# Patient Record
Sex: MALE | Race: WHITE | NOT HISPANIC OR LATINO | Employment: FULL TIME | URBAN - METROPOLITAN AREA
[De-identification: names, ages, dates, MRNs, and addresses within clinical notes are randomized per-mention and may not be internally consistent; named-entity substitution may affect disease eponyms.]

---

## 2017-03-18 ENCOUNTER — HOSPITAL ENCOUNTER (INPATIENT)
Facility: HOSPITAL | Age: 62
LOS: 19 days | Discharge: NURSING, REHAB OR LONG-TERM CARE | DRG: 023 | End: 2017-04-06
Attending: EMERGENCY MEDICINE | Admitting: PSYCHIATRY & NEUROLOGY
Payer: COMMERCIAL

## 2017-03-18 ENCOUNTER — TELEMEDICINE (OUTPATIENT)
Dept: TELEMEDICINE | Facility: OTHER | Age: 62
End: 2017-03-18
Payer: COMMERCIAL

## 2017-03-18 DIAGNOSIS — I82.411 DVT OF DEEP FEMORAL VEIN, RIGHT: ICD-10-CM

## 2017-03-18 DIAGNOSIS — Q23.9 ABNORMALITY OF AORTIC VALVE: ICD-10-CM

## 2017-03-18 DIAGNOSIS — I63.512 ACUTE ISCHEMIC LEFT MCA STROKE: Primary | ICD-10-CM

## 2017-03-18 DIAGNOSIS — R53.1 RIGHT SIDED WEAKNESS: ICD-10-CM

## 2017-03-18 DIAGNOSIS — R13.10 DYSPHAGIA, UNSPECIFIED TYPE: ICD-10-CM

## 2017-03-18 DIAGNOSIS — G93.6 CYTOTOXIC CEREBRAL EDEMA: ICD-10-CM

## 2017-03-18 DIAGNOSIS — I63.412 EMBOLIC STROKE INVOLVING LEFT MIDDLE CEREBRAL ARTERY: ICD-10-CM

## 2017-03-18 DIAGNOSIS — R53.1 ACUTE RIGHT-SIDED WEAKNESS: ICD-10-CM

## 2017-03-18 DIAGNOSIS — I10 ESSENTIAL HYPERTENSION: ICD-10-CM

## 2017-03-18 DIAGNOSIS — I63.312 THROMBOTIC STROKE INVOLVING LEFT MIDDLE CEREBRAL ARTERY: ICD-10-CM

## 2017-03-18 DIAGNOSIS — R47.01 APHASIA: ICD-10-CM

## 2017-03-18 LAB
ABO + RH BLD: NORMAL
ALBUMIN SERPL BCP-MCNC: 3.4 G/DL
ALLENS TEST: ABNORMAL
ALP SERPL-CCNC: 77 U/L
ALT SERPL W/O P-5'-P-CCNC: 14 U/L
ANION GAP SERPL CALC-SCNC: 9 MMOL/L
APTT BLDCRRT: 28.5 SEC
AST SERPL-CCNC: 20 U/L
BASOPHILS # BLD AUTO: 0.05 K/UL
BASOPHILS NFR BLD: 0.4 %
BILIRUB SERPL-MCNC: 0.7 MG/DL
BLD GP AB SCN CELLS X3 SERPL QL: NORMAL
BUN SERPL-MCNC: 16 MG/DL
CALCIUM SERPL-MCNC: 8.8 MG/DL
CHLORIDE SERPL-SCNC: 108 MMOL/L
CHOLEST/HDLC SERPL: 4.9 {RATIO}
CO2 SERPL-SCNC: 22 MMOL/L
CREAT SERPL-MCNC: 1 MG/DL
DELSYS: ABNORMAL
DIFFERENTIAL METHOD: ABNORMAL
EOSINOPHIL # BLD AUTO: 0.1 K/UL
EOSINOPHIL NFR BLD: 1.1 %
ERYTHROCYTE [DISTWIDTH] IN BLOOD BY AUTOMATED COUNT: 13.5 %
EST. GFR  (AFRICAN AMERICAN): >60 ML/MIN/1.73 M^2
EST. GFR  (NON AFRICAN AMERICAN): >60 ML/MIN/1.73 M^2
GLUCOSE SERPL-MCNC: 83 MG/DL
HCO3 UR-SCNC: 19.3 MMOL/L (ref 24–28)
HCT VFR BLD AUTO: 44.7 %
HDL/CHOLESTEROL RATIO: 20.6 %
HDLC SERPL-MCNC: 175 MG/DL
HDLC SERPL-MCNC: 36 MG/DL
HGB BLD-MCNC: 15.6 G/DL
INR PPP: 1.1
LDLC SERPL CALC-MCNC: 123.8 MG/DL
LYMPHOCYTES # BLD AUTO: 2.5 K/UL
LYMPHOCYTES NFR BLD: 22.4 %
MCH RBC QN AUTO: 31.8 PG
MCHC RBC AUTO-ENTMCNC: 34.9 %
MCV RBC AUTO: 91 FL
MONOCYTES # BLD AUTO: 1.2 K/UL
MONOCYTES NFR BLD: 10.6 %
NEUTROPHILS # BLD AUTO: 7.4 K/UL
NEUTROPHILS NFR BLD: 65.3 %
NONHDLC SERPL-MCNC: 139 MG/DL
PCO2 BLDA: 32.3 MMHG (ref 35–45)
PH SMN: 7.38 [PH] (ref 7.35–7.45)
PLATELET # BLD AUTO: 234 K/UL
PMV BLD AUTO: 10.3 FL
PO2 BLDA: 72 MMHG (ref 80–100)
POC BE: -6 MMOL/L
POC SATURATED O2: 94 % (ref 95–100)
POC TCO2: 20 MMOL/L (ref 23–27)
POCT GLUCOSE: 93 MG/DL (ref 70–110)
POTASSIUM SERPL-SCNC: 4 MMOL/L
PROT SERPL-MCNC: 6.5 G/DL
PROTHROMBIN TIME: 11.5 SEC
RBC # BLD AUTO: 4.91 M/UL
SAMPLE: ABNORMAL
SITE: ABNORMAL
SODIUM SERPL-SCNC: 139 MMOL/L
T4 FREE SERPL-MCNC: 1.24 NG/DL
TRIGL SERPL-MCNC: 76 MG/DL
TSH SERPL DL<=0.005 MIU/L-ACNC: 0.17 UIU/ML
WBC # BLD AUTO: 11.34 K/UL

## 2017-03-18 PROCEDURE — 80053 COMPREHEN METABOLIC PANEL: CPT

## 2017-03-18 PROCEDURE — 81003 URINALYSIS AUTO W/O SCOPE: CPT

## 2017-03-18 PROCEDURE — 25000003 PHARM REV CODE 250: Performed by: PHYSICIAN ASSISTANT

## 2017-03-18 PROCEDURE — 93005 ELECTROCARDIOGRAM TRACING: CPT

## 2017-03-18 PROCEDURE — 25500020 PHARM REV CODE 255: Performed by: EMERGENCY MEDICINE

## 2017-03-18 PROCEDURE — 99245 OFF/OP CONSLTJ NEW/EST HI 55: CPT | Mod: GT,,, | Performed by: PSYCHIATRY & NEUROLOGY

## 2017-03-18 PROCEDURE — 96365 THER/PROPH/DIAG IV INF INIT: CPT

## 2017-03-18 PROCEDURE — 037L3DZ DILATION OF LEFT INTERNAL CAROTID ARTERY WITH INTRALUMINAL DEVICE, PERCUTANEOUS APPROACH: ICD-10-PCS | Performed by: PSYCHIATRY & NEUROLOGY

## 2017-03-18 PROCEDURE — 93010 ELECTROCARDIOGRAM REPORT: CPT | Mod: ,,, | Performed by: INTERNAL MEDICINE

## 2017-03-18 PROCEDURE — 83036 HEMOGLOBIN GLYCOSYLATED A1C: CPT

## 2017-03-18 PROCEDURE — 99291 CRITICAL CARE FIRST HOUR: CPT | Mod: ,,, | Performed by: EMERGENCY MEDICINE

## 2017-03-18 PROCEDURE — 85610 PROTHROMBIN TIME: CPT

## 2017-03-18 PROCEDURE — 20000000 HC ICU ROOM

## 2017-03-18 PROCEDURE — 85730 THROMBOPLASTIN TIME PARTIAL: CPT

## 2017-03-18 PROCEDURE — 86900 BLOOD TYPING SEROLOGIC ABO: CPT

## 2017-03-18 PROCEDURE — 80061 LIPID PANEL: CPT

## 2017-03-18 PROCEDURE — 99291 CRITICAL CARE FIRST HOUR: CPT | Mod: 25

## 2017-03-18 PROCEDURE — 36600 WITHDRAWAL OF ARTERIAL BLOOD: CPT

## 2017-03-18 PROCEDURE — 82803 BLOOD GASES ANY COMBINATION: CPT

## 2017-03-18 PROCEDURE — 99223 1ST HOSP IP/OBS HIGH 75: CPT | Mod: ,,, | Performed by: PSYCHIATRY & NEUROLOGY

## 2017-03-18 PROCEDURE — 94760 N-INVAS EAR/PLS OXIMETRY 1: CPT

## 2017-03-18 PROCEDURE — 86901 BLOOD TYPING SEROLOGIC RH(D): CPT

## 2017-03-18 PROCEDURE — 84439 ASSAY OF FREE THYROXINE: CPT

## 2017-03-18 PROCEDURE — 03CG3ZZ EXTIRPATION OF MATTER FROM INTRACRANIAL ARTERY, PERCUTANEOUS APPROACH: ICD-10-PCS | Performed by: PSYCHIATRY & NEUROLOGY

## 2017-03-18 PROCEDURE — 96375 TX/PRO/DX INJ NEW DRUG ADDON: CPT

## 2017-03-18 PROCEDURE — 99291 CRITICAL CARE FIRST HOUR: CPT | Mod: ,,, | Performed by: NURSE PRACTITIONER

## 2017-03-18 PROCEDURE — 25000003 PHARM REV CODE 250: Performed by: NURSE PRACTITIONER

## 2017-03-18 PROCEDURE — 85025 COMPLETE CBC W/AUTO DIFF WBC: CPT

## 2017-03-18 PROCEDURE — 63600175 PHARM REV CODE 636 W HCPCS: Performed by: PSYCHIATRY & NEUROLOGY

## 2017-03-18 PROCEDURE — B3141ZZ FLUOROSCOPY OF LEFT COMMON CAROTID ARTERY USING LOW OSMOLAR CONTRAST: ICD-10-PCS | Performed by: PSYCHIATRY & NEUROLOGY

## 2017-03-18 PROCEDURE — 84443 ASSAY THYROID STIM HORMONE: CPT

## 2017-03-18 RX ORDER — SODIUM CHLORIDE 9 MG/ML
INJECTION, SOLUTION INTRAVENOUS CONTINUOUS
Status: DISCONTINUED | OUTPATIENT
Start: 2017-03-18 | End: 2017-03-19

## 2017-03-18 RX ORDER — POLYETHYLENE GLYCOL 3350 17 G/17G
17 POWDER, FOR SOLUTION ORAL DAILY
Status: DISCONTINUED | OUTPATIENT
Start: 2017-03-19 | End: 2017-03-20

## 2017-03-18 RX ORDER — MIDAZOLAM HYDROCHLORIDE 1 MG/ML
INJECTION, SOLUTION INTRAMUSCULAR; INTRAVENOUS CODE/TRAUMA/SEDATION MEDICATION
Status: COMPLETED | OUTPATIENT
Start: 2017-03-18 | End: 2017-03-18

## 2017-03-18 RX ORDER — IODIXANOL 320 MG/ML
100 INJECTION, SOLUTION INTRAVASCULAR
Status: COMPLETED | OUTPATIENT
Start: 2017-03-18 | End: 2017-03-18

## 2017-03-18 RX ORDER — AMOXICILLIN 250 MG
1 CAPSULE ORAL 2 TIMES DAILY
Status: DISCONTINUED | OUTPATIENT
Start: 2017-03-18 | End: 2017-03-20

## 2017-03-18 RX ORDER — SODIUM CHLORIDE 0.9 % (FLUSH) 0.9 %
3 SYRINGE (ML) INJECTION EVERY 8 HOURS
Status: DISCONTINUED | OUTPATIENT
Start: 2017-03-18 | End: 2017-04-06 | Stop reason: HOSPADM

## 2017-03-18 RX ORDER — HYDRALAZINE HYDROCHLORIDE 20 MG/ML
10 INJECTION INTRAMUSCULAR; INTRAVENOUS EVERY 4 HOURS PRN
Status: DISCONTINUED | OUTPATIENT
Start: 2017-03-18 | End: 2017-03-27

## 2017-03-18 RX ORDER — NICARDIPINE HYDROCHLORIDE 0.2 MG/ML
2.5 INJECTION INTRAVENOUS CONTINUOUS
Status: DISCONTINUED | OUTPATIENT
Start: 2017-03-18 | End: 2017-03-20

## 2017-03-18 RX ORDER — ONDANSETRON 2 MG/ML
4 INJECTION INTRAMUSCULAR; INTRAVENOUS EVERY 12 HOURS PRN
Status: DISCONTINUED | OUTPATIENT
Start: 2017-03-18 | End: 2017-04-06 | Stop reason: HOSPADM

## 2017-03-18 RX ADMIN — IOHEXOL 100 ML: 350 INJECTION, SOLUTION INTRAVENOUS at 04:03

## 2017-03-18 RX ADMIN — SODIUM CHLORIDE: 0.9 INJECTION, SOLUTION INTRAVENOUS at 05:03

## 2017-03-18 RX ADMIN — MIDAZOLAM HYDROCHLORIDE 1 MG: 1 INJECTION, SOLUTION INTRAMUSCULAR; INTRAVENOUS at 05:03

## 2017-03-18 RX ADMIN — NICARDIPINE HYDROCHLORIDE 2.5 MG/HR: 0.2 INJECTION, SOLUTION INTRAVENOUS at 05:03

## 2017-03-18 RX ADMIN — IODIXANOL 60 ML: 320 INJECTION, SOLUTION INTRAVASCULAR at 05:03

## 2017-03-18 NOTE — PROGRESS NOTES
Assumed care of patient in  from ED RN. Pt placed on IR table and Dr. Naidu at bedside. Connected to IR monitor and oxygen per IR nurse. Will continue to monitor patient.

## 2017-03-18 NOTE — PROGRESS NOTES
IR procedures completed. Right groin dressing noted to be clean dry and intact. Pulses palpable. Per Dr. Naidu, systolic blood pressure to be controlled less than 140. Cardene drip started at 1720. Pt connected to portable ICU monitor and placed on ICU bed. Pt transported back to [facility] and brought to room 7088. Report given to ADINA Whitaker. CAROLINE Limon with [facility] notified of patient's arrival.

## 2017-03-18 NOTE — PROCEDURES
Radiology Post-Procedure Note    Pre Op Diagnosis: L ICA stenosis and MCA occlusion    Post Op Diagnosis: no stenosis of L ICA (stent) and TICI 3 L MCA    Procedure: Cerebral angiogram, carotid stent and aspiration thrombectomy    Procedure performed by: Guillaume Naidu MD    Written Informed Consent Obtained: Yes    Specimen Removed: YES clot    Estimated Blood Loss: 200     Procedure report:     A 8F sheath was placed into the right femoral artery and a 5F Ken catheter was advanced into the aortic arch.  The left common/internal arteries were subselected and angiography of the brain was performed after injection into each of these vessels.    Preliminary interpretation: L ICA stenosis < 90% and L MCA occlusion, L ICA stenosis treated to 0% stenosis w angioplasty and stent; L MCA occlusion treated to TICI3 w aspiration.  Please see Imaging report for full details.    A right femoral artery angiogram was performed, the sheath removed and hemostasis achieved using angioseal.  No hematoma was present at the time of hemostasis.    The patient tolerated the procedure well.     Guillaume Naidu MD  Vascular and Interventional Neurology Staff  Director of [facility]  [phone]

## 2017-03-18 NOTE — TELEMEDICINE CONSULT
[facility]/Vascular Neurology  Tele-Consult    Consultation started: 3/18/2017 at 12:25 PM   The chief complaint leading to stroke consultation is:  stroke  The referring MD that requested this consult is: margie  The patient location is [facility]  Spoke hospital nurse at bedside with patient assisting consultant.     Subjective:     History of Present Illness:  Nathanael Velez is a 61 y.o. male who presents with L MCA syndrome    Many unknowns- lives in [place] and receives rachel care there, now aphasic  HXo CAD with recent cardiac cath. In last several days. HTN, HLD, heavy smoker  LSN 10:00. Found in car at 1110 and to ED  CT head Hyperdense LMCA, otherwise normal  Exam with LMCA syndrome.   Last known normal:   1000 as determined over phne with family  Patient information was obtained from relative(s).    Past Medical History: hypertension, high cholesterol, CAD and heart problems    Past Surgical History: any recent surgeries (within last month) cardiac cath in last several days    Family History: unable to obtain    Social History: smoker (active)    Medications: No current outpatient prescriptions on file.    Allergies: Review of patient's allergies indicates:  Allergies not on file    Review Of Systems:     Unable to obtain due to aphasia      Objective:     Vitals: There were no vitals filed for this visit.     Physical Exam:  General: well developed, well nourished, severe distress   HENT: Head:normocephalic and atraumatic   HENT: Ears: right ear normal and left ear normal  Nose: normal nares and no discharge  Eyes:conjunctivae/corneas clear. PERRL.  Neck: normal, no obvious masses and trachea to midline  Lungs: normal respiratory effort  Cardiovascular: Heart: regular rate and rhythm   Cardiovascular: Extremities: no cyanosis, no edema and no clubbing  Abdomen: appears normal and not distended  Skin:  skin color and texture normal, no rash and no bruises  Musculoskeletal: normal  range of motion in all extremities  Psych/Behavioral: confused, inappropriate affect and inappropriate insight      NIH Stroke Scale:  Interval: baseline (upon arrival/admit)  Level of Consciousness: 1 - drowsy  LOC Questions: 2 - answers none correctly  LOC Commands: 2 - performs neither correctly  Best Gaze: 2 - forced deviation  Visual: 1 - partial hemianopia  Facial Palsy: 2 - partial  Motor Left Arm: 0 - no drift  Motor Right Arm: 4 - no movement  Motor Left Le - no drift  Motor Right Le - no movement  Limb Ataxia: 0 - absent  Sensory: 1 - mild to moderate loss  Best Language: 3 - mute  Dysarthria: 2 - near to unintelligible  Extinction and Inattention: 0 - no neglect  NIH Stroke Scale Total: 24          Laboratory Data:   No results found for: EXTCAPBLOODG  No results found for: GLUCOSE   No results found for: EXTPT  No results found for: LABPROT  No results found for: EXTPTT   No results found for: APTT  No results found for: EXTINR  No results found for: INR  No results found for: EXTCREATININ  No results found for: CREATININE  No results found for: EXTPLATELETS   No results found for: PLT     Assessment - Diagnosis - Goals:     Impression: Nathanael Velez 61 y.o. male with acute L MCA syndrome  Hyper dense L MCA on CT  LSN about 1000  Several risk factors- CAD, HTN, HLD, smoking  Several unknowns- no blood thinners with his meds. Some type of cath procedure in last several days to right groin for CAD or PAD- family not aware of specifis    Imaging Notes: Abnormal CT L MCA hyperdense. Impression performed at: 1225     Diagnosis: Carotid stenosis or occlusion with stroke (I63.9)  Other Diagnosis: I10 Hypertension  E78.5 Hyperlipidemia  Z72.0 Smoker  I25.10 CAD  M62.89 Acute right side weakness    TPA Recommendation:   tPA Recommendation   tPA Total Dose:          Bolus Dose       Intravenously over 1 minute (10% of Total Dose)   Infusion Dose       Continuous Infusion over 60 Minutes     Additional  Recommendations:   1. Neurological assessment and vital signs (except temperature) every 15 minutes during tPA infusion.  2. Frequency of BP assessments may need to be increased if systolic BP stays >= 180 mm Hg or diastolic BP stays >= 105 mm Hg. Administer antihypertensive meds as ordered  3. Continue to monitor and control blood pressure and monitor for neurological deterioration every 15 minutes for the first hour after the infusion is stopped. Then every 30 minutes for the next 6 hours. Perform hourly monitoring from the 8th post-infusion hour until 24 hours post-infusion.  4. Temperature every 4 hours or as required.  5. Follow hospital protocol for further orders re: post tPA infusion patient management.  6. No antithrombotics or anticoagulants (including but not limited to: heparin, warfarin, aspirin, clopidigrel, or dipyridamole) for 24 hours, then start antithrombotics as ordered by treating physician    Adapted from the American Heart Association/American Stroke Association (AHA/ASA) and American Association of Neuroscience Nurses (AANN) Guidelines.  Recommendation given at: 1230    Possible Interventional Revascularization Candidate? Yes    Recommendation: NPO until after pass dysphagia screen Diagnostic studies: CTA Head to assess vasculature , CTA Neck/Arch to assess vasculature, HgbA1C to assess blood glucose levels, Lipid Profile to assess cholesterol levels, MRI head without contrast to assess brain parenchyma, Trans-thoracic cardiac echo to assess cardiac function/status    Disposition Recommendation:  transfer to [facility] by  air  stat    Risk/Benefit Discussion: Risks and benefit of treatment (if indicated) were discussed with staff physician.     Additional Work up Recommended in the ED: IV TPA, BP control, IV hydration, prep for transport    Consulting clinician was informed of the encounter and consult note.    Consultation ended: 3/18/2017 at 1245  .

## 2017-03-18 NOTE — CONSULTS
[facility]  Vascular Neurology  Comprehensive Stroke Center  Consult Note      Consults  Subjective:     History of Present Illness:  61 year old male with pmh of HTN, HLD, and CAD who presents after receiving tPA at [facility] for CTA multiphase and potential IR thrombectomy. The patients tpa infusion ended at 1347 and was seen in the ED at 1528 upon arrival. The patient with symptoms of right sided weakness, left gaze preference, aphasia, numbness of the right side and facial droop - last known normal at 10 am. The patients son was contacted and advised of his arrival and verified the patient has no known drug allergies   The patients ems transport reported some spontaneous movement of the Rside while in transport. Remains aphasic.            No past medical history on file.  No past surgical history on file.  No family history on file.  Social History   Substance Use Topics    Smoking status: Not on file    Smokeless tobacco: Not on file    Alcohol use Not on file     Review of patient's allergies indicates:  Allergies not on file  Medications: I have reviewed the current medication administration record.      (Not in a hospital admission)    Review of Systems   Constitutional: Negative for chills and fever.   HENT: Positive for drooling.    Eyes: Positive for visual disturbance.   Respiratory: Negative for shortness of breath.    Cardiovascular: Negative for leg swelling.   Gastrointestinal: Negative for diarrhea and vomiting.   Genitourinary: Negative for hematuria.   Musculoskeletal: Positive for gait problem.   Neurological: Positive for facial asymmetry, speech difficulty and weakness.   Psychiatric/Behavioral: Negative for agitation.     Objective:     Vital Signs (Most Recent):  Pulse: (!) 58 (03/18/17 1530)  Resp: 16 (03/18/17 1530)  BP: (!) 156/90 (03/18/17 1530)  SpO2: 98 % (03/18/17 1530)    Vital Signs Range (Last 24H):  Pulse:  [58]   Resp:  [16]   BP: (156)/(90)   SpO2:  [98  %]     Physical Exam   Constitutional: He appears well-developed and well-nourished.   HENT:   Head: Normocephalic and atraumatic.   Eyes: Pupils are equal, round, and reactive to light.   Neck: Normal range of motion.   Cardiovascular: Normal rate.    Pulmonary/Chest: Effort normal.   Abdominal: Soft. There is no tenderness.   Musculoskeletal:   Right weakness    Neurological: He is alert. GCS eye subscore is 4 - spontaneous. GCS verbal subscore is 1 - none. GCS motor subscore is 4 - withdraws from pain.   Skin: Skin is warm.   Psychiatric: He has a normal mood and affect.       Neurological Exam:   LOC: alert and does not follow requests  Language: Global aphasia  Speech: Mute  Orientation: Mute  Memory: Mute  Visual Fields (CN II): Hemianopsia  right  EOM (CN III, IV, VI): Gaze preference left  Oculocephalics: normal  Pupils (CN III, IV, VI): PERRL  Facial Sensation (CN V): Facial sensory loss right upper and right lower  Facial Movement (CN VII): lower weakness right lower  Hearing (CN VIII): intact bilaterally  Tongue (CN XII): doesnt follow commands to stick tongue out  Motor*: Arm Left:  Normal (5/5), Leg Left:   Normal (5/5), Arm Right:   Paretic:  1/5, Leg Right:   Paretic:  1/5  Cerebellar*: Not testable due to aphasia  Sensation: siddharth-hypoesthesia right  Tone: Arm-Left: normal; Leg-Left: normal; Arm-Right: flaccid; Leg-Right: flaccid    Stroke Team Times:   Last Known Normal Date and Time : 3/18/869638:00  Symptom Onset Date and Time:3/18/596992:00  Stroke Team Called Date and Time: 3/18/2017Call team paged: seen via telemedicine     Stroke Team Arrived Date and Time: 3/18/838506:28  CT Interpretation Time: 15:58  Intervention Time: 13:47 (end time on tpa documented as 1347. went to IR thrombectomy after cta multiphase)  NIH Stroke Scale:  Interval: baseline (upon arrival/admit)  Level of Consciousness: 0 - alert  LOC Questions: 2 - answers none correctly  LOC Commands: 2 - performs neither  correctly  Best Gaze: 2 - forced deviation  Visual: 2 - complete hemianopia  Facial Palsy: 1 - minor  Motor Left Arm: 0 - no drift  Motor Right Arm: 3 - no effort against gravity  Motor Left Le - no drift  Motor Right Leg: 3 - no effort against gravity  Limb Ataxia: 0 - absent  Sensory: 1 - mild to moderate loss  Best Language: 3 - mute  Dysarthria: 0 - normal articulation  Extinction and Inattention: 0 - no neglect  NIH Stroke Scale Total: 19  Benson Coma Scale:  Best Eye Response: 4 - spontaneous  Best Motor Response: 4 - withdraws from pain  Best Verbal Response: 1 - none  Benson Coma Scale Total: 9  Modified Willcox Scale:   Timeline: Prior to symptoms onset  Modified Willcox Score: 0 - no symptoms        Laboratory:  CMP: No results for input(s): GLUCOSE, CALCIUM, ALBUMIN, PROT, NA, K, CO2, CL, BUN, CREATININE, ALKPHOS, ALT, AST, BILITOT in the last 24 hours.  CBC: No results for input(s): WBC, RBC, HGB, HCT, PLT, MCV, MCH, MCHC in the last 168 hours.  Lipid Panel: No results for input(s): CHOL, LDLCALC, HDL, TRIG in the last 168 hours.  Coagulation: No results for input(s): INR, APTT in the last 168 hours.    Invalid input(s): PT  Platelet Aggregation Study: No results for input(s): PLTAGG, PLTAGINTERP, PLTAGREGLACO, ADPPLTAGGREG in the last 168 hours.  Hgb A1C: No results for input(s): HGBA1C in the last 168 hours.  TSH: No results for input(s): TSH in the last 168 hours.    Diagnostic Results:  Brain Imaging:  L carotid stenosis on CT multiphase, going to IR angio for potential thrombectomy     Assessment/Plan:     Patient is a 61 y.o. year old male with:    * Thrombotic stroke involving left middle cerebral artery  Arrived and had STAT cta multiphase - patient with critical carotid stenosis, still with significant deficit post tPA -going for IR thrombectomy.   Admit to [facility]     Antithrombotics for secondary stroke prevention: None: Reason:  Hold all Antithrombotics x 24 hours after IV t-PA  administration  Statins for secondary stroke prevention and hyperlipidemia, if present: Atorvastatin- 40 mg oral daily  Aggressive risk factor modification: Hypertension, High Cholesterol and Obesity  Rehab Efforts: Physical Therapy, Occupational Therapy, Speech and Language Pathology and when appropriate, Diagnostics: Ordered/Pending HgbA1C to assess blood glucose levels, Lipid Profile to assess cholesterol levels, MRI head without contrast to assess brain parenchyma  VTE Prophylaxis: None: Reason for No Pharmacological VTE Prophylaxis: Mechanical prophylaxis: Place SCDs  BP Parameters: SBP <180  Nursing Orders: Neuro checks- per tpa protocol, Antiembolic stockings, Swallowing evaluation by Nursing, Seizure precautions, Avoid Rucker catheter, Pneumatic compression device, Stroke Education, Blood glucose target 100-130, Up with assistance                             Right sided weakness  Given tpa   Due to stroke above  Aggressive PT/OT/ speech when appropriate    Aphasia  Given tpa   Due to stroke above  Aggressive PT/OT/ speech when appropriate    Hypertension  Stroke RF  <180 post tpa and thrombectomy   Optimize management       Thrombolysis Candidate? Yes. given at outside facility  1. Contraindications: none    Interventional Revascularization Candidate?  Yes    Research Candidate? No    AC Philippe  Comprehensive Stroke Center  Department of Vascular Neurology   [facility]

## 2017-03-18 NOTE — PROGRESS NOTES
Patient arrived to [facility] from ED/IR at 1740    Type of Stroke ischemic Left MCA    TPA start time and end time (if applicable) tPA end time 1347 3/18/17, thrombectomy end time 3/18/17 6844    Patients current symptoms include right sided facial droop, right sided weakness left visual gaze and aphasia

## 2017-03-18 NOTE — ED PROVIDER NOTES
Encounter Date: 3/18/2017    SCRIBE #1 NOTE: IMartha, am scribing for, and in the presence of, Dr. Ma.       History     Chief Complaint   Patient presents with    Cerebrovascular Accident     Patient sent for further evaluation and treatment of MCA stroke after TPA administration.     Review of patient's allergies indicates:  Allergies not on file  HPI Comments: Time seen by provider: 3:36 PM    This is a 61 y.o. male with a history of HTN, HLD, and CAD who presented with right sided hemiplegia and aphasia to facility in [place] with evidence of left MCA injury on head CT and presents here for further evaluation and management of acute stroke symptoms.      The history is provided by medical records. The history is limited by the condition of the patient.     No past medical history on file.  No past surgical history on file.  No family history on file.  Social History   Substance Use Topics    Smoking status: Not on file    Smokeless tobacco: Not on file    Alcohol use Not on file     Review of Systems   Unable to perform ROS: Acuity of condition   Neurological: Positive for speech difficulty.        Hemiplegia        Physical Exam   Initial Vitals   BP Pulse Resp Temp SpO2   03/18/17 1530 03/18/17 1530 03/18/17 1530 -- 03/18/17 1530   156/90 58 16  98 %     Physical Exam    Nursing note and vitals reviewed.  Constitutional: He appears distressed (moderate).   61 year old  man.    HENT:   Head: Normocephalic and atraumatic.   No intraoral lesions noted   Eyes:   4 mm bilaterally and reactive   Cardiovascular: Normal rate, regular rhythm and intact distal pulses.   Pulmonary/Chest:   Diminished breath sounds at bilateral bases   Abdominal: He exhibits no distension. There is no tenderness.   Neurological:   Does not exhibit spontaneous movement of right side with right sided neglect. Aphasic. Does not follow commands.         ED Course   Critical Care  Date/Time: 3/18/2017 3:36  PM  Performed by: AKILA MARKS  Authorized by: AKILA MARKS   Direct patient critical care time: 15 minutes  Additional history critical care time: 5 minutes  Ordering / reviewing critical care time: 10 minutes  Documentation critical care time: 5 minutes  Consulting other physicians critical care time: 10 minutes  Total critical care time (exclusive of procedural time) : 45 minutes  Critical care was necessary to treat or prevent imminent or life-threatening deterioration of the following conditions: CNS failure or compromise.  Critical care was time spent personally by me on the following activities: pulse oximetry, obtaining history from patient or surrogate, re-evaluation of patient's condition, ordering and performing treatments and interventions, interpretation of cardiac output measurements, discussions with consultants, evaluation of patient's response to treatment, ordering and review of laboratory studies, review of old charts, examination of patient and ordering and review of radiographic studies.        Labs Reviewed   CBC W/ AUTO DIFFERENTIAL - Abnormal; Notable for the following:        Result Value    MCH 31.8 (*)     Mono # 1.2 (*)     All other components within normal limits   COMPREHENSIVE METABOLIC PANEL   PROTIME-INR   TSH   LIPID PANEL   HEMOGLOBIN A1C   APTT   URINALYSIS   TYPE & SCREEN   POCT GLUCOSE MONITORING CONTINUOUS        Imaging Results         CTA STROKE MULTI-PHASE (Final result) Result time:  03/18/17 16:32:34    Final result by Dayday Dietrich DO (03/18/17 16:32:34)    Impression:     CT head: No evidence for acute intracranial hemorrhage. There is ill-defined decreased attenuation of left MCA distribution specifically left basal ganglia and left insula concerning for recent area of infarction. No evidence for hemorrhagic confusion. Ventricles normal without hydrocephalus.    CTA head: Occlusion left cervical ICA with reconstitution at the cavernous segment. In  addition there is a ipsilateral occlusion of the left M1 segment of the MCA proximally. There is reconstituted M2 and M3 MCA candelabra on all 3 phases.    CTA neck: Atherosclerotic disease carotid bifurcations and proximal ICAs bilaterally with high-grade critical stenosis or short segment occlusion of the left proximal ICA and more distal left cervical occlusion or reduced flow-related to the distal occlusion.    There is high-grade critical stenosis of the right ICA at its origin with only trace flow identified.    Atherosclerotic disease of the origin the vertebral arteries the dominant left vertebral artery. There is questioned stenosis at the vertebral artery origins bilaterally limited by calcified plaque and high density venous contrast.    Please note there is advanced degenerative change in the cervical spine with moderate to severe stenosis in the central canal allowing for CT technique at C5/C6.      Please see above for additional details.      Electronically signed by: CECILE LOVE DO  Date:     03/18/17  Time:    16:32     Narrative:       Procedure: Postcontrast CTA of the head and neck    Technique:5-mm axial images the head  pre-contrast . 0.5 mm axial CTA images of the head and neck postcontrast with coronal and sagittal reformatted imaging. Please note this study was performed in multiphase technique with 3 arterial passes through the head. 100 cc of Omnipaque 350 IV contrast administered.      Comparison:None          Results:    CT headwithout contrast:  There is no evidence for acute intracranial hemorrhage. Ill-defined decreased attenuation within the left MCA distribution primarily in the left basal ganglia and insula concerning for recent area of infarction. Age-appropriate generalized cerebral volume Loss. Ventricles normal without hydrocephalus. No midline shift or significant mass effect globular focus of maxillary antra suggestive for mucous retention cyst or polyp. .    CTA  head:  Anterior circulation: There is occlusion of the left cervical ICA with reconstitution of the mid cavernous segment with atherosclerotic plaquing. There is patent left DARIEN with small caliber flow in the left proximal M1 segment of the MCA with occlusion in its proximal aspect which persists on all 3 phases. There is collateralization with reconstitution of the left MCA candelabra on all 3 phases    The right distal cervical, features cavernous and supraclinoid ICAs are patent. The right anterior and posterior arteries are patent without focal proximal stenosis or occlusion.        Posterior circulation: The distal left vertebral artery is dominant.  The distal vertebral arteries, basilar artery and posterior cerebral arteries are patent without significant focal stenosis or aneurysm.  Probable small bilateral PCOMS.    CTA Neck:  Atherosclerotic plaquing of the arch and origin of the great vessels without significant focal stenosis. The origins of the right brachycephalic,  left common carotid and left subclavian arteries from the arch are within normal limits. There is atherosclerotic plaque in origin the right vertebral artery which is partially obscured by calcification and venous contrast with question high-grade stenosis in the proximal right vertebral artery. There is flow identified in the remaining right vertebral artery without additional significant focal stenosis. The left vertebral artery origin has atherosclerotic plaquing with questionable mottled stenosis. Left vertebral artery is dominant otherwise patent throughout its course.    .      Right carotid: The right common carotid artery is patent. There is heavy atherosclerotic plaquing of the right carotid bifurcation and proximal ICA with resultant high-grade critical stenosis of the right ICA at its origin with only trace flow identified suggestive for greater than 80-90% stenosis by NASCET criteria.    Left carotid: The left common carotid  artery, carotid lactation are patent. There is heavy atherosclerotic plaque in the carotid bifurcation with high-grade critical stenosis of the left ICA origin versus short segment occlusion. There is trickle of contrast identified in the cervical ICA distally with probable occlusion in the distal cervical ICA versus reduced flow related to distal occlusion with left M1 occlusion detailed above    .  Pharynx/larynx: Please note evaluation somewhat limited by  patient motion.  Allowing for limitation the nasopharynx, oral pharynx,, hypopharynx, larynx and visualized portion of the trachea are within normal limits.    The oral cavity and buccal space are limited partial exclusion    Glands: The bilateral parotid, submandibular and thyroid glands are within normal limits.    No evidence for adenopathy throughout the neck by size criteria.    Degenerative change in the cervical spine without evidence for acute fracture subluxation. On for CT technique degenerative changes most prominent at C5/C6 with posterior disc osteophyte with probable severe central canal stenosis. Uncovertebral joint hypertrophy and facet arthropathy with at least moderate to severe neural foraminal stenosis.     No significant air space consolidation within the visualized lungs are mild scattered centrilobular emphysema..        Case was discussed with Dr. Naidu at 16:09 on 3/18/17                Medical Decision Making:   History:   Old Medical Records: I decided to obtain old medical records.  Differential Diagnosis:   My initial differential diagnoses include altered mental status, acute ischemic stroke, hemorrhagic conversion, and cerebral edema.  Independently Interpreted Test(s):   I have ordered and independently interpreted X-rays - see prior notes.  Clinical Tests:   Lab Tests: Ordered and Reviewed  Radiological Study: Ordered and Reviewed  Other:   I have discussed this case with another health care provider.       <> Summary of the  Discussion: Vascular Neurology, Neuro ICU            Scribe Attestation:   Scribe #1: I performed the above scribed service and the documentation accurately describes the services I performed. I attest to the accuracy of the note.    Attending Attestation:           Physician Attestation for Scribe:  Physician Attestation Statement for Scribe #1: I, Dr. Ma, reviewed documentation, as scribed by Martha Anthony in my presence, and it is both accurate and complete.         Attending ED Notes:   4:50 PM - Patient has been emergently evaluated by Vascular Neurology and undergone emergent CTA multiphase. Despite thrombolytic therapy, patient does not exhibit significant improvement of neurologic deficits. He will be taken for emergent intravascular procedure and will be admitted to Neuro ICU in serious condition.           ED Course     Clinical Impression:   The encounter diagnosis was Acute ischemic left MCA stroke.    Disposition:   Disposition: Admitted  Condition: Serious  Neuro ICU/ Vascular neurology       Mahamed Ma MD  03/29/17 0034

## 2017-03-18 NOTE — IP AVS SNAPSHOT
[facility]  [address]  [facility] [address]  Phone: [phone]           Patient Discharge Instructions   Our goal is to set you up for success. This packet includes information on your condition, medications, and your home care.  It will help you care for yourself to prevent having to return to the hospital.     Please ask your nurse if you have any questions.      There are many details to remember when preparing to leave the hospital. Here is what you will need to do:    1. Take your medicine. If you are prescribed medications, review your Medication List on the following pages. You may have new medications to  at the pharmacy and others that you'll need to stop taking. Review the instructions for how and when to take your medications. Talk with your doctor or nurses if you are unsure of what to do.     2. Go to your follow-up appointments. Specific follow-up information is listed in the following pages. Your may be contacted by a nurse or clinical provider about future appointments. Be sure we have all of the phone numbers to reach you. Please contact your provider's office if you are unable to make an appointment.     3. Watch for warning signs. Your doctor or nurse will give you detailed warning signs to watch for and when to call for assistance. These instructions may also include educational information about your condition. If you experience any of warning signs to your health, call your doctor.           [facility]  Unless otherwise directed by your provider, please   contact [facility], our nurse care line   that is available for 24/7 assistance.     [phone] (toll-free)     Registered nurses in the [facility]   provide: appointment scheduling, clinical advisement, health education, and other advisory services.                  ** Verify the list of medication(s) below is accurate and up to date. Carry this with you in case of  emergency. If your medications have changed, please notify your healthcare provider.             Medication List      START taking these medications        Additional Info                      amlodipine 10 MG tablet   Commonly known as:  NORVASC   Refills:  0   Dose:  10 mg    Last time this was given:  10 mg on 4/6/2017  9:46 AM   Instructions:  1 tablet (10 mg total) by Per G Tube route once daily.     Begin Date    AM    Noon    PM    Bedtime       apixaban 5 mg Tab   Refills:  0   Dose:  10 mg    Last time this was given:  10 mg on 4/6/2017  9:44 AM   Instructions:  2 tablets (10 mg total) by Per G Tube route 2 (two) times daily.     Begin Date    AM    Noon    PM    Bedtime       aspirin 81 MG Chew   Refills:  0   Dose:  81 mg    Last time this was given:  81 mg on 4/6/2017  9:46 AM   Instructions:  1 tablet (81 mg total) by Per G Tube route once daily.     Begin Date    AM    Noon    PM    Bedtime       clopidogrel 75 mg tablet   Commonly known as:  PLAVIX   Refills:  0   Dose:  75 mg    Last time this was given:  75 mg on 4/6/2017  9:45 AM   Instructions:  1 tablet (75 mg total) by Per G Tube route once daily.     Begin Date    AM    Noon    PM    Bedtime       fluoxetine 20 MG capsule   Commonly known as:  PROZAC   Refills:  0   Dose:  20 mg    Last time this was given:  20 mg on 4/6/2017  9:43 AM   Instructions:  1 capsule (20 mg total) by Per G Tube route once daily.     Begin Date    AM    Noon    PM    Bedtime       hydrALAZINE 50 MG tablet   Commonly known as:  APRESOLINE   Refills:  0   Dose:  50 mg    Last time this was given:  50 mg on 4/6/2017  4:31 PM   Instructions:  1 tablet (50 mg total) by Per G Tube route every 8 (eight) hours.     Begin Date    AM    Noon    PM    Bedtime         CHANGE how you take these medications        Additional Info                      atorvastatin 80 MG tablet   Commonly known as:  LIPITOR   Refills:  0   Dose:  80 mg   What changed:    - medication strength  - how  much to take  - how to take this    Last time this was given:  80 mg on 4/6/2017  9:43 AM   Instructions:  1 tablet (80 mg total) by Per G Tube route once daily.     Begin Date    AM    Noon    PM    Bedtime         CONTINUE taking these medications        Additional Info                      losartan 100 MG tablet   Commonly known as:  COZAAR   Refills:  0   Dose:  100 mg    Last time this was given:  100 mg on 4/6/2017  9:43 AM   Instructions:  Take 100 mg by mouth once daily.     Begin Date    AM    Noon    PM    Bedtime         STOP taking these medications     clonazePAM 1 MG tablet   Commonly known as:  KLONOPIN            Where to Get Your Medications      You can get these medications from any pharmacy     You don't need a prescription for these medications     aspirin 81 MG Chew         Information about where to get these medications is not yet available     ! Ask your nurse or doctor about these medications     amlodipine 10 MG tablet    apixaban 5 mg Tab    atorvastatin 80 MG tablet    clopidogrel 75 mg tablet    fluoxetine 20 MG capsule    hydrALAZINE 50 MG tablet                  Please bring to all follow up appointments:    1. A copy of your discharge instructions.  2. All medicines you are currently taking in their original bottles.  3. Identification and insurance card.    Please arrive 15 minutes ahead of scheduled appointment time.    Please call 24 hours in advance if you must reschedule your appointment and/or time.        Follow-up Information     Follow up with Arlet Belcher PA-C In 4 weeks.    Specialty:  Neurology    Contact information:    [address] A[address]  [facility] [id number] [phone]        Referrals     Future Orders    Ambulatory Referral to Cardiology         Discharge Instructions     Future Orders    Activity as tolerated         Discharge Instructions         Discharge Instructions: Caring for Your Gastrostomy Tube (G-Tube)  You have been discharged with a gastrostomy  tube, or G-tube. The G-tube was inserted through your belly (abdominal) wall and into your stomach. The tube will provide you with food, fluids, and medicine. Your G-tube may move in and out slightly. If the tube comes out all the way in the first few weeks after placement, dont put it back in. Call your healthcare provider right away. Dont wait until the next day. This is important because the G-tube tract through the skin may close very quickly, often within 24 hours. After the first few weeks, if the tube comes out, ask your provider how to get a spare tube. Ask your provider how to replace it at home.   General guidelines for use  · Wash your hands thoroughly with soap and warm water before starting your feeding.  · During the feeding and for 1 hour after, sit in a chair or sit up in bed.  · Before feeding begins, check to see that your stomach is empty. You will need a syringe for the following steps:   ¨ Insert the tip of an empty syringe into the end of the G-tube.  ¨ Pull back on the syringe to withdraw contents of the stomach.  ¨ Dont begin the feeding if more than 100 mL remains from the previous feeding.  · Clean the area around the tube with mild soap and water.  · Pat the area dry during bathing and as needed.  · Clean the area more often if it gets wet or is leaking some discharge (weeping).  · Keep the disk (flange) a few millimeters off the skin. This should leave just enough room for a gauze sponge. Pulling the flange too tightly can damage the skin. But leaving the flange too loose leads to leaking around the G-tube. Your healthcare team will go over these guidelines before you leave the hospital.     The name of my feeding supplement/formula is:  ___________________________________________     Amount per feeding:   ___________________________________________     Times per day:  ___________________________________________     Amount of water used to flush  tube:  ___________________________________________   Gravity feeding method  · Fill the feeding bag with the prescribed amount of formula. Run the fluid to the end of the tube to clear out any air. Clamp the tube.  · Connect the end of the feeding bag tubing to the G-tube.  · Hang the bag at least 18 inches above the level of your G-tube.  · Open the clamp and allow the formula to flow into the G-tube.  · Follow with the prescribed amount of water.  · After each feeding, rinse the bag and tubing. Every 24 hours, wash the bag and tubing with soapy water and rinse thoroughly.  Pump feeding method  · Fill the feeding bag with the prescribed amount of formula. Run the fluid to the end of the tube to clear out any air. Clamp the tube.  · Connect the end of the feeding bag tubing to the G-tube. Set the pump rate of flow to the prescribed rate per hour.  · Open the clamp on the tubing; press the start button on your pump.  · When feeding is complete, disconnect the feeding set.  · Connect the tip of an empty syringe to the feeding tube and slowly push in the prescribed amount of water.  · After each feeding, rinse the bag and tubing. Every 24 hours, wash the bag and tubing with soapy water and rinse thoroughly.  Syringe feeding method  · Remove the plunger from a syringe and connect the syringe to the G-tube.  · Hold the syringe upright and pour the formula into the syringe.  · Refill the syringe as the formula reaches the bottom of the syringe.  · Repeat the process until the prescribed amount of formula is given.  · Follow the feeding with the prescribed amount of water.  · After each feeding, rinse the bag and tubing. Every 24 hours, wash the bag and tubing with soapy water and rinse thoroughly.  When to call your provider  Call your healthcare provider right away if you have any of the following:  · The tube comes out   · The tube is blocked  · Vomiting  · Fever above 100.4°F (38.0°C)  · Diarrhea that lasts more than 2  "days  · Signs of infection (redness, swelling, or warmth at the tube site)  · Drainage from the tube site   Date Last Reviewed: 8/1/2016  © 8243-8440 [employer]. [address] [place] [address], [place] [address]. All rights reserved. This information is not intended as a substitute for professional medical care. Always follow your healthcare professional's instructions.            Primary Diagnosis     Your primary diagnosis was:  Stroke      Admission Information     Date & Time Provider Department CSN    3/18/2017  3:41 PM Ramses Benz MD [facility] [id number]      Care Providers     Provider Role Specialty Primary office phone    Ramses Benz MD Attending Provider Neurology [phone]    Louie Soto MD Team Attending  Neuro Critical Care [phone]    Chi Hall MD Team Attending  Neuro Critical Care [phone]    Guillaume Naidu MD Team Attending  Interventional Neurology [phone]    Ramses Benz MD Team Attending  Neurology [phone]    Joshua Goldberg, MD Surgeon  General Surgery [phone]      Your Vitals Were     BP Pulse Temp Resp Height Weight    147/63 74 98 °F (36.7 °C) (Oral) 16 5' 7" (1.702 m) 74.2 kg (163 lb 9.3 oz)    SpO2 BMI             98% 25.62 kg/m2         Recent Lab Values        3/18/2017                           5:52 PM           A1C 5.7           Comment for A1C at  5:52 PM on 3/18/2017:  According to ADA guidelines, hemoglobin A1C <7.0% represents  optimal control in non-pregnant diabetic patients.  Different  metrics may apply to specific populations.   Standards of Medical Care in Diabetes - 2016.  For the purpose of screening for the presence of diabetes:  <5.7%     Consistent with the absence of diabetes  5.7-6.4%  Consistent with increasing risk for diabetes   (prediabetes)  >or=6.5%  Consistent with diabetes  Currently no consensus exists for use of hemoglobin A1C  for diagnosis of diabetes for " children.        Pending Labs     Order Current Status    Blood culture In process    Sodium In process    Blood culture Preliminary result    Blood culture Preliminary result      Allergies as of 4/6/2017     No Known Allergies      Advance Directives     An advance directive is a document which, in the event you are no longer able to make decisions for yourself, tells your healthcare team what kind of treatment you do or do not want to receive, or who you would like to make those decisions for you.  If you do not currently have an advance directive, [facility] encourages you to create one.  For more information call:  [phone]), [phone] ([phone]),  or log on to [email or web address].        Smoking Cessation     If you would like to quit smoking:   You may be eligible for free services if you are a [place] resident and started smoking cigarettes before September 1, 1988.  Call the Smoking Cessation Trust ([facility]) toll free at [phone] or [phone].   Call 8-638-QUIT-NOW if you do not meet the above criteria.   Contact us via email: [email or web address]   View our website for more information: [email or web address]        Language Assistance Services     ATTENTION: Language assistance services are available, free of charge. Please call [phone].      ATENCIÓN: Si habla español, tiene a hays disposición servicios gratuitos de asistencia lingüística. Llame al [phone].     CHÚ Ý: N?u b?n nói Ti?ng Vi?t, có các d?ch v? h? tr? ngôn ng? mi?n phí dành cho b?n. G?i s? [phone].        Stroke Education              [employer] Inform[employer]           [facility] Sign-Up     Activating your [facility] account is as easy as 1-2-3!     1) Visit [email or web address], select Sign Up Now, enter this activation code and your date of birth, then select Next.  [email or web address]  Expires: 5/21/2017  5:30 PM      2) Create a username and password to use when you visit [facility] in  the future and select a security question in case you lose your password and select Next.    3) Enter your e-mail address and click Sign Up!    Additional Information  If you have questions, please e-mail [email or web address] or call [phone] to talk to our [facility] staff. Remember, [facility] is NOT to be used for urgent needs. For medical emergencies, dial 911.          [facility] complies with applicable Federal civil rights laws and does not discriminate on the basis of race, color, national origin, age, disability, or sex.

## 2017-03-18 NOTE — ED NOTES
Records reviewed from prior facility.  Documented 71 mg administered at 1230pm - documents bolus dose not given.  Dosing time for administration 12:30pm

## 2017-03-18 NOTE — ASSESSMENT & PLAN NOTE
Arrived and had STAT cta multiphase - patient with critical carotid stenosis, still with significant deficit post tPA -going for IR thrombectomy.   Admit to [facility]     Antithrombotics for secondary stroke prevention: None: Reason:  Hold all Antithrombotics x 24 hours after IV t-PA administration  Statins for secondary stroke prevention and hyperlipidemia, if present: Atorvastatin- 40 mg oral daily  Aggressive risk factor modification: Hypertension, High Cholesterol and Obesity  Rehab Efforts: Physical Therapy, Occupational Therapy, Speech and Language Pathology and when appropriate, Diagnostics: Ordered/Pending HgbA1C to assess blood glucose levels, Lipid Profile to assess cholesterol levels, MRI head without contrast to assess brain parenchyma  VTE Prophylaxis: None: Reason for No Pharmacological VTE Prophylaxis: Mechanical prophylaxis: Place SCDs  BP Parameters: SBP <180  Nursing Orders: Neuro checks- per tpa protocol, Antiembolic stockings, Swallowing evaluation by Nursing, Seizure precautions, Avoid Rucker catheter, Pneumatic compression device, Stroke Education, Blood glucose target 100-130, Up with assistance

## 2017-03-18 NOTE — PROGRESS NOTES
Pt arrive to the IR dept room 190 for Cerebral Angiogram and possible Thrombectomy. Neuro ICU nurse at the bedside.

## 2017-03-18 NOTE — SUBJECTIVE & OBJECTIVE
No past medical history on file.  No past surgical history on file.  No family history on file.  Social History   Substance Use Topics    Smoking status: Not on file    Smokeless tobacco: Not on file    Alcohol use Not on file     Review of patient's allergies indicates:  Allergies not on file  Medications: I have reviewed the current medication administration record.      (Not in a hospital admission)    Review of Systems   Constitutional: Negative for chills and fever.   HENT: Positive for drooling.    Eyes: Positive for visual disturbance.   Respiratory: Negative for shortness of breath.    Cardiovascular: Negative for leg swelling.   Gastrointestinal: Negative for diarrhea and vomiting.   Genitourinary: Negative for hematuria.   Musculoskeletal: Positive for gait problem.   Neurological: Positive for facial asymmetry, speech difficulty and weakness.   Psychiatric/Behavioral: Negative for agitation.     Objective:     Vital Signs (Most Recent):  Pulse: (!) 58 (03/18/17 1530)  Resp: 16 (03/18/17 1530)  BP: (!) 156/90 (03/18/17 1530)  SpO2: 98 % (03/18/17 1530)    Vital Signs Range (Last 24H):  Pulse:  [58]   Resp:  [16]   BP: (156)/(90)   SpO2:  [98 %]     Physical Exam   Constitutional: He appears well-developed and well-nourished.   HENT:   Head: Normocephalic and atraumatic.   Eyes: Pupils are equal, round, and reactive to light.   Neck: Normal range of motion.   Cardiovascular: Normal rate.    Pulmonary/Chest: Effort normal.   Abdominal: Soft. There is no tenderness.   Musculoskeletal:   Right weakness    Neurological: He is alert. GCS eye subscore is 4 - spontaneous. GCS verbal subscore is 1 - none. GCS motor subscore is 4 - withdraws from pain.   Skin: Skin is warm.   Psychiatric: He has a normal mood and affect.       Neurological Exam:   LOC: alert and does not follow requests  Language: Global aphasia  Speech: Mute  Orientation: Mute  Memory: Mute  Visual Fields (CN II): Hemianopsia  right  EOM  (CN III, IV, VI): Gaze preference left  Oculocephalics: normal  Pupils (CN III, IV, VI): PERRL  Facial Sensation (CN V): Facial sensory loss right upper and right lower  Facial Movement (CN VII): lower weakness right lower  Hearing (CN VIII): intact bilaterally  Tongue (CN XII): doesnt follow commands to stick tongue out  Motor*: Arm Left:  Normal (5/5), Leg Left:   Normal (5/5), Arm Right:   Paretic:  1/5, Leg Right:   Paretic:  1/5  Cerebellar*: Not testable due to aphasia  Sensation: siddharth-hypoesthesia right  Tone: Arm-Left: normal; Leg-Left: normal; Arm-Right: flaccid; Leg-Right: flaccid    Stroke Team Times:   Last Known Normal Date and Time : 3/18/960709:00  Symptom Onset Date and Time:3/18/137722:00  Stroke Team Called Date and Time: 3/18/2017Call team paged: seen via telemedicine     Stroke Team Arrived Date and Time: 3/18/211745:28  CT Interpretation Time: 15:58  Intervention Time: 13:47 (end time on tpa documented as 1347. went to IR thrombectomy after cta multiphase)  NIH Stroke Scale:  Interval: baseline (upon arrival/admit)  Level of Consciousness: 0 - alert  LOC Questions: 2 - answers none correctly  LOC Commands: 2 - performs neither correctly  Best Gaze: 2 - forced deviation  Visual: 2 - complete hemianopia  Facial Palsy: 1 - minor  Motor Left Arm: 0 - no drift  Motor Right Arm: 3 - no effort against gravity  Motor Left Le - no drift  Motor Right Leg: 3 - no effort against gravity  Limb Ataxia: 0 - absent  Sensory: 1 - mild to moderate loss  Best Language: 3 - mute  Dysarthria: 0 - normal articulation  Extinction and Inattention: 0 - no neglect  NIH Stroke Scale Total: 19  Poston Coma Scale:  Best Eye Response: 4 - spontaneous  Best Motor Response: 4 - withdraws from pain  Best Verbal Response: 1 - none  Poston Coma Scale Total: 9  Modified Dinora Scale:   Timeline: Prior to symptoms onset  Modified Cotuit Score: 0 - no symptoms        Laboratory:  CMP: No results for input(s): GLUCOSE, CALCIUM,  ALBUMIN, PROT, NA, K, CO2, CL, BUN, CREATININE, ALKPHOS, ALT, AST, BILITOT in the last 24 hours.  CBC: No results for input(s): WBC, RBC, HGB, HCT, PLT, MCV, MCH, MCHC in the last 168 hours.  Lipid Panel: No results for input(s): CHOL, LDLCALC, HDL, TRIG in the last 168 hours.  Coagulation: No results for input(s): INR, APTT in the last 168 hours.    Invalid input(s): PT  Platelet Aggregation Study: No results for input(s): PLTAGG, PLTAGINTERP, PLTAGREGLACO, ADPPLTAGGREG in the last 168 hours.  Hgb A1C: No results for input(s): HGBA1C in the last 168 hours.  TSH: No results for input(s): TSH in the last 168 hours.    Diagnostic Results:  Brain Imaging:  L carotid stenosis on CT multiphase, going to IR angio for potential thrombectomy

## 2017-03-19 LAB
ALBUMIN SERPL BCP-MCNC: 3.1 G/DL
ALP SERPL-CCNC: 59 U/L
ALT SERPL W/O P-5'-P-CCNC: 14 U/L
ANION GAP SERPL CALC-SCNC: 10 MMOL/L
AST SERPL-CCNC: 18 U/L
BASOPHILS # BLD AUTO: 0.04 K/UL
BASOPHILS NFR BLD: 0.3 %
BILIRUB SERPL-MCNC: 0.8 MG/DL
BILIRUB UR QL STRIP: NEGATIVE
BUN SERPL-MCNC: 17 MG/DL
CALCIUM SERPL-MCNC: 7.9 MG/DL
CHLORIDE SERPL-SCNC: 114 MMOL/L
CLARITY UR REFRACT.AUTO: CLEAR
CO2 SERPL-SCNC: 20 MMOL/L
COLOR UR AUTO: ABNORMAL
CREAT SERPL-MCNC: 0.8 MG/DL
DIASTOLIC DYSFUNCTION: NO
DIFFERENTIAL METHOD: ABNORMAL
EOSINOPHIL # BLD AUTO: 0.1 K/UL
EOSINOPHIL NFR BLD: 0.5 %
ERYTHROCYTE [DISTWIDTH] IN BLOOD BY AUTOMATED COUNT: 19.6 %
EST. GFR  (AFRICAN AMERICAN): >60 ML/MIN/1.73 M^2
EST. GFR  (NON AFRICAN AMERICAN): >60 ML/MIN/1.73 M^2
ESTIMATED AVG GLUCOSE: 117 MG/DL
FIBRINOGEN PPP-MCNC: 196 MG/DL
GLUCOSE SERPL-MCNC: 86 MG/DL
GLUCOSE UR QL STRIP: NEGATIVE
HBA1C MFR BLD HPLC: 5.7 %
HCT VFR BLD AUTO: 49.2 %
HGB BLD-MCNC: 16.4 G/DL
HGB UR QL STRIP: NEGATIVE
INR PPP: 1.1
KETONES UR QL STRIP: ABNORMAL
LEUKOCYTE ESTERASE UR QL STRIP: NEGATIVE
LYMPHOCYTES # BLD AUTO: 3.1 K/UL
LYMPHOCYTES NFR BLD: 21 %
MAGNESIUM SERPL-MCNC: 1.7 MG/DL
MCH RBC QN AUTO: 31.9 PG
MCHC RBC AUTO-ENTMCNC: 33.3 %
MCV RBC AUTO: 96 FL
MONOCYTES # BLD AUTO: 1.4 K/UL
MONOCYTES NFR BLD: 9.2 %
NEUTROPHILS # BLD AUTO: 10.2 K/UL
NEUTROPHILS NFR BLD: 68.7 %
NITRITE UR QL STRIP: NEGATIVE
PH UR STRIP: 6 [PH] (ref 5–8)
PHOSPHATE SERPL-MCNC: 2.9 MG/DL
PLATELET # BLD AUTO: 248 K/UL
PMV BLD AUTO: 11.6 FL
POCT GLUCOSE: 101 MG/DL (ref 70–110)
POCT GLUCOSE: 103 MG/DL (ref 70–110)
POCT GLUCOSE: 76 MG/DL (ref 70–110)
POCT GLUCOSE: 76 MG/DL (ref 70–110)
POTASSIUM SERPL-SCNC: 3.7 MMOL/L
PROT SERPL-MCNC: 6 G/DL
PROT UR QL STRIP: NEGATIVE
PROTHROMBIN TIME: 11.8 SEC
RBC # BLD AUTO: 5.14 M/UL
RETIRED EF AND QEF - SEE NOTES: 55 (ref 55–65)
SODIUM SERPL-SCNC: 141 MMOL/L
SODIUM SERPL-SCNC: 142 MMOL/L
SODIUM SERPL-SCNC: 143 MMOL/L
SODIUM SERPL-SCNC: 144 MMOL/L
SP GR UR STRIP: >1.03 (ref 1–1.03)
TRICUSPID VALVE REGURGITATION: NORMAL
URN SPEC COLLECT METH UR: ABNORMAL
UROBILINOGEN UR STRIP-ACNC: NEGATIVE EU/DL
VANCOMYCIN TROUGH SERPL-MCNC: <1.1 UG/ML
WBC # BLD AUTO: 14.81 K/UL

## 2017-03-19 PROCEDURE — 85384 FIBRINOGEN ACTIVITY: CPT

## 2017-03-19 PROCEDURE — 84100 ASSAY OF PHOSPHORUS: CPT

## 2017-03-19 PROCEDURE — G8996 SWALLOW CURRENT STATUS: HCPCS | Mod: CN

## 2017-03-19 PROCEDURE — 99233 SBSQ HOSP IP/OBS HIGH 50: CPT | Mod: ,,, | Performed by: NURSE PRACTITIONER

## 2017-03-19 PROCEDURE — 97166 OT EVAL MOD COMPLEX 45 MIN: CPT

## 2017-03-19 PROCEDURE — 99900035 HC TECH TIME PER 15 MIN (STAT)

## 2017-03-19 PROCEDURE — 85025 COMPLETE CBC W/AUTO DIFF WBC: CPT

## 2017-03-19 PROCEDURE — 97112 NEUROMUSCULAR REEDUCATION: CPT

## 2017-03-19 PROCEDURE — 25000003 PHARM REV CODE 250: Performed by: STUDENT IN AN ORGANIZED HEALTH CARE EDUCATION/TRAINING PROGRAM

## 2017-03-19 PROCEDURE — 93306 TTE W/DOPPLER COMPLETE: CPT | Mod: 26,,, | Performed by: INTERNAL MEDICINE

## 2017-03-19 PROCEDURE — 20000000 HC ICU ROOM

## 2017-03-19 PROCEDURE — 83735 ASSAY OF MAGNESIUM: CPT

## 2017-03-19 PROCEDURE — G8997 SWALLOW GOAL STATUS: HCPCS | Mod: CL

## 2017-03-19 PROCEDURE — 84295 ASSAY OF SERUM SODIUM: CPT | Mod: 91

## 2017-03-19 PROCEDURE — 93306 TTE W/DOPPLER COMPLETE: CPT

## 2017-03-19 PROCEDURE — 63600175 PHARM REV CODE 636 W HCPCS: Performed by: STUDENT IN AN ORGANIZED HEALTH CARE EDUCATION/TRAINING PROGRAM

## 2017-03-19 PROCEDURE — 99233 SBSQ HOSP IP/OBS HIGH 50: CPT | Mod: ,,, | Performed by: PSYCHIATRY & NEUROLOGY

## 2017-03-19 PROCEDURE — 85610 PROTHROMBIN TIME: CPT

## 2017-03-19 PROCEDURE — 63600175 PHARM REV CODE 636 W HCPCS: Performed by: NURSE PRACTITIONER

## 2017-03-19 PROCEDURE — 80053 COMPREHEN METABOLIC PANEL: CPT

## 2017-03-19 PROCEDURE — 25000003 PHARM REV CODE 250: Performed by: NURSE PRACTITIONER

## 2017-03-19 PROCEDURE — 92610 EVALUATE SWALLOWING FUNCTION: CPT

## 2017-03-19 PROCEDURE — 99291 CRITICAL CARE FIRST HOUR: CPT | Mod: ,,, | Performed by: PSYCHIATRY & NEUROLOGY

## 2017-03-19 PROCEDURE — 97162 PT EVAL MOD COMPLEX 30 MIN: CPT

## 2017-03-19 PROCEDURE — 80202 ASSAY OF VANCOMYCIN: CPT

## 2017-03-19 PROCEDURE — 87040 BLOOD CULTURE FOR BACTERIA: CPT | Mod: 59

## 2017-03-19 RX ORDER — ATORVASTATIN CALCIUM 20 MG/1
40 TABLET, FILM COATED ORAL DAILY
Status: DISCONTINUED | OUTPATIENT
Start: 2017-03-19 | End: 2017-03-20

## 2017-03-19 RX ORDER — ATORVASTATIN CALCIUM 10 MG/1
10 TABLET, FILM COATED ORAL DAILY
Status: ON HOLD | COMMUNITY
End: 2017-04-06 | Stop reason: HOSPADM

## 2017-03-19 RX ORDER — LOSARTAN POTASSIUM 100 MG/1
100 TABLET ORAL DAILY
COMMUNITY

## 2017-03-19 RX ORDER — CEFEPIME HYDROCHLORIDE 1 G/50ML
1 INJECTION, SOLUTION INTRAVENOUS
Status: DISCONTINUED | OUTPATIENT
Start: 2017-03-19 | End: 2017-03-24

## 2017-03-19 RX ORDER — CLONAZEPAM 1 MG/1
1 TABLET ORAL 3 TIMES DAILY
Status: ON HOLD | COMMUNITY
End: 2017-04-06 | Stop reason: HOSPADM

## 2017-03-19 RX ORDER — CEFEPIME HYDROCHLORIDE 1 G/50ML
1 INJECTION, SOLUTION INTRAVENOUS
Status: DISCONTINUED | OUTPATIENT
Start: 2017-03-19 | End: 2017-03-19

## 2017-03-19 RX ORDER — SODIUM CHLORIDE 9 MG/ML
INJECTION, SOLUTION INTRAVENOUS CONTINUOUS
Status: DISCONTINUED | OUTPATIENT
Start: 2017-03-19 | End: 2017-03-21

## 2017-03-19 RX ORDER — ACETAMINOPHEN 325 MG/1
650 TABLET ORAL EVERY 4 HOURS PRN
Status: DISCONTINUED | OUTPATIENT
Start: 2017-03-19 | End: 2017-04-06

## 2017-03-19 RX ORDER — NAPROXEN SODIUM 220 MG/1
81 TABLET, FILM COATED ORAL DAILY
Status: DISCONTINUED | OUTPATIENT
Start: 2017-03-19 | End: 2017-03-20

## 2017-03-19 RX ADMIN — CEFEPIME HYDROCHLORIDE 1 G: 1 INJECTION, POWDER, FOR SOLUTION INTRAMUSCULAR; INTRAVENOUS at 07:03

## 2017-03-19 RX ADMIN — STANDARDIZED SENNA CONCENTRATE AND DOCUSATE SODIUM 1 TABLET: 8.6; 5 TABLET, FILM COATED ORAL at 09:03

## 2017-03-19 RX ADMIN — ACETAMINOPHEN 650 MG: 325 TABLET ORAL at 11:03

## 2017-03-19 RX ADMIN — SODIUM CHLORIDE: 234 INJECTION INTRAMUSCULAR; INTRAVENOUS; SUBCUTANEOUS at 07:03

## 2017-03-19 RX ADMIN — VANCOMYCIN HYDROCHLORIDE 1000 MG: 1 INJECTION, POWDER, LYOPHILIZED, FOR SOLUTION INTRAVENOUS at 07:03

## 2017-03-19 RX ADMIN — ACETAMINOPHEN 650 MG: 325 TABLET ORAL at 06:03

## 2017-03-19 RX ADMIN — Medication 3 ML: at 10:03

## 2017-03-19 RX ADMIN — HYDRALAZINE HYDROCHLORIDE 10 MG: 20 INJECTION INTRAMUSCULAR; INTRAVENOUS at 05:03

## 2017-03-19 RX ADMIN — ATORVASTATIN CALCIUM 40 MG: 20 TABLET, FILM COATED ORAL at 02:03

## 2017-03-19 RX ADMIN — ASPIRIN 81 MG CHEWABLE TABLET 81 MG: 81 TABLET CHEWABLE at 02:03

## 2017-03-19 RX ADMIN — HYDRALAZINE HYDROCHLORIDE 10 MG: 20 INJECTION INTRAMUSCULAR; INTRAVENOUS at 10:03

## 2017-03-19 NOTE — PLAN OF CARE
Problem: Patient Care Overview  Goal: Plan of Care Review  Outcome: Ongoing (interventions implemented as appropriate)  POC reviewed with pt at 0400. Pt unable to verbalize understanding. Questions and concerns addressed. No acute events overnight. Pt taken to CT.  Pt titrated off Cardene. Pt progressing toward goals. Will continue to monitor. See flowsheets for full assessment and VS info

## 2017-03-19 NOTE — ASSESSMENT & PLAN NOTE
Given tpa , status post thrombectomy   Due to stroke above  Aggressive PT/OT/ speech when appropriate

## 2017-03-19 NOTE — PT/OT/SLP EVAL
"Occupational Therapy  Evaluation    Nathanael Velez   MRN: [id number]   Admitting Diagnosis: Thrombotic stroke involving left middle cerebral artery    OT Date of Treatment: 17   OT Start Time: 0515; 10:21  OT Stop Time: 0546: 10:31  OT Total Time (min): 41 min    Billable Minutes:  Evaluation 22  Neuromuscular Re-education 19    Diagnosis: Thrombotic stroke involving left middle cerebral artery     History reviewed. No pertinent past medical history.   History reviewed. No pertinent surgical history.    Referring physician: CAROLINE Guy  Date referred to OT: 3/18  General Precautions: Standard, aphasia, aspiration, fall, vision impaired  Orthopedic Precautions: N/A  Braces: N/A    Do you have any cultural, spiritual, Jehovah's witness conflicts, given your current situation?: Gnosticist     Patient History:  Prior level of function:   Per son:  Patient primarily resides in first floor apt in [place], [place] alone (wife passed away).  Patient resides in [place] for work.  Also has a two story home in [place], [place]  with bedroom on the first floor.  PTA patient independent with ADLs including driving.  Currently owns no DME.  Patient is right handed.  Works:   at [employer].  Patient is right handed.  Hobbies:  baseball, casino.  Role/Responsibilities:  father, grandfather, , grocery shopping, managing finances, cooking.     Subjective:  Communicated with nurse prior to session.  Patient:  Nonverbal  Son: "I haven't heard him try to talk.  I'll just hear a moan when he yawns."  Pain Ratin/10   Pain Rating Post-Intervention: 0/10    Objective:  Patient found with: oxygen, telemetry, peripheral IV, restraints, blood pressure cuff (external urinary cath)  Son present at 2nd session    Cognitive Exam:  Oriented to: Daily orientation provided  Follows Commands/attention: Unable to follow commands.  Communication: Nonverbal  Safety awareness/insight to disability: " impaired    Visual/perceptual:  Right neglect; left upward gaze    Physical Exam:  Postural examination/scapula alignment: Rounded shoulder  Skin integrity: Visible skin intact  Edema: None noted     Sensation:   Impaired light touch    Upper Extremity Range of Motion:  Right Upper Extremity: PROM WNL  Left Upper Extremity: AAROM WNL    Upper Extremity Strength:  Right Upper Extremity: 0/5  Left Upper Extremity: WFL    Fine motor coordination:   Absent right hand    Functional Mobility:  Bed Mobility:  Rolling/Turning to Left: Total assistance  Rolling/Turning Right: Total assistance  Scooting/Bridging: Total Assistance  Supine to Sit: Total Assistance  Sit to Supine: Total Assistance    Transfers:  Dependent drawsheet transfers    Activities of Daily Living:  Feeding Level of Assistance:  (NPO)  UE Dressing Level of Assistance: Total assistance  LE Dressing Level of Assistance: Total assistance  Grooming Position: Seated  Grooming Level of Assistance: Total assistance     Additional Treatment:   Patient education provided on role of OT and need for continued OT services upon discharge.  Patient education provided on hemiplegic dressing technique, right UE weight bearing / positioning, postural control, and bed mobility.  Patient unable to verbalize understanding via teach back method. Continued education, patient/ family training recommended.  Patient lethargic; unable to follow commands. Daily orientation provided.  PROM performed right UE/LE and AAROM left UE/LE one set x 10 rep in all planes of motion with stretches provided at end range; sustained stretch provided for right ankle dorsiflexion.  Assistance and facilitation provided for upward rotation of the scapula during shoulder flexion and abduction.  Patient kept eyes closed 90% of the session.  Total assist required with bed mobility and self-care skills.  Positioning provided for midline orientation with bilateral UEs elevated and heels lifted off  "mattress.  Gentle cervical rotation provided.   White board updated in patient's room.  OT asked if there were any other questions; patient/ family had no further questions.      AM-PAC 6 CLICK ADL  How much help from another person does this patient currently need?  1 = Unable, Total/Dependent Assistance  2 = A lot, Maximum/Moderate Assistance  3 = A little, Minimum/Contact Guard/Supervision  4 = None, Modified Morton/Independent    Putting on and taking off regular lower body clothing? : 1  Bathing (including washing, rinsing, drying)?: 1  Toileting, which includes using toilet, bedpan, or urinal? : 1  Putting on and taking off regular upper body clothing?: 1  Taking care of personal grooming such as brushing teeth?: 1  Eating meals?: 1  Total Score: 6    AM-PAC Raw Score CMS "G-Code Modifier Level of Impairment Assistance   6 % Total / Unable   7 - 9 CM 80 - 100% Maximal Assist   10 - 14 CL 60 - 80% Moderate Assist   15 - 19 CK 40 - 60% Moderate Assist   20 - 22 CJ 20 - 40% Minimal Assist   23 CI 1-20% SBA / CGA   24 CH 0% Independent/ Mod I       Patient left supine with all lines intact and call button in reach    Assessment:  Nathanael Velez is a 61 y.o. male with a medical diagnosis of Thrombotic stroke involving left middle cerebral artery and presents with performance deficits of physical skills including impaired balance, mobility, strength, dexterity, fine motor coordination, gross motor coordination, sensation and endurance; demonstrating performance deficits of cognitive skills including impaired attention, perception, understanding, problem solving, sequencing and memory all resulting in inability organizing occupational performance in a timely and safe manner; demonstrating performance deficits of psychosocial skills including impairments of interpersonal interactions and coping strategies which are skills necessary to successfully and appropriately participate in everyday tasks and social " situations.  These performance deficits have resulted in activity limitations including but not limited to:  bed mobility, transfers, ascending/ descending stairs, walking short and long distances, walking around obstacles, transitional movement patterns (kneeling, bending); eating, upper body dressing, lower body dressing, brushing teeth, toileting, bathing, carrying objects, typing, writing, reading, balancing checkbook, shopping, meal preparation, school, community roles, and assisting others in self care.   Patient's role as father, grandfather, , and independent caretaker for self has been affected. Patient will benefit from skilled OT services to maximize level of independence with self-care skills and functional mobility.  Will benefit from SNF.    Rehab identified problem list/impairments: Rehab identified problem list/impairments: weakness, impaired endurance, impaired self care skills, gait instability, visual deficits, impaired balance, impaired functional mobilty, impaired sensation, impaired cognition, decreased coordination, decreased lower extremity function, decreased upper extremity function, decreased safety awareness, abnormal tone, decreased ROM, impaired fine motor, impaired coordination    Rehab potential is fair.    Activity tolerance: Fair    Discharge recommendations: Discharge Facility/Level Of Care Needs: nursing facility, skilled     Barriers to discharge: Barriers to Discharge: Inaccessible home environment, Decreased caregiver support    Equipment recommendations: 3-in-1 commode, bath bench, wheelchair     GOALS:   Occupational Therapy Goals        Problem: Occupational Therapy Goal    Goal Priority Disciplines Outcome Interventions   Occupational Therapy Goal     OT, PT/OT     Description:  Goals set 3/19 to be addressed for 7 days with expiration date, 3/26:  Patient will increase functional independence with ADLs by performing:    Patient will demonstrate rolling to the  right with mod assist.  Not met   Patient will demonstrate rolling to the left with mod assist.   Not met  Patient will demonstrate supine -sit with mod assist.   Not met  Patient will demonstrate stand pivot transfers with max assist.   Not met  Patient will demonstrate grooming while standing with max assist.   Not met  Patient will demonstrate upper body dressing with max assist while seated EOB.   Not met  Patient will demonstrate lower body dressing with max assist while seated EOB.   Not met  Patient will demonstrate toileting with max assist.   Not met  Patient will demonstrate bathing while seated EOB with max assist.   Not met  Patient's family / caregiver will demonstrate independence and safety with assisting patient with self-care skills and functional mobility.     Not met  Patient's family / caregiver will demonstrate independence with providing ROM and changes in bed positioning.   Not met  Patient and/or patient's family will verbalize understanding of stroke prevention guidelines, personal risk factors and stroke warning signs via teachback method.  Not met                     PLAN:  Patient to be seen 6 x/week to address the above listed problems via self-care/home management, therapeutic activities, therapeutic exercises, sensory integration, cognitive retraining, neuromuscular re-education  Plan of Care expires: 04/16/17  Plan of Care reviewed with: patient         Solange AVIS Mtz  03/19/2017

## 2017-03-19 NOTE — SUBJECTIVE & OBJECTIVE
Neurologic Chief Complaint: L MCA with carotid stenosis     Subjective:     Interval History: Patient is seen for follow-up neurological assessment and treatment recommendations: improvement today, family at bedside. No events overnight     HPI, Past Medical, Family, and Social History remains the same as documented in the initial encounter.     Review of Systems   Constitutional: Negative for fever.   Genitourinary: Negative for hematuria.   Neurological: Positive for facial asymmetry, speech difficulty and weakness.     Scheduled Meds:   aspirin  81 mg Oral Daily    atorvastatin  40 mg Oral Daily    polyethylene glycol  17 g Oral Daily    senna-docusate 8.6-50 mg  1 tablet Oral BID    sodium chloride 0.9%  3 mL Intravenous Q8H     Continuous Infusions:   sodium chloride 0.9% 50 mL/hr at 03/19/17 1130    nicardipine Stopped (03/19/17 0000)     PRN Meds:hydrALAZINE, ondansetron    Objective:     Vital Signs (Most Recent):  Temp: 99.4 °F (37.4 °C) (03/19/17 1115)  Pulse: 65 (03/19/17 1215)  Resp: (!) 21 (03/19/17 1115)  BP: 123/62 (03/19/17 1215)  SpO2: 100 % (03/19/17 1215)  BP Location: Right arm    Vital Signs Range (Last 24H):  Temp:  [98.4 °F (36.9 °C)-100 °F (37.8 °C)]   Pulse:  [55-90]   Resp:  [10-29]   BP: (115-207)/()   SpO2:  [97 %-100 %]   BP Location: Right arm    Physical Exam   Constitutional: He appears well-developed and well-nourished.   HENT:   Head: Normocephalic and atraumatic.   Cardiovascular: Normal rate.    Pulmonary/Chest: Effort normal.   Neurological:   Drowsy but easy to arouse   Skin: Skin is warm.   Nursing note and vitals reviewed.      Neurological Exam:   LOC: does not follow requests and drowsy  Language: Expressive aphasia, Receptive aphasia  Speech: Aphasic   Orientation: Aphasic  Memory: aphasic  Facial Movement (CN VII): lower weakness right lower  Motor*: localizes with left extremities. Some proximal movement - wd to pain on right extremities  Sensation:  siddharth-hypoesthesia right  Tone: Arm-Left: normal; Leg-Left: normal; Arm-Right: normal; Leg-Right: normal    NIH Stroke Scale:    Level of Consciousness: 1 - drowsy  LOC Questions: 2 - answers none correctly  LOC Commands: 2 - performs neither correctly  Best Gaze: 1 - partial gaze palsy  Visual: 2 - complete hemianopia  Facial Palsy: 1 - minor  Motor Left Arm: 0 - no drift  Motor Right Arm: 3 - no effort against gravity  Motor Left Le - no drift  Motor Right Leg: 3 - no effort against gravity  Limb Ataxia: 0 - absent  Sensory: 0 - normal  Best Language: 3 - mute  Dysarthria: 0 - normal articulation  Extinction and Inattention: 0 - no neglect  NIH Stroke Scale Total: 18      Laboratory:  CMP:   Recent Labs  Lab 17  0524  17  1140   CALCIUM 7.9*  --   --    ALBUMIN 3.1*  --   --    PROT 6.0  --   --      < > 142   K 3.7  --   --    CO2 20*  --   --    *  --   --    BUN 17  --   --    CREATININE 0.8  --   --    ALKPHOS 59  --   --    ALT 14  --   --    AST 18  --   --    BILITOT 0.8  --   --    < > = values in this interval not displayed.  CBC:   Recent Labs  Lab 17  0220   WBC 14.81*   RBC 5.14   HGB 16.4   HCT 49.2      MCV 96   MCH 31.9*   MCHC 33.3     Lipid Panel:   Recent Labs  Lab 17  1609   CHOL 175   LDLCALC 123.8   HDL 36*   TRIG 76     Coagulation:   Recent Labs  Lab 17  1752 17  0220   INR  --  1.1   APTT 28.5  --      Platelet Aggregation Study: No results for input(s): PLTAGG, PLTAGINTERP, PLTAGREGLACO, ADPPLTAGGREG in the last 168 hours.  Hgb A1C:   Recent Labs  Lab 17  1752   HGBA1C 5.7     TSH:   Recent Labs  Lab 17  1609   TSH 0.169*       Diagnostic Results:  I have personally reviewed:  MRI pending    CT head 3/19/17  Interval worsening of hemorrhagic conversion centered within the left basal ganglia, in this patient who is status post thrombectomy for left MCA infarct.  Interval worsening of sulcal effacement and effacement of  the left lateral ventricle.  There is 0.2 cm rightward midline shift.    No new foci of hemorrhage or infarction.    CTA stroke multiphase 3/18/17   CT head: No evidence for acute intracranial hemorrhage. There is ill-defined decreased attenuation of left MCA distribution specifically left basal ganglia and left insula concerning for recent area of infarction. No evidence for hemorrhagic confusion. Ventricles normal without hydrocephalus.    CTA head: Occlusion left cervical ICA with reconstitution at the cavernous segment. In addition there is a ipsilateral occlusion of the left M1 segment of the MCA proximally. There is reconstituted M2 and M3 MCA candelabra on all 3 phases.    CTA neck: Atherosclerotic disease carotid bifurcations and proximal ICAs bilaterally with high-grade critical stenosis or short segment occlusion of the left proximal ICA and more distal left cervical occlusion or reduced flow-related to the distal occlusion.    There is high-grade critical stenosis of the right ICA at its origin with only trace flow identified.    Atherosclerotic disease of the origin the vertebral arteries the dominant left vertebral artery. There is questioned stenosis at the vertebral artery origins bilaterally limited by calcified plaque and high density venous contrast.    Please note there is advanced degenerative change in the cervical spine with moderate to severe stenosis in the central canal allowing for CT technique at C5/C6.

## 2017-03-19 NOTE — PROGRESS NOTES
ICU Progress Note  Neurocritical Care    Admit Date: 3/18/2017  LOS: 1    CC: Thrombotic stroke involving left middle cerebral artery    Code Status: Full Code     SUBJECTIVE:     Interval History/Significant Events:   61 year old male with pmh of HTN, HLD, and CAD who presents after receiving tPA at [facility] for CTA multiphase and potential IR thrombectomy. The patients tpa infusion ended at 1347l. The patient with symptoms of right sided weakness, left gaze preference, aphasia, numbness of the right side and facial droop - last known normal at 10 am. The patients son was contacted and advised of his arrival and verified the patient has no known drug allergies   The patients ems transport reported some spontaneous movement of the Rside while in transport, but  remains aphasic.     3/18 Post thrombectomy monitoring serial CT scans for hemorrhagic conversion  3/18 Repeat CT head showed increased attenuation of L basal ganglia and caudate head with mild increase in mass effect, mild effacement of anterior horn of left ventricle and sulci in L hemisphere.    3/19 CT head Shows an interval increase in size of increased attenuation within the globus pallidus, putamen, and caudate head, representing hemorrhagic conversion post thrombectomy;  There is worsening of effacement of the left lateral ventricle, and development of 0.2 cm rightward midline shift. There is increased effacement of the sulci of the left cerebral hemisphere.  There is no evidence of developing hydrocephalus.  There are no new foci of hemorrhage.  There is no evidence of intraventricular extension.    Review of Symptoms: SAMINA 2/2 global aphasia      Medications:  Continuous Infusions:   sodium chloride 0.9% 50 mL/hr at 03/19/17 1130    nicardipine Stopped (03/19/17 0000)     Scheduled Meds:   aspirin  81 mg Oral Daily    atorvastatin  40 mg Oral Daily    polyethylene glycol  17 g Oral Daily    senna-docusate 8.6-50 mg  1 tablet Oral BID     sodium chloride 0.9%  3 mL Intravenous Q8H     PRN Meds:.hydrALAZINE, ondansetron    OBJECTIVE:   Vital Signs (Most Recent):   Temp: 99.4 °F (37.4 °C) (03/19/17 1115)  Pulse: 65 (03/19/17 1115)  Resp: (!) 21 (03/19/17 1115)  BP: 132/68 (03/19/17 1115)  SpO2: 99 % (03/19/17 1115)    Vital Signs (24h Range):   Temp:  [98.4 °F (36.9 °C)-100 °F (37.8 °C)] 99.4 °F (37.4 °C)  Pulse:  [55-90] 65  Resp:  [10-29] 21  SpO2:  [97 %-100 %] 99 %  BP: (115-207)/() 132/68    ICP/CPP (Last 24h):        I & O (Last 24h):   Intake/Output Summary (Last 24 hours) at 03/19/17 1217  Last data filed at 03/19/17 0930   Gross per 24 hour   Intake          1589.17 ml   Output              820 ml   Net           769.17 ml     Physical Exam:  GA: Awake, opens eyes spontaneously,will track as one  moves around bed. nonverbal comfortable, no acute distress.   HEENT: No scleral icterus or JVD.   Pulmonary: Clear to auscultation A/P/L. No wheezing, crackles, or rhonchi.  Cardiac: RRR S1 & S2 w/o rubs/murmurs/gallops.   Abdominal: Bowel sounds present x 4. No appreciable hepatosplenomegaly.  Skin: No jaundice, rashes, or visible lesions.  Neuro:  --GCS: E4 V1 M4  --Mental Status: Awake, opens eyes spontaneously,will track as one  moves around bed. Nonverbal. Does not follow commands  --CN II-XII grossly intact.   --Pupils 4mm, PERRL.   --Corneal reflex, cough intact.  --RUE extends to noxious stimuli; RLE triple flexion with noxious stimuli. Moves LUE/LLE spontaneously and withdraws to pain    Vent Data:        Lines/Drains/Airway:              External Urinary Catheter 03/18/17 8678 Medium (Active)   Collection Container Urimeter 3/19/2017 11:15 AM   Securement Method secured to top of thigh w/ adhesive device 3/19/2017 11:15 AM   Skin no redness;no breakdown;penis/scrotum cleansed w/ soap and water;skin barrier applied 3/19/2017 11:15 AM   Tolerance no signs/symptoms of discomfort 3/19/2017 11:15 AM   Output (mL) 0 mL 3/19/2017  6:00 AM      Nutrition/Tube Feeds (if NPO state why): TF started Isosource at 40cc/h dietary consulted for tube feeding requirements    Labs:  ABG:   Recent Labs  Lab 03/18/17  1933   PH 7.384   PO2 72*   PCO2 32.3*   HCO3 19.3*   POCSATURATED 94*   BE -6     BMP:  Recent Labs  Lab 03/19/17  0524  03/19/17  1140     < > 142   K 3.7  --   --    *  --   --    CO2 20*  --   --    BUN 17  --   --    CREATININE 0.8  --   --    GLU 86  --   --    MG 1.7  --   --    PHOS 2.9  --   --    < > = values in this interval not displayed.  LFT: Lab Results   Component Value Date    AST 18 03/19/2017    ALT 14 03/19/2017    ALKPHOS 59 03/19/2017    BILITOT 0.8 03/19/2017    ALBUMIN 3.1 (L) 03/19/2017    PROT 6.0 03/19/2017     CBC:   Lab Results   Component Value Date    WBC 14.81 (H) 03/19/2017    HGB 16.4 03/19/2017    HCT 49.2 03/19/2017    MCV 96 03/19/2017     03/19/2017     Microbiology x 7d:   Microbiology Results (last 7 days)     ** No results found for the last 168 hours. **        Imaging:  3/19 CT head  Interval worsening of hemorrhagic conversion centered within the left basal ganglia, in this patient who is status post thrombectomy for left MCA infarct.  Interval worsening of sulcal effacement and effacement of the left lateral ventricle.  There is 0.2 cm rightward midline shift.  No new foci of hemorrhage or infarction.    3/19 CXR  Cardiac monitoring leads project over the bilateral hemithoraces. Cardiopulmonary status is unchanged noting mild persistent increased attenuation within the left lung base suggesting atelectasis. No pneumothorax.    I personally reviewed the above image.    ASSESSMENT/PLAN:     Active Hospital Problems    Diagnosis    *Thrombotic stroke involving left middle cerebral artery    Right sided weakness    Aphasia    Hypertension    Embolic stroke involving left middle cerebral artery      Neuro: L MCA Stroke/ S/P tPA/ S/P thrombectomy  Vasc. Neuro following  CT/ CTA stroke  Multiphase 3/18 LMCA stroke  To IR for thrombectomy - L MCA occlusion to TICI 3 with aspiration  CT Head post IR procedure Shows increase attenuation of L basal ganglia and caudate head with mild increase mass effect, mild effacement ant. Horn of L ventricle   MRI 3/19 pending  ASA 81mg daily  Atorvastatin 40mg daily  Repeat CT head 3/19 worsening of hemorrhagic conversion, and worsening effacement of sulciand L lateral ventricle with 0.2cm R MLS  Q1h Neuro checks  SBP goal < 140  NS 100cc/h  PT/OT/SLP     Pulmonary: Hx of smoking  On 2l NCO2 Sats 99%  Protecting airway at present  Requiring ocassional oral suctioning, monitor closely  Prn CXR/ABG if distress    Cardiac: HTN  SBP goal <140  Cardene gtt  PRN hydralazine    Echo pending  ECG SB with LBBB; L axis deviation    Renal:    Monitor strict I/O  BUN/Cr WNL  Follow daily CMP    ID:   T. 99.4   leukocytosis  Monitor for s/s of infection    Hem/Onc:   H/H WNL  Plt WNL  PT/INR/PTT WNL  Follow Daily CBC    Endocrine:    accuchecks Q6 with SSI  TSH 0.169  fT4 1.24  A1C 5.7    Fluids/Electrolytes/Nutrition/GI:   NG tube placed  TF isosource at 40cc/h started  Dietary consult for tube feeding requirements  IVF NS 50cc/h  Electrolyte replacement prn    Lines:  PIV x3 3/18  Art  CVC  ETT  Rucker condom cath 3/18  NG  PEG  R groin incision    Proph:  DVT:scd  Constipation: senna doc  miralax Last output: none since admissiin  PUP:  VAP:      Uninterrupted Critical Care/Counseling Time (not including procedures): 40 minutes    Ashly Guy [facility]  Neurocritical care

## 2017-03-19 NOTE — PROGRESS NOTES
0335- pt brought to CT via bed and portable monitor.    0355- pt back from CT, on bedside monitor.

## 2017-03-19 NOTE — PLAN OF CARE
Problem: Occupational Therapy Goal  Goal: Occupational Therapy Goal  Goals set 3/19 to be addressed for 7 days with expiration date, 3/26:  Patient will increase functional independence with ADLs by performing:    Patient will demonstrate rolling to the right with mod assist. Not met   Patient will demonstrate rolling to the left with mod assist. Not met  Patient will demonstrate supine -sit with mod assist. Not met  Patient will demonstrate stand pivot transfers with max assist. Not met  Patient will demonstrate grooming while standing with max assist. Not met  Patient will demonstrate upper body dressing with max assist while seated EOB. Not met  Patient will demonstrate lower body dressing with max assist while seated EOB. Not met  Patient will demonstrate toileting with max assist. Not met  Patient will demonstrate bathing while seated EOB with max assist. Not met  Patients family / caregiver will demonstrate independence and safety with assisting patient with self-care skills and functional mobility. Not met  Patients family / caregiver will demonstrate independence with providing ROM and changes in bed positioning. Not met  Patient and/or patients family will verbalize understanding of stroke prevention guidelines, personal risk factors and stroke warning signs via teachback method. Not met   OT evaluation completed.  AVIS Staton  3/19/2017

## 2017-03-19 NOTE — PT/OT/SLP EVAL
Speech Language Pathology  Evaluation    Nathanael Velez   MRN: [id number]   Admitting Diagnosis: Thrombotic stroke involving left middle cerebral artery    Diet recommendations: Solid Diet Level: NPO  Liquid Diet Level: NPO     SLP Treatment Date: 17  Speech Start Time: 831     Speech Stop Time: 839     Speech Total (min): 8 min       TREATMENT BILLABLE MINUTES:  Eval Swallow and Oral Function 8    Diagnosis: Thrombotic stroke involving left middle cerebral artery      History reviewed. No pertinent past medical history.  History reviewed. No pertinent surgical history.    Has the patient been evaluated by SLP for swallowing? : Yes  Keep patient NPO?: Yes   General Precautions: Standard,                Prior diet: no reported restrictions        Subjective:  Pt solemnly  Patient goals: none stated    Pain Ratin/10              Pain Rating Post-Intervention: 0/10    Objective:        Oral Musculature Evaluation  Oral Musculature: unable to assess due to poor participation/comprehension  Dentition: present and adequate  Voice Prior to PO Intake: no phonation during session     Bedside Swallow Eval:  Consistencies Assessed: Thin liquids 1 ice chip  Oral Phase: adequate oral acceptance provided min-mod cues; functional mastication with delayed onset of pharyngeal swallow  Pharyngeal Phase: coughing before swallow initiated; no swallow demonstrated    Additional Treatment:    Wet/gurgly breathing before, during, and after PO; evidence of poor saliva tolerance. Oral suction used; non-productive. No further PO trials attempted 2/2 high aspiration risk; RN notified. Pt followed 1 step simple commands x3 with 0% acc indep. Skilled education provided on aspiration precautions and diet recommendations. Whiteboard updated with diet recommendations/aspiration precautions. When appropriate, it is recommended complete MBS prior to initiation of PO diet. No further questions.                                    Assessment:  Nathanael Velez is a 61 y.o. male with a medical diagnosis of Thrombotic stroke involving left middle cerebral artery and presents with oropharyngeal dysphagia.    Do you have any cultural, spiritual, Episcopalian conflicts, given your current situation?: no     Discharge recommendations: Discharge Facility/Level Of Care Needs: nursing facility, skilled (pending PT OT Recs)     Goals:   SLP Goals        Problem: SLP Goal    Goal Priority Disciplines Outcome   SLP Goal     SLP    Description:  SLP Plan of Care: Speech Pathology will follow pt daily M-F and address the following goals x1 week:  3/26  1. Pt will participate in ongoing swallow assessment to determine least restrictive diet.  2. Pt will participate in speech language cognitive eval to determine need for intervention.  3. When deemed appropriate by MD/SLP pt will participate in MBS to further determine safe swallow function.                     Plan:   Patient to be seen Therapy Frequency: 5 x/week   Plan of Care expires: 04/17/17  Plan of Care reviewed with: patient  SLP Follow-up?: Yes  SLP - Next Visit Date: 03/20/17           Brenda Card M.A. CCC-SLP  Speech Language Pathologist  [phone]  3/19/2017

## 2017-03-19 NOTE — H&P
History & Physical  Neurocritical Care    Admit Date: 3/18/2017  LOS: 0    Code Status: Full Code     CC: Thrombotic stroke involving left middle cerebral artery    SUBJECTIVE:     History of Present Illness:   61 year old male with pmh of HTN, HLD, and CAD who presents after receiving tPA at [facility] for CTA multiphase and potential IR thrombectomy. The patients tpa infusion ended at 1347 and was seen in the ED at 1528 upon arrival. The patient with symptoms of right sided weakness, left gaze preference, aphasia, numbness of the right side and facial droop - last known normal at 10 am. The patients son was contacted and advised of his arrival and verified the patient has no known drug allergies   The patients ems transport reported some spontaneous movement of the Rside while in transport. Remains aphasic.        No past medical history on file.  No past surgical history on file.  No current facility-administered medications on file prior to encounter.      No current outpatient prescriptions on file prior to encounter.     Review of patient's allergies indicates:  Not on File  No family history on file.  Social History   Substance Use Topics    Smoking status: Not on file    Smokeless tobacco: Not on file    Alcohol use Not on file      Review of Symptoms: Unable to fully assess 2/2 aphasia      OBJECTIVE:   Vital Signs (Most Recent):   Temp: 98.4 °F (36.9 °C) (03/18/17 1745)  Pulse: 90 (03/18/17 1934)  Resp: (!) 23 (03/18/17 1934)  BP: (!) 146/63 (03/18/17 1934)  SpO2: 99 % (03/18/17 1934)    Vital Signs (24h Range):   Temp:  [98.4 °F (36.9 °C)] 98.4 °F (36.9 °C)  Pulse:  [55-90] 90  Resp:  [10-29] 23  SpO2:  [98 %-100 %] 99 %  BP: (137-207)/() 146/63    ICP/CPP (Last 24h):        I & O (Last 24h):    Intake/Output Summary (Last 24 hours) at 03/18/17 1951  Last data filed at 03/18/17 1900   Gross per 24 hour   Intake           108.34 ml   Output                0 ml   Net           108.34 ml      Physical Exam:  GA: Aphasic, left upward  gaze, comfortable, no acute distress.   HEENT: No scleral icterus or JVD.   Pulmonary: Clear to auscultation A/P/L. No wheezing, crackles, or rhonchi.  Cardiac: RRR S1 & S2 w/o rubs/murmurs/gallops.   Abdominal: Bowel sounds present x 4. No appreciable hepatosplenomegaly.  Skin: No jaundice, rashes, or visible lesions.  Neuro:  --GCS: E4 V1 M5  --Mental Status:  Eyes open, left upward gaze but does track to right, aphasic/nonverbal. Not following commands  --CN II-XII grossly intact.   --Pupils 4mm, PERRL.   --Corneal reflex, gag, cough intact.  --RUE/RLE weakness., withdraws to pain. LUE/LLE moves extremities spontaneously. Localizes to pain    Vent Data:        Lines/Drains/Airway:              External Urinary Catheter 03/18/17 1758 Medium (Active)   Collection Container Urimeter 3/18/2017  5:40 PM   Securement Method secured to top of thigh w/ adhesive device 3/18/2017  5:40 PM   Skin no redness;no breakdown;penis/scrotum cleansed w/ soap and water;skin barrier applied 3/18/2017  5:40 PM   Tolerance no signs/symptoms of discomfort 3/18/2017  5:40 PM   Output (mL) 0 mL 3/18/2017  5:40 PM     Nutrition/Tube Feeds:   Current Diet Order   Procedures    Diet NPO     No food intake until passes ELENA or evaluated by speech.       Labs:  ABG:   Recent Labs  Lab 03/18/17  1933   PH 7.384   PO2 72*   PCO2 32.3*   HCO3 19.3*   POCSATURATED 94*   BE -6     BMP:  Recent Labs  Lab 03/18/17  1609      K 4.0      CO2 22*   BUN 16   CREATININE 1.0   GLU 83     LFT: Lab Results   Component Value Date    AST 20 03/18/2017    ALT 14 03/18/2017    ALKPHOS 77 03/18/2017    BILITOT 0.7 03/18/2017    ALBUMIN 3.4 (L) 03/18/2017    PROT 6.5 03/18/2017     CBC:   Lab Results   Component Value Date    WBC 11.34 03/18/2017    HGB 15.6 03/18/2017    HCT 44.7 03/18/2017    MCV 91 03/18/2017     03/18/2017     Microbiology x 7d:   Microbiology Results (last 7 days)     ** No  results found for the last 168 hours. **        Imaging:  3/18 CT head post IR procedure  Increased attenuation of the left basal ganglia and caudate head, in this patient who is status post IR thrombectomy.  This finding is concerning for hemorrhage and/or contrast staining.  Mild increased mass effect from acute left basal ganglia infarct, with mild effacement of the anterior horn of the left ventricle, as well as minimal effacement of the sulci of the left cerebral hemisphere  No new foci of infarction.      3/18 CTA stroke multiphase   CT head: No evidence for acute intracranial hemorrhage. There is ill-defined decreased attenuation of left MCA distribution specifically left basal ganglia and left insula concerning for recent area of infarction. No evidence for hemorrhagic confusion. Ventricles normal without hydrocephalus.    CTA head: Occlusion left cervical ICA with reconstitution at the cavernous segment. In addition there is a ipsilateral occlusion of the left M1 segment of the MCA proximally. There is reconstituted M2 and M3 MCA candelabra on all 3 phases.    CTA neck: Atherosclerotic disease carotid bifurcations and proximal ICAs bilaterally with high-grade critical stenosis or short segment occlusion of the left proximal ICA and more distal left cervical occlusion or reduced flow-related to the distal occlusion.    There is high-grade critical stenosis of the right ICA at its origin with only trace flow identified.    Atherosclerotic disease of the origin the vertebral arteries the dominant left vertebral artery. There is questioned stenosis at the vertebral artery origins bilaterally limited by calcified plaque and high density venous contrast.    Please note there is advanced degenerative change in the cervical spine with moderate to severe stenosis in the central canal allowing for CT technique at C5/C6.      3/18 CXR  1. Mildly enlarged cardiac silhouette without evidence failure.  2. Medial left  lung base vascular crowding suggesting retrocardiac subsegmental atelectasis, with early aspiration or pneumonia not excluded in the proper clinical setting. Followup as warranted.      I personally reviewed the above image.    ASSESSMENT/PLAN:     Patient Active Problem List    Diagnosis Date Noted    Thrombotic stroke involving left middle cerebral artery 03/18/2017    Right sided weakness 03/18/2017    Aphasia 03/18/2017    Hypertension 03/18/2017    Embolic stroke involving left middle cerebral artery 03/18/2017     Neuro: L MCA Stroke/ S/P tPA/ S/P thrombectomy  Vasc. Neuro consulted  CT/ CTA stroke Multiphase 3/18 LMCA stroke  To IR for thrombectomy - L MCA occlusion to TICI 3 with aspiration  CT Head post IR procedure  Shows increase attenuation of L basal ganglia and caudate head with mild increase mass effect, mild effacement ant. Horn of L ventricle  ASA and Plavix held  Repeat CT head 3/19 0230 pending  Q1h Neuro checks  SBP goal < 140  NS 100cc/h  PT/OT/SLP    Pulmonary: Hx of smoking  On RA O2 Sats 97%  ABG 7.38/32.4/73/-6/19.3/94  Protecting airway at present  Requiring ocassional oral suctioning, monitor closely  Prn CXR/ABG if distress    Cardiac: HTN  SBP goal <140  Cardene gtt  PRN hydralazine    Echo pending  ECG SB with LBBB; L axis deviation    Renal:    Monitor strict I/O  BUN/Cr WNL  Follow daily CMP    ID:   T. 100  No leukocytosis  Monitor for s/s of infection    Hem/Onc:   H/H WNL  Plt WNL  PT/INR/PTT WNL  Follow Daily CBC    Endocrine:    accuchecks Q6 with SSI  TSH 0.169  fT4 1.24  A1C pending    Fluids/Electrolytes/Nutrition/GI:   NPO till passes swallow eval  IVF NS 100cc/h  Electrolyte replacement prn    Uninterrupted Critical Care/Counseling Time (not including procedures):  35minutes    Ashly Guy [facility]  Neurocritical Care

## 2017-03-19 NOTE — PLAN OF CARE
Problem: Stroke (Ischemic) (Adult)  Goal: Signs and Symptoms of Listed Potential Problems Will be Absent, Minimized or Managed (Stroke)  Signs and symptoms of listed potential problems will be absent, minimized or managed by discharge/transition of care (reference Stroke (Ischemic) (Adult) CPG).  Outcome: Ongoing (interventions implemented as appropriate)  Stroke education booklet provided to son and reviewed at bedside with son and patient. All questions and concerns addressed. Will continue to educate

## 2017-03-19 NOTE — PROGRESS NOTES
Ashly Guy NP with NCC at bedside. Upon initial assessment, patient has multiple large discolored moles all over back, arms and neck, some with scabs. There is also a large, round, fatty mass that is behind the patient's left neck and right neck. There is also one smaller, hard, rounded mass above the right areola.     Mr. Velez's son Brian was questioned at 1620 regarding his father's skin. He states that his father regularly has dermatology appointments and has all moles, lesions, and masses checked. Also per the son, the large fatty masses behind Mr. Velez's neck are very close to his spinal cord and the previous hospital opted out of removal.

## 2017-03-19 NOTE — PT/OT/SLP EVAL
Physical Therapy  Evaluation    Nathanael Velez   MRN: [id number]   Admitting Diagnosis: Thrombotic stroke involving left middle cerebral artery    PT Received On: 17  PT Start Time: 0950     PT Stop Time: 1008    PT Total Time (min): 18 min       Billable Minutes:  Evaluation 18  Moderate complexity eval  Total assist for bed mobility  Max assist for sitting balance  Max assist for transfers  Pt unable to ambulate  Lives alone  1 JESUS  Diagnosis: Thrombotic stroke involving left middle cerebral artery  [facility], s/p tPA. Pt with R sided weakness, L gaze preference, aphasia, and facial droop. Pt found to have thrombotic stroke involving the L MCA     History reviewed. No pertinent past medical history.   History reviewed. No pertinent surgical history.    Referring physician: Malena  Date referred to PT: 3/18/17  General Precautions: Standard, fall, aphasia, aspiration, vision impaired  Orthopedic Precautions: N/A   Braces: N/A       Do you have any cultural, spiritual, Latter-day conflicts, given your current situation?: Jehovah's witness    Patient History:  Lives With: alone  Living Arrangements: apartment  Home Accessibility: stairs to enter home  Home Layout: Able to live on 1st floor  Number of Stairs to Enter Home: 1  Stair Railings at Home: none  Living Environment Comment: Per son, pt lives in [place] alone but would come to [place] regularly to stay with sons. Pt's wife recently passed away.  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  Pt non verbal, kept his eyes closed most of the treatment    Pain Ratin/10 (pt aphasic but did not appear to be in pain with mobility)   Pain Rating Post-Intervention: 0/10    Objective:   Patient found with: oxygen,  blood pressure cuff, restraints, SCD, telemetry, pulse ox (continuous), peripheral IV (condom catheter)     Cognitive Exam:  Oriented to: unable to assess due to pt non verbal  Follows Commands/attention: unable to follow commands  Communication: expressive aphasia and receptive aphasia  Safety awareness/insight to disability: impaired    Physical Exam:  Postural examination/scapula alignment: Rounded shoulder and Head forward    Sensation:   Unable to assess due to pt not following commands    Lower Extremity Range of Motion:  Right Lower Extremity: WFL PROM  Left Lower Extremity: WFL    Lower Extremity Strength:  Right Lower Extremity: active movement into R hip/knee ext noted during standing, no other active movement noted  Left Lower Extremity: at least 3/5 throughout spontaneously, unable to test further due to pt unable to follow commands     Gross motor coordination: impaired on R UE/LE    Functional Mobility:  Bed Mobility:  Rolling/Turning Right: Total assistance  Supine to Sit: Total Assistance, With leg lift  Sit to Supine: Total Assistance, With leg lift    Transfers:  Sit <> Stand Assistance: Maximum Assistance (PT initially blocked R knee into ext; pt able to hold B hip and knee ext in standing ~ 45 sec for nurse to straighten linens)  Sit <> Stand Assistive Device: No Assistive Device    Gait:   Gait Distance: PT attempted to weight shift pt in standing to try to get pt to take a step without success    Balance:   Static Sit: 0: Needs MAXIMAL assist to maintain sitting without back support  Dynamic Sit: FAIR: Cannot move trunk without losing balance  Static Stand: 0: Needs MAXIMAL assist to maintain     Therapeutic Activities and Exercises:  Pt sat on the EOB ~ 10 min with minimal to max assist due to posterior instability and pt putting his L foot up on the bed at times causing LOB. Pt noted to be drooling throughout treatment.    AM-PAC 6 CLICK MOBILITY  How much help from another person does this  patient currently need?   1 = Unable, Total/Dependent Assistance  2 = A lot, Maximum/Moderate Assistance  3 = A little, Minimum/Contact Guard/Supervision  4 = None, Modified [place]/Independent    Turning over in bed (including adjusting bedclothes, sheets and blankets)?: 2  Sitting down on and standing up from a chair with arms (e.g., wheelchair, bedside commode, etc.): 2  Moving from lying on back to sitting on the side of the bed?: 2  Moving to and from a bed to a chair (including a wheelchair)?: 2  Need to walk in hospital room?: 1  Climbing 3-5 steps with a railing?: 1  Total Score: 10     AM-PAC Raw Score CMS G-Code Modifier Level of Impairment Assistance   6 % Total / Unable   7 - 9 CM 80 - 100% Maximal Assist   10 - 14 CL 60 - 80% Moderate Assist   15 - 19 CK 40 - 60% Moderate Assist   20 - 22 CJ 20 - 40% Minimal Assist   23 CI 1-20% SBA / CGA   24 CH 0% Independent/ Mod I     Patient left HOB elevated with all lines intact, call button in reach, nurse notified and son present.    Assessment:   Nathanael Velez is a 61 y.o. male with a medical diagnosis of Thrombotic stroke involving left middle cerebral artery and presents with difficulty with functional mobility due to weakness, instability, poor safety awareness, aphasia, impaired sitting and standing balance, and fatigue. Pt can benefit from continued therapy to work towards improved mobility.    Rehab identified problem list/impairments: Rehab identified problem list/impairments: weakness, impaired endurance, gait instability, impaired balance, decreased safety awareness, decreased lower extremity function, decreased upper extremity function, abnormal tone, decreased coordination, impaired functional mobilty    Rehab potential is fair.    Activity tolerance: Good    Discharge recommendations: Discharge Facility/Level Of Care Needs: nursing facility, skilled     Barriers to discharge: Barriers to Discharge: Inaccessible home environment,  Decreased caregiver support (1 JESUS and lives alone)    Equipment recommendations: Equipment Needed After Discharge: 3-in-1 commode, bath bench, wheelchair (will assess AD needed for gait as pt progresses)     GOALS:   Physical Therapy Goals        Problem: Physical Therapy Goal    Goal Priority Disciplines Outcome Goal Variances Interventions   Physical Therapy Goal     PT/OT, PT Ongoing (interventions implemented as appropriate)     Description:  PT goals for the next 10 visits:    1. Pt supine to sit with moderate assist-not met  2. Pt sit to supine with moderate assist-not met  3. Pt sit to stand with appropriate AD with moderate assist-not met  4. Pt to perform gait 5ft with appropriate AD with max assist.-not met  5. Pt to tolerate sitting on the EOB 20 min  with CGA.-not met  6. Pt to transfer bed to/from bedside chair with max assist.-not met  7. Pt to perform B LE exs in sitting or supine x 10 reps with AAROM on R LE.-not met            PLAN:    Patient to be seen 6 x/week to address the above listed problems via gait training, therapeutic activities, therapeutic exercises, neuromuscular re-education  Plan of Care expires: 04/18/17  Plan of Care reviewed with: patient, dewey GONZALEZ Davida, PT  03/19/2017

## 2017-03-19 NOTE — PLAN OF CARE
Problem: SLP Goal  Goal: SLP Goal  SLP Plan of Care: Speech Pathology will follow pt daily M-F and address the following goals x1 week:  3/26  1. Pt will participate in ongoing swallow assessment to determine least restrictive diet.  2. Pt will participate in speech language cognitive eval to determine need for intervention.  3. When deemed appropriate by MD/SLP pt will participate in MBS to further determine safe swallow function.          Swallow evaluation completed with initiated POC.     Brenda Card M.A. CCC-SLP  Speech Language Pathologist  [phone]  3/19/2017

## 2017-03-19 NOTE — ASSESSMENT & PLAN NOTE
Arrived and had STAT cta multiphase - patient with critical carotid stenosis, still with significant deficit post tPA -going for IR thrombectomy.   Admit to [facility]     Antithrombotics for secondary stroke prevention: Can start asa at 1400 on 3/19/17  Statins for secondary stroke prevention and hyperlipidemia, if present: Atorvastatin- 40 mg oral daily  Aggressive risk factor modification: Hypertension, High Cholesterol and Obesity  Rehab Efforts: Physical Therapy, Occupational Therapy, Speech and Language Pathology and when appropriate,   Diagnostics: echo  VTE Prophylaxis:  Can restart vte at 1400 at 3/18/17  BP Parameters: SBP <180  Nursing Orders: Neuro checks- per tpa protocol, Antiembolic stockings, Swallowing evaluation by Nursing, Seizure precautions, Avoid Rucker catheter, Pneumatic compression device, Stroke Education, Blood glucose target 100-130, Up with assistance     Should likely start plavix tomorrow due to stenting

## 2017-03-19 NOTE — NURSING
1200- Pt transferred to MRI on portable monitor with RN & PCT.  1310- Pt arrived back in room 7088 connected to monitor.

## 2017-03-19 NOTE — PLAN OF CARE
Problem: Patient Care Overview  Goal: Plan of Care Review  Outcome: Ongoing (interventions implemented as appropriate)  POC reviewed with pt and family at 1500. Pt unable to verbalize understanding. Family verbalized understanding. Questions and concerns addressed. No acute events today. Pt progressing toward goals. Will continue to monitor. See flowsheets for full assessment and VS info.

## 2017-03-19 NOTE — PROGRESS NOTES
[facility]  Vascular Neurology  Comprehensive Stroke Center  Progress Note      Neurologic Chief Complaint: L MCA with carotid stenosis     Subjective:     Interval History: Patient is seen for follow-up neurological assessment and treatment recommendations: improvement today, family at bedside. No events overnight     HPI, Past Medical, Family, and Social History remains the same as documented in the initial encounter.     Review of Systems   Constitutional: Negative for fever.   Genitourinary: Negative for hematuria.   Neurological: Positive for facial asymmetry, speech difficulty and weakness.     Scheduled Meds:   aspirin  81 mg Oral Daily    atorvastatin  40 mg Oral Daily    polyethylene glycol  17 g Oral Daily    senna-docusate 8.6-50 mg  1 tablet Oral BID    sodium chloride 0.9%  3 mL Intravenous Q8H     Continuous Infusions:   sodium chloride 0.9% 50 mL/hr at 03/19/17 1130    nicardipine Stopped (03/19/17 0000)     PRN Meds:hydrALAZINE, ondansetron    Objective:     Vital Signs (Most Recent):  Temp: 99.4 °F (37.4 °C) (03/19/17 1115)  Pulse: 65 (03/19/17 1215)  Resp: (!) 21 (03/19/17 1115)  BP: 123/62 (03/19/17 1215)  SpO2: 100 % (03/19/17 1215)  BP Location: Right arm    Vital Signs Range (Last 24H):  Temp:  [98.4 °F (36.9 °C)-100 °F (37.8 °C)]   Pulse:  [55-90]   Resp:  [10-29]   BP: (115-207)/()   SpO2:  [97 %-100 %]   BP Location: Right arm    Physical Exam   Constitutional: He appears well-developed and well-nourished.   HENT:   Head: Normocephalic and atraumatic.   Cardiovascular: Normal rate.    Pulmonary/Chest: Effort normal.   Neurological:   Drowsy but easy to arouse   Skin: Skin is warm.   Nursing note and vitals reviewed.      Neurological Exam:   LOC: does not follow requests and drowsy  Language: Expressive aphasia, Receptive aphasia  Speech: Aphasic   Orientation: Aphasic  Memory: aphasic  Facial Movement (CN VII): lower weakness right lower  Motor*: localizes  with left extremities. Some proximal movement - wd to pain on right extremities  Sensation: siddharth-hypoesthesia right  Tone: Arm-Left: normal; Leg-Left: normal; Arm-Right: normal; Leg-Right: normal    NIH Stroke Scale:    Level of Consciousness: 1 - drowsy  LOC Questions: 2 - answers none correctly  LOC Commands: 2 - performs neither correctly  Best Gaze: 1 - partial gaze palsy  Visual: 2 - complete hemianopia  Facial Palsy: 1 - minor  Motor Left Arm: 0 - no drift  Motor Right Arm: 3 - no effort against gravity  Motor Left Le - no drift  Motor Right Leg: 3 - no effort against gravity  Limb Ataxia: 0 - absent  Sensory: 0 - normal  Best Language: 3 - mute  Dysarthria: 0 - normal articulation  Extinction and Inattention: 0 - no neglect  NIH Stroke Scale Total: 18      Laboratory:  CMP:   Recent Labs  Lab 17  0524  17  1140   CALCIUM 7.9*  --   --    ALBUMIN 3.1*  --   --    PROT 6.0  --   --      < > 142   K 3.7  --   --    CO2 20*  --   --    *  --   --    BUN 17  --   --    CREATININE 0.8  --   --    ALKPHOS 59  --   --    ALT 14  --   --    AST 18  --   --    BILITOT 0.8  --   --    < > = values in this interval not displayed.  CBC:   Recent Labs  Lab 17  0220   WBC 14.81*   RBC 5.14   HGB 16.4   HCT 49.2      MCV 96   MCH 31.9*   MCHC 33.3     Lipid Panel:   Recent Labs  Lab 17  1609   CHOL 175   LDLCALC 123.8   HDL 36*   TRIG 76     Coagulation:   Recent Labs  Lab 17  1752 17  0220   INR  --  1.1   APTT 28.5  --      Platelet Aggregation Study: No results for input(s): PLTAGG, PLTAGINTERP, PLTAGREGLACO, ADPPLTAGGREG in the last 168 hours.  Hgb A1C:   Recent Labs  Lab 17  1752   HGBA1C 5.7     TSH:   Recent Labs  Lab 17  1609   TSH 0.169*       Diagnostic Results:  I have personally reviewed:  MRI pending    CT head 3/19/17  Interval worsening of hemorrhagic conversion centered within the left basal ganglia, in this patient who is status post  thrombectomy for left MCA infarct.  Interval worsening of sulcal effacement and effacement of the left lateral ventricle.  There is 0.2 cm rightward midline shift.    No new foci of hemorrhage or infarction.    CTA stroke multiphase 3/18/17   CT head: No evidence for acute intracranial hemorrhage. There is ill-defined decreased attenuation of left MCA distribution specifically left basal ganglia and left insula concerning for recent area of infarction. No evidence for hemorrhagic confusion. Ventricles normal without hydrocephalus.    CTA head: Occlusion left cervical ICA with reconstitution at the cavernous segment. In addition there is a ipsilateral occlusion of the left M1 segment of the MCA proximally. There is reconstituted M2 and M3 MCA candelabra on all 3 phases.    CTA neck: Atherosclerotic disease carotid bifurcations and proximal ICAs bilaterally with high-grade critical stenosis or short segment occlusion of the left proximal ICA and more distal left cervical occlusion or reduced flow-related to the distal occlusion.    There is high-grade critical stenosis of the right ICA at its origin with only trace flow identified.    Atherosclerotic disease of the origin the vertebral arteries the dominant left vertebral artery. There is questioned stenosis at the vertebral artery origins bilaterally limited by calcified plaque and high density venous contrast.    Please note there is advanced degenerative change in the cervical spine with moderate to severe stenosis in the central canal allowing for CT technique at C5/C6.        Assessment/Plan:     61 year old male with HTN, HLD, and CAD and L MCA stroke symptoms post tpa and transferred here for higher level of care and potential thrombectomy. CTA multiphase upon arrival.   Taken to IR for acute intervention. Stent placed on 3/18/17        * Thrombotic stroke involving left middle cerebral artery  Arrived and had STAT cta multiphase - patient with critical carotid  stenosis, still with significant deficit post tPA -going for IR thrombectomy.   Admit to [facility]     Antithrombotics for secondary stroke prevention: Can start asa at 1400 on 3/19/17  Statins for secondary stroke prevention and hyperlipidemia, if present: Atorvastatin- 40 mg oral daily  Aggressive risk factor modification: Hypertension, High Cholesterol and Obesity  Rehab Efforts: Physical Therapy, Occupational Therapy, Speech and Language Pathology and when appropriate,   Diagnostics: echo  VTE Prophylaxis:  Can restart vte at 1400 at 3/18/17  BP Parameters: SBP <180  Nursing Orders: Neuro checks- per tpa protocol, Antiembolic stockings, Swallowing evaluation by Nursing, Seizure precautions, Avoid Rucker catheter, Pneumatic compression device, Stroke Education, Blood glucose target 100-130, Up with assistance     Should likely start plavix tomorrow due to stenting                         Right sided weakness  Given tpa , status post thrombectomy   Due to stroke above  Aggressive PT/OT/ speech when appropriate    Aphasia  Given tpa , status post thrombectomy   Due to stroke above  Aggressive PT/OT/ speech when appropriate    Hypertension  Stroke RF  <180 post tpa and thrombectomy   Optimize management       AC Philippe  Comprehensive Stroke Center  Department of Vascular Neurology   [facility]

## 2017-03-19 NOTE — PLAN OF CARE
Problem: Physical Therapy Goal  Goal: Physical Therapy Goal  PT goals for the next 10 visits:    1. Pt supine to sit with moderate assist-not met  2. Pt sit to supine with moderate assist-not met  3. Pt sit to stand with appropriate AD with moderate assist-not met  4. Pt to perform gait 5ft with appropriate AD with max assist.-not met  5. Pt to tolerate sitting on the EOB 20 min with CGA.-not met  6. Pt to transfer bed to/from bedside chair with max assist.-not met  7. Pt to perform B LE exs in sitting or supine x 10 reps with AAROM on R LE.-not met  Outcome: Ongoing (interventions implemented as appropriate)  Pt's goals set and pt will benefit from skilled PT services to work towards improved functional mobility including: bed mobility, transfers, therapeutic exercise, and gait. Sharon Higuera PT 3/19/17

## 2017-03-20 PROBLEM — Q23.9 ABNORMALITY OF AORTIC VALVE: Status: ACTIVE | Noted: 2017-03-20

## 2017-03-20 LAB
ALBUMIN SERPL BCP-MCNC: 3.3 G/DL
ALP SERPL-CCNC: 65 U/L
ALT SERPL W/O P-5'-P-CCNC: 14 U/L
ANION GAP SERPL CALC-SCNC: 10 MMOL/L
AST SERPL-CCNC: 26 U/L
BASOPHILS # BLD AUTO: 0.04 K/UL
BASOPHILS NFR BLD: 0.3 %
BILIRUB SERPL-MCNC: 0.9 MG/DL
BILIRUB UR QL STRIP: NEGATIVE
BUN SERPL-MCNC: 21 MG/DL
CALCIUM SERPL-MCNC: 8.7 MG/DL
CHLORIDE SERPL-SCNC: 109 MMOL/L
CLARITY UR REFRACT.AUTO: ABNORMAL
CO2 SERPL-SCNC: 23 MMOL/L
COLOR UR AUTO: YELLOW
CREAT SERPL-MCNC: 0.9 MG/DL
CRP SERPL-MCNC: 30.72 MG/L
DIFFERENTIAL METHOD: ABNORMAL
EOSINOPHIL # BLD AUTO: 0.1 K/UL
EOSINOPHIL NFR BLD: 0.9 %
ERYTHROCYTE [DISTWIDTH] IN BLOOD BY AUTOMATED COUNT: 13.8 %
ERYTHROCYTE [SEDIMENTATION RATE] IN BLOOD BY WESTERGREN METHOD: 7 MM/HR
EST. GFR  (AFRICAN AMERICAN): >60 ML/MIN/1.73 M^2
EST. GFR  (NON AFRICAN AMERICAN): >60 ML/MIN/1.73 M^2
GLUCOSE SERPL-MCNC: 95 MG/DL
GLUCOSE UR QL STRIP: NEGATIVE
HCT VFR BLD AUTO: 43.9 %
HGB BLD-MCNC: 14.8 G/DL
HGB UR QL STRIP: NEGATIVE
INR PPP: 1.1
KETONES UR QL STRIP: ABNORMAL
LEUKOCYTE ESTERASE UR QL STRIP: NEGATIVE
LYMPHOCYTES # BLD AUTO: 2 K/UL
LYMPHOCYTES NFR BLD: 12.8 %
MAGNESIUM SERPL-MCNC: 2.1 MG/DL
MCH RBC QN AUTO: 31.3 PG
MCHC RBC AUTO-ENTMCNC: 33.7 %
MCV RBC AUTO: 93 FL
MONOCYTES # BLD AUTO: 1.6 K/UL
MONOCYTES NFR BLD: 10.3 %
NEUTROPHILS # BLD AUTO: 11.7 K/UL
NEUTROPHILS NFR BLD: 75.3 %
NITRITE UR QL STRIP: NEGATIVE
PH UR STRIP: 6 [PH] (ref 5–8)
PHOSPHATE SERPL-MCNC: 2 MG/DL
PLATELET # BLD AUTO: 226 K/UL
PLATELET BLD QL SMEAR: ABNORMAL
PMV BLD AUTO: 11 FL
POCT GLUCOSE: 107 MG/DL (ref 70–110)
POCT GLUCOSE: 123 MG/DL (ref 70–110)
POCT GLUCOSE: 123 MG/DL (ref 70–110)
POCT GLUCOSE: 129 MG/DL (ref 70–110)
POTASSIUM SERPL-SCNC: 3.6 MMOL/L
PROT SERPL-MCNC: 6.6 G/DL
PROT UR QL STRIP: NEGATIVE
PROTHROMBIN TIME: 11.4 SEC
RBC # BLD AUTO: 4.73 M/UL
SODIUM SERPL-SCNC: 142 MMOL/L
SODIUM SERPL-SCNC: 145 MMOL/L
SP GR UR STRIP: >1.03 (ref 1–1.03)
URN SPEC COLLECT METH UR: ABNORMAL
UROBILINOGEN UR STRIP-ACNC: ABNORMAL EU/DL
WBC # BLD AUTO: 15.53 K/UL

## 2017-03-20 PROCEDURE — 92523 SPEECH SOUND LANG COMPREHEN: CPT

## 2017-03-20 PROCEDURE — 97530 THERAPEUTIC ACTIVITIES: CPT

## 2017-03-20 PROCEDURE — 97802 MEDICAL NUTRITION INDIV IN: CPT

## 2017-03-20 PROCEDURE — 84295 ASSAY OF SERUM SODIUM: CPT

## 2017-03-20 PROCEDURE — 85651 RBC SED RATE NONAUTOMATED: CPT

## 2017-03-20 PROCEDURE — 99233 SBSQ HOSP IP/OBS HIGH 50: CPT | Mod: ,,, | Performed by: PSYCHIATRY & NEUROLOGY

## 2017-03-20 PROCEDURE — 87040 BLOOD CULTURE FOR BACTERIA: CPT

## 2017-03-20 PROCEDURE — 25000003 PHARM REV CODE 250: Performed by: NURSE PRACTITIONER

## 2017-03-20 PROCEDURE — 81003 URINALYSIS AUTO W/O SCOPE: CPT

## 2017-03-20 PROCEDURE — 83735 ASSAY OF MAGNESIUM: CPT

## 2017-03-20 PROCEDURE — 63600175 PHARM REV CODE 636 W HCPCS: Performed by: PHYSICIAN ASSISTANT

## 2017-03-20 PROCEDURE — 99900035 HC TECH TIME PER 15 MIN (STAT)

## 2017-03-20 PROCEDURE — 25000003 PHARM REV CODE 250: Performed by: STUDENT IN AN ORGANIZED HEALTH CARE EDUCATION/TRAINING PROGRAM

## 2017-03-20 PROCEDURE — 92526 ORAL FUNCTION THERAPY: CPT

## 2017-03-20 PROCEDURE — 85610 PROTHROMBIN TIME: CPT

## 2017-03-20 PROCEDURE — 63600175 PHARM REV CODE 636 W HCPCS: Performed by: NURSE PRACTITIONER

## 2017-03-20 PROCEDURE — 20000000 HC ICU ROOM

## 2017-03-20 PROCEDURE — 84100 ASSAY OF PHOSPHORUS: CPT

## 2017-03-20 PROCEDURE — 86141 C-REACTIVE PROTEIN HS: CPT

## 2017-03-20 PROCEDURE — 85025 COMPLETE CBC W/AUTO DIFF WBC: CPT

## 2017-03-20 PROCEDURE — 99222 1ST HOSP IP/OBS MODERATE 55: CPT | Mod: ,,, | Performed by: PHYSICIAN ASSISTANT

## 2017-03-20 PROCEDURE — 80053 COMPREHEN METABOLIC PANEL: CPT

## 2017-03-20 PROCEDURE — 97110 THERAPEUTIC EXERCISES: CPT

## 2017-03-20 PROCEDURE — 94761 N-INVAS EAR/PLS OXIMETRY MLT: CPT

## 2017-03-20 PROCEDURE — 25000003 PHARM REV CODE 250: Performed by: PHYSICIAN ASSISTANT

## 2017-03-20 RX ORDER — METOPROLOL TARTRATE 25 MG/1
12.5 TABLET ORAL 2 TIMES DAILY
Status: DISCONTINUED | OUTPATIENT
Start: 2017-03-20 | End: 2017-03-20

## 2017-03-20 RX ORDER — POLYETHYLENE GLYCOL 3350 17 G/17G
17 POWDER, FOR SOLUTION ORAL DAILY
Status: DISCONTINUED | OUTPATIENT
Start: 2017-03-21 | End: 2017-03-29

## 2017-03-20 RX ORDER — ATORVASTATIN CALCIUM 20 MG/1
80 TABLET, FILM COATED ORAL DAILY
Status: DISCONTINUED | OUTPATIENT
Start: 2017-03-21 | End: 2017-04-05

## 2017-03-20 RX ORDER — AMOXICILLIN 250 MG
1 CAPSULE ORAL 2 TIMES DAILY
Status: DISCONTINUED | OUTPATIENT
Start: 2017-03-20 | End: 2017-03-29

## 2017-03-20 RX ORDER — LABETALOL 200 MG/1
200 TABLET, FILM COATED ORAL EVERY 8 HOURS
Status: DISCONTINUED | OUTPATIENT
Start: 2017-03-20 | End: 2017-03-20

## 2017-03-20 RX ORDER — LOSARTAN POTASSIUM 50 MG/1
100 TABLET ORAL DAILY
Status: DISCONTINUED | OUTPATIENT
Start: 2017-03-20 | End: 2017-03-20

## 2017-03-20 RX ORDER — ATORVASTATIN CALCIUM 20 MG/1
80 TABLET, FILM COATED ORAL DAILY
Status: DISCONTINUED | OUTPATIENT
Start: 2017-03-21 | End: 2017-03-20

## 2017-03-20 RX ORDER — NAPROXEN SODIUM 220 MG/1
81 TABLET, FILM COATED ORAL DAILY
Status: DISCONTINUED | OUTPATIENT
Start: 2017-03-21 | End: 2017-03-20

## 2017-03-20 RX ORDER — LABETALOL 200 MG/1
200 TABLET, FILM COATED ORAL EVERY 8 HOURS
Status: DISCONTINUED | OUTPATIENT
Start: 2017-03-20 | End: 2017-03-26

## 2017-03-20 RX ORDER — LOSARTAN POTASSIUM 50 MG/1
100 TABLET ORAL DAILY
Status: DISCONTINUED | OUTPATIENT
Start: 2017-03-20 | End: 2017-04-05

## 2017-03-20 RX ADMIN — LABETALOL HYDROCHLORIDE 200 MG: 200 TABLET, FILM COATED ORAL at 09:03

## 2017-03-20 RX ADMIN — Medication 3 ML: at 09:03

## 2017-03-20 RX ADMIN — ACETAMINOPHEN 650 MG: 325 TABLET ORAL at 11:03

## 2017-03-20 RX ADMIN — ATORVASTATIN CALCIUM 40 MG: 20 TABLET, FILM COATED ORAL at 08:03

## 2017-03-20 RX ADMIN — SODIUM CHLORIDE: 234 INJECTION INTRAMUSCULAR; INTRAVENOUS; SUBCUTANEOUS at 11:03

## 2017-03-20 RX ADMIN — STANDARDIZED SENNA CONCENTRATE AND DOCUSATE SODIUM 1 TABLET: 8.6; 5 TABLET, FILM COATED ORAL at 08:03

## 2017-03-20 RX ADMIN — VANCOMYCIN HYDROCHLORIDE 1000 MG: 1 INJECTION, POWDER, LYOPHILIZED, FOR SOLUTION INTRAVENOUS at 07:03

## 2017-03-20 RX ADMIN — LOSARTAN POTASSIUM 100 MG: 50 TABLET, FILM COATED ORAL at 02:03

## 2017-03-20 RX ADMIN — ACETAMINOPHEN 650 MG: 325 TABLET ORAL at 04:03

## 2017-03-20 RX ADMIN — CEFEPIME HYDROCHLORIDE 1 G: 1 INJECTION, POWDER, FOR SOLUTION INTRAMUSCULAR; INTRAVENOUS at 07:03

## 2017-03-20 RX ADMIN — Medication 3 ML: at 06:03

## 2017-03-20 RX ADMIN — LABETALOL HYDROCHLORIDE 200 MG: 200 TABLET, FILM COATED ORAL at 03:03

## 2017-03-20 RX ADMIN — POLYETHYLENE GLYCOL 3350 17 G: 17 POWDER, FOR SOLUTION ORAL at 08:03

## 2017-03-20 RX ADMIN — ASPIRIN 81 MG CHEWABLE TABLET 81 MG: 81 TABLET CHEWABLE at 08:03

## 2017-03-20 RX ADMIN — HYDRALAZINE HYDROCHLORIDE 10 MG: 20 INJECTION INTRAMUSCULAR; INTRAVENOUS at 12:03

## 2017-03-20 RX ADMIN — HYDRALAZINE HYDROCHLORIDE 10 MG: 20 INJECTION INTRAMUSCULAR; INTRAVENOUS at 08:03

## 2017-03-20 RX ADMIN — ACETAMINOPHEN 650 MG: 325 TABLET ORAL at 02:03

## 2017-03-20 RX ADMIN — STANDARDIZED SENNA CONCENTRATE AND DOCUSATE SODIUM 1 TABLET: 8.6; 5 TABLET, FILM COATED ORAL at 09:03

## 2017-03-20 RX ADMIN — SODIUM CHLORIDE: 234 INJECTION INTRAMUSCULAR; INTRAVENOUS; SUBCUTANEOUS at 03:03

## 2017-03-20 NOTE — PT/OT/SLP EVAL
Speech Language Pathology Evaluation    Nathanael Velez   MRN: [id number]   Admitting Diagnosis: Thrombotic stroke involving left middle cerebral artery    Diet recommendations: Solid Diet Level: NPO  Liquid Diet Level: NPO   Strictly NPO; high risk for aspiration.    SLP Treatment Date: 03/20/17  Speech Start Time: 1053     Speech Stop Time: 1111     Speech Total (min): 18 min       TREATMENT BILLABLE MINUTES:  Eval 9  and Treatment Swallowing Dysfunction 9    Diagnosis: Thrombotic stroke involving left middle cerebral artery      History reviewed. No pertinent past medical history.  History reviewed. No pertinent surgical history.    Has the patient been evaluated by SLP for swallowing? : Yes  Keep patient NPO?: Yes   General Precautions: Standard,            Social Hx: Patient lives alone; independent.    Prior diet: Regular/ thin liquids.    Occupational/hobbies/homemaking: ; researcher.    Education: Master's Degree in Marketing    Subjective:  Pt was lethargic.     Patient goals: pt did not state.    Objective:      Oral Musculature Evaluation  Oral Musculature: unable to assess due to poor participation/comprehension  Dentition: present and adequate  Voice Prior to PO Intake: wet gurgly; clear after suction of PO trials    Cognitive Status:  Behavioral Observations: lethargic-    Orientation: X3 with 33% acc indep      Language:    Receptive Language: identify X2 objects with 0% acc indep; follow X1 1-step command with 100% acc indep; X1 2-step commands with 0% acc indep; X1 yes/no question with 0% acc indep .     Expressive Language: X3 confrontational naming atsk with 0% acc indep; per family rote speech greeting with 100% acc indep.    Motor Speech: mild dysarthria     Pt exhibited paraphasia throughout session.      Additional Treatment:      Wet/gurgly voice before PO trials. Oral suction provided.  Pt tolerated X2 icechips via spoon; no pharyngeal swallow demonstrated; suction provided and no  further PO trials assessed 2/2 aspiration risk. Oral phase cb slow mastication, drooling, oral weakness. Extensive skilled education provided to family on diagnosis of Aphasia, aspiration precautions and diet recommendations. Whiteboard updated with diet recommendations/aspiration precautions. No further    Assessment:  Nathanael Velez is a 61 y.o. male with a SLP diagnosis of Aphasia, oropharyngeal Dysphagia, mild Dysarthria and Cognitive-Linguistic Impairment. Continued progress towards goals.     Do you have any cultural, spiritual, Oriental orthodox conflicts, given your current situation?: no    Discharge recommendations: Discharge Facility/Level Of Care Needs: nursing facility, skilled (pending PT OT recs)     Goals:   SLP Goals        Problem: SLP Goal    Goal Priority Disciplines Outcome   SLP Goal     SLP    Description:  SLP Plan of Care: Speech Pathology will follow pt daily M-F and address the following goals x1 week:  3/26  1. Pt will participate in ongoing swallow assessment to determine least restrictive diet. ONGOING  2. Pt will participate in speech language cognitive eval to determine need for intervention. MET  3. When deemed appropriate by MD/SLP pt will participate in MBS to further determine safe swallow function. ONGOING      SLP Plan of Care: Speech Pathology will follow pt daily M-F and address the following goals x1 week:  3/27  1. Pt will participate in ongoing swallow assessment to determine least restrictive diet.  2. Pt will answer 3/4 orientation questions with mod assistance to improve cognitive function.   3. Pt will follow simple directives with 60% acc indep provided mod cues to improve receptive language.  4. Pt will complete automatic rote speech (counting) with 60% acc indep provided mod cues to improve expressive language.  5. When deemed appropriate by MD/SLP pt will participate in MBS to further determine safe swallow function.                        Plan:   Patient to be seen Therapy  Frequency: 5 x/week   Plan of Care expires: 04/17/17  Plan of Care reviewed with: patient, family  SLP Follow-up?: Yes  SLP - Next Visit Date: 03/21/17           KAYLEE Mckeon  03/20/2017

## 2017-03-20 NOTE — PT/OT/SLP PROGRESS
Physical Therapy  Treatment    Nathanael Velez   MRN: [id number]   Admitting Diagnosis: Thrombotic stroke involving left middle cerebral artery    PT Received On: 17  PT Start Time: 1538     PT Stop Time: 1610    PT Total Time (min): 32 min       Billable Minutes:  Therapeutic Activity 22 and Therapeutic Exercise 10    Treatment Type: Treatment  PT/PTA: PT             General Precautions: Standard, aspiration, fall, NPO  Orthopedic Precautions: N/A   Braces: N/A    Do you have any cultural, spiritual, Sikhism conflicts, given your current situation?: Jewish    Subjective:  Communicated with RN prior to session.  Pt non-verbal and kept eyes closed throughout session.     Pain Ratin/10 (Pt unable to communicate but no signs of pain. )  Pain Rating Post-Intervention: 0/10 (Pt unable to communicate but no signs of pain during treatment. )    Objective:   Patient found with: blood pressure cuff, NG tube, peripheral IV, telemetry, pulse ox (continuous), restraints, SCD   Pt found supine in bed.     Functional Mobility:  Bed Mobility:   Scooting/Bridging: Total Assistance  Supine to Sit: Maximum Assistance (x1 for trunk control, x1 for leg and line management)  Sit to Supine: Maximum Assistance  (x1 for trunk control, x1 for leg and line management)    Transfers:  Sit <> Stand Assistance: Activity did not occur    Gait:   Gait Distance: Activity did not occur 2/2 unable to follow commands and very lethargic throughout session.     Stairs: Not appropriate at this time.     Balance:   Static Sit: 0: Needs MAXIMAL assist to maintain sitting without back support  Dynamic Sit: 0: N/A  Static Stand: 0: Needs MAXIMAL assist to maintain   Dynamic stand: 0: N/A     Therapeutic Activities and Exercises:  Pt sat EOB ~10 min with Max A-Total A x2 to maintain trunk and head control. Pt was initially Max A and progressed to Total A as time progressed. Pt had significant pushing to R and a L gaze. Pt cued to open eyes- responses  inconsistent.   Pt given tactile cues for proper posture and neck extension.   BLE PROM in all planes of motion x10 reps       AM-PAC 6 CLICK MOBILITY  How much help from another person does this patient currently need?   1 = Unable, Total/Dependent Assistance  2 = A lot, Maximum/Moderate Assistance  3 = A little, Minimum/Contact Guard/Supervision  4 = None, Modified [place]/Independent    Turning over in bed (including adjusting bedclothes, sheets and blankets)?: 2  Sitting down on and standing up from a chair with arms (e.g., wheelchair, bedside commode, etc.): 2  Moving from lying on back to sitting on the side of the bed?: 2  Moving to and from a bed to a chair (including a wheelchair)?: 2  Need to walk in hospital room?: 1  Climbing 3-5 steps with a railing?: 1  Total Score: 10    AM-PAC Raw Score CMS G-Code Modifier Level of Impairment Assistance   6 % Total / Unable   7 - 9 CM 80 - 100% Maximal Assist   10 - 14 CL 60 - 80% Moderate Assist   15 - 19 CK 40 - 60% Moderate Assist   20 - 22 CJ 20 - 40% Minimal Assist   23 CI 1-20% SBA / CGA   24 CH 0% Independent/ Mod I     Patient left supine with all lines intact, call button in reach, bed alarm on, restraints reapplied at end of session and son present.    Assessment:  Nathanael Velez is a 61 y.o. male with a medical diagnosis of Thrombotic stroke involving left middle cerebral artery and presents with deficits listed below. Pt had fair tolerance of treatment. Pt non-verbal and not following commands throughout treatment. Pt had fair tolerance of EOB but required Max-Total A to maintain. Pt will continue to benefit from skilled acute therapy services to address the deficits listed below and increase functional activities.     Rehab identified problem list/impairments: Rehab identified problem list/impairments: weakness, impaired endurance, impaired sensation, impaired self care skills, impaired functional mobilty, impaired balance, impaired cognition,  decreased coordination, decreased upper extremity function, decreased lower extremity function, decreased safety awareness, impaired coordination    Rehab potential is fair.    Activity tolerance: Fair    Discharge recommendations: Discharge Facility/Level Of Care Needs: nursing facility, skilled     Barriers to discharge: Barriers to Discharge: Inaccessible home environment, Decreased caregiver support    Equipment recommendations: Equipment Needed After Discharge: 3-in-1 commode, bath bench, wheelchair (AD needs will be assessed as pt progresses)     GOALS:   Physical Therapy Goals        Problem: Physical Therapy Goal    Goal Priority Disciplines Outcome Goal Variances Interventions   Physical Therapy Goal     PT/OT, PT Ongoing (interventions implemented as appropriate)     Description:  PT goals for the next 10 visits:    1. Pt supine to sit with moderate assist-not met  2. Pt sit to supine with moderate assist-not met  3. Pt sit to stand with appropriate AD with moderate assist-not met  4. Pt to perform gait 5ft with appropriate AD with max assist.-not met  5. Pt to tolerate sitting on the EOB 20 min  with CGA.-not met  6. Pt to transfer bed to/from bedside chair with max assist.-not met  7. Pt to perform B LE exs in sitting or supine x 10 reps with AAROM on R LE.-not met                PLAN:    Patient to be seen 6 x/week  to address the above listed problems via gait training, therapeutic activities, therapeutic exercises, neuromuscular re-education  Plan of Care expires: 04/18/17  Plan of Care reviewed with: son, patient         Pankaj Wilson, SPT  03/20/2017

## 2017-03-20 NOTE — PT/OT/SLP PROGRESS
"Occupational Therapy  Treatment    Nathanael Velez   MRN: [id number]   Admitting Diagnosis: Thrombotic stroke involving left middle cerebral artery    OT Date of Treatment: 17   OT Start Time: 1400  OT Stop Time: 1434  OT Total Time (min): 34 min    Billable Minutes:  Therapeutic Activity 20 and Therapeutic Exercise 14    General Precautions: Standard, aspiration, fall, NPO (cardiac)    Do you have any cultural, spiritual, Buddhist conflicts, given your current situation?: Yazidism    Subjective:  Communicated with RN prior to session.  Pt stated "I want to" however did not finish statement.    Pain Ratin/10  Pain Rating Post-Intervention: 0/10 (no indication of pain during session.)    Objective:  Patient found with: blood pressure cuff, telemetry, NG tube, peripheral IV, pulse ox (continuous), restraints, SCD (condom catheter)     Functional Mobility:  Bed Mobility:  Rolling/Turning Right: Moderate assistance (x 2 trials with verbal & physical (A)/cues required)  Scooting/Bridging: Total Assistance (scooting forward on EOB; Max (A) with scooting back on EOB; dependent drawsheet transfer up HOB while supine)  Supine to Sit: Maximum Assistance  Sit to Supine: Maximum Assistance    Activities of Daily Living:     Grooming Position: Seated, EOB  Grooming Level of Assistance: Total assistance (hand over hand (A) required)     Therapeutic Activities and Exercises:  Pt required CGA-Max (A) with postural control while seated EOB.  Pt initially with strong rightward leaning however with leftward weight shifting with (A) from OT pt was able to sit with CGA while seated EOB.  Provided (A) to decrease LUE pushing while seated EOB.  Provided RUE weight bearing while seated EOB.  Assisted pt to decrease truncal rotation while seated EOB.  Provided AAROM with LUE & PROM with RUE in all planes x 10 reps each while supine.  Pt & son had no further questions & when asked whether there were any concerns pt & son reported " none.      AM-PAC 6 CLICK ADL   How much help from another person does this patient currently need?   1 = Unable, Total/Dependent Assistance  2 = A lot, Maximum/Moderate Assistance  3 = A little, Minimum/Contact Guard/Supervision  4 = None, Modified [place]/Independent    Putting on and taking off regular lower body clothing? : 1  Bathing (including washing, rinsing, drying)?: 1  Toileting, which includes using toilet, bedpan, or urinal? : 1  Putting on and taking off regular upper body clothing?: 1  Taking care of personal grooming such as brushing teeth?: 1  Eating meals?: 1  Total Score: 6     AM-PAC Raw Score CMS G-Code Modifier Level of Impairment Assistance   6 % Total / Unable   7 - 9 CM 80 - 100% Maximal Assist   10 - 14 CL 60 - 80% Moderate Assist   15 - 19 CK 40 - 60% Moderate Assist   20 - 22 CJ 20 - 40% Minimal Assist   23 CI 1-20% SBA / CGA   24 CH 0% Independent/ Mod I     Patient left supine with all lines intact, call button in reach, restraints reapplied at end of session, RN notified, son present and white board updated.    ASSESSMENT:  Nathanael Velez is a 61 y.o. male with a medical diagnosis of Thrombotic stroke involving left middle cerebral artery and presents with fair participation and motivation. Pt continues to demonstrate deficits in ADL's, postural control, strength, ROM, and cognition affecting all activities.    Rehab identified problem list/impairments: Rehab identified problem list/impairments: weakness, impaired endurance, impaired sensation, impaired self care skills, visual deficits, impaired balance, impaired functional mobilty, impaired cognition, decreased safety awareness, decreased coordination, decreased upper extremity function, decreased lower extremity function    Rehab potential is fair.    Activity tolerance: Fair    Discharge recommendations: Discharge Facility/Level Of Care Needs: nursing facility, skilled     GOALS:   Occupational Therapy Goals         Problem: Occupational Therapy Goal    Goal Priority Disciplines Outcome Interventions   Occupational Therapy Goal     OT, PT/OT Ongoing (interventions implemented as appropriate)    Description:  Goals set 3/19 to be addressed for 7 days with expiration date, 3/26:  Patient will increase functional independence with ADLs by performing:    Patient will demonstrate rolling to the right with mod assist.  Not met   Patient will demonstrate rolling to the left with mod assist.   Not met  Patient will demonstrate supine -sit with mod assist.   Not met  Patient will demonstrate stand pivot transfers with max assist.   Not met  Patient will demonstrate grooming while standing with max assist.   Not met  Patient will demonstrate upper body dressing with max assist while seated EOB.   Not met  Patient will demonstrate lower body dressing with max assist while seated EOB.   Not met  Patient will demonstrate toileting with max assist.   Not met  Patient will demonstrate bathing while seated EOB with max assist.   Not met  Patient's family / caregiver will demonstrate independence and safety with assisting patient with self-care skills and functional mobility.     Not met  Patient's family / caregiver will demonstrate independence with providing ROM and changes in bed positioning.   Not met  Patient and/or patient's family will verbalize understanding of stroke prevention guidelines, personal risk factors and stroke warning signs via teachback method.  Not met                     Plan:  Patient to be seen 6 x/week to address the above listed problems via self-care/home management, neuromuscular re-education, cognitive retraining, sensory integration, therapeutic activities, therapeutic exercises  Plan of Care expires: 04/19/17  Plan of Care reviewed with: patient, dewey Moe AVIS Baxter  03/20/2017

## 2017-03-20 NOTE — PROGRESS NOTES
Pt taken to CT via bed on portable monitor by Nurse  X 1 at 0410.  Pt returned to [id number] at 0433 and placed back on bedside monitor.  No acute events.

## 2017-03-20 NOTE — PLAN OF CARE
SW attempted to meet with Pt and family at bedside but Pt family was not present and Pt was having procedures done. SW left message for Pt son Brian and spoke with Pt son Forest regarding the SNF recs and list.     SW received return message from Pt son Brian reporting he was in the room. SW met with Pt son at bedside to discuss SNF and therapy recs. Pt was receiving PT services. Pt son reported if Pt has to stay somewhere, he would like to have a list for [place] near his apartment. SW will give list for this as well. He will review and give choices asap.     SW gave [place] list to Pt son to review.    SW attempted to complete the LOCET via phone, however, there is no SS number listed and without it, they cannot create a file. SW will attempt to retrieve the SS number from family and call in the LOCET when SS is received.    Argentina Juarez, GRAHAM  Neurocritical Care   [facility]  [id number]

## 2017-03-20 NOTE — PLAN OF CARE
Problem: Occupational Therapy Goal  Goal: Occupational Therapy Goal  Goals set 3/19 to be addressed for 7 days with expiration date, 3/26:  Patient will increase functional independence with ADLs by performing:    Patient will demonstrate rolling to the right with mod assist. Not met   Patient will demonstrate rolling to the left with mod assist. Not met  Patient will demonstrate supine -sit with mod assist. Not met  Patient will demonstrate stand pivot transfers with max assist. Not met  Patient will demonstrate grooming while standing with max assist. Not met  Patient will demonstrate upper body dressing with max assist while seated EOB. Not met  Patient will demonstrate lower body dressing with max assist while seated EOB. Not met  Patient will demonstrate toileting with max assist. Not met  Patient will demonstrate bathing while seated EOB with max assist. Not met  Patients family / caregiver will demonstrate independence and safety with assisting patient with self-care skills and functional mobility. Not met  Patients family / caregiver will demonstrate independence with providing ROM and changes in bed positioning. Not met  Patient and/or patients family will verbalize understanding of stroke prevention guidelines, personal risk factors and stroke warning signs via teachback method. Not met        Outcome: Ongoing (interventions implemented as appropriate)  Goals remain appropriate

## 2017-03-20 NOTE — PLAN OF CARE
Problem: SLP Goal  Goal: SLP Goal  SLP Plan of Care: Speech Pathology will follow pt daily M-F and address the following goals x1 week:  3/26  1. Pt will participate in ongoing swallow assessment to determine least restrictive diet. ONGOING  2. Pt will participate in speech language cognitive eval to determine need for intervention. MET  3. When deemed appropriate by MD/SLP pt will participate in MBS to further determine safe swallow function. ONGOING        SLP Plan of Care: Speech Pathology will follow pt daily M-F and address the following goals x1 week:  3/27  1. Pt will participate in ongoing swallow assessment to determine least restrictive diet.  2. Pt will answer 3/4 orientation questions with mod assistance to improve cognitive function.   3. Pt will follow simple directives with 60% acc indep provided mod cues to improve receptive language.  4. Pt will complete automatic rote speech (counting) with 60% acc indep provided mod cues to improve expressive language.  5. When deemed appropriate by MD/SLP pt will participate in MBS to further determine safe swallow function.         Speech evaluation completed. Continued progress towards goals. Updated POC.      Brenda Card M.A. CCC-SLP  Speech Language Pathologist  [phone]  3/20/2017

## 2017-03-20 NOTE — PLAN OF CARE
Problem: Patient Care Overview  Goal: Plan of Care Review  Recommendations     Recommendation/Intervention: 1. Rec to increase TF goal rate to 55ml/hr to fully meet pt's EEN/EPN. This will provide 1980 cals, 90 gms prot, & 1008 mls free fl 2. RD to cont to monitor all nutr parameters   Goals: TF to meet >85% of EEN/EPN  Nutrition Goal Status: new  Communication of RD Recs: reviewed with RN

## 2017-03-20 NOTE — PLAN OF CARE
Problem: Physical Therapy Goal  Goal: Physical Therapy Goal  PT goals for the next 10 visits:    1. Pt supine to sit with moderate assist-not met  2. Pt sit to supine with moderate assist-not met  3. Pt sit to stand with appropriate AD with moderate assist-not met  4. Pt to perform gait 5ft with appropriate AD with max assist.-not met  5. Pt to tolerate sitting on the EOB 20 min with CGA.-not met  6. Pt to transfer bed to/from bedside chair with max assist.-not met  7. Pt to perform B LE exs in sitting or supine x 10 reps with AAROM on R LE.-not met   Outcome: Ongoing (interventions implemented as appropriate)  No goals met today. Goals remain appropriate.

## 2017-03-20 NOTE — CONSULTS
Consult Note  Neurosurgery    Consult Requested By: PEDRO  Reason for Consult: ICH    SUBJECTIVE:     History of Present Illness:  Patient is a 61 y.o. male presents with pmh of HTN, HLD, and CAD admitted to neuro ICU with left MCA infarct, s/p    thrombectomy, left ICA, and tPA.  HT s/p thrombectomy showed acute left basal ganglia hemorrhage.  Repeat HCT was stable.  Neurosurgery consulted for evaluation of the bleed.  Pt currently on aspirin.        Scheduled Meds:   [START ON 3/21/2017] aspirin  81 mg Per NG tube Daily    [START ON 3/21/2017] atorvastatin  80 mg Per NG tube Daily    ceFEPime (MAXIPIME) IVPB  1 g Intravenous Q12H    labetalol  200 mg Per NG tube Q8H    losartan  100 mg Per NG tube Daily    [START ON 3/21/2017] polyethylene glycol  17 g Per NG tube Daily    senna-docusate 8.6-50 mg  1 tablet Per NG tube BID    sodium chloride 0.9%  3 mL Intravenous Q8H    vancomycin (VANCOCIN) IVPB  1,000 mg Intravenous Q12H     Continuous Infusions:   sodium chloride 0.9% 50 mL/hr at 03/20/17 1105     PRN Meds:acetaminophen, hydrALAZINE, ondansetron, buffered 2% sodium acetate 86meq, sodium chloride 86meq, sterile water for inj IV soln    Review of patient's allergies indicates:  Not on File    History reviewed. No pertinent past medical history.  History reviewed. No pertinent surgical history.  History reviewed. No pertinent family history.  Social History   Substance Use Topics    Smoking status: Unknown If Ever Smoked    Smokeless tobacco: None    Alcohol use None        Review of Systems:  Unable to obtain 2/2 aphasia     OBJECTIVE:     Vital Signs (Most Recent)  Temp: 99 °F (37.2 °C) (03/20/17 1105)  Pulse: 72 (03/20/17 1305)  Resp: (!) 23 (03/20/17 1305)  BP: 133/68 (03/20/17 1305)  SpO2: 100 % (03/20/17 1305)    Vital Signs Range (Last 24H):  Temp:  [98.8 °F (37.1 °C)-101.6 °F (38.7 °C)]   Pulse:  [62-91]   Resp:  [16-28]   BP: (123-162)/(56-76)   SpO2:  [97 %-100 %]     Physical Exam:  General:  no distress  Neurologic: Alert and oriented. Thought content appropriate.  Head: normocephalic  GCS: Motor: 5/Verbal: 1/Eyes: 3  Global aphasia   Cranial nerves:  tongue midline, pupils equal, round, reactive to light with accomodation, extraocular muscles intact  Follows some simple commands ntermittently, squeezes hands, wiggles toes   Right sided neglect, paresis   Sensory: response to light touch throughout  Pronator Drift: unable to assess  Lungs:  normal respiratory effort  Abdomen: soft, non-tender   Extremities: no cyanosis or edema, or clubbing      Laboratory:  CBC:   Recent Labs  Lab 03/20/17  0434   WBC 15.53*   RBC 4.73   HGB 14.8   HCT 43.9      MCV 93   MCH 31.3*   MCHC 33.7     BMP:   Recent Labs  Lab 03/20/17  0434   GLU 95      K 3.6      CO2 23   BUN 21*   CREATININE 0.9   CALCIUM 8.7   MG 2.1     Coagulation:   Recent Labs  Lab 03/18/17  1752  03/20/17  0434   INR  --   < > 1.1   APTT 28.5  --   --    < > = values in this interval not displayed.    Diagnostic Results:  Head CT 3/20/17: Reviewed  Evolving left MCA distribution infarct with persistent hemorrhagic conversion at the level of the basal ganglia with stable local mass effect, minimal left to right midline shift and trace intraventricular extension. No CT findings to suggest developing hydrocephalus.    New punctate hyperattenuating areas within the superior aspect of the left frontoparietal region concerning for new petechial hemorrhages.  Continued followup recommended.    ASSESSMENT/PLAN:     62 yo male with left MCA infarct and hemorrhagic conversion  - Continue current medical management per primary  - Hold aspirin given acute hemorrhage.  Repeat HCT is schedule for the morning   - No acute neurosurgical interventions at this time  - SBP  < 160  - Keppra for Sz prophylaxis   - Will continue to follow, please page neurosurgery with questions or any change in neurologic status   - Discussed with   Devon Padilla Jr. PA-C  Neurosurgery

## 2017-03-20 NOTE — CONSULTS
[facility]  Adult Nutrition  Consult Note    SUMMARY     Recommendations    Recommendation/Intervention: 1. Rec to increase TF goal rate to 55ml/hr to fully meet pt's EEN/EPN. This will provide 1980 cals, 90 gms prot, & 1008 mls free fl  2. RD to cont to monitor all nutr parameters   Goals: TF to meet >85% of EEN/EPN  Nutrition Goal Status: new  Communication of RD Recs: reviewed with RN    Continuum of Care Plan    Referral to Outpatient Services: other (see comments) (Nutr D/c Plan: unable to determine @ this time)    Reason for Assessment    Reason for Assessment: nurse/nurse practitioner consult  Diagnosis: stroke/CVA  Relevent Medical History: HTN, HLD, CAD         General Information Comments: Pt seen this a.m. Sleeping soundly. TF initiated yest & being edward'd well thus far. SLP rec'd cont'd NPO.    Nutrition Prescription Ordered    Current Diet Order: NPO  Nutrition Order Comments: TF currently infusing @ 30ml/hr  Current Nutrition Support Formula Ordered: Isosource 1.5  Current Nutrition Support Rate Ordered: 40 (ml)  Current Nutrition Support Frequency Ordered: ml/hr        Evaluation of Received Nutrients/Fluid Intake    Enteral Calories (kcal): 1440  Enteral Protein (gm): 65  Enteral (Free Water) Fluid (mL): 733         Energy Calories Required: not meeting needs  % Kcal Needs: 75      Protein Required: not meeting needs  % Protein Needs: 86     IV Fluid (mL): 1200 (NS @ 50ml/hr)      Total Fluid Intake (mL/kg): 26  Fluid Required: meeting needs  Comments: I/O noted, LBM unknown, good uop.  Tolerance: tolerating     Nutrition Risk Screen     Nutrition Risk Screen: no indicators present    Nutrition/Diet History       Typical Food/Fluid Intake: NPO currently  Food Preferences: SAMINA        Factors Affecting Nutritional Intake: difficulty/impaired swallowing, NPO        Labs/Tests/Procedures/Meds       Pertinent Labs Reviewed: reviewed  Pertinent Labs Comments: BUN 21, POCT 107-123, Phos  2.0, Alb 3.3  Pertinent Medications Reviewed: reviewed  Pertinent Medications Comments: IVF, statin, senna    Physical Findings    Overall Physical Appearance: on oxygen therapy, overweight  Tubes: nasogastric tube     Skin: intact    Anthropometrics       Height (inches): 67.01 in  Weight Method: Bed Scale  Weight (kg): 75.8 kg  Ideal Body Weight (IBW), Male: 148.06 lb     % Ideal Body Weight, Male (lb): 112.87 lb     BMI (kg/m2): 26.17  BMI Grade: 25 - 29.9 - overweight  Usual Body Weight (UBW), kg:  (unable to obtain)        Estimated/Assessed Needs    Weight Used For Calorie Calculations: 75.8 kg (167 lb 1.7 oz)   Height (cm): 170.2 cm     Energy Need Method: [place]-St Jeor (x 1.25 (PAL) = 1913 cals daily)     RMR ([place]-St. Jeor Equation): 1530.79        Weight Used For Protein Calculations: 75.8 kg (167 lb 1.7 oz)  Protein Requirements: 76-91 gms (1-1.2 gms/kg)    Fluid Need Method: RDA Method (1ml/kcal or per MD)            Monitor and Evaluation    Food and Nutrient Intake: enteral nutrition intake, energy intake  Food and Nutrient Adminstration: enteral and parenteral nutrition administration, diet order        Anthropometric Measurements: weight change, weight  Biochemical Data, Medical Tests and Procedures: electrolyte and renal panel, glucose/endocrine profile  Nutrition-Focused Physical Findings: overall appearance    Nutrition Risk    Level of Risk: high    Nutrition Follow-Up    RD Follow-up?: Yes    Assessment and Plan    Nutr Dx/problem: Inadequate energy intake  Related to/etiology: insufficient TF goal rate  As evidenced by: <85% of EEN being met  Status: New

## 2017-03-20 NOTE — PLAN OF CARE
Problem: Patient Care Overview  Goal: Plan of Care Review  Outcome: Ongoing (interventions implemented as appropriate)  POC reviewed with pt at 0500. Pt unable to verbalize understanding. Questions and concerns addressed. No acute events overnight. Pt progressing toward goals. Will continue to monitor. See flowsheets for full assessment and VS info

## 2017-03-20 NOTE — PLAN OF CARE
[id number] - [place], [place] - [address]  [address] [address]  Phone: [phone] Fax: [phone]    This CM spoke with patient son at bedside who stated patient lived alone, could not remember patients PCP. Son also stated that if patient discharge to home he will be returning to his home.       03/20/17 1006   Discharge Assessment   Assessment Type Discharge Planning Assessment   Confirmed/corrected address and phone number on facesheet? Yes   Assessment information obtained from? Caregiver   Expected Length of Stay (days) 3   Prior to hospitilization cognitive status: Unable to Assess   Current cognitive status: Unable to Assess   Arrived From [facility]  (transferred from [facility] in [place])   Lives With alone;child(rachel), adult  (son states he will return home with him if possible)   Able to Return to Prior Arrangements unable to determine at this time (comments)   Is patient able to care for self after discharge? Unable to determine at this time (comments)   How many people do you have in your home that can help with your care after discharge? 2   Who are your caregiver(s) and their phone number(s)? (patient silvano Velez [phone] & Nathanael Velez [phone])   Patient's perception of discharge disposition other (comments)  (xiang)   Readmission Within The Last 30 Days no previous admission in last 30 days   Patient currently being followed by outpatient case management? No   Patient currently receives home health services? No   Does the patient currently use HME? No   Patient currently receives private duty nursing? No   Patient currently receives any other outside agency services? No   Equipment Currently Used at Home none   Do you have any problems affording any of your prescribed medications? No   Is the patient taking medications as prescribed? yes   Do you have any financial concerns preventing you from receiving the healthcare you need? No    Does the patient have transportation to healthcare appointments? Yes   Transportation Available family or friend will provide   On Dialysis? No   Does the patient receive services at the Coumadin Clinic? No   Are there any open cases? No   Discharge Plan A Rehab   Discharge Plan B Skilled Nursing Facility   Patient/Family In Agreement With Plan yes       Fifi Alcocer CM  [id number]

## 2017-03-21 PROBLEM — I63.512 ACUTE ISCHEMIC LEFT MCA STROKE: Status: ACTIVE | Noted: 2017-03-21

## 2017-03-21 LAB
ALBUMIN SERPL BCP-MCNC: 2.8 G/DL
ALP SERPL-CCNC: 54 U/L
ALT SERPL W/O P-5'-P-CCNC: 13 U/L
ANION GAP SERPL CALC-SCNC: 9 MMOL/L
AST SERPL-CCNC: 18 U/L
BASOPHILS # BLD AUTO: 0.03 K/UL
BASOPHILS NFR BLD: 0.2 %
BILIRUB SERPL-MCNC: 1 MG/DL
BUN SERPL-MCNC: 16 MG/DL
CALCIUM SERPL-MCNC: 8.1 MG/DL
CHLORIDE SERPL-SCNC: 111 MMOL/L
CO2 SERPL-SCNC: 23 MMOL/L
CREAT SERPL-MCNC: 0.8 MG/DL
DIASTOLIC DYSFUNCTION: NO
DIFFERENTIAL METHOD: ABNORMAL
EOSINOPHIL # BLD AUTO: 0.2 K/UL
EOSINOPHIL NFR BLD: 1.4 %
ERYTHROCYTE [DISTWIDTH] IN BLOOD BY AUTOMATED COUNT: 13.5 %
EST. GFR  (AFRICAN AMERICAN): >60 ML/MIN/1.73 M^2
EST. GFR  (NON AFRICAN AMERICAN): >60 ML/MIN/1.73 M^2
GLUCOSE SERPL-MCNC: 119 MG/DL
HCT VFR BLD AUTO: 38.5 %
HGB BLD-MCNC: 13.5 G/DL
INR PPP: 1.1
LYMPHOCYTES # BLD AUTO: 2.1 K/UL
LYMPHOCYTES NFR BLD: 14.8 %
MAGNESIUM SERPL-MCNC: 1.7 MG/DL
MAGNESIUM SERPL-MCNC: 2.3 MG/DL
MCH RBC QN AUTO: 31.5 PG
MCHC RBC AUTO-ENTMCNC: 35.1 %
MCV RBC AUTO: 90 FL
MONOCYTES # BLD AUTO: 1.4 K/UL
MONOCYTES NFR BLD: 9.9 %
NEUTROPHILS # BLD AUTO: 10.4 K/UL
NEUTROPHILS NFR BLD: 73.5 %
PA AA PRP-ACNC: 522 ARU
PHOSPHATE SERPL-MCNC: 2.4 MG/DL
PHOSPHATE SERPL-MCNC: 2.6 MG/DL
PLATELET # BLD AUTO: 193 K/UL
PMV BLD AUTO: 9.9 FL
POCT GLUCOSE: 106 MG/DL (ref 70–110)
POCT GLUCOSE: 130 MG/DL (ref 70–110)
POCT GLUCOSE: 141 MG/DL (ref 70–110)
POTASSIUM SERPL-SCNC: 3.2 MMOL/L
POTASSIUM SERPL-SCNC: 4 MMOL/L
PROT SERPL-MCNC: 5.7 G/DL
PROTHROMBIN TIME: 11.3 SEC
RBC # BLD AUTO: 4.28 M/UL
RETIRED EF AND QEF - SEE NOTES: 50 (ref 55–65)
SODIUM SERPL-SCNC: 142 MMOL/L
SODIUM SERPL-SCNC: 142 MMOL/L
SODIUM SERPL-SCNC: 143 MMOL/L
SODIUM SERPL-SCNC: 143 MMOL/L
SODIUM SERPL-SCNC: 147 MMOL/L
VANCOMYCIN TROUGH SERPL-MCNC: 7.5 UG/ML
WBC # BLD AUTO: 14.09 K/UL

## 2017-03-21 PROCEDURE — 85610 PROTHROMBIN TIME: CPT

## 2017-03-21 PROCEDURE — 63600175 PHARM REV CODE 636 W HCPCS: Performed by: PHYSICIAN ASSISTANT

## 2017-03-21 PROCEDURE — 93307 TTE W/O DOPPLER COMPLETE: CPT | Mod: 26,,, | Performed by: INTERNAL MEDICINE

## 2017-03-21 PROCEDURE — 80202 ASSAY OF VANCOMYCIN: CPT

## 2017-03-21 PROCEDURE — 97110 THERAPEUTIC EXERCISES: CPT

## 2017-03-21 PROCEDURE — 92526 ORAL FUNCTION THERAPY: CPT

## 2017-03-21 PROCEDURE — 87040 BLOOD CULTURE FOR BACTERIA: CPT

## 2017-03-21 PROCEDURE — 86580 TB INTRADERMAL TEST: CPT | Performed by: PSYCHIATRY & NEUROLOGY

## 2017-03-21 PROCEDURE — 84100 ASSAY OF PHOSPHORUS: CPT | Mod: 91

## 2017-03-21 PROCEDURE — 99233 SBSQ HOSP IP/OBS HIGH 50: CPT | Mod: ,,, | Performed by: PSYCHIATRY & NEUROLOGY

## 2017-03-21 PROCEDURE — 83735 ASSAY OF MAGNESIUM: CPT | Mod: 91

## 2017-03-21 PROCEDURE — 36569 INSJ PICC 5 YR+ W/O IMAGING: CPT

## 2017-03-21 PROCEDURE — 63600175 PHARM REV CODE 636 W HCPCS: Performed by: PSYCHIATRY & NEUROLOGY

## 2017-03-21 PROCEDURE — 97116 GAIT TRAINING THERAPY: CPT

## 2017-03-21 PROCEDURE — 84132 ASSAY OF SERUM POTASSIUM: CPT

## 2017-03-21 PROCEDURE — 25000003 PHARM REV CODE 250: Performed by: PSYCHIATRY & NEUROLOGY

## 2017-03-21 PROCEDURE — 92507 TX SP LANG VOICE COMM INDIV: CPT

## 2017-03-21 PROCEDURE — 99254 IP/OBS CNSLTJ NEW/EST MOD 60: CPT | Mod: ,,, | Performed by: NEUROLOGICAL SURGERY

## 2017-03-21 PROCEDURE — 63600175 PHARM REV CODE 636 W HCPCS: Performed by: NURSE PRACTITIONER

## 2017-03-21 PROCEDURE — 02HV33Z INSERTION OF INFUSION DEVICE INTO SUPERIOR VENA CAVA, PERCUTANEOUS APPROACH: ICD-10-PCS | Performed by: PSYCHIATRY & NEUROLOGY

## 2017-03-21 PROCEDURE — 84100 ASSAY OF PHOSPHORUS: CPT

## 2017-03-21 PROCEDURE — 97535 SELF CARE MNGMENT TRAINING: CPT

## 2017-03-21 PROCEDURE — 93307 TTE W/O DOPPLER COMPLETE: CPT

## 2017-03-21 PROCEDURE — 20000000 HC ICU ROOM

## 2017-03-21 PROCEDURE — 25000003 PHARM REV CODE 250: Performed by: STUDENT IN AN ORGANIZED HEALTH CARE EDUCATION/TRAINING PROGRAM

## 2017-03-21 PROCEDURE — 85025 COMPLETE CBC W/AUTO DIFF WBC: CPT

## 2017-03-21 PROCEDURE — 84295 ASSAY OF SERUM SODIUM: CPT | Mod: 91

## 2017-03-21 PROCEDURE — 83735 ASSAY OF MAGNESIUM: CPT

## 2017-03-21 PROCEDURE — 63600175 PHARM REV CODE 636 W HCPCS: Performed by: STUDENT IN AN ORGANIZED HEALTH CARE EDUCATION/TRAINING PROGRAM

## 2017-03-21 PROCEDURE — 25000003 PHARM REV CODE 250: Performed by: PHYSICIAN ASSISTANT

## 2017-03-21 PROCEDURE — 97532 *HC OT COG SKL DEV EA 15: CPT

## 2017-03-21 PROCEDURE — 80053 COMPREHEN METABOLIC PANEL: CPT

## 2017-03-21 PROCEDURE — 76937 US GUIDE VASCULAR ACCESS: CPT

## 2017-03-21 PROCEDURE — 97530 THERAPEUTIC ACTIVITIES: CPT

## 2017-03-21 PROCEDURE — C1751 CATH, INF, PER/CENT/MIDLINE: HCPCS

## 2017-03-21 PROCEDURE — 25000003 PHARM REV CODE 250: Performed by: NURSE PRACTITIONER

## 2017-03-21 PROCEDURE — 85576 BLOOD PLATELET AGGREGATION: CPT

## 2017-03-21 RX ORDER — POTASSIUM CHLORIDE 20 MEQ/15ML
60 SOLUTION ORAL
Status: DISCONTINUED | OUTPATIENT
Start: 2017-03-21 | End: 2017-04-03

## 2017-03-21 RX ORDER — HEPARIN SODIUM 5000 [USP'U]/ML
5000 INJECTION, SOLUTION INTRAVENOUS; SUBCUTANEOUS EVERY 8 HOURS
Status: DISCONTINUED | OUTPATIENT
Start: 2017-03-21 | End: 2017-03-21

## 2017-03-21 RX ORDER — MANNITOL 250 MG/ML
50 INJECTION, SOLUTION INTRAVENOUS ONCE
Status: COMPLETED | OUTPATIENT
Start: 2017-03-21 | End: 2017-03-21

## 2017-03-21 RX ORDER — SODIUM,POTASSIUM PHOSPHATES 280-250MG
2 POWDER IN PACKET (EA) ORAL
Status: DISCONTINUED | OUTPATIENT
Start: 2017-03-21 | End: 2017-03-21

## 2017-03-21 RX ORDER — MAGNESIUM SULFATE HEPTAHYDRATE 40 MG/ML
2 INJECTION, SOLUTION INTRAVENOUS
Status: DISCONTINUED | OUTPATIENT
Start: 2017-03-21 | End: 2017-04-03

## 2017-03-21 RX ORDER — SODIUM CHLORIDE 0.9 % (FLUSH) 0.9 %
10 SYRINGE (ML) INJECTION
Status: DISCONTINUED | OUTPATIENT
Start: 2017-03-21 | End: 2017-04-06 | Stop reason: HOSPADM

## 2017-03-21 RX ORDER — POTASSIUM CHLORIDE 20 MEQ/15ML
40 SOLUTION ORAL
Status: DISCONTINUED | OUTPATIENT
Start: 2017-03-21 | End: 2017-03-21

## 2017-03-21 RX ORDER — SODIUM,POTASSIUM PHOSPHATES 280-250MG
2 POWDER IN PACKET (EA) ORAL
Status: DISCONTINUED | OUTPATIENT
Start: 2017-03-21 | End: 2017-04-03

## 2017-03-21 RX ORDER — POTASSIUM CHLORIDE 20 MEQ/15ML
40 SOLUTION ORAL
Status: DISCONTINUED | OUTPATIENT
Start: 2017-03-21 | End: 2017-04-03

## 2017-03-21 RX ORDER — SODIUM CHLORIDE 234 MG/ML
30 INJECTION, SOLUTION INTRAVENOUS ONCE
Status: COMPLETED | OUTPATIENT
Start: 2017-03-21 | End: 2017-03-21

## 2017-03-21 RX ORDER — SODIUM CHLORIDE 0.9 % (FLUSH) 0.9 %
10 SYRINGE (ML) INJECTION EVERY 6 HOURS
Status: DISCONTINUED | OUTPATIENT
Start: 2017-03-21 | End: 2017-04-06 | Stop reason: HOSPADM

## 2017-03-21 RX ORDER — POTASSIUM CHLORIDE 20 MEQ/15ML
60 SOLUTION ORAL
Status: DISCONTINUED | OUTPATIENT
Start: 2017-03-21 | End: 2017-03-21

## 2017-03-21 RX ORDER — MAGNESIUM SULFATE HEPTAHYDRATE 40 MG/ML
4 INJECTION, SOLUTION INTRAVENOUS
Status: DISCONTINUED | OUTPATIENT
Start: 2017-03-21 | End: 2017-04-03

## 2017-03-21 RX ADMIN — VANCOMYCIN HYDROCHLORIDE 1500 MG: 1 INJECTION, POWDER, LYOPHILIZED, FOR SOLUTION INTRAVENOUS at 09:03

## 2017-03-21 RX ADMIN — Medication 3 ML: at 09:03

## 2017-03-21 RX ADMIN — ATORVASTATIN CALCIUM 80 MG: 20 TABLET, FILM COATED ORAL at 09:03

## 2017-03-21 RX ADMIN — SODIUM CHLORIDE 120 MEQ: 234 INJECTION INTRAMUSCULAR; INTRAVENOUS; SUBCUTANEOUS at 05:03

## 2017-03-21 RX ADMIN — Medication 5 UNITS: at 04:03

## 2017-03-21 RX ADMIN — POTASSIUM CHLORIDE 40 MEQ: 20 SOLUTION ORAL at 03:03

## 2017-03-21 RX ADMIN — Medication 3 ML: at 02:03

## 2017-03-21 RX ADMIN — LABETALOL HYDROCHLORIDE 200 MG: 200 TABLET, FILM COATED ORAL at 09:03

## 2017-03-21 RX ADMIN — ACETAMINOPHEN 650 MG: 325 TABLET ORAL at 09:03

## 2017-03-21 RX ADMIN — Medication 3 ML: at 06:03

## 2017-03-21 RX ADMIN — LOSARTAN POTASSIUM 100 MG: 50 TABLET, FILM COATED ORAL at 09:03

## 2017-03-21 RX ADMIN — CEFEPIME HYDROCHLORIDE 1 G: 1 INJECTION, POWDER, FOR SOLUTION INTRAMUSCULAR; INTRAVENOUS at 06:03

## 2017-03-21 RX ADMIN — SODIUM CHLORIDE: 234 INJECTION INTRAMUSCULAR; INTRAVENOUS; SUBCUTANEOUS at 07:03

## 2017-03-21 RX ADMIN — HYDRALAZINE HYDROCHLORIDE 10 MG: 20 INJECTION INTRAMUSCULAR; INTRAVENOUS at 08:03

## 2017-03-21 RX ADMIN — LABETALOL HYDROCHLORIDE 200 MG: 200 TABLET, FILM COATED ORAL at 06:03

## 2017-03-21 RX ADMIN — VANCOMYCIN HYDROCHLORIDE 1500 MG: 1 INJECTION, POWDER, LYOPHILIZED, FOR SOLUTION INTRAVENOUS at 08:03

## 2017-03-21 RX ADMIN — HEPARIN SODIUM 5000 UNITS: 5000 INJECTION, SOLUTION INTRAVENOUS; SUBCUTANEOUS at 02:03

## 2017-03-21 RX ADMIN — POTASSIUM & SODIUM PHOSPHATES POWDER PACK 280-160-250 MG 2 PACKET: 280-160-250 PACK at 07:03

## 2017-03-21 RX ADMIN — SODIUM CHLORIDE: 234 INJECTION INTRAMUSCULAR; INTRAVENOUS; SUBCUTANEOUS at 10:03

## 2017-03-21 RX ADMIN — POTASSIUM & SODIUM PHOSPHATES POWDER PACK 280-160-250 MG 2 PACKET: 280-160-250 PACK at 03:03

## 2017-03-21 RX ADMIN — MANNITOL 50 G: 12.5 INJECTION, SOLUTION INTRAVENOUS at 11:03

## 2017-03-21 RX ADMIN — POTASSIUM CHLORIDE 40 MEQ: 20 SOLUTION ORAL at 06:03

## 2017-03-21 NOTE — PROGRESS NOTES
[facility]  Vascular Neurology  Comprehensive Stroke Center  Progress Note      Neurologic Chief Complaint: L MCA with carotid stenosis     Subjective:     Interval History: Patient is seen for follow-up neurological assessment and treatment recommendations: some lethargy today, NCC aware.   Repeat scan as below  Aortic vegitation noted - will further investigate     HPI, Past Medical, Family, and Social History remains the same as documented in the initial encounter.     Review of Systems   Constitutional: Positive for fever.   Genitourinary: Negative for hematuria.   Neurological: Positive for facial asymmetry, speech difficulty and weakness.     Scheduled Meds:   [START ON 3/21/2017] atorvastatin  80 mg Per NG tube Daily    ceFEPime (MAXIPIME) IVPB  1 g Intravenous Q12H    labetalol  200 mg Per NG tube Q8H    losartan  100 mg Per NG tube Daily    [START ON 3/21/2017] polyethylene glycol  17 g Per NG tube Daily    senna-docusate 8.6-50 mg  1 tablet Per NG tube BID    sodium chloride 0.9%  3 mL Intravenous Q8H    vancomycin (VANCOCIN) IVPB  1,000 mg Intravenous Q12H     Continuous Infusions:   sodium chloride 0.9% 50 mL/hr at 03/20/17 1805     PRN Meds:acetaminophen, hydrALAZINE, ondansetron, buffered 2% sodium acetate 86meq, sodium chloride 86meq, sterile water for inj IV soln    Objective:     Vital Signs (Most Recent):  Temp: (!) 101.2 °F (38.4 °C) (03/20/17 1505)  Pulse: 72 (03/20/17 1805)  Resp: 18 (03/20/17 1805)  BP: 129/64 (03/20/17 1805)  SpO2: 99 % (03/20/17 1805)  BP Location: Right arm    Vital Signs Range (Last 24H):  Temp:  [98.8 °F (37.1 °C)-101.2 °F (38.4 °C)]   Pulse:  [62-91]   Resp:  [16-28]   BP: (101-162)/(55-76)   SpO2:  [97 %-100 %]   BP Location: Right arm    Physical Exam   Constitutional: He appears well-developed and well-nourished.   HENT:   Head: Normocephalic and atraumatic.   Cardiovascular: Normal rate.    Pulmonary/Chest: Effort normal.   Neurological:    Drowsy    Skin: Skin is warm.   Nursing note and vitals reviewed.      Neurological Exam:   LOC: does not follow requests and drowsy  Language: Expressive aphasia, Receptive aphasia  Speech: Aphasic   Orientation: Aphasic  Memory: aphasic  Facial Movement (CN VII): lower weakness right lower  Motor*: localizes with left extremities. Some proximal movement - wd to pain on right extremities. Leg stronger than arm  Sensation: siddharth-hypoesthesia right  Tone: Arm-Left: normal; Leg-Left: normal; Arm-Right: normal; Leg-Right: normal    NIH Stroke Scale:    Level of Consciousness: 1 - drowsy  LOC Questions: 2 - answers none correctly  LOC Commands: 2 - performs neither correctly  Best Gaze: 1 - partial gaze palsy  Visual: 2 - complete hemianopia  Facial Palsy: 1 - minor  Motor Left Arm: 0 - no drift  Motor Right Arm: 3 - no effort against gravity  Motor Left Le - no drift  Motor Right Le - can't resist gravity  Limb Ataxia: 0 - absent  Sensory: 0 - normal  Best Language: 3 - mute  Dysarthria: 0 - normal articulation  Extinction and Inattention: 0 - no neglect  NIH Stroke Scale Total: 17      Laboratory:  CMP:     Recent Labs  Lab 17  0434   CALCIUM 8.7   ALBUMIN 3.3*   PROT 6.6      K 3.6   CO2 23      BUN 21*   CREATININE 0.9   ALKPHOS 65   ALT 14   AST 26   BILITOT 0.9     CBC:     Recent Labs  Lab 17  0434   WBC 15.53*   RBC 4.73   HGB 14.8   HCT 43.9      MCV 93   MCH 31.3*   MCHC 33.7     Lipid Panel:     Recent Labs  Lab 17  1609   CHOL 175   LDLCALC 123.8   HDL 36*   TRIG 76     Coagulation:   Recent Labs  Lab 17  1752  17  0434   INR  --   < > 1.1   APTT 28.5  --   --    < > = values in this interval not displayed.  Platelet Aggregation Study: No results for input(s): PLTAGG, PLTAGINTERP, PLTAGREGLACO, ADPPLTAGGREG in the last 168 hours.  Hgb A1C:     Recent Labs  Lab 17  1752   HGBA1C 5.7     TSH:     Recent Labs  Lab 17  1609   TSH 0.169*        Diagnostic Results:  I have personally reviewed:  MRI pending    CT head 3/19/17  Interval worsening of hemorrhagic conversion centered within the left basal ganglia, in this patient who is status post thrombectomy for left MCA infarct.  Interval worsening of sulcal effacement and effacement of the left lateral ventricle.  There is 0.2 cm rightward midline shift.    No new foci of hemorrhage or infarction.    CTA stroke multiphase 3/18/17   CT head: No evidence for acute intracranial hemorrhage. There is ill-defined decreased attenuation of left MCA distribution specifically left basal ganglia and left insula concerning for recent area of infarction. No evidence for hemorrhagic confusion. Ventricles normal without hydrocephalus.    CTA head: Occlusion left cervical ICA with reconstitution at the cavernous segment. In addition there is a ipsilateral occlusion of the left M1 segment of the MCA proximally. There is reconstituted M2 and M3 MCA candelabra on all 3 phases.    CTA neck: Atherosclerotic disease carotid bifurcations and proximal ICAs bilaterally with high-grade critical stenosis or short segment occlusion of the left proximal ICA and more distal left cervical occlusion or reduced flow-related to the distal occlusion.    There is high-grade critical stenosis of the right ICA at its origin with only trace flow identified.    Atherosclerotic disease of the origin the vertebral arteries the dominant left vertebral artery. There is questioned stenosis at the vertebral artery origins bilaterally limited by calcified plaque and high density venous contrast.    Please note there is advanced degenerative change in the cervical spine with moderate to severe stenosis in the central canal allowing for CT technique at C5/C6.    CT head 3/20/17  Evolving left MCA distribution infarct with persistent hemorrhagic conversion at the level of the basal ganglia with stable local mass effect, minimal left to right midline  shift and trace intraventricular extension. No CT findings to suggest developing hydrocephalus.    New punctate hyperattenuating areas within the superior aspect of the left frontoparietal region concerning for new petechial hemorrhages.  Continued followup recommended.    Echo 3/19/17   Normal [place]   CONCLUSIONS     1 - Normal left ventricular systolic function (EF 55-60%).     2 - Normal left ventricular diastolic function.     3 - Normal right ventricular systolic function .  4- The aortic valve is mildly sclerotic. There is a mobile echodensity on the right coronary cusp, which could be representing sclerosed body of the leaflet, but vegetation or mass cannot be ruled out. ( image 36). Clinical correlation is   required.       Assessment/Plan:     61 year old male with HTN, HLD, and CAD and L MCA stroke symptoms post tpa and transferred here for higher level of care and potential thrombectomy. CTA multiphase upon arrival.   Taken to IR for acute intervention. Stent placed on 3/18/17        * Thrombotic stroke involving left middle cerebral artery  Arrived and had STAT cta multiphase - patient with critical carotid stenosis, still with significant deficit post tPA -going for IR thrombectomy.   Admit to [facility]     Antithrombotics for secondary stroke prevention: aspirin. plavix too (due to stent) when appropriate, currently with hemorrhagic conversion   Statins for secondary stroke prevention and hyperlipidemia, if present: Atorvastatin- 40 mg oral daily  Aggressive risk factor modification: Hypertension, High Cholesterol and Obesity  Rehab Efforts: Physical Therapy, Occupational Therapy, Speech and Language Pathology and when appropriate,   Diagnostics: echo  VTE Prophylaxis:  Can restart vte at 1400 at 3/18/17, may delay due to hemorrhagic conversion   BP Parameters: SBP <180  Nursing Orders: Neuro checks- per tpa protocol, Antiembolic stockings, Swallowing evaluation by Nursing, Seizure precautions, Avoid Rucker  catheter, Pneumatic compression device, Stroke Education, Blood glucose target 100-130, Up with assistance     Rehab teams recommending snf                       Right sided weakness  Given tpa , status post thrombectomy   Due to stroke above  Aggressive PT/OT/ speech when appropriate    Aphasia  Given tpa , status post thrombectomy   Due to stroke above  Aggressive PT/OT/ speech when appropriate    Hypertension  Stroke RF  <180 post tpa and thrombectomy   Optimize management     Abnormality of aortic valve  Found on echo.   As per NCC  Vegitation vs mass       AC Philippe  Comprehensive Stroke Center  Department of Vascular Neurology   [facility]

## 2017-03-21 NOTE — SUBJECTIVE & OBJECTIVE
Neurologic Chief Complaint: L MCA with carotid stenosis     Subjective:     Interval History: Patient is seen for follow-up neurological assessment and treatment recommendations: some lethargy today, NCC aware.   Repeat scan as below  Aortic vegitation noted - will further investigate     HPI, Past Medical, Family, and Social History remains the same as documented in the initial encounter.     Review of Systems   Constitutional: Positive for fever.   Genitourinary: Negative for hematuria.   Neurological: Positive for facial asymmetry, speech difficulty and weakness.     Scheduled Meds:   [START ON 3/21/2017] atorvastatin  80 mg Per NG tube Daily    ceFEPime (MAXIPIME) IVPB  1 g Intravenous Q12H    labetalol  200 mg Per NG tube Q8H    losartan  100 mg Per NG tube Daily    [START ON 3/21/2017] polyethylene glycol  17 g Per NG tube Daily    senna-docusate 8.6-50 mg  1 tablet Per NG tube BID    sodium chloride 0.9%  3 mL Intravenous Q8H    vancomycin (VANCOCIN) IVPB  1,000 mg Intravenous Q12H     Continuous Infusions:   sodium chloride 0.9% 50 mL/hr at 03/20/17 1805     PRN Meds:acetaminophen, hydrALAZINE, ondansetron, buffered 2% sodium acetate 86meq, sodium chloride 86meq, sterile water for inj IV soln    Objective:     Vital Signs (Most Recent):  Temp: (!) 101.2 °F (38.4 °C) (03/20/17 1505)  Pulse: 72 (03/20/17 1805)  Resp: 18 (03/20/17 1805)  BP: 129/64 (03/20/17 1805)  SpO2: 99 % (03/20/17 1805)  BP Location: Right arm    Vital Signs Range (Last 24H):  Temp:  [98.8 °F (37.1 °C)-101.2 °F (38.4 °C)]   Pulse:  [62-91]   Resp:  [16-28]   BP: (101-162)/(55-76)   SpO2:  [97 %-100 %]   BP Location: Right arm    Physical Exam   Constitutional: He appears well-developed and well-nourished.   HENT:   Head: Normocephalic and atraumatic.   Cardiovascular: Normal rate.    Pulmonary/Chest: Effort normal.   Neurological:   Drowsy    Skin: Skin is warm.   Nursing note and vitals reviewed.      Neurological Exam:   LOC:  does not follow requests and drowsy  Language: Expressive aphasia, Receptive aphasia  Speech: Aphasic   Orientation: Aphasic  Memory: aphasic  Facial Movement (CN VII): lower weakness right lower  Motor*: localizes with left extremities. Some proximal movement - wd to pain on right extremities. Leg stronger than arm  Sensation: siddharth-hypoesthesia right  Tone: Arm-Left: normal; Leg-Left: normal; Arm-Right: normal; Leg-Right: normal    NIH Stroke Scale:    Level of Consciousness: 1 - drowsy  LOC Questions: 2 - answers none correctly  LOC Commands: 2 - performs neither correctly  Best Gaze: 1 - partial gaze palsy  Visual: 2 - complete hemianopia  Facial Palsy: 1 - minor  Motor Left Arm: 0 - no drift  Motor Right Arm: 3 - no effort against gravity  Motor Left Le - no drift  Motor Right Le - can't resist gravity  Limb Ataxia: 0 - absent  Sensory: 0 - normal  Best Language: 3 - mute  Dysarthria: 0 - normal articulation  Extinction and Inattention: 0 - no neglect  NIH Stroke Scale Total: 17      Laboratory:  CMP:     Recent Labs  Lab 17  0434   CALCIUM 8.7   ALBUMIN 3.3*   PROT 6.6      K 3.6   CO2 23      BUN 21*   CREATININE 0.9   ALKPHOS 65   ALT 14   AST 26   BILITOT 0.9     CBC:     Recent Labs  Lab 17  0434   WBC 15.53*   RBC 4.73   HGB 14.8   HCT 43.9      MCV 93   MCH 31.3*   MCHC 33.7     Lipid Panel:     Recent Labs  Lab 17  1609   CHOL 175   LDLCALC 123.8   HDL 36*   TRIG 76     Coagulation:   Recent Labs  Lab 17  1752  17  0434   INR  --   < > 1.1   APTT 28.5  --   --    < > = values in this interval not displayed.  Platelet Aggregation Study: No results for input(s): PLTAGG, PLTAGINTERP, PLTAGREGLACO, ADPPLTAGGREG in the last 168 hours.  Hgb A1C:     Recent Labs  Lab 17  1752   HGBA1C 5.7     TSH:     Recent Labs  Lab 17  1609   TSH 0.169*       Diagnostic Results:  I have personally reviewed:  MRI pending    CT head 3/19/17  Interval  worsening of hemorrhagic conversion centered within the left basal ganglia, in this patient who is status post thrombectomy for left MCA infarct.  Interval worsening of sulcal effacement and effacement of the left lateral ventricle.  There is 0.2 cm rightward midline shift.    No new foci of hemorrhage or infarction.    CTA stroke multiphase 3/18/17   CT head: No evidence for acute intracranial hemorrhage. There is ill-defined decreased attenuation of left MCA distribution specifically left basal ganglia and left insula concerning for recent area of infarction. No evidence for hemorrhagic confusion. Ventricles normal without hydrocephalus.    CTA head: Occlusion left cervical ICA with reconstitution at the cavernous segment. In addition there is a ipsilateral occlusion of the left M1 segment of the MCA proximally. There is reconstituted M2 and M3 MCA candelabra on all 3 phases.    CTA neck: Atherosclerotic disease carotid bifurcations and proximal ICAs bilaterally with high-grade critical stenosis or short segment occlusion of the left proximal ICA and more distal left cervical occlusion or reduced flow-related to the distal occlusion.    There is high-grade critical stenosis of the right ICA at its origin with only trace flow identified.    Atherosclerotic disease of the origin the vertebral arteries the dominant left vertebral artery. There is questioned stenosis at the vertebral artery origins bilaterally limited by calcified plaque and high density venous contrast.    Please note there is advanced degenerative change in the cervical spine with moderate to severe stenosis in the central canal allowing for CT technique at C5/C6.    CT head 3/20/17  Evolving left MCA distribution infarct with persistent hemorrhagic conversion at the level of the basal ganglia with stable local mass effect, minimal left to right midline shift and trace intraventricular extension. No CT findings to suggest developing  hydrocephalus.    New punctate hyperattenuating areas within the superior aspect of the left frontoparietal region concerning for new petechial hemorrhages.  Continued followup recommended.    Echo 3/19/17   Normal LA   CONCLUSIONS     1 - Normal left ventricular systolic function (EF 55-60%).     2 - Normal left ventricular diastolic function.     3 - Normal right ventricular systolic function .  4- The aortic valve is mildly sclerotic. There is a mobile echodensity on the right coronary cusp, which could be representing sclerosed body of the leaflet, but vegetation or mass cannot be ruled out. ( image 36). Clinical correlation is   required.

## 2017-03-21 NOTE — ASSESSMENT & PLAN NOTE
Arrived and had STAT cta multiphase - patient with critical carotid stenosis, still with significant deficit post tPA -treated with IR thrombectomy.   Admit to [facility]     Antithrombotics for secondary stroke prevention: aspirin. plavix too (due to stent) when appropriate, currently with hemorrhagic conversion   Statins for secondary stroke prevention and hyperlipidemia, if present: Atorvastatin- 40 mg oral daily  Aggressive risk factor modification: Hypertension, High Cholesterol and Obesity  Rehab Efforts: Physical Therapy, Occupational Therapy, Speech and Language Pathology and when appropriate,   Diagnostics: echo  VTE Prophylaxis:  Can restart vte at 1400 at 3/18/17, may delay due to hemorrhagic conversion   BP Parameters: SBP <180  Nursing Orders: Neuro checks- per tpa protocol, Antiembolic stockings, Swallowing evaluation by Nursing, Seizure precautions, Avoid Rucker catheter, Pneumatic compression device, Stroke Education, Blood glucose target 100-130, Up with assistance     Rehab teams recommending snf

## 2017-03-21 NOTE — PROGRESS NOTES
ICU Progress Note  Neurocritical Care    Admit Date: 3/18/2017  LOS: 2    CC: Thrombotic stroke involving left middle cerebral artery    Code Status: Full Code     SUBJECTIVE:     Interval History/Significant Events:  S/P rTPA and thrombectomy s/p L-ICA stenting. L-head of the caudate and putaminal hemorrhage with vasogenic edema.Slight increased on ICH and edema on last CT head.       HPI:  Mr Velez is a 61 y.o. male presenting with pmh of HTN, HLD, and CAD who presents after receiving tPA at [facility] for CTA multiphase and potential IR thrombectomy. The patients tpa infusion ended at 1347 and was seen in the ED at 1528 upon arrival. The patient with symptoms of right sided weakness, left gaze preference, aphasia, numbness of the right side and facial droop - last known normal at 10 am. The patients son was contacted and advised of his arrival and verified the patient has no known drug allergies   The patients ems transport reported some spontaneous movement of the Rside while in transport. Remains aphasic.   Review of Symptoms:    ROS   Limited  Medications:  Continuous Infusions:   sodium chloride 0.9% 50 mL/hr at 03/20/17 1805     Scheduled Meds:   [START ON 3/21/2017] atorvastatin  80 mg Per NG tube Daily    ceFEPime (MAXIPIME) IVPB  1 g Intravenous Q12H    labetalol  200 mg Per NG tube Q8H    losartan  100 mg Per NG tube Daily    [START ON 3/21/2017] polyethylene glycol  17 g Per NG tube Daily    senna-docusate 8.6-50 mg  1 tablet Per NG tube BID    sodium chloride 0.9%  3 mL Intravenous Q8H    vancomycin (VANCOCIN) IVPB  1,000 mg Intravenous Q12H     PRN Meds:.acetaminophen, hydrALAZINE, ondansetron, buffered 2% sodium acetate 86meq, sodium chloride 86meq, sterile water for inj IV soln    OBJECTIVE:   Vital Signs (Most Recent):   Temp: (!) 101.2 °F (38.4 °C) (03/20/17 1505)  Pulse: 72 (03/20/17 1805)  Resp: 18 (03/20/17 1805)  BP: 129/64 (03/20/17 1805)  SpO2: 99 % (03/20/17 1805)    Vital Signs  (24h Range):   Temp:  [98.8 °F (37.1 °C)-101.2 °F (38.4 °C)] 101.2 °F (38.4 °C)  Pulse:  [62-91] 72  Resp:  [16-28] 18  SpO2:  [97 %-100 %] 99 %  BP: (101-162)/(55-76) 129/64    ICP/CPP (Last 24h):        I & O (Last 24h):   Intake/Output Summary (Last 24 hours) at 03/20/17 1924  Last data filed at 03/20/17 1805   Gross per 24 hour   Intake          2776.34 ml   Output              825 ml   Net          1951.34 ml     Physical Exam:  Physical Exam  General   HEENT:   Chest Heart RRR / Lungs Clear to auscultation  Adbomen: Soft nontender + BS  Extremities: OK distal pulses.  Skin:   Neurological Exam:  MS; Lethargic. Decreased speech output. Following simple commands.high risk for aspiration.  CN: II-XII R-UMN facial, significant dysphagia.  Motor: LUE 5 /5 / RUE 1 /5 Tone normal bilaterally  LLE 5 /5 / RLE 2/5 Tone normal bilaterally  Sensory: LT/PP/T/ Vibration   Complex sensory modalities: not tested  DTR: normal throughout.  Coordination /Fine motor:   Gait: Not tested.  Meningeal signs: Absent  Vent Data:        Lines/Drains/Airway:              NG/OG Tube 03/19/17 1145 Right nostril (Active)   Placement Check placement verified by x-ray;placement verified by aspirate characteristics 3/20/2017  3:05 PM   Tube advanced (cm) 65 3/19/2017  3:15 PM   Distal Tube Length (cm) 65 3/19/2017  7:01 PM   Tolerance no signs/symptoms of discomfort 3/20/2017  3:05 PM   Securement anchored to nostril center w/ adhesive device 3/20/2017  3:05 PM   Clamp Status/Tolerance unclamped 3/20/2017  3:05 PM   Flush/Irrigation flushed w/;water 3/20/2017  3:05 PM   Feeding Method continuous 3/20/2017  3:05 PM   Current Rate (mL/hr) 10 mL/hr 3/19/2017  7:01 PM   Goal Rate (mL/hr) 40 mL/hr 3/19/2017  7:01 PM   Intake (mL) 100 mL 3/20/2017  8:05 AM   Intake (mL) - Formula Tube Feeding 40 3/20/2017  6:05 PM   Residual Amount (ml) 15 ml 3/20/2017  7:05 AM            External Urinary Catheter 03/18/17 3198 Medium (Active)   Collection  Container Urimeter 3/20/2017  3:05 PM   Securement Method secured to top of thigh w/ adhesive device 3/20/2017  3:05 PM   Skin no redness;no breakdown;penis/scrotum cleansed w/ soap and water;skin barrier applied 3/20/2017  3:05 PM   Tolerance no signs/symptoms of discomfort 3/20/2017  3:05 PM   Output (mL) 250 mL 3/20/2017 11:05 AM     Nutrition/Tube Feeds (if NPO state why): NPO. NGT confirmed.    Labs:  ABG: No results for input(s): PH, PO2, PCO2, HCO3, POCSATURATED, BE in the last 24 hours.  BMP:  Recent Labs  Lab 03/20/17  0434      K 3.6      CO2 23   BUN 21*   CREATININE 0.9   GLU 95   MG 2.1   PHOS 2.0*     LFT: Lab Results   Component Value Date    AST 26 03/20/2017    ALT 14 03/20/2017    ALKPHOS 65 03/20/2017    BILITOT 0.9 03/20/2017    ALBUMIN 3.3 (L) 03/20/2017    PROT 6.6 03/20/2017     CBC:   Lab Results   Component Value Date    WBC 15.53 (H) 03/20/2017    HGB 14.8 03/20/2017    HCT 43.9 03/20/2017    MCV 93 03/20/2017     03/20/2017     Microbiology x 7d:   Microbiology Results (last 7 days)     Procedure Component Value Units Date/Time    Blood culture [[id number]] Collected:  03/20/17 1533    Order Status:  Sent Specimen:  Blood from Peripheral, Lower Arm, Right Updated:  03/20/17 1747    Narrative:       Blood cultures x 2 different sites. 4 bottles total. Please  draw cultures before administering antibiotics.    Blood culture [[id number]] Collected:  03/20/17 1532    Order Status:  Sent Specimen:  Blood from Peripheral, Forearm, Right Updated:  03/20/17 1730    Narrative:       Blood cultures from 2 different sites. 4 bottles total.  Please draw before starting antibiotics.    Blood culture [[id number]] Collected:  03/19/17 1827    Order Status:  Completed Specimen:  Blood from Peripheral, Hand, Left Updated:  03/20/17 0515     Blood Culture, Routine No Growth to date    Narrative:       Blood cultures x 2 different sites. 4 bottles total. Please  draw cultures before  administering antibiotics.    Blood culture [[id number]] Collected:  03/19/17 1821    Order Status:  Completed Specimen:  Blood from Peripheral, Hand, Right Updated:  03/20/17 0345     Blood Culture, Routine No Growth to date    Narrative:       Blood cultures from 2 different sites. 4 bottles total.  Please draw before starting antibiotics.    Culture, Respiratory [[id number]]     Order Status:  No result Specimen:  Respiratory         Imaging:  3/20/17: CT head:  L-head of the caudate and putaminal hemorrhage with vasogenic edema.Slight increased on ICH and edema.  I personally reviewed the above image.    ASSESSMENT/PLAN:     Active Hospital Problems    Diagnosis    *Thrombotic stroke involving left middle cerebral artery    Right sided weakness    Aphasia    Hypertension    Embolic stroke involving left middle cerebral artery        Active Problem List:   1 L-stroke with hemorrhagic transformation on Caudate and putamen.  2. R/O endocarditis. Aortic valve with possible vegetation.  3. HTN  4.. Secondary cytotoxic and vasogenic edema.         Assessment / Plan:  Neuro: Na 150-155.  -ECASA 81 mg qd/   -Lipitor 80 mg qd.  -Pending to start Plavix for the stent.  -Has NGT.  -PRN HTS 300cc q 6hrs PRN for serum NA < 150.   -Speech eval.  -PT, OT. and rehab  -Repeat head CT in AM to assess bleeding expansion.  Resp:  -RA  -IS  CV: TTE normal. Abnormal aortic valve. May have a vegetation.  -Keep SBP{ <=140-mmhG  -Hydralazine PRN  -Lozartan 100mg qd.   -Lopressor 200mg q 8hrs hold for HR < 55 and or SBP < 120 mmHg.  -PICC line placement pending.  IVF/nutrition/Renal/GI:  -Speech evaluation  -NGT confirmed.  -Condom catheter.  -Keep NPO.  Hem / ID: R/O vegetations. WBC eleted.  -Cefepime 1 gr q 8hr  -Vanc 1gr q 12hrs.  Surveillance blood cultures.  -CRP  -ESD  Endo:  SSI q 4hrs  Prophylaxis:  -SCDs  -SC Heparin pending.  -Platelet response to ASA  Advance Directives and Disposition:    Full  Code.           Uninterrupted level 3 Follow-up /Counseling Time (not including procedures): = 35 min                   Bhavesh Ledbetter MD, MPH, FAHA,  Neurocritical Care Department Faculty

## 2017-03-21 NOTE — PROCEDURES
"Nathanael Velez is a 61 y.o. male patient.    Temp: 97.8 °F (36.6 °C) (03/21/17 1100)  Pulse: 63 (03/21/17 1400)  Resp: 12 (03/21/17 1400)  BP: 119/61 (03/21/17 1400)  SpO2: 99 % (03/21/17 1400)  Weight: 73.8 kg (162 lb 11.2 oz) (03/21/17 0355)  Height: 5' 7" (170.2 cm) (03/18/17 7665)    PICC  Date/Time: 3/21/2017 3:23 PM  Performed by: SHOBHA MONTEZ  Consent Done: Yes  Time out: Immediately prior to procedure a time out was called to verify the correct patient, procedure, equipment, support staff and site/side marked as required  Indications: med administration and vascular access  Anesthesia: local infiltration  Local anesthetic: lidocaine 1% without epinephrine  Anesthetic Total (mL): 3  Preparation: skin prepped with ChloraPrep  Skin prep agent dried: skin prep agent completely dried prior to procedure  Sterile barriers: all five maximum sterile barriers used - cap, mask, sterile gown, sterile gloves, and large sterile sheet  Hand hygiene: hand hygiene performed prior to central venous catheter insertion  Location details: right basilic  Catheter type: double lumen  Catheter size: 5 Fr  Catheter Length: 37cm    Ultrasound guidance: yes  Vessel Caliber: medium and patent, compressibility normal  Needle advanced into vessel with real time Ultrasound guidance.  Guidewire confirmed in vessel.  Image recorded and saved.  Sterile sheath used.  no esophageal manometryNumber of attempts: 1  Post-procedure: blood return through all ports, chlorhexidine patch and sterile dressing applied  Technical procedures used: 3CG  Specimens: No  Implants: No  Assessment: placement verified by x-ray        Anna Hylton  3/21/2017    "

## 2017-03-21 NOTE — PLAN OF CARE
Discharge plan: Discharge Facility/Level Of Care Needs: nursing facility, skilled        03/21/17 1024   Right Care Assessment   Can the patient answer the patient profile reliably? No, cognitively impaired  (patient unable to verbilize)   How often would a person be available to care for the patient? Often   Describe the patient's ability to walk at the present time. Minor restrictions or changes   How does the patient rate their overall health at the present time? (xiang)   Number of comorbid conditions (as recorded on the chart) One   During the past month, has the patient often been bothered by feeling down, depressed or hopeless? (xiang)   During the past month, has the patient often been bothered by little interest or pleasure in doing things? (xiang)       Fifi Alcocer   [phone]

## 2017-03-21 NOTE — PT/OT/SLP PROGRESS
Physical Therapy  Treatment    Nathanael Velez   MRN: [id number]   Admitting Diagnosis: Thrombotic stroke involving left middle cerebral artery    PT Received On: 17  PT Start Time: 1331     PT Stop Time: 1354    PT Total Time (min): 23 min       Billable Minutes:  Gait [id number] and Therapeutic Activity 13    Treatment Type: Treatment  PT/PTA: PT             General Precautions: Standard, aspiration, fall, NPO  Orthopedic Precautions: N/A   Braces: N/A    Do you have any cultural, spiritual, Roman Catholic conflicts, given your current situation?: Episcopalian    Subjective:  Communicated with RN prior to session.  Pt agreeable to PT session. Son present at bedside.     Pain Ratin/10  Pain Rating Post-Intervention: 0/10    Objective:   Patient found with: blood pressure cuff, NG tube, restraints, pulse ox (continuous), telemetry, peripheral IV (condom catheter)    Functional Mobility:    Bed Mobility:  Supine to Sit:  Moderate Assistance   Sit to supine: Minimal Assistance assist with R LE   Scooting: Minimal Assistance HHA towards EOB      Transfers:   Sit to stand:Max A (min A x 2ppl) with B HHA   Son present and holding pt's L hand while PT assisted pt on Right side. No buckling noted, with good weight-bearing through bilateral LE's      Gait:   Patient able to take ~4 steps fwd/back with B HHA (Pinky x2ppl), MAX A. Required assist for balance and weight-shift. Decrease step length bilaterally with step-to gait.       Balance:    Static Sitting:  Initially, pt required MAX assist to maintain midline trunk 2° R lateral lean & pushing with L UE; demonstrated trunk flexion with increase cervical flexion & downward gaze. After gait trial, pt returned to sitting EOB demonstrating good trunk extension with better upright cervical posture. Required only SBA for balance.   Dynamic Sitting: Minimal Assistance   Static Standing: Minimal Assistance   Dynamic Standing: Moderate Assistance      Therapeutic Activities and  Exercises:  Performed hip abd/add and heel slides x10 reps. Required Active assist to complete.     Education:  Education provided to pt and family regarding: safety with mobility. Verbalized understanding.     Whiteboard updated with correct mobility information. RN/PCT notified.  Pt ok to transfer via stand pivot with RN/PCT. Use 2 person assist for safety.       AM-PAC 6 CLICK MOBILITY  How much help from another person does this patient currently need?   1 = Unable, Total/Dependent Assistance  2 = A lot, Maximum/Moderate Assistance  3 = A little, Minimum/Contact Guard/Supervision  4 = None, Modified [place]/Independent    Turning over in bed (including adjusting bedclothes, sheets and blankets)?: 2  Sitting down on and standing up from a chair with arms (e.g., wheelchair, bedside commode, etc.): 2  Moving from lying on back to sitting on the side of the bed?: 2  Moving to and from a bed to a chair (including a wheelchair)?: 2  Need to walk in hospital room?: 1  Climbing 3-5 steps with a railing?: 1  Total Score: 10    AM-PAC Raw Score CMS G-Code Modifier Level of Impairment Assistance   6 % Total / Unable   7 - 9 CM 80 - 100% Maximal Assist   10 - 14 CL 60 - 80% Moderate Assist   15 - 19 CK 40 - 60% Moderate Assist   20 - 22 CJ 20 - 40% Minimal Assist   23 CI 1-20% SBA / CGA   24 CH 0% Independent/ Mod I     Patient left supine with all lines intact, call button in reach and restraints reapplied at end of session.    Assessment:  Nathanael Velez is a 61 y.o. male with a medical diagnosis of Thrombotic stroke involving left middle cerebral artery and presents with decrease endurance, strength, balance, coordination, and cognition impairing overall safety with mobility. Pt tolerated session well and continues to progress towards goals.  Pt able to proceed with gait training this visit demonstrating good tolerance to activity. Progress towards goals limited/impacted by endurance and cognition.  Pt would  benefit from continued skilled physical therapy to address listed impairments, improve functional independence, and maximize safety with mobility.  PT recommends dc disposition to SNF with hopeful progress to Rehab pending further tolerance and activity.  .    Rehab identified problem list/impairments: Rehab identified problem list/impairments: weakness, impaired endurance, impaired functional mobilty, gait instability, impaired cognition, decreased safety awareness, decreased coordination, decreased upper extremity function, decreased lower extremity function, impaired balance, visual deficits, impaired self care skills    Rehab potential is good.    Activity tolerance: Good    Discharge recommendations: Discharge Facility/Level Of Care Needs: nursing facility, skilled     Barriers to discharge: Barriers to Discharge: Inaccessible home environment, Decreased caregiver support    Equipment recommendations: Equipment Needed After Discharge:  (TBD)     GOALS:   Physical Therapy Goals        Problem: Physical Therapy Goal    Goal Priority Disciplines Outcome Goal Variances Interventions   Physical Therapy Goal     PT/OT, PT Ongoing (interventions implemented as appropriate)     Description:  Goals to be met by: 3/31/17    1. Pt supine to sit with moderate assist- MET 3/21  Updated: Supine to sit to Minimal Assist. NOT MET  2. Pt sit to supine with moderate assist-MET 3/21  Updated: Sit to supine with Supervision. NOT MET  3. Pt sit to stand with appropriate AD with moderate assist-not met  4. Pt to perform gait 5ft with appropriate AD with max assist.-not met  5. Pt to tolerate sitting on the EOB 20 min  with CGA.-not met  6. Pt to transfer bed to/from bedside chair with max assist.-not met  7. Pt to perform B LE exs in sitting or supine x 10 reps with AAROM on R LE.-not met                 PLAN:    Patient to be seen 6 x/week  to address the above listed problems via gait training, therapeutic activities, therapeutic  exercises, neuromuscular re-education  Plan of Care expires: 04/18/17  Plan of Care reviewed with: patient, dewey Dey WILLARD Fay, PT  03/21/2017

## 2017-03-21 NOTE — PLAN OF CARE
Problem: Occupational Therapy Goal  Goal: Occupational Therapy Goal  Goals set 3/19 to be addressed for 7 days with expiration date, 3/26:  Patient will increase functional independence with ADLs by performing:    Patient will demonstrate rolling to the right with mod assist. Not met   Patient will demonstrate rolling to the left with mod assist. Not met  Patient will demonstrate supine -sit with mod assist. Not met  Patient will demonstrate stand pivot transfers with Contact guard assist. Not met  Patient will demonstrate grooming while standing with max assist. Not met  Patient will demonstrate upper body dressing with max assist while seated EOB. Not met  Patient will demonstrate lower body dressing with min assist while seated EOB. Not met  Patient will demonstrate toileting with max assist. Not met  Patient will demonstrate bathing while seated EOB with max assist. Not met  Patients family / caregiver will demonstrate independence and safety with assisting patient with self-care skills and functional mobility. Not met  Patients family / caregiver will demonstrate independence with providing ROM and changes in bed positioning. Not met  Patient and/or patients family will verbalize understanding of stroke prevention guidelines, personal risk factors and stroke warning signs via teachback method. Not met        Outcome: Ongoing (interventions implemented as appropriate)  Goals updated.

## 2017-03-21 NOTE — CONSULTS
Double lumen PICC placed to right basilic vein. 37cm in length, 32cm arm circumference, 0cm exposed.  Lot# [id number].

## 2017-03-21 NOTE — PLAN OF CARE
SW attempted to go by room to check choices and obtain SS number. Pt was having a sterile procedure and no family was present. SW advised RN of need for SS number and will check tomorrow morning for choices/number.    Argentina Juarez, GRAHAM  Neurocritical Care   [facility]  [id number]

## 2017-03-21 NOTE — PLAN OF CARE
Problem: Patient Care Overview  Goal: Plan of Care Review  Outcome: Ongoing (interventions implemented as appropriate)  POC reviewed with pt and family at 2030. Pt unable to verbalize understanding.  Pt's son did verbalize understanding and understood plan of care. Questions and concerns addressed. No acute events today. Pt progressing toward goals. Will continue to monitor. See flowsheets for full assessment and VS info.

## 2017-03-21 NOTE — ASSESSMENT & PLAN NOTE
Arrived and had STAT cta multiphase - patient with critical carotid stenosis, still with significant deficit post tPA -going for IR thrombectomy.   Admit to [facility]     Antithrombotics for secondary stroke prevention: aspirin. plavix too (due to stent) when appropriate, currently with hemorrhagic conversion   Statins for secondary stroke prevention and hyperlipidemia, if present: Atorvastatin- 40 mg oral daily  Aggressive risk factor modification: Hypertension, High Cholesterol and Obesity  Rehab Efforts: Physical Therapy, Occupational Therapy, Speech and Language Pathology and when appropriate,   Diagnostics: echo  VTE Prophylaxis:  Can restart vte at 1400 at 3/18/17, may delay due to hemorrhagic conversion   BP Parameters: SBP <180  Nursing Orders: Neuro checks- per tpa protocol, Antiembolic stockings, Swallowing evaluation by Nursing, Seizure precautions, Avoid Rucker catheter, Pneumatic compression device, Stroke Education, Blood glucose target 100-130, Up with assistance     Rehab teams recommending snf

## 2017-03-21 NOTE — PROGRESS NOTES
Progress Note  Neurosurgery    Admit Date: 3/18/2017  Post-operative Day:    Hospital Day: 4    SUBJECTIVE:     Nathanael Velez is a 61 y.o. male pmh of HTN, HLD, and CAD admitted to neuro ICU with left MCA infarct, s/p   thrombectomy, left ICA, and tPA. HT s/p thrombectomy showed acute left basal ganglia hemorrhage. Repeat HCT was stable. Neurosurgery consulted for evaluation of the bleed. Pt was on aspirin.   Follow-up For:  * No surgery found *    No AE. AFVSS.     Scheduled Meds:   atorvastatin  80 mg Per NG tube Daily    ceFEPime (MAXIPIME) IVPB  1 g Intravenous Q12H    labetalol  200 mg Per NG tube Q8H    losartan  100 mg Per NG tube Daily    polyethylene glycol  17 g Per NG tube Daily    senna-docusate 8.6-50 mg  1 tablet Per NG tube BID    sodium chloride 0.9%  3 mL Intravenous Q8H    vancomycin (VANCOCIN) IVPB  1,500 mg Intravenous Q12H     Continuous Infusions:   sodium chloride 0.9% 50 mL/hr at 03/21/17 0900     PRN Meds:acetaminophen, hydrALAZINE, magnesium sulfate IVPB, magnesium sulfate IVPB, ondansetron, potassium chloride 10%, potassium chloride 10%, potassium chloride 10%, potassium, sodium phosphates, potassium, sodium phosphates, potassium, sodium phosphates, buffered 2% sodium acetate 86meq, sodium chloride 86meq, sterile water for inj IV soln    Review of patient's allergies indicates:  Not on File    OBJECTIVE:     Vital Signs (Most Recent)  Temp: 98.1 °F (36.7 °C) (03/21/17 0703)  Pulse: 69 (03/21/17 0900)  Resp: 16 (03/21/17 0900)  BP: 134/63 (03/21/17 0900)  SpO2: 100 % (03/21/17 0900)    Vital Signs Range (Last 24H):  Temp:  [98.1 °F (36.7 °C)-101.2 °F (38.4 °C)]   Pulse:  [66-80]   Resp:  [15-25]   BP: (101-151)/(55-77)   SpO2:  [96 %-100 %]     I & O (Last 24H):  Intake/Output Summary (Last 24 hours) at 03/21/17 0946  Last data filed at 03/21/17 0900   Gross per 24 hour   Intake          7008.83 ml   Output             1367 ml   Net          5641.83 ml     Physical Exam:  General: no  distress  Head: normocephalic  GCS: Motor: 5/Verbal: 2/Eyes: 3  Opens eyes to voice  localizes L side. R paresis   PERRL  Sensory: response to light touch throughout  nonlabored respiration  Abdomen: soft, non-tender   Extremities: no cyanosis or edema, or clubbing     Lines/Drains:       Peripheral IV - Single Lumen 03/18/17 1543 Right Hand (Active)   Site Assessment Clean;Dry;Intact 3/21/2017  7:01 AM   Line Status Infusing 3/21/2017  7:01 AM   Dressing Status Clean;Dry;Intact 3/21/2017  5:00 AM   Dressing Intervention Dressing reinforced 3/21/2017  7:01 AM   Dressing Change Due 03/22/17 3/21/2017  5:00 AM   Site Change Due 03/22/17 3/21/2017  7:01 AM   Reason Not Rotated Not due 3/21/2017  7:01 AM   Number of days:2            Peripheral IV - Single Lumen 03/20/17 1900 Antecubital (Active)   Site Assessment Clean;Dry;Intact 3/21/2017  7:01 AM   Line Status Infusing 3/21/2017  7:01 AM   Dressing Status Clean;Dry;Intact 3/21/2017  7:01 AM   Dressing Intervention Dressing reinforced 3/21/2017  7:01 AM   Dressing Change Due 03/24/17 3/21/2017  5:00 AM   Site Change Due 03/24/17 3/21/2017  7:01 AM   Reason Not Rotated Not due 3/21/2017  7:01 AM   Number of days:0            External Urinary Catheter 03/18/17 1758 Medium (Active)   Collection Container Urimeter 3/21/2017  7:01 AM   Securement Method secured to top of thigh w/ adhesive device 3/21/2017  7:01 AM   Skin no redness;no breakdown;penis/scrotum cleansed w/ soap and water;skin barrier applied 3/21/2017  7:01 AM   Tolerance no signs/symptoms of discomfort 3/21/2017  7:01 AM   Output (mL) 20 mL 3/21/2017  9:00 AM   Number of days:2       Wound/Incision:    Laboratory:  CBC:   Recent Labs  Lab 03/21/17 0122   WBC 14.09*   RBC 4.28*   HGB 13.5*   HCT 38.5*      MCV 90   MCH 31.5*   MCHC 35.1     BMP:   Recent Labs  Lab 03/21/17 0122 03/21/17  0557   *  --      143 142   K 3.2*  --    *  --    CO2 23  --    BUN 16  --    CREATININE 0.8   --    CALCIUM 8.1*  --    MG 1.7  --      CMP:   Recent Labs  Lab 03/21/17  0122 03/21/17  0557   *  --    CALCIUM 8.1*  --    ALBUMIN 2.8*  --    PROT 5.7*  --      143 142   K 3.2*  --    CO2 23  --    *  --    BUN 16  --    CREATININE 0.8  --    ALKPHOS 54*  --    ALT 13  --    AST 18  --    BILITOT 1.0  --      LFTs:   Recent Labs  Lab 03/21/17  0122   ALT 13   AST 18   ALKPHOS 54*   BILITOT 1.0   PROT 5.7*   ALBUMIN 2.8*     Coagulation:   Recent Labs  Lab 03/18/17  1752  03/21/17 0122   INR  --   < > 1.1   APTT 28.5  --   --    < > = values in this interval not displayed.  Cardiac markers: No results for input(s): CKMB, CPKMB, TROPONINT, TROPONINI, MYOGLOBIN in the last 168 hours.  ABGs:   Recent Labs  Lab 03/18/17  1933   PH 7.384   PCO2 32.3*   PO2 72*   HCO3 19.3*   POCSATURATED 94*   BE -6     Microbiology Results (last 7 days)     Procedure Component Value Units Date/Time    Blood culture [[id number]] Collected:  03/21/17 0356    Order Status:  Sent Specimen:  Blood from Peripheral, Antecubital, Left Updated:  03/21/17 0615    Narrative:       Blood cultures from 2 different sites. 4 bottles total.  Please draw before starting antibiotics.    Blood culture [[id number]] Collected:  03/21/17 0356    Order Status:  Sent Specimen:  Blood from Peripheral, Antecubital, Left Updated:  03/21/17 0409    Narrative:       Blood cultures x 2 different sites. 4 bottles total. Please  draw cultures before administering antibiotics.    Blood culture [[id number]] Collected:  03/20/17 1532    Order Status:  Completed Specimen:  Blood from Peripheral, Forearm, Right Updated:  03/21/17 0315     Blood Culture, Routine No Growth to date    Narrative:       Blood cultures from 2 different sites. 4 bottles total.  Please draw before starting antibiotics.    Blood culture [[id number]] Collected:  03/20/17 1533    Order Status:  Completed Specimen:  Blood from Peripheral, Lower Arm, Right Updated:  03/21/17 0315      Blood Culture, Routine No Growth to date    Narrative:       Blood cultures x 2 different sites. 4 bottles total. Please  draw cultures before administering antibiotics.    Blood culture [[id number]] Collected:  03/19/17 1827    Order Status:  Completed Specimen:  Blood from Peripheral, Hand, Right Updated:  03/20/17 2212     Blood Culture, Routine No Growth to date     Blood Culture, Routine No Growth to date    Narrative:       Blood cultures from 2 different sites. 4 bottles total.  Please draw before starting antibiotics.    Blood culture [[id number]] Collected:  03/19/17 1827    Order Status:  Completed Specimen:  Blood from Peripheral, Hand, Left Updated:  03/20/17 2212     Blood Culture, Routine No Growth to date     Blood Culture, Routine No Growth to date    Narrative:       Blood cultures x 2 different sites. 4 bottles total. Please  draw cultures before administering antibiotics.    Culture, Respiratory [[id number]]     Order Status:  No result Specimen:  Respiratory         Specimen     None          Recent Labs  Lab 03/20/17  0442   COLORU Yellow   SPECGRAV >1.030*   PHUR 6.0   PROTEINUA Negative   NITRITE Negative   LEUKOCYTESUR Negative   UROBILINOGEN 2.0-3.0*       Diagnostic Results:  Repeat CT head - stable hemorrhage.   ASSESSMENT/PLAN:     Assessment: 60 yo M L MCA infarct w/ hemorrhagic conversion    Neuro stable.   Cont q1 hour neuro checks  Repeat CT head stable  SBP < 160  No ASA x5 days total.   Will continue to follow  please page neurosurgery with questions or any change in neurologic status

## 2017-03-21 NOTE — PROGRESS NOTES
[facility]  Vascular Neurology  Comprehensive Stroke Center  Progress Note      Neurologic Chief Complaint: L MCA with carotid stenosis     Subjective:     Interval History: Patient is seen for follow-up neurological assessment and treatment recommendations: NAEON, drowsy, patient's aphasia improving    HPI, Past Medical, Family, and Social History remains the same as documented in the initial encounter.     Review of Systems   Constitutional: Negative for chills and fever.   HENT: Negative for drooling.    Genitourinary: Negative for hematuria.   Neurological: Positive for facial asymmetry, speech difficulty and weakness.     Scheduled Meds:   atorvastatin  80 mg Per NG tube Daily    ceFEPime (MAXIPIME) IVPB  1 g Intravenous Q12H    labetalol  200 mg Per NG tube Q8H    losartan  100 mg Per NG tube Daily    polyethylene glycol  17 g Per NG tube Daily    senna-docusate 8.6-50 mg  1 tablet Per NG tube BID    sodium chloride (23.4%)  30 mL Intravenous Once    sodium chloride 0.9%  10 mL Intravenous Q6H    sodium chloride 0.9%  3 mL Intravenous Q8H    tuberculin  5 Units Intradermal Once    vancomycin (VANCOCIN) IVPB  1,500 mg Intravenous Q12H     Continuous Infusions:     PRN Meds:acetaminophen, hydrALAZINE, magnesium sulfate IVPB, magnesium sulfate IVPB, ondansetron, potassium chloride 10%, potassium chloride 10%, potassium chloride 10%, potassium, sodium phosphates, potassium, sodium phosphates, potassium, sodium phosphates, buffered 2% sodium acetate 86meq, sodium chloride 86meq, sterile water for inj IV soln, Flushing PICC Protocol **AND** sodium chloride 0.9% **AND** sodium chloride 0.9%    Objective:     Vital Signs (Most Recent):  Temp: 98.6 °F (37 °C) (03/21/17 1500)  Pulse: 65 (03/21/17 1500)  Resp: 16 (03/21/17 1500)  BP: (!) 142/65 (03/21/17 1500)  SpO2: 98 % (03/21/17 1500)  BP Location: Right arm    Vital Signs Range (Last 24H):  Temp:  [97.8 °F (36.6 °C)-101 °F (38.3 °C)]    Pulse:  [63-80]   Resp:  [12-26]   BP: (110-151)/(61-79)   SpO2:  [96 %-100 %]   BP Location: Right arm    Physical Exam   Constitutional: He appears well-developed and well-nourished.   HENT:   Head: Normocephalic and atraumatic.   Eyes: Pupils are equal, round, and reactive to light.   Cardiovascular: Normal rate.    Pulmonary/Chest: Effort normal.   Neurological: He is alert.   Drowsy    Skin: Skin is warm and dry.   Nursing note and vitals reviewed.      Neurological Exam:   LOC: does not follow requests and drowsy  Language: Aphasia  Speech: Aphasic   Orientation: Aphasic  Memory: aphasic  Facial Movement (CN VII): lower weakness right lower  Motor*: RSW, arm weaker than leg  Sensation: siddharth-hypoesthesia right  Tone: Arm-Left: normal; Leg-Left: normal; Arm-Right: normal; Leg-Right: normal    NIH Stroke Scale:    Level of Consciousness: 1 - drowsy  LOC Questions: 2 - answers none correctly  LOC Commands: 2 - performs neither correctly  Best Gaze: 1 - partial gaze palsy  Visual: 2 - complete hemianopia  Facial Palsy: 1 - minor  Motor Left Arm: 0 - no drift  Motor Right Arm: 3 - no effort against gravity  Motor Left Le - no drift  Motor Right Le - can't resist gravity  Limb Ataxia: 0 - absent  Sensory: 0 - normal  Best Language: 3 - mute  Dysarthria: 0 - normal articulation  Extinction and Inattention: 0 - no neglect  NIH Stroke Scale Total: 17      Laboratory:  CMP:     Recent Labs  Lab 17  1206   CALCIUM 8.1*  --   --    ALBUMIN 2.8*  --   --    PROT 5.7*  --   --      143  < > 142   K 3.2*  --  4.0   CO2 23  --   --    *  --   --    BUN 16  --   --    CREATININE 0.8  --   --    ALKPHOS 54*  --   --    ALT 13  --   --    AST 18  --   --    BILITOT 1.0  --   --    < > = values in this interval not displayed.  CBC:     Recent Labs  Lab 17  0122   WBC 14.09*   RBC 4.28*   HGB 13.5*   HCT 38.5*      MCV 90   MCH 31.5*   MCHC 35.1     Lipid Panel:     Recent  Labs  Lab 03/18/17  1609   CHOL 175   LDLCALC 123.8   HDL 36*   TRIG 76     Coagulation:   Recent Labs  Lab 03/18/17  1752  03/21/17  0122   INR  --   < > 1.1   APTT 28.5  --   --    < > = values in this interval not displayed.  Platelet Aggregation Study: No results for input(s): PLTAGG, PLTAGINTERP, PLTAGREGLACO, ADPPLTAGGREG in the last 168 hours.  Hgb A1C:     Recent Labs  Lab 03/18/17  1752   HGBA1C 5.7     TSH:     Recent Labs  Lab 03/18/17  1609   TSH 0.169*       Diagnostic Results:  I have personally reviewed:  MRI pending    CT head 3/19/17  Interval worsening of hemorrhagic conversion centered within the left basal ganglia, in this patient who is status post thrombectomy for left MCA infarct.  Interval worsening of sulcal effacement and effacement of the left lateral ventricle.  There is 0.2 cm rightward midline shift.    No new foci of hemorrhage or infarction.    CTA stroke multiphase 3/18/17   CT head: No evidence for acute intracranial hemorrhage. There is ill-defined decreased attenuation of left MCA distribution specifically left basal ganglia and left insula concerning for recent area of infarction. No evidence for hemorrhagic confusion. Ventricles normal without hydrocephalus.    CTA head: Occlusion left cervical ICA with reconstitution at the cavernous segment. In addition there is a ipsilateral occlusion of the left M1 segment of the MCA proximally. There is reconstituted M2 and M3 MCA candelabra on all 3 phases.    CTA neck: Atherosclerotic disease carotid bifurcations and proximal ICAs bilaterally with high-grade critical stenosis or short segment occlusion of the left proximal ICA and more distal left cervical occlusion or reduced flow-related to the distal occlusion.    There is high-grade critical stenosis of the right ICA at its origin with only trace flow identified.    Atherosclerotic disease of the origin the vertebral arteries the dominant left vertebral artery. There is questioned  stenosis at the vertebral artery origins bilaterally limited by calcified plaque and high density venous contrast.    Please note there is advanced degenerative change in the cervical spine with moderate to severe stenosis in the central canal allowing for CT technique at C5/C6.    CT head 3/20/17  Evolving left MCA distribution infarct with persistent hemorrhagic conversion at the level of the basal ganglia with stable local mass effect, minimal left to right midline shift and trace intraventricular extension. No CT findings to suggest developing hydrocephalus.    New punctate hyperattenuating areas within the superior aspect of the left frontoparietal region concerning for new petechial hemorrhages.  Continued followup recommended.    Echo 3/19/17   Normal LA   CONCLUSIONS     1 - Normal left ventricular systolic function (EF 55-60%).     2 - Normal left ventricular diastolic function.     3 - Normal right ventricular systolic function .  4- The aortic valve is mildly sclerotic. There is a mobile echodensity on the right coronary cusp, which could be representing sclerosed body of the leaflet, but vegetation or mass cannot be ruled out. ( image 36). Clinical correlation is   required.       Assessment/Plan:     61 year old male with HTN, HLD, and CAD and L MCA stroke symptoms post tpa and transferred here for higher level of care and potential thrombectomy. CTA multiphase upon arrival.   Taken to IR for acute intervention. Stent placed on 3/18/17        * Thrombotic stroke involving left middle cerebral artery  Arrived and had STAT cta multiphase - patient with critical carotid stenosis, still with significant deficit post tPA -treated with IR thrombectomy.   Admit to NCC     Antithrombotics for secondary stroke prevention: aspirin. plavix too (due to stent) when appropriate, currently with hemorrhagic conversion   Statins for secondary stroke prevention and hyperlipidemia, if present: Atorvastatin- 40 mg oral  daily  Aggressive risk factor modification: Hypertension, High Cholesterol and Obesity  Rehab Efforts: Physical Therapy, Occupational Therapy, Speech and Language Pathology and when appropriate,   Diagnostics: echo  VTE Prophylaxis:  Can restart vte at 1400 at 3/18/17, may delay due to hemorrhagic conversion   BP Parameters: SBP <180  Nursing Orders: Neuro checks- per tpa protocol, Antiembolic stockings, Swallowing evaluation by Nursing, Seizure precautions, Avoid Rucker catheter, Pneumatic compression device, Stroke Education, Blood glucose target 100-130, Up with assistance     Rehab teams recommending snf                       Right sided weakness  Given tpa , status post thrombectomy   Due to stroke above  Aggressive PT/OT/ speech when appropriate    Aphasia  Given tpa , status post thrombectomy   Due to stroke above  Aggressive PT/OT/ speech when appropriate    Hypertension  Stroke RF  <180 post tpa and thrombectomy   Optimize management     Abnormality of aortic valve  Found on echo.   As per NCC  Vegitation vs mass           Arlet Hogan PA-C  Comprehensive Stroke Center  Department of Vascular Neurology   [facility]

## 2017-03-21 NOTE — PLAN OF CARE
Problem: Physical Therapy Goal  Goal: Physical Therapy Goal  Goals to be met by: 3/31/17    1. Pt supine to sit with moderate assist- MET 3/21  Updated: Supine to sit to Minimal Assist. NOT MET  2. Pt sit to supine with moderate assist-MET 3/21  Updated: Sit to supine with Supervision. NOT MET  3. Pt sit to stand with appropriate AD with moderate assist-not met  4. Pt to perform gait 5ft with appropriate AD with max assist.-not met  5. Pt to tolerate sitting on the EOB 20 min with CGA.-not met  6. Pt to transfer bed to/from bedside chair with max assist.-not met  7. Pt to perform B LE exs in sitting or supine x 10 reps with AAROM on R LE.-not met            Pt met 2 goals this visit. Goals updated. Pt progressing towards goals. All goals remain appropriate at this time.     Yissel Fay, PT, DPT  3/21/2017

## 2017-03-21 NOTE — PLAN OF CARE
SW spoke with Pt son, Brian Velez, regarding the social security number for his father in order for SW to complete the state paperwork. He reported he got it after he was told it was needed yesterday, but left it is his truck. Advised he can grab it later today and give it to this SW or the RN to input in the system. He will do so and is continuing to review the list.    Argentina Juarez, GRAHAM  Neurocritical Care   [facility]  [id number]

## 2017-03-21 NOTE — PROGRESS NOTES
ICU Progress Note  Neurocritical Care    Admit Date: 3/18/2017  LOS: 3    CC: Thrombotic stroke involving left middle cerebral artery    Code Status: Full Code     SUBJECTIVE:     Interval History/Significant Events: more alert this am, not moving LUE.     Review of Symptoms: unable to assess due to mental status    Medications:  Continuous Infusions:   Scheduled Meds:   atorvastatin  80 mg Per NG tube Daily    ceFEPime (MAXIPIME) IVPB  1 g Intravenous Q12H    heparin (porcine)  5,000 Units Subcutaneous Q8H    labetalol  200 mg Per NG tube Q8H    losartan  100 mg Per NG tube Daily    mannitol 25%  50 g Intravenous Once    polyethylene glycol  17 g Per NG tube Daily    senna-docusate 8.6-50 mg  1 tablet Per NG tube BID    sodium chloride 0.9%  3 mL Intravenous Q8H    vancomycin (VANCOCIN) IVPB  1,500 mg Intravenous Q12H     PRN Meds:.acetaminophen, hydrALAZINE, magnesium sulfate IVPB, magnesium sulfate IVPB, ondansetron, potassium chloride 10%, potassium chloride 10%, potassium chloride 10%, potassium, sodium phosphates, potassium, sodium phosphates, potassium, sodium phosphates, buffered 2% sodium acetate 86meq, sodium chloride 86meq, sterile water for inj IV soln    OBJECTIVE:   Vital Signs (Most Recent):   Temp: 98.1 °F (36.7 °C) (03/21/17 0703)  Pulse: 72 (03/21/17 1000)  Resp: (!) 21 (03/21/17 1000)  BP: 134/63 (03/21/17 0900)  SpO2: 97 % (03/21/17 1000)    Vital Signs (24h Range):   Temp:  [98.1 °F (36.7 °C)-101.2 °F (38.4 °C)] 98.1 °F (36.7 °C)  Pulse:  [66-80] 72  Resp:  [15-25] 21  SpO2:  [96 %-100 %] 97 %  BP: (101-151)/(55-77) 134/63    ICP/CPP (Last 24h):        I & O (Last 24h):   Intake/Output Summary (Last 24 hours) at 03/21/17 1101  Last data filed at 03/21/17 1000   Gross per 24 hour   Intake          7173.83 ml   Output             1367 ml   Net          5806.83 ml     Physical Exam:  GA: Alert, comfortable, no acute distress.   HEENT: No scleral icterus or JVD.   Pulmonary: Clear to  auscultation A/P/L. No wheezing, crackles, or rhonchi. Lots of secretions  Cardiac: RRR S1 & S2 w/o rubs/murmurs/gallops.   Abdominal: Bowel sounds present x 4. No appreciable hepatosplenomegaly.  Skin: No jaundice, rashes, or visible lesions.  Neuro:  --GCS: E4 V1 M6  --Mental Status:  No speech. Follows simple commands  --CN II-XII grossly intact, dysphagia  -- PERRL.   --Corneal reflex, gag, cough intact.  --LUE strength: 5/5  --RUE strength: 1/5  --LLE strength: 5/5  --RLE strength: 3/5    Vent Data:   Resp Rate Total:  [12 br/min-20 br/min] 20 br/min    Lines/Drains/Airway:              External Urinary Catheter 03/18/17 2850 Medium (Active)   Collection Container Urimeter 3/21/2017  7:01 AM   Securement Method secured to top of thigh w/ adhesive device 3/21/2017  7:01 AM   Skin no redness;no breakdown;penis/scrotum cleansed w/ soap and water;skin barrier applied 3/21/2017  7:01 AM   Tolerance no signs/symptoms of discomfort 3/21/2017  7:01 AM   Output (mL) 20 mL 3/21/2017  9:00 AM     Nutrition/Tube Feeds (if NPO state why):  TF at goal    Labs:  ABG: No results for input(s): PH, PO2, PCO2, HCO3, POCSATURATED, BE in the last 24 hours.  BMP:  Recent Labs  Lab 03/21/17  0122 03/21/17  0557     143 142   K 3.2*  --    *  --    CO2 23  --    BUN 16  --    CREATININE 0.8  --    *  --    MG 1.7  --    PHOS 2.6*  --      LFT: Lab Results   Component Value Date    AST 18 03/21/2017    ALT 13 03/21/2017    ALKPHOS 54 (L) 03/21/2017    BILITOT 1.0 03/21/2017    ALBUMIN 2.8 (L) 03/21/2017    PROT 5.7 (L) 03/21/2017     CBC:   Lab Results   Component Value Date    WBC 14.09 (H) 03/21/2017    HGB 13.5 (L) 03/21/2017    HCT 38.5 (L) 03/21/2017    MCV 90 03/21/2017     03/21/2017     Microbiology x 7d:   Microbiology Results (last 7 days)     Procedure Component Value Units Date/Time    Blood culture [[id number]] Collected:  03/21/17 0356    Order Status:  Sent Specimen:  Blood from Peripheral,  Antecubital, Left Updated:  03/21/17 0615    Narrative:       Blood cultures from 2 different sites. 4 bottles total.  Please draw before starting antibiotics.    Blood culture [[id number]] Collected:  03/21/17 0356    Order Status:  Sent Specimen:  Blood from Peripheral, Antecubital, Left Updated:  03/21/17 0409    Narrative:       Blood cultures x 2 different sites. 4 bottles total. Please  draw cultures before administering antibiotics.    Blood culture [[id number]] Collected:  03/20/17 1532    Order Status:  Completed Specimen:  Blood from Peripheral, Forearm, Right Updated:  03/21/17 0315     Blood Culture, Routine No Growth to date    Narrative:       Blood cultures from 2 different sites. 4 bottles total.  Please draw before starting antibiotics.    Blood culture [[id number]] Collected:  03/20/17 1533    Order Status:  Completed Specimen:  Blood from Peripheral, Lower Arm, Right Updated:  03/21/17 0315     Blood Culture, Routine No Growth to date    Narrative:       Blood cultures x 2 different sites. 4 bottles total. Please  draw cultures before administering antibiotics.    Blood culture [[id number]] Collected:  03/19/17 1827    Order Status:  Completed Specimen:  Blood from Peripheral, Hand, Right Updated:  03/20/17 2212     Blood Culture, Routine No Growth to date     Blood Culture, Routine No Growth to date    Narrative:       Blood cultures from 2 different sites. 4 bottles total.  Please draw before starting antibiotics.    Blood culture [[id number]] Collected:  03/19/17 1827    Order Status:  Completed Specimen:  Blood from Peripheral, Hand, Left Updated:  03/20/17 2212     Blood Culture, Routine No Growth to date     Blood Culture, Routine No Growth to date    Narrative:       Blood cultures x 2 different sites. 4 bottles total. Please  draw cultures before administering antibiotics.    Culture, Respiratory [[id number]]     Order Status:  No result Specimen:  Respiratory         Imaging:  CT head  3/21/17  No significant interval detrimental change when compared to the CT dated 03/20/2017.    Evolving left MCA distribution infarct with persistent hemorrhagic conversion at the level of the basal ganglia and trace petechial hemorrhages about the superior aspect of the left frontal lobe. There is stable mass effect on the left lateral ventricle with minimal left to right midline shift.  No CT findings to suggest developing   hydrocephalus.    ASSESSMENT/PLAN:     Active Hospital Problems    Diagnosis  POA    *Thrombotic stroke involving left middle cerebral artery [I63.312]  Yes    Acute ischemic left MCA stroke [I63.512]  Unknown    Abnormality of aortic valve [Q23.9]  Not Applicable    Right sided weakness [M62.81]  Unknown    Aphasia [R47.01]  Unknown    Hypertension [I10]  Unknown    Embolic stroke involving left middle cerebral artery [I63.412]  Yes      Resolved Hospital Problems    Diagnosis Date Resolved POA   No resolved problems to display.      Neuro:   L stroke with hemorrhagic transofrmation on caudate and putamen s/p TPA, thrombectomy and stent placement  -ASA 81 daily. NSGY discontinued, will discuss aspirin moving forward  -lipitor 80 mg  -mannitol 1/2gm/kg x1  -continue Hypertonic saline 300cc q6 prn for Na <150  -will repeat speech eval at end of week  -PT/OT    Pulmonary:   -Stable on RA. Incentive spirometry  -patient has significant amount of secretions. Will keep seated 90 degrees    Cardiac:   TTE with abnormal aortic valve,concerning for vegetation. Following surveillance blood cultures. Will repeat TTE Friday  -SBP goal <140  -losartan 100mg daily. Labetalol 200mg q8    Renal:    Na goal >150. Hypertonic saline prn. 1x dose mannitol    ID:   -vanc/cefepime with surveillance bcx  -CRP elevated    Hem/Onc:   H/h stable  -subq hep for dvt ppx    Endocrine:    SSI q4    Fluids/Electrolytes/Nutrition/GI:   -tf at goal. D/c ivf        Brian Amaya MD PGY-1

## 2017-03-21 NOTE — PT/OT/SLP PROGRESS
"Speech Language Pathology  Treatment    Nathanael Velez   MRN: [id number]   Admitting Diagnosis: Thrombotic stroke involving left middle cerebral artery    Diet recommendations: Solid Diet Level: NPO  Liquid Diet Level: NPO     SLP Treatment Date: 17  Speech Start Time: 1130     Speech Stop Time: 1148     Speech Total (min): 18 min       TREATMENT BILLABLE MINUTES:  Speech Therapy Individual 10 and Treatment Swallowing Dysfunction 8    Has the patient been evaluated by SLP for swallowing? : Yes  Keep patient NPO?: Yes   General Precautions: Standard, aphasia, aspiration, fall          Subjective:  "I have no idea"    Pain Ratin/10              Pain Rating Post-Intervention: 0/10    Objective:      Pt seen bedside with no family present. He kept his eyes closed for most of the session. He counted to 5. He completed automatic phrases with 60% acc and automatic sentences with 50% acc. Answers to responsive speech questions were nonsensical and he was unable to Id objects. He responded with no to most questions He followed 66% of one step commands. Vocal quality was hoarse with low volume and wet vocal quality. He was given teaspoon of thin, nectar and honey thick liquids. Pt with delayed swallow response with wet vocal quality noted following all boluses. Pt with a delayed weak cough after honey thick liquids. Double swallows also noted. White board updated.     FIM:                    Expression: 2 Maximal Prompting--The patient expresses basic daily needs and ideas 25 to 49% of the time.  The patient uses only single words or gestures, and (s)he needs prompting more than half the time.          Assessment:  Nathanael Velez is a 61 y.o. male with a medical diagnosis of Thrombotic stroke involving left middle cerebral artery and presents with dysphagia, aphasia. Pt progressing towards goals    Discharge recommendations: Discharge Facility/Level Of Care Needs: nursing facility, skilled     Goals:   SLP Goals        " Problem: SLP Goal    Goal Priority Disciplines Outcome   SLP Goal     SLP    Description:  SLP Plan of Care: Speech Pathology will follow pt daily M-F and address the following goals x1 week:  3/26  1. Pt will participate in ongoing swallow assessment to determine least restrictive diet. ONGOING  2. Pt will participate in speech language cognitive eval to determine need for intervention. MET  3. When deemed appropriate by MD/SLP pt will participate in MBS to further determine safe swallow function. ONGOING        SLP Plan of Care: Speech Pathology will follow pt daily M-F and address the following goals x1 week:  3/27  1. Pt will participate in ongoing swallow assessment to determine least restrictive diet.  2. Pt will answer 3/4 orientation questions with mod assistance to improve cognitive function.   3. Pt will follow simple directives with 60% acc indep provided mod cues to improve receptive language.  4. Pt will complete automatic rote speech (counting) with 60% acc indep provided mod cues to improve expressive language.  5. When deemed appropriate by MD/SLP pt will participate in MBS to further determine safe swallow function.                       Plan:   Patient to be seen Therapy Frequency: 5 x/week   Plan of Care expires: 04/17/17  Plan of Care reviewed with: patient  SLP Follow-up?: Yes  SLP - Next Visit Date: 03/21/17           PAOLA Smith, CCC-SLP  03/21/2017

## 2017-03-21 NOTE — PLAN OF CARE
Problem: Patient Care Overview  Goal: Plan of Care Review  Outcome: Ongoing (interventions implemented as appropriate)  POC reviewed with pt and family at 1400. Pt unable to verbalize understanding. Pts sons verbalized understanding. Questions and concerns addressed. No acute events today. Pt progressing toward goals. Will continue to monitor. See flowsheets for full assessment and VS info.

## 2017-03-21 NOTE — PT/OT/SLP PROGRESS
Occupational Therapy  Treatment    Nathanael Velez   MRN: [id number]   Admitting Diagnosis: Thrombotic stroke involving left middle cerebral artery    OT Date of Treatment: 17   OT Start Time: 947  OT Stop Time:   OT Total Time (min): 40 min    Billable Minutes:  Self Care/Home Management 10, Therapeutic Exercise 15 and Cognitive Retraining 15    General Precautions: Standard, aspiration, fall, NPO (cardiac)    Do you have any cultural, spiritual, Hindu conflicts, given your current situation?: [employer]    Subjective:  Communicated with RN prior to session.  Pt with several mumbled statements during session that at times were fairly garbled in nature.    Pain Ratin/10  Pain Rating Post-Intervention: 0/10    Objective:  Patient found with: blood pressure cuff, NG tube, restraints, pulse ox (continuous), telemetry, peripheral IV (condom catheter)     Functional Mobility:  Bed Mobility:  Rolling/Turning to Left: Total assistance  Rolling/Turning Right: Moderate assistance  Scooting/Bridging:  (Mod (A) with scooting forward on EOB; Total (A) with drawsheet transfer up HOB while supine)  Supine to Sit: Maximum Assistance  Sit to Supine: Minimum Assistance    Transfers:   Sit <> Stand Assistance: Minimum Assistance (from EOB)  Sit <> Stand Assistive Device: No Assistive Device    Activities of Daily Living:     LE Dressing Level of Assistance: Moderate assistance (donning socks while seated EOB with (A) for LE placement, postural control, initiation of socks over toes & to prevent pt from donning on same foot)   Toileting: Total (A) while supine    Therapeutic Activities and Exercises:  Pt required Min (A) with static standing trial.  Pt performed activities to facilitate visual scanning to the right while seated EOB. Provided RUE weight bearing while seated EOB.  Pt was disoriented therefore provided daily orientation.  Pt with fair responses to questions on this date.  Pt able to sequence 2/7 days/week. Pt  accurately named 2/5 objects/colors of objects during session.  Pt required CGA-Min (A) with postural control while seated EOB.  Pt followed 2/3 one step commands with cues required.  Provided RUE & RLE PROM in all planes x 10 reps each while supine.  Provided education to pt & pt's son regarding pt  Performance & therapy.  Pt had no further questions & when asked whether there were any concerns pt reported none.      AM-PAC 6 CLICK ADL   How much help from another person does this patient currently need?   1 = Unable, Total/Dependent Assistance  2 = A lot, Maximum/Moderate Assistance  3 = A little, Minimum/Contact Guard/Supervision  4 = None, Modified [place]/Independent    Putting on and taking off regular lower body clothing? : 2  Bathing (including washing, rinsing, drying)?: 1  Toileting, which includes using toilet, bedpan, or urinal? : 1  Putting on and taking off regular upper body clothing?: 1  Taking care of personal grooming such as brushing teeth?: 1  Eating meals?: 1  Total Score: 7     AM-PAC Raw Score CMS G-Code Modifier Level of Impairment Assistance   6 % Total / Unable   7 - 9 CM 80 - 100% Maximal Assist   10 - 14 CL 60 - 80% Moderate Assist   15 - 19 CK 40 - 60% Moderate Assist   20 - 22 CJ 20 - 40% Minimal Assist   23 CI 1-20% SBA / CGA   24 CH 0% Independent/ Mod I     Patient left supine with all lines intact, call button in reach, restraints reapplied at end of session, RN notified, son present and white board updated.    ASSESSMENT:  Nathanael Velez is a 61 y.o. male with a medical diagnosis of Thrombotic stroke involving left middle cerebral artery and presents with fair participation and motivation.  Pt with improved alertness & active participation in activities on this date.  Pt continues to require (A) with all activities.    Rehab identified problem list/impairments: Rehab identified problem list/impairments: weakness, impaired endurance, impaired balance, impaired sensation,  impaired self care skills, visual deficits, impaired functional mobilty, impaired cognition, decreased safety awareness, decreased coordination, decreased upper extremity function, decreased lower extremity function    Rehab potential is fair.    Activity tolerance: Fair    Discharge recommendations: Discharge Facility/Level Of Care Needs: nursing facility, skilled     GOALS:   Occupational Therapy Goals        Problem: Occupational Therapy Goal    Goal Priority Disciplines Outcome Interventions   Occupational Therapy Goal     OT, PT/OT Ongoing (interventions implemented as appropriate)    Description:  Goals set 3/19 to be addressed for 7 days with expiration date, 3/26:  Patient will increase functional independence with ADLs by performing:    Patient will demonstrate rolling to the right with mod assist.  Not met   Patient will demonstrate rolling to the left with mod assist.   Not met  Patient will demonstrate supine -sit with mod assist.   Not met  Patient will demonstrate stand pivot transfers with Contact guard assist.   Not met  Patient will demonstrate grooming while standing with max assist.   Not met  Patient will demonstrate upper body dressing with max assist while seated EOB.   Not met  Patient will demonstrate lower body dressing with min assist while seated EOB.   Not met  Patient will demonstrate toileting with max assist.   Not met  Patient will demonstrate bathing while seated EOB with max assist.   Not met  Patient's family / caregiver will demonstrate independence and safety with assisting patient with self-care skills and functional mobility.     Not met  Patient's family / caregiver will demonstrate independence with providing ROM and changes in bed positioning.   Not met  Patient and/or patient's family will verbalize understanding of stroke prevention guidelines, personal risk factors and stroke warning signs via teachback method.  Not met                      Plan:  Patient to be seen 6  x/week to address the above listed problems via self-care/home management, neuromuscular re-education, cognitive retraining, sensory integration, therapeutic activities, therapeutic exercises  Plan of Care expires: 04/19/17  Plan of Care reviewed with: patient, dewey Moe AVIS Baxter  03/21/2017

## 2017-03-21 NOTE — PLAN OF CARE
Problem: SLP Goal  Goal: SLP Goal  SLP Plan of Care: Speech Pathology will follow pt daily M-F and address the following goals x1 week:  3/26  1. Pt will participate in ongoing swallow assessment to determine least restrictive diet. ONGOING  2. Pt will participate in speech language cognitive eval to determine need for intervention. MET  3. When deemed appropriate by MD/SLP pt will participate in MBS to further determine safe swallow function. ONGOING        SLP Plan of Care: Speech Pathology will follow pt daily M-F and address the following goals x1 week:  3/27  1. Pt will participate in ongoing swallow assessment to determine least restrictive diet.  2. Pt will answer 3/4 orientation questions with mod assistance to improve cognitive function.   3. Pt will follow simple directives with 60% acc indep provided mod cues to improve receptive language.  4. Pt will complete automatic rote speech (counting) with 60% acc indep provided mod cues to improve expressive language.  5. When deemed appropriate by MD/SLP pt will participate in MBS to further determine safe swallow function.         Improved spontaneous speech noted today.      PAOLA Smith, CCC-SLP  3/21/2017

## 2017-03-21 NOTE — SUBJECTIVE & OBJECTIVE
Neurologic Chief Complaint: L MCA with carotid stenosis     Subjective:     Interval History: Patient is seen for follow-up neurological assessment and treatment recommendations: NAEON, drowsy, patient's aphasia improving    HPI, Past Medical, Family, and Social History remains the same as documented in the initial encounter.     Review of Systems   Constitutional: Negative for chills and fever.   HENT: Negative for drooling.    Genitourinary: Negative for hematuria.   Neurological: Positive for facial asymmetry, speech difficulty and weakness.     Scheduled Meds:   atorvastatin  80 mg Per NG tube Daily    ceFEPime (MAXIPIME) IVPB  1 g Intravenous Q12H    labetalol  200 mg Per NG tube Q8H    losartan  100 mg Per NG tube Daily    polyethylene glycol  17 g Per NG tube Daily    senna-docusate 8.6-50 mg  1 tablet Per NG tube BID    sodium chloride (23.4%)  30 mL Intravenous Once    sodium chloride 0.9%  10 mL Intravenous Q6H    sodium chloride 0.9%  3 mL Intravenous Q8H    tuberculin  5 Units Intradermal Once    vancomycin (VANCOCIN) IVPB  1,500 mg Intravenous Q12H     Continuous Infusions:     PRN Meds:acetaminophen, hydrALAZINE, magnesium sulfate IVPB, magnesium sulfate IVPB, ondansetron, potassium chloride 10%, potassium chloride 10%, potassium chloride 10%, potassium, sodium phosphates, potassium, sodium phosphates, potassium, sodium phosphates, buffered 2% sodium acetate 86meq, sodium chloride 86meq, sterile water for inj IV soln, Flushing PICC Protocol **AND** sodium chloride 0.9% **AND** sodium chloride 0.9%    Objective:     Vital Signs (Most Recent):  Temp: 98.6 °F (37 °C) (03/21/17 1500)  Pulse: 65 (03/21/17 1500)  Resp: 16 (03/21/17 1500)  BP: (!) 142/65 (03/21/17 1500)  SpO2: 98 % (03/21/17 1500)  BP Location: Right arm    Vital Signs Range (Last 24H):  Temp:  [97.8 °F (36.6 °C)-101 °F (38.3 °C)]   Pulse:  [63-80]   Resp:  [12-26]   BP: (110-151)/(61-79)   SpO2:  [96 %-100 %]   BP Location: Right  arm    Physical Exam   Constitutional: He appears well-developed and well-nourished.   HENT:   Head: Normocephalic and atraumatic.   Eyes: Pupils are equal, round, and reactive to light.   Cardiovascular: Normal rate.    Pulmonary/Chest: Effort normal.   Neurological: He is alert.   Drowsy    Skin: Skin is warm and dry.   Nursing note and vitals reviewed.      Neurological Exam:   LOC: does not follow requests and drowsy  Language: Aphasia  Speech: Aphasic   Orientation: Aphasic  Memory: aphasic  Facial Movement (CN VII): lower weakness right lower  Motor*: RSW, arm weaker than leg  Sensation: siddharth-hypoesthesia right  Tone: Arm-Left: normal; Leg-Left: normal; Arm-Right: normal; Leg-Right: normal    NIH Stroke Scale:    Level of Consciousness: 1 - drowsy  LOC Questions: 2 - answers none correctly  LOC Commands: 2 - performs neither correctly  Best Gaze: 1 - partial gaze palsy  Visual: 2 - complete hemianopia  Facial Palsy: 1 - minor  Motor Left Arm: 0 - no drift  Motor Right Arm: 3 - no effort against gravity  Motor Left Le - no drift  Motor Right Le - can't resist gravity  Limb Ataxia: 0 - absent  Sensory: 0 - normal  Best Language: 3 - mute  Dysarthria: 0 - normal articulation  Extinction and Inattention: 0 - no neglect  NIH Stroke Scale Total: 17      Laboratory:  CMP:     Recent Labs  Lab 17  0122  17  1206   CALCIUM 8.1*  --   --    ALBUMIN 2.8*  --   --    PROT 5.7*  --   --      143  < > 142   K 3.2*  --  4.0   CO2 23  --   --    *  --   --    BUN 16  --   --    CREATININE 0.8  --   --    ALKPHOS 54*  --   --    ALT 13  --   --    AST 18  --   --    BILITOT 1.0  --   --    < > = values in this interval not displayed.  CBC:     Recent Labs  Lab 17  0122   WBC 14.09*   RBC 4.28*   HGB 13.5*   HCT 38.5*      MCV 90   MCH 31.5*   MCHC 35.1     Lipid Panel:     Recent Labs  Lab 17  1609   CHOL 175   LDLCALC 123.8   HDL 36*   TRIG 76     Coagulation:   Recent  Labs  Lab 03/18/17  1752  03/21/17  0122   INR  --   < > 1.1   APTT 28.5  --   --    < > = values in this interval not displayed.  Platelet Aggregation Study: No results for input(s): PLTAGG, PLTAGINTERP, PLTAGREGLACO, ADPPLTAGGREG in the last 168 hours.  Hgb A1C:     Recent Labs  Lab 03/18/17  1752   HGBA1C 5.7     TSH:     Recent Labs  Lab 03/18/17  1609   TSH 0.169*       Diagnostic Results:  I have personally reviewed:  MRI pending    CT head 3/19/17  Interval worsening of hemorrhagic conversion centered within the left basal ganglia, in this patient who is status post thrombectomy for left MCA infarct.  Interval worsening of sulcal effacement and effacement of the left lateral ventricle.  There is 0.2 cm rightward midline shift.    No new foci of hemorrhage or infarction.    CTA stroke multiphase 3/18/17   CT head: No evidence for acute intracranial hemorrhage. There is ill-defined decreased attenuation of left MCA distribution specifically left basal ganglia and left insula concerning for recent area of infarction. No evidence for hemorrhagic confusion. Ventricles normal without hydrocephalus.    CTA head: Occlusion left cervical ICA with reconstitution at the cavernous segment. In addition there is a ipsilateral occlusion of the left M1 segment of the MCA proximally. There is reconstituted M2 and M3 MCA candelabra on all 3 phases.    CTA neck: Atherosclerotic disease carotid bifurcations and proximal ICAs bilaterally with high-grade critical stenosis or short segment occlusion of the left proximal ICA and more distal left cervical occlusion or reduced flow-related to the distal occlusion.    There is high-grade critical stenosis of the right ICA at its origin with only trace flow identified.    Atherosclerotic disease of the origin the vertebral arteries the dominant left vertebral artery. There is questioned stenosis at the vertebral artery origins bilaterally limited by calcified plaque and high density  venous contrast.    Please note there is advanced degenerative change in the cervical spine with moderate to severe stenosis in the central canal allowing for CT technique at C5/C6.    CT head 3/20/17  Evolving left MCA distribution infarct with persistent hemorrhagic conversion at the level of the basal ganglia with stable local mass effect, minimal left to right midline shift and trace intraventricular extension. No CT findings to suggest developing hydrocephalus.    New punctate hyperattenuating areas within the superior aspect of the left frontoparietal region concerning for new petechial hemorrhages.  Continued followup recommended.    Echo 3/19/17   Normal LA   CONCLUSIONS     1 - Normal left ventricular systolic function (EF 55-60%).     2 - Normal left ventricular diastolic function.     3 - Normal right ventricular systolic function .  4- The aortic valve is mildly sclerotic. There is a mobile echodensity on the right coronary cusp, which could be representing sclerosed body of the leaflet, but vegetation or mass cannot be ruled out. ( image 36). Clinical correlation is   required.

## 2017-03-22 PROBLEM — G93.6 CYTOTOXIC CEREBRAL EDEMA: Status: ACTIVE | Noted: 2017-03-22

## 2017-03-22 PROBLEM — I63.232 STROKE DUE TO STENOSIS OF LEFT CAROTID ARTERY: Status: ACTIVE | Noted: 2017-03-22

## 2017-03-22 LAB
ALBUMIN SERPL BCP-MCNC: 2.7 G/DL
ALP SERPL-CCNC: 53 U/L
ALT SERPL W/O P-5'-P-CCNC: 13 U/L
ANION GAP SERPL CALC-SCNC: 9 MMOL/L
AST SERPL-CCNC: 16 U/L
BASOPHILS # BLD AUTO: 0.03 K/UL
BASOPHILS NFR BLD: 0.2 %
BILIRUB SERPL-MCNC: 1.1 MG/DL
BUN SERPL-MCNC: 15 MG/DL
CALCIUM SERPL-MCNC: 8 MG/DL
CHLORIDE SERPL-SCNC: 115 MMOL/L
CO2 SERPL-SCNC: 24 MMOL/L
CREAT SERPL-MCNC: 0.8 MG/DL
DIFFERENTIAL METHOD: ABNORMAL
EOSINOPHIL # BLD AUTO: 0.2 K/UL
EOSINOPHIL NFR BLD: 1.3 %
ERYTHROCYTE [DISTWIDTH] IN BLOOD BY AUTOMATED COUNT: 13.9 %
EST. GFR  (AFRICAN AMERICAN): >60 ML/MIN/1.73 M^2
EST. GFR  (NON AFRICAN AMERICAN): >60 ML/MIN/1.73 M^2
GLUCOSE SERPL-MCNC: 107 MG/DL
HCT VFR BLD AUTO: 37.3 %
HGB BLD-MCNC: 12.4 G/DL
INR PPP: 1.1
LYMPHOCYTES # BLD AUTO: 2 K/UL
LYMPHOCYTES NFR BLD: 13.4 %
MAGNESIUM SERPL-MCNC: 1.9 MG/DL
MCH RBC QN AUTO: 31.3 PG
MCHC RBC AUTO-ENTMCNC: 33.2 %
MCV RBC AUTO: 94 FL
MONOCYTES # BLD AUTO: 1.1 K/UL
MONOCYTES NFR BLD: 7.3 %
NEUTROPHILS # BLD AUTO: 11.5 K/UL
NEUTROPHILS NFR BLD: 77.6 %
PHOSPHATE SERPL-MCNC: 3.5 MG/DL
PLATELET # BLD AUTO: 177 K/UL
PMV BLD AUTO: 10.4 FL
POCT GLUCOSE: 115 MG/DL (ref 70–110)
POCT GLUCOSE: 123 MG/DL (ref 70–110)
POCT GLUCOSE: 141 MG/DL (ref 70–110)
POCT GLUCOSE: 145 MG/DL (ref 70–110)
POTASSIUM SERPL-SCNC: 3.6 MMOL/L
POTASSIUM SERPL-SCNC: 3.8 MMOL/L
POTASSIUM SERPL-SCNC: 4 MMOL/L
PROT SERPL-MCNC: 5.5 G/DL
PROTHROMBIN TIME: 11.4 SEC
RBC # BLD AUTO: 3.96 M/UL
SODIUM SERPL-SCNC: 145 MMOL/L
SODIUM SERPL-SCNC: 148 MMOL/L
SODIUM SERPL-SCNC: 149 MMOL/L
SODIUM SERPL-SCNC: 149 MMOL/L
SODIUM SERPL-SCNC: 150 MMOL/L
VANCOMYCIN TROUGH SERPL-MCNC: 9.3 UG/ML
WBC # BLD AUTO: 14.8 K/UL

## 2017-03-22 PROCEDURE — 63600175 PHARM REV CODE 636 W HCPCS: Performed by: PSYCHIATRY & NEUROLOGY

## 2017-03-22 PROCEDURE — 20000000 HC ICU ROOM

## 2017-03-22 PROCEDURE — 99900035 HC TECH TIME PER 15 MIN (STAT)

## 2017-03-22 PROCEDURE — 25000003 PHARM REV CODE 250: Performed by: PHYSICIAN ASSISTANT

## 2017-03-22 PROCEDURE — 85025 COMPLETE CBC W/AUTO DIFF WBC: CPT

## 2017-03-22 PROCEDURE — 94667 MNPJ CHEST WALL 1ST: CPT

## 2017-03-22 PROCEDURE — 63600175 PHARM REV CODE 636 W HCPCS: Performed by: PHYSICIAN ASSISTANT

## 2017-03-22 PROCEDURE — 80202 ASSAY OF VANCOMYCIN: CPT

## 2017-03-22 PROCEDURE — 80053 COMPREHEN METABOLIC PANEL: CPT

## 2017-03-22 PROCEDURE — 84132 ASSAY OF SERUM POTASSIUM: CPT

## 2017-03-22 PROCEDURE — 99233 SBSQ HOSP IP/OBS HIGH 50: CPT | Mod: ,,, | Performed by: PSYCHIATRY & NEUROLOGY

## 2017-03-22 PROCEDURE — 97110 THERAPEUTIC EXERCISES: CPT

## 2017-03-22 PROCEDURE — 84100 ASSAY OF PHOSPHORUS: CPT

## 2017-03-22 PROCEDURE — 25000242 PHARM REV CODE 250 ALT 637 W/ HCPCS: Performed by: STUDENT IN AN ORGANIZED HEALTH CARE EDUCATION/TRAINING PROGRAM

## 2017-03-22 PROCEDURE — 83735 ASSAY OF MAGNESIUM: CPT

## 2017-03-22 PROCEDURE — 97535 SELF CARE MNGMENT TRAINING: CPT

## 2017-03-22 PROCEDURE — 97530 THERAPEUTIC ACTIVITIES: CPT

## 2017-03-22 PROCEDURE — 63600175 PHARM REV CODE 636 W HCPCS: Performed by: STUDENT IN AN ORGANIZED HEALTH CARE EDUCATION/TRAINING PROGRAM

## 2017-03-22 PROCEDURE — 84295 ASSAY OF SERUM SODIUM: CPT | Mod: 91

## 2017-03-22 PROCEDURE — 92507 TX SP LANG VOICE COMM INDIV: CPT

## 2017-03-22 PROCEDURE — 25000003 PHARM REV CODE 250: Performed by: PSYCHIATRY & NEUROLOGY

## 2017-03-22 PROCEDURE — 84132 ASSAY OF SERUM POTASSIUM: CPT | Mod: 91

## 2017-03-22 PROCEDURE — 25000003 PHARM REV CODE 250: Performed by: NURSE PRACTITIONER

## 2017-03-22 PROCEDURE — 94668 MNPJ CHEST WALL SBSQ: CPT

## 2017-03-22 PROCEDURE — 25000003 PHARM REV CODE 250: Performed by: STUDENT IN AN ORGANIZED HEALTH CARE EDUCATION/TRAINING PROGRAM

## 2017-03-22 PROCEDURE — 92526 ORAL FUNCTION THERAPY: CPT

## 2017-03-22 PROCEDURE — 85610 PROTHROMBIN TIME: CPT

## 2017-03-22 PROCEDURE — 63600175 PHARM REV CODE 636 W HCPCS: Performed by: NURSE PRACTITIONER

## 2017-03-22 PROCEDURE — 94640 AIRWAY INHALATION TREATMENT: CPT

## 2017-03-22 RX ORDER — IPRATROPIUM BROMIDE AND ALBUTEROL SULFATE 2.5; .5 MG/3ML; MG/3ML
3 SOLUTION RESPIRATORY (INHALATION) EVERY 6 HOURS
Status: DISCONTINUED | OUTPATIENT
Start: 2017-03-22 | End: 2017-03-24

## 2017-03-22 RX ORDER — SODIUM CHLORIDE 234 MG/ML
30 INJECTION, SOLUTION INTRAVENOUS EVERY 6 HOURS
Status: COMPLETED | OUTPATIENT
Start: 2017-03-22 | End: 2017-03-22

## 2017-03-22 RX ORDER — SODIUM CHLORIDE 234 MG/ML
30 INJECTION, SOLUTION INTRAVENOUS ONCE
Status: DISCONTINUED | OUTPATIENT
Start: 2017-03-22 | End: 2017-03-22

## 2017-03-22 RX ADMIN — IPRATROPIUM BROMIDE AND ALBUTEROL SULFATE 3 ML: .5; 3 SOLUTION RESPIRATORY (INHALATION) at 02:03

## 2017-03-22 RX ADMIN — HYDRALAZINE HYDROCHLORIDE 10 MG: 20 INJECTION INTRAMUSCULAR; INTRAVENOUS at 11:03

## 2017-03-22 RX ADMIN — CEFEPIME HYDROCHLORIDE 1 G: 1 INJECTION, POWDER, FOR SOLUTION INTRAMUSCULAR; INTRAVENOUS at 06:03

## 2017-03-22 RX ADMIN — ACETAMINOPHEN 650 MG: 325 TABLET ORAL at 03:03

## 2017-03-22 RX ADMIN — LOSARTAN POTASSIUM 100 MG: 50 TABLET, FILM COATED ORAL at 08:03

## 2017-03-22 RX ADMIN — LABETALOL HYDROCHLORIDE 200 MG: 200 TABLET, FILM COATED ORAL at 01:03

## 2017-03-22 RX ADMIN — Medication 10 ML: at 05:03

## 2017-03-22 RX ADMIN — HYDRALAZINE HYDROCHLORIDE 10 MG: 20 INJECTION INTRAMUSCULAR; INTRAVENOUS at 07:03

## 2017-03-22 RX ADMIN — VANCOMYCIN HYDROCHLORIDE 1500 MG: 1 INJECTION, POWDER, LYOPHILIZED, FOR SOLUTION INTRAVENOUS at 09:03

## 2017-03-22 RX ADMIN — ATORVASTATIN CALCIUM 80 MG: 20 TABLET, FILM COATED ORAL at 08:03

## 2017-03-22 RX ADMIN — POTASSIUM CHLORIDE 40 MEQ: 20 SOLUTION ORAL at 01:03

## 2017-03-22 RX ADMIN — LABETALOL HYDROCHLORIDE 200 MG: 200 TABLET, FILM COATED ORAL at 09:03

## 2017-03-22 RX ADMIN — POTASSIUM CHLORIDE 40 MEQ: 20 SOLUTION ORAL at 05:03

## 2017-03-22 RX ADMIN — Medication 3 ML: at 05:03

## 2017-03-22 RX ADMIN — STANDARDIZED SENNA CONCENTRATE AND DOCUSATE SODIUM 1 TABLET: 8.6; 5 TABLET, FILM COATED ORAL at 09:03

## 2017-03-22 RX ADMIN — SODIUM CHLORIDE 120 MEQ: 234 INJECTION INTRAMUSCULAR; INTRAVENOUS; SUBCUTANEOUS at 06:03

## 2017-03-22 RX ADMIN — Medication 3 ML: at 01:03

## 2017-03-22 RX ADMIN — Medication 10 ML: at 11:03

## 2017-03-22 RX ADMIN — VANCOMYCIN HYDROCHLORIDE 1500 MG: 1 INJECTION, POWDER, LYOPHILIZED, FOR SOLUTION INTRAVENOUS at 08:03

## 2017-03-22 RX ADMIN — Medication 3 ML: at 09:03

## 2017-03-22 RX ADMIN — SODIUM CHLORIDE 120 MEQ: 234 INJECTION INTRAMUSCULAR; INTRAVENOUS; SUBCUTANEOUS at 10:03

## 2017-03-22 RX ADMIN — LABETALOL HYDROCHLORIDE 200 MG: 200 TABLET, FILM COATED ORAL at 05:03

## 2017-03-22 RX ADMIN — IPRATROPIUM BROMIDE AND ALBUTEROL SULFATE 3 ML: .5; 3 SOLUTION RESPIRATORY (INHALATION) at 08:03

## 2017-03-22 RX ADMIN — SODIUM CHLORIDE: 234 INJECTION INTRAMUSCULAR; INTRAVENOUS; SUBCUTANEOUS at 04:03

## 2017-03-22 NOTE — PT/OT/SLP PROGRESS
Physical Therapy  Treatment / Goals updated    Nathanael Velez   MRN: [id number]   Admitting Diagnosis: Thrombotic stroke involving left middle cerebral artery    PT Received On: 17  PT Start Time: 1350     PT Stop Time: 1423    PT Total Time (min): 33 min       Billable Minutes:  Therapeutic Activity 18 and Therapeutic Exercise 15    Treatment Type: Treatment  PT/PTA: PT       General Precautions: Standard, aspiration, fall, NPO  Orthopedic Precautions: N/A   Braces: N/A    Subjective:  Communicated with ADINA Baron prior to session.  Pt mumbling words at end of session, unable to decipher words. Pt following all commands with tactile cues.  Pt's sons present during session.    Pain Ratin/10  Pain Rating Post-Intervention: 0/10    Objective:   Patient found with: blood pressure cuff, pulse ox (continuous), NG tube, SCD, telemetry, restraints (condom catheter, BUE wrist restraints)    Functional Mobility:  Bed Mobility:    Rolling to the right: use of bedrails: Minimal Assistance   Supine <> Sit: From right sidelying. Minimal Assistance   Sit <> Supine: To right sidelying. Minimal Assistance   Scooting to HOB: Minimal Assistance via supine bridge to HOB.     Sitting Balance at Edge of Bed:   Assistance Level Required: Stand-by Assistance   Time: 8 minutes   Postural deviations noted: Rounded shoulder and Head forward   Encouraged: upright posture, RUE weight bearing, cervical extension and eyes open   Comments: Pt with BUE support and BLE support able to maintain posture sitting EOB. Pt required vc's for eyes open and cervical extension.    Transfers:    Sit <> Stand: x 1 trial from EOB with Minimal Assistance with No Assistive Device    Stand <> Sit: x 1 trials to EOB with Minimal Assistance with No Assistive Device   Comments: PT supporting RUE. Pt able to stand with equal WB through BLE ~ 2 minutes. Pt able to reach out to son standing to his left and wrap left arm around him for a hug.    Gait:   Distance  Ambulated: 4 steps to the right    Assistance level: Maximum Assistance   Assistive Device used:  No Assistive Device   Gait Pattern: 2-point gait   Gait Deviation(s): decreased hany, decreased step length, decreased swing-to-stance ratio, decreased toe-to-floor clearance and decreased weight-shifting ability   Impairments due to: decreased strength of RLE   Comments: PT providing advancement and placement of RLE, and weight shifting. Pt able to maintain balance during stepping trials.    Therapeutic Activities and Exercises:  Supine: BLE AAROM x 15 reps in all planes through available pain-free ROM.  Exercises performed to develop and maintain pt's  strength, ROM and flexibility.     Education:  Patient provided with daily orientation and goals of this PT session. Patient and family agreed to participate in session.Patient and family aware of patient's deficits and therapy progression. They were educated to transfer with RN only. They were provided and educated on proper positioning in supine and in sitting with support of affected RUE extremities in order to increase awareness of extremity and to decrease the effects of immobility, specifically edema and pain. Time provided for therapeutic counseling and discussion of health disposition. All questions answered to patient's and family's satisfaction, within scope of PT practice; voiced no other concerns. White board updated in patient's room, RN notified of session.     AM-PAC 6 CLICK MOBILITY  How much help from another person does this patient currently need?   1 = Unable, Total/Dependent Assistance  2 = A lot, Maximum/Moderate Assistance  3 = A little, Minimum/Contact Guard/Supervision  4 = None, Modified [place]/Independent    Turning over in bed (including adjusting bedclothes, sheets and blankets)?: 3  Sitting down on and standing up from a chair with arms (e.g., wheelchair, bedside commode, etc.): 3  Moving from lying on back to sitting on the side  of the bed?: 3  Moving to and from a bed to a chair (including a wheelchair)?: 3  Need to walk in hospital room?: 2  Climbing 3-5 steps with a railing?: 2  Total Score: 16    AM-PAC Raw Score CMS G-Code Modifier Level of Impairment Assistance   6 % Total / Unable   7 - 9 CM 80 - 100% Maximal Assist   10 - 14 CL 60 - 80% Moderate Assist   15 - 19 CK 40 - 60% Moderate Assist   20 - 22 CJ 20 - 40% Minimal Assist   23 CI 1-20% SBA / CGA   24 CH 0% Independent/ Mod I     Patient left supine with all lines intact, call button in reach, RN notified and family present.    Assessment:  Nathanael Velez is a 61 y.o. male with a medical diagnosis of Thrombotic stroke involving left middle cerebral artery. Patient is making progress towards goals, participating well in this session. Pt continues to require significant assist with RUE support. Pt with noted improvement with bed mobility and transfers. Mr. Velez's deficits affect their ability to safely and independently participate in self-care tasks and functional mobility. Due to his physical therapy diagnosis of debility and deconditioning, they continue to benefit from acute PT services to address the following limitations: high fall risk, weakness, instability, the need for supervised instructions of exercise, and the decreased ability to perform functional activities which they were able to complete PTA. Education was provided to patient & family regarding importance of continued participation in therapy, patient's progress and discharge disposition. Pt would benefit from IP Rehab upon discharge to improve quality of life and focus on recovery of impairments.     Rehab identified problem list/impairments: Rehab identified problem list/impairments: weakness, impaired self care skills, impaired functional mobilty, gait instability, impaired balance, decreased upper extremity function, decreased lower extremity function, decreased safety awareness    Rehab potential is  good.    Activity tolerance: Good    Discharge recommendations: Discharge Facility/Level Of Care Needs: rehabilitation facility (pt following all commands, attempting to speak and improved mobility)    Barriers to discharge: Barriers to Discharge: Inaccessible home environment, Decreased caregiver support    Equipment recommendations: Equipment Needed After Discharge: 3-in-1 commode, bath bench, wheelchair, walker, rolling     GOALS:   Physical Therapy Goals        Problem: Physical Therapy Goal    Goal Priority Disciplines Outcome Goal Variances Interventions   Physical Therapy Goal     PT/OT, PT Ongoing (interventions implemented as appropriate)     Description:  Goals to be met by: 3/31/17    1. Pt supine to sit with moderate assist- MET 3/21  Updated: Supine to sit to Minimal Assist. MET  Revised: supine to sit with CGA.  2. Pt sit to supine with moderate assist-MET 3/21  Updated: Sit to supine with Supervision. NOT MET  3. Pt sit to stand with appropriate AD with moderate assist. Met  Revised: sit to stand with QCN with CGA.  4. Pt to perform gait 5ft with appropriate AD with max assist.-not met  5. Pt to tolerate sitting on the EOB 20 min  with CGA.-not met  6. Pt to transfer bed to/from bedside chair with max assist.-not met  7. Pt to perform B LE exs in sitting or supine x 10 reps with AAROM on R LE. MET  Revised: Able to tolerate exercise for 15-20 reps with independence.  8. Dynamic sitting at edge of bed x 8 minutes with Stand-by Assistance.  9. Dynamic standing for 2 minutes with Contact Guard Assistance using Quad Cane.  10. Patient and family able to teachback stroke & positioning education independently.  11. Wheelchair propulsion x 100 feet with Minimal Assistance using bilateral lower extremities                      PLAN:    Patient to be seen 6 x/week  to address the above listed problems via gait training, therapeutic activities, therapeutic exercises, neuromuscular re-education, wheelchair  management/training  Plan of Care expires: 04/18/17  Plan of Care reviewed with: patient, dewey DAVE Piter PT, DPT  3/22/2017  [phone]

## 2017-03-22 NOTE — PROGRESS NOTES
Progress Note  Neurosurgery    Admit Date: 3/18/2017  Post-operative Day:    Hospital Day: 5    SUBJECTIVE:     Nathanael Velez is a 61 y.o. male pmh of HTN, HLD, and CAD admitted to neuro ICU with left MCA infarct, s/p   thrombectomy, left ICA, and tPA. HT s/p thrombectomy showed acute left basal ganglia hemorrhage. Repeat HCT was stable. Neurosurgery consulted for evaluation of the bleed. Pt was on aspirin.   Follow-up For:  * No surgery found *    NAEON.    Scheduled Meds:   atorvastatin  80 mg Per NG tube Daily    ceFEPime (MAXIPIME) IVPB  1 g Intravenous Q12H    labetalol  200 mg Per NG tube Q8H    losartan  100 mg Per NG tube Daily    polyethylene glycol  17 g Per NG tube Daily    senna-docusate 8.6-50 mg  1 tablet Per NG tube BID    sodium chloride 0.9%  10 mL Intravenous Q6H    sodium chloride 0.9%  3 mL Intravenous Q8H    vancomycin (VANCOCIN) IVPB  1,500 mg Intravenous Q12H     Continuous Infusions:     PRN Meds:acetaminophen, hydrALAZINE, magnesium sulfate IVPB, magnesium sulfate IVPB, ondansetron, potassium chloride 10%, potassium chloride 10%, potassium chloride 10%, potassium, sodium phosphates, potassium, sodium phosphates, potassium, sodium phosphates, buffered 2% sodium acetate 86meq, sodium chloride 86meq, sterile water for inj IV soln, Flushing PICC Protocol **AND** sodium chloride 0.9% **AND** sodium chloride 0.9%    Review of patient's allergies indicates:  Not on File    OBJECTIVE:     Vital Signs (Most Recent)  Temp: 98.7 °F (37.1 °C) (03/22/17 0701)  Pulse: 64 (03/22/17 0800)  Resp: 20 (03/22/17 0800)  BP: 135/65 (03/22/17 0800)  SpO2: 98 % (03/22/17 0800)    Vital Signs Range (Last 24H):  Temp:  [97.8 °F (36.6 °C)-100.7 °F (38.2 °C)]   Pulse:  [62-94]   Resp:  [12-29]   BP: (119-148)/(56-82)   SpO2:  [95 %-100 %]     I & O (Last 24H):    Intake/Output Summary (Last 24 hours) at 03/22/17 0905  Last data filed at 03/22/17 0825   Gross per 24 hour   Intake             2085 ml   Output              1694 ml   Net              391 ml     Physical Exam:  General: no distress  Head: normocephalic  GCS: Motor: 5/Verbal: 2/Eyes: 3  Opens eyes to voice  localizes L side. R paresis   PERRL  Sensory: response to light touch throughout  nonlabored respiration  Abdomen: soft, non-tender   Extremities: no cyanosis or edema, or clubbing     Lines/Drains:       Peripheral IV - Single Lumen 03/18/17 1543 Right Hand (Active)   Site Assessment Clean;Dry;Intact 3/21/2017  7:01 AM   Line Status Infusing 3/21/2017  7:01 AM   Dressing Status Clean;Dry;Intact 3/21/2017  5:00 AM   Dressing Intervention Dressing reinforced 3/21/2017  7:01 AM   Dressing Change Due 03/22/17 3/21/2017  5:00 AM   Site Change Due 03/22/17 3/21/2017  7:01 AM   Reason Not Rotated Not due 3/21/2017  7:01 AM   Number of days:2            Peripheral IV - Single Lumen 03/20/17 1900 Antecubital (Active)   Site Assessment Clean;Dry;Intact 3/21/2017  7:01 AM   Line Status Infusing 3/21/2017  7:01 AM   Dressing Status Clean;Dry;Intact 3/21/2017  7:01 AM   Dressing Intervention Dressing reinforced 3/21/2017  7:01 AM   Dressing Change Due 03/24/17 3/21/2017  5:00 AM   Site Change Due 03/24/17 3/21/2017  7:01 AM   Reason Not Rotated Not due 3/21/2017  7:01 AM   Number of days:0            External Urinary Catheter 03/18/17 1758 Medium (Active)   Collection Container Urimeter 3/21/2017  7:01 AM   Securement Method secured to top of thigh w/ adhesive device 3/21/2017  7:01 AM   Skin no redness;no breakdown;penis/scrotum cleansed w/ soap and water;skin barrier applied 3/21/2017  7:01 AM   Tolerance no signs/symptoms of discomfort 3/21/2017  7:01 AM   Output (mL) 20 mL 3/21/2017  9:00 AM   Number of days:2       Wound/Incision:    Laboratory:  CBC:     Recent Labs  Lab 03/22/17  0146   WBC 14.80*   RBC 3.96*   HGB 12.4*   HCT 37.3*      MCV 94   MCH 31.3*   MCHC 33.2     BMP:     Recent Labs  Lab 03/22/17  0146 03/22/17  0547     --    * 149*    K 3.6  --    *  --    CO2 24  --    BUN 15  --    CREATININE 0.8  --    CALCIUM 8.0*  --    MG 1.9  --      CMP:     Recent Labs  Lab 03/22/17  0146 03/22/17  0547     --    CALCIUM 8.0*  --    ALBUMIN 2.7*  --    PROT 5.5*  --    * 149*   K 3.6  --    CO2 24  --    *  --    BUN 15  --    CREATININE 0.8  --    ALKPHOS 53*  --    ALT 13  --    AST 16  --    BILITOT 1.1*  --      LFTs:     Recent Labs  Lab 03/22/17  0146   ALT 13   AST 16   ALKPHOS 53*   BILITOT 1.1*   PROT 5.5*   ALBUMIN 2.7*     Coagulation:   Recent Labs  Lab 03/18/17  1752  03/22/17 0146   INR  --   < > 1.1   APTT 28.5  --   --    < > = values in this interval not displayed.  Cardiac markers: No results for input(s): CKMB, CPKMB, TROPONINT, TROPONINI, MYOGLOBIN in the last 168 hours.  ABGs:     Recent Labs  Lab 03/18/17  1933   PH 7.384   PCO2 32.3*   PO2 72*   HCO3 19.3*   POCSATURATED 94*   BE -6     Microbiology Results (last 7 days)     Procedure Component Value Units Date/Time    Blood culture [[id number]] Collected:  03/21/17 0356    Order Status:  Completed Specimen:  Blood from Peripheral, Antecubital, Left Updated:  03/22/17 0812     Blood Culture, Routine No Growth to date     Blood Culture, Routine No Growth to date    Narrative:       Blood cultures from 2 different sites. 4 bottles total.  Please draw before starting antibiotics.    Blood culture [[id number]] Collected:  03/19/17 1827    Order Status:  Completed Specimen:  Blood from Peripheral, Hand, Right Updated:  03/21/17 2212     Blood Culture, Routine No Growth to date     Blood Culture, Routine No Growth to date     Blood Culture, Routine No Growth to date    Narrative:       Blood cultures from 2 different sites. 4 bottles total.  Please draw before starting antibiotics.    Blood culture [[id number]] Collected:  03/19/17 1827    Order Status:  Completed Specimen:  Blood from Peripheral, Hand, Left Updated:  03/21/17 2212     Blood Culture, Routine No  Growth to date     Blood Culture, Routine No Growth to date     Blood Culture, Routine No Growth to date    Narrative:       Blood cultures x 2 different sites. 4 bottles total. Please  draw cultures before administering antibiotics.    Blood culture [[id number]] Collected:  03/20/17 1532    Order Status:  Completed Specimen:  Blood from Peripheral, Forearm, Right Updated:  03/21/17 2012     Blood Culture, Routine No Growth to date     Blood Culture, Routine No Growth to date    Narrative:       Blood cultures from 2 different sites. 4 bottles total.  Please draw before starting antibiotics.    Blood culture [[id number]] Collected:  03/20/17 1533    Order Status:  Completed Specimen:  Blood from Peripheral, Lower Arm, Right Updated:  03/21/17 2012     Blood Culture, Routine No Growth to date     Blood Culture, Routine No Growth to date    Narrative:       Blood cultures x 2 different sites. 4 bottles total. Please  draw cultures before administering antibiotics.    Blood culture [[id number]] Collected:  03/21/17 0356    Order Status:  Sent Specimen:  Blood from Peripheral, Antecubital, Left Updated:  03/21/17 0409    Narrative:       Blood cultures x 2 different sites. 4 bottles total. Please  draw cultures before administering antibiotics.    Culture, Respiratory [[id number]]     Order Status:  No result Specimen:  Respiratory         Specimen     None          Recent Labs  Lab 03/20/17  0442   COLORU Yellow   SPECGRAV >1.030*   PHUR 6.0   PROTEINUA Negative   NITRITE Negative   LEUKOCYTESUR Negative   UROBILINOGEN 2.0-3.0*       Diagnostic Results:  Repeat CT head - stable hemorrhage.   ASSESSMENT/PLAN:     Assessment: 60 yo M L MCA infarct w/ hemorrhagic conversion    Neuro stable.   Cont q1 hour neuro checks  Repeat CT head stable  SBP < 160  No ASA x5 days total.   Will continue to follow  Medical mgmt per [facility].

## 2017-03-22 NOTE — PROGRESS NOTES
ICU Progress Note  Neurocritical Care    Admit Date: 3/18/2017  LOS: 4    CC: Thrombotic stroke involving left middle cerebral artery    Code Status: Full Code     SUBJECTIVE:     Interval History/Significant Events: more alert this am, not moving RUE, following commands.    Review of Symptoms: unable to assess due to mental status    Medications:  Continuous Infusions:   Scheduled Meds:   albuterol-ipratropium 2.5mg-0.5mg/3mL  3 mL Nebulization Q6H    atorvastatin  80 mg Per NG tube Daily    ceFEPime (MAXIPIME) IVPB  1 g Intravenous Q12H    labetalol  200 mg Per NG tube Q8H    losartan  100 mg Per NG tube Daily    polyethylene glycol  17 g Per NG tube Daily    senna-docusate 8.6-50 mg  1 tablet Per NG tube BID    sodium chloride (23.4%)  30 mL Intravenous Q6H    sodium chloride 0.9%  10 mL Intravenous Q6H    sodium chloride 0.9%  3 mL Intravenous Q8H    vancomycin (VANCOCIN) IVPB  1,500 mg Intravenous Q12H     PRN Meds:.acetaminophen, hydrALAZINE, magnesium sulfate IVPB, magnesium sulfate IVPB, ondansetron, potassium chloride 10%, potassium chloride 10%, potassium chloride 10%, potassium, sodium phosphates, potassium, sodium phosphates, potassium, sodium phosphates, Flushing PICC Protocol **AND** sodium chloride 0.9% **AND** sodium chloride 0.9%    OBJECTIVE:   Vital Signs (Most Recent):   Temp: 98.7 °F (37.1 °C) (03/22/17 0701)  Pulse: (!) 58 (03/22/17 1000)  Resp: (!) 21 (03/22/17 1000)  BP: 121/61 (03/22/17 1000)  SpO2: 98 % (03/22/17 1000)    Vital Signs (24h Range):   Temp:  [97.8 °F (36.6 °C)-100.7 °F (38.2 °C)] 98.7 °F (37.1 °C)  Pulse:  [58-94] 58  Resp:  [12-29] 21  SpO2:  [95 %-100 %] 98 %  BP: (119-148)/(56-82) 121/61    ICP/CPP (Last 24h):        I & O (Last 24h):     Intake/Output Summary (Last 24 hours) at 03/22/17 1009  Last data filed at 03/22/17 1000   Gross per 24 hour   Intake             2170 ml   Output             1599 ml   Net              571 ml     Physical Exam:  GA: Alert,  comfortable, no acute distress. Opens eyes spontaneously  HEENT: No scleral icterus or JVD.   Pulmonary: Clear to auscultation A/P/L. No wheezing, crackles, or rhonchi. Lots of secretions  Cardiac: RRR S1 & S2 w/o rubs/murmurs/gallops.   Abdominal: Bowel sounds present x 4. No appreciable hepatosplenomegaly.  Skin: No jaundice, rashes, or visible lesions.  Neuro:  --GCS: E4 V1 M6  --Mental Status:  No speech. Follows simple commands  --CN II-XII grossly intact, dysphagia  -- PERRL.   --Corneal reflex, gag, cough intact.  --LUE strength: 5/5  --RUE strength: 1/5  --LLE strength: 5/5  --RLE strength: 3/5    Vent Data:   Resp Rate Total:  [18 br/min] 18 br/min    Lines/Drains/Airway:              External Urinary Catheter 03/18/17 6368 Medium (Active)   Collection Container Urimeter 3/21/2017  7:01 AM   Securement Method secured to top of thigh w/ adhesive device 3/21/2017  7:01 AM   Skin no redness;no breakdown;penis/scrotum cleansed w/ soap and water;skin barrier applied 3/21/2017  7:01 AM   Tolerance no signs/symptoms of discomfort 3/21/2017  7:01 AM   Output (mL) 20 mL 3/21/2017  9:00 AM     Nutrition/Tube Feeds (if NPO state why):  TF at goal    Labs:  ABG: No results for input(s): PH, PO2, PCO2, HCO3, POCSATURATED, BE in the last 24 hours.  BMP:    Recent Labs  Lab 03/22/17  0146 03/22/17  0547   * 149*   K 3.6  --    *  --    CO2 24  --    BUN 15  --    CREATININE 0.8  --      --    MG 1.9  --    PHOS 3.5  --      LFT:   Lab Results   Component Value Date    AST 16 03/22/2017    ALT 13 03/22/2017    ALKPHOS 53 (L) 03/22/2017    BILITOT 1.1 (H) 03/22/2017    ALBUMIN 2.7 (L) 03/22/2017    PROT 5.5 (L) 03/22/2017     CBC:   Lab Results   Component Value Date    WBC 14.80 (H) 03/22/2017    HGB 12.4 (L) 03/22/2017    HCT 37.3 (L) 03/22/2017    MCV 94 03/22/2017     03/22/2017     Microbiology x 7d:   Microbiology Results (last 7 days)     Procedure Component Value Units Date/Time    Blood  culture [[id number]] Collected:  03/21/17 0356    Order Status:  Completed Specimen:  Blood from Peripheral, Antecubital, Left Updated:  03/22/17 0812     Blood Culture, Routine No Growth to date     Blood Culture, Routine No Growth to date    Narrative:       Blood cultures from 2 different sites. 4 bottles total.  Please draw before starting antibiotics.    Blood culture [[id number]] Collected:  03/19/17 1827    Order Status:  Completed Specimen:  Blood from Peripheral, Hand, Right Updated:  03/21/17 2212     Blood Culture, Routine No Growth to date     Blood Culture, Routine No Growth to date     Blood Culture, Routine No Growth to date    Narrative:       Blood cultures from 2 different sites. 4 bottles total.  Please draw before starting antibiotics.    Blood culture [[id number]] Collected:  03/19/17 1827    Order Status:  Completed Specimen:  Blood from Peripheral, Hand, Left Updated:  03/21/17 2212     Blood Culture, Routine No Growth to date     Blood Culture, Routine No Growth to date     Blood Culture, Routine No Growth to date    Narrative:       Blood cultures x 2 different sites. 4 bottles total. Please  draw cultures before administering antibiotics.    Blood culture [[id number]] Collected:  03/20/17 1532    Order Status:  Completed Specimen:  Blood from Peripheral, Forearm, Right Updated:  03/21/17 2012     Blood Culture, Routine No Growth to date     Blood Culture, Routine No Growth to date    Narrative:       Blood cultures from 2 different sites. 4 bottles total.  Please draw before starting antibiotics.    Blood culture [[id number]] Collected:  03/20/17 1533    Order Status:  Completed Specimen:  Blood from Peripheral, Lower Arm, Right Updated:  03/21/17 2012     Blood Culture, Routine No Growth to date     Blood Culture, Routine No Growth to date    Narrative:       Blood cultures x 2 different sites. 4 bottles total. Please  draw cultures before administering antibiotics.    Blood culture [[id number]]  Collected:  03/21/17 0356    Order Status:  Sent Specimen:  Blood from Peripheral, Antecubital, Left Updated:  03/21/17 0409    Narrative:       Blood cultures x 2 different sites. 4 bottles total. Please  draw cultures before administering antibiotics.    Culture, Respiratory [[id number]]     Order Status:  No result Specimen:  Respiratory         Imaging:  CT head 3/21/17  No significant interval detrimental change when compared to the CT dated 03/20/2017.    Evolving left MCA distribution infarct with persistent hemorrhagic conversion at the level of the basal ganglia and trace petechial hemorrhages about the superior aspect of the left frontal lobe. There is stable mass effect on the left lateral ventricle with minimal left to right midline shift.  No CT findings to suggest developing   hydrocephalus.    ASSESSMENT/PLAN:     Active Hospital Problems    Diagnosis  POA    *Thrombotic stroke involving left middle cerebral artery [I63.312]  Yes    Acute ischemic left MCA stroke [I63.512]  Yes    Abnormality of aortic valve [Q23.9]  Not Applicable    Right sided weakness [M62.81]  Unknown    Aphasia [R47.01]  Unknown    Hypertension [I10]  Unknown    Embolic stroke involving left middle cerebral artery [I63.412]  Yes      Resolved Hospital Problems    Diagnosis Date Resolved POA   No resolved problems to display.      Neuro:   L stroke with hemorrhagic transofrmation on caudate and putamen s/p TPA, thrombectomy and stent placement  -ASA 81 daily. NSGY discontinued, will hold for 5 days total  -lipitor 80 mg  -mannitol 1/2gm/kg x1  -will give Give 23.4% 30cc IVB at 10 AM and 4 PM instead of 2% HTS IVB  -continue Hypertonic saline 300cc q6 prn for Na <150 at night  -will repeat speech eval at end of week  -PT/OT    Pulmonary:   -Stable on RA. Incentive spirometry  -patient has significant amount of secretions. Will keep seated 90 degrees  -duonebs q6, cpt q6, frequent suctioning    Cardiac:   TTE with abnormal  aortic valve,concerning for vegetation. Following surveillance blood cultures. Repeat echo, aortic valve sclerosis . No evidence of vegetation  -SBP goal <140  -losartan 100mg daily. Labetalol 200mg q8    Renal:    Na goal >150. Hypertonic saline prn. 1x dose mannitol  -HTS 2% 300cc q 6hrs PRN for Na < 150.  -Give 23.4% 30cc IVB at 10 AM and 4 PM instead of 2% HTS IVB  -Free water 100 cc q 6hrs.    ID:   -vanc/cefepime   -bcx ngtd  -CRP elevated, will recheck on friday    Hem/Onc:   H/h stable  -subq hep for dvt ppx, on hold    Endocrine:    SSI q4    Fluids/Electrolytes/Nutrition/GI:   -tf at goal. D/c ivf        Brian Amaya MD PGY-1

## 2017-03-22 NOTE — PLAN OF CARE
SW went by Pt room to discuss choices with family however, no one was present. SW will either call or meet with Pt son tomorrow.    Argentina Juarez LMSW  Neurocritical Care   [facility]  [id number]

## 2017-03-22 NOTE — PLAN OF CARE
SW received SS number by message from PM RN yesterday. Went to bedside to meet with Pt son for choices. He was not there. Updated RN on discharge plans.    LIZANDRO completed the LOCET via phone and faxed the PASSR to the state.    Argentina Juarez, GRAHAM  Neurocritical Care   [facility]  [phone]

## 2017-03-22 NOTE — PLAN OF CARE
Problem: Patient Care Overview  Goal: Plan of Care Review  Outcome: Ongoing (interventions implemented as appropriate)  POC reviewed with pt at 0500. Pt unable to verbalize understanding. Questions and concerns not adressed with patient but with son. No acute events overnight. Pt progressing toward goals. Will continue to monitor. See flowsheets for full assessment and VS info

## 2017-03-22 NOTE — PLAN OF CARE
Problem: Patient Care Overview  Goal: Individualization & Mutuality  Admit Date: 3/18/2017 3:41 PM    Admit Diagnosis: Acute ischemic left MCA stroke [I63.512]  Embolic stroke involving left middle cerebral artery [I63.412]    Past Medical History: No past medical history on file.    Past Surgical History: No past surgical history on file.    Individualization:   1. Patients youngest son Brian is closest with him   2. Patient likes to sleep in cool room. ADINA Tucker 03.18.2017   Outcome: Ongoing (interventions implemented as appropriate)  Plan of care reviewed with pt and pt's son, Brian.  Pt's son Brian verbalized understanding of the plan of care, pt unable to verbalize understanding.  Pt remained free of any falls or injuries during this shift, skin integrity was maintained.  Sodium bomb given by team this AM, improvement with following commands.  Monitoring serial sodiums.  Electrolytes replaced prn.  Tolerating tube feeds.  Will continue to monitor.

## 2017-03-22 NOTE — PLAN OF CARE
Problem: Occupational Therapy Goal  Goal: Occupational Therapy Goal  Goals with expiration date, 3/29:  Patient will increase functional independence with ADLs by performing:    Patient will demonstrate rolling to the right with stand by assist. Not met   Patient will demonstrate rolling to the left with mod assist. Not met  Patient will demonstrate supine -sit with stand by assist. Not met  Patient will demonstrate stand pivot transfers with Contact guard assist. Not met  Patient will demonstrate grooming while standing with mod assist. Not met  Patient will demonstrate upper body dressing with max assist while seated EOB. Not met  Patient will demonstrate lower body dressing with min assist while seated EOB. Not met  Patient will demonstrate toileting with max assist. Not met  Patient will demonstrate bathing while seated EOB with max assist. Not met  Patients family / caregiver will demonstrate independence and safety with assisting patient with self-care skills and functional mobility. Not met  Patients family / caregiver will demonstrate independence with providing ROM and changes in bed positioning. Not met  Patient and/or patients family will verbalize understanding of stroke prevention guidelines, personal risk factors and stroke warning signs via teachback method. Not met        Outcome: Ongoing (interventions implemented as appropriate)  Goals updated.

## 2017-03-22 NOTE — PT/OT/SLP PROGRESS
Speech Language Pathology  Treatment    Nathanael Velez   MRN: [id number]   Admitting Diagnosis: Thrombotic stroke involving left middle cerebral artery    Diet recommendations: Solid Diet Level: NPO  Liquid Diet Level: NPO   Strictly NPO; high risk for aspiration.    SLP Treatment Date: 17  Speech Start Time: 1000     Speech Stop Time: 1016     Speech Total (min): 16 min       TREATMENT BILLABLE MINUTES:  Speech Therapy Individual 8 and Treatment Swallowing Dysfunction 8    Has the patient been evaluated by SLP for swallowing? : Yes  Keep patient NPO?: Yes   General Precautions: Standard, aphasia, aspiration, fall          Subjective:  Pt was alert.    Pain Ratin/10  Pain Rating Post-Intervention: 0/10    Objective:    Son was present at BS. Voice clear prior to PO trials. Pt tolerated X2 icechips; swallow not present; oral suction provided. Pt tolerated X2 1/2 spoonfuls of thin liquid via cup; X1 delay cough; suction provided. Oral phase cb slow mastication 2/2 mild R-sided weakness and oral holding.  No other PO trials assessed 2/2 aspiration risk. Extensive skilled education provided on aspiration precautions and the components of an MBS; PO trials will continue to be assessed until pt is appropriate for   MBS. Whiteboard updated with dietary recommendations.     Pt answered 4/5 orientation questions with 80% acc indep, improving to 100% acc provided min cues . Pt followed X5 1-step directives with 100% acc indep. Pt completed X2 rote speech task (#1-5/happy birthday) with 100% acc indep. Pt squeezed hand while singing happy birthday; improving sustained attention during task. Pt presented with repetitive paraphasias, neologisms and mild dysarthria throughout session. Family encouraged to bring additional materials from home to stimulate language; education provided on melodic intonation therapy. No further questions.    Assessment:  Nathanael Velez is a 61 y.o. male with a medical diagnosis of Thrombotic stroke  involving left middle cerebral artery and presents with dysphagia, aphasia, dysarthria and oropharyngeal dysphagia.    Discharge recommendations: Discharge Facility/Level Of Care Needs: nursing facility, skilled (pending PT OT recs)     Goals:   SLP Goals        Problem: SLP Goal    Goal Priority Disciplines Outcome   SLP Goal     SLP    Description:  SLP Plan of Care: Speech Pathology will follow pt daily M-F and address the following goals x1 week:  3/26  1. Pt will participate in ongoing swallow assessment to determine least restrictive diet. ONGOING  2. Pt will participate in speech language cognitive eval to determine need for intervention. MET  3. When deemed appropriate by MD/SLP pt will participate in MBS to further determine safe swallow function. ONGOING        SLP Plan of Care: Speech Pathology will follow pt daily M-F and address the following goals x1 week:  3/27  1. Pt will participate in ongoing swallow assessment to determine least restrictive diet.  2. Pt will answer 3/4 orientation questions with mod assistance to improve cognitive function.   3. Pt will follow simple directives with 60% acc indep provided mod cues to improve receptive language.  4. Pt will complete automatic rote speech (counting) with 60% acc indep provided mod cues to improve expressive language.  5. When deemed appropriate by MD/SLP pt will participate in MBS to further determine safe swallow function.                       Plan:   Patient to be seen Therapy Frequency: 5 x/week   Plan of Care expires: 04/17/17  Plan of Care reviewed with: patient, son  SLP Follow-up?: Yes  SLP - Next Visit Date: 03/23/17           KAYLEE Mckeon  03/22/2017

## 2017-03-22 NOTE — PLAN OF CARE
Problem: SLP Goal  Goal: SLP Goal  SLP Plan of Care: Speech Pathology will follow pt daily M-F and address the following goals x1 week:  3/26  1. Pt will participate in ongoing swallow assessment to determine least restrictive diet. ONGOING  2. Pt will participate in speech language cognitive eval to determine need for intervention. MET  3. When deemed appropriate by MD/SLP pt will participate in MBS to further determine safe swallow function. ONGOING        SLP Plan of Care: Speech Pathology will follow pt daily M-F and address the following goals x1 week:  3/27  1. Pt will participate in ongoing swallow assessment to determine least restrictive diet.  2. Pt will answer 3/4 orientation questions with mod assistance to improve cognitive function.   3. Pt will follow simple directives with 60% acc indep provided mod cues to improve receptive language.  4. Pt will complete automatic rote speech (counting) with 60% acc indep provided mod cues to improve expressive language.  5. When deemed appropriate by MD/SLP pt will participate in MBS to further determine safe swallow function.            Continued progress towards goals.      KAYLEE Mckeon

## 2017-03-22 NOTE — PLAN OF CARE
Problem: Physical Therapy Goal  Goal: Physical Therapy Goal  Goals to be met by: 3/31/17    1. Pt supine to sit with moderate assist- MET 3/21  Updated: Supine to sit to Minimal Assist. MET  Revised: supine to sit with CGA.  2. Pt sit to supine with moderate assist-MET 3/21  Updated: Sit to supine with Supervision. NOT MET  3. Pt sit to stand with appropriate AD with moderate assist. Met  Revised: sit to stand with QCN with CGA.  4. Pt to perform gait 5ft with appropriate AD with max assist.-not met  5. Pt to tolerate sitting on the EOB 20 min with CGA.-not met  6. Pt to transfer bed to/from bedside chair with max assist.-not met  7. Pt to perform B LE exs in sitting or supine x 10 reps with AAROM on R LE. MET  Revised: Able to tolerate exercise for 15-20 reps with independence.  8. Dynamic sitting at edge of bed x 8 minutes with Stand-by Assistance.  9. Dynamic standing for 2 minutes with Contact Guard Assistance using Quad Cane.  10. Patient and family able to teachback stroke & positioning education independently.  11. Wheelchair propulsion x 100 feet with Minimal Assistance using bilateral lower extremities       Outcome: Ongoing (interventions implemented as appropriate)  Pt would benefit from IP rehab upon discharge. Pt progressing well towards goals.  Idania Dumas PT, DPT  3/22/2017  [phone]

## 2017-03-22 NOTE — PT/OT/SLP PROGRESS
"Occupational Therapy  Treatment    Nathanael Velez   MRN: [id number]   Admitting Diagnosis: Thrombotic stroke involving left middle cerebral artery    OT Date of Treatment: 17   OT Start Time: 1114  OT Stop Time: 1141  OT Total Time (min): 27 min    Billable Minutes:  Self Care/Home Management 17 and Therapeutic Exercise 10    General Precautions: Standard, aspiration, NPO (cardiac)    Do you have any cultural, spiritual, Mormonism conflicts, given your current situation?: Tenriism    Subjective:  Communicated with RN prior to session.  "I don't know."    Pain Ratin/10  Pain Rating Post-Intervention: 0/10    Objective:  Patient found with: blood pressure cuff, peripheral IV, pulse ox (continuous), restraints, SCD, telemetry, NG tube (condom catheter)     Functional Mobility:  Bed Mobility:  Rolling/Turning Right: Moderate assistance  Scooting/Bridging: Moderate Assistance (scooting forward on EOB)  Supine to Sit: Moderate Assistance  Sit to Supine: Minimum Assistance    Transfers:   Sit <> Stand Assistance: Minimum Assistance (from EOB; Min (A) with taking ~ 2-3 steps to the right along side of bed (required cues via weight shifting & verbal cues to initiate & take steps)  Sit <> Stand Assistive Device: No Assistive Device    Activities of Daily Living:     UE Dressing Level of Assistance: Total assistance (donning gown around back while seated EOB with (A) at all steps with hand over hand (A))  Grooming Position: Seated, EOB  Grooming Level of Assistance: Maximum assistance (to fully complete activity)     Therapeutic Activities and Exercises:  Pt correctly answered ~ 3/8 questions during session.  Pt demonstrated disorientation when questioned on orientation therefore provided daily orientation.  Pt with minimal eye contact.  Pt noted to frequently blink eyes as if trying to refocus however when questioned about vision pt reported no difficulties with vision.  Pt required SBA-CGA with postural control while " seated EOB.  Provided RUE weight bearing while seated EOB.  Provided PROM with RUE in all planes x 10 reps each & AAROM with LUE in all planes x 10 reps each while seated EOB during part & supine during part.  Pt had no further questions & when asked whether there were any concerns pt reported none.      AM-PAC 6 CLICK ADL   How much help from another person does this patient currently need?   1 = Unable, Total/Dependent Assistance  2 = A lot, Maximum/Moderate Assistance  3 = A little, Minimum/Contact Guard/Supervision  4 = None, Modified [place]/Independent    Putting on and taking off regular lower body clothing? : 2  Bathing (including washing, rinsing, drying)?: 1  Toileting, which includes using toilet, bedpan, or urinal? : 1  Putting on and taking off regular upper body clothing?: 1  Taking care of personal grooming such as brushing teeth?: 2  Eating meals?: 1  Total Score: 8     AM-PAC Raw Score CMS G-Code Modifier Level of Impairment Assistance   6 % Total / Unable   7 - 9 CM 80 - 100% Maximal Assist   10 - 14 CL 60 - 80% Moderate Assist   15 - 19 CK 40 - 60% Moderate Assist   20 - 22 CJ 20 - 40% Minimal Assist   23 CI 1-20% SBA / CGA   24 CH 0% Independent/ Mod I     Patient left supine with all lines intact, call button in reach, restraints reapplied at end of session, RN notified and white board updated.    ASSESSMENT:  Nathanael Velez is a 61 y.o. male with a medical diagnosis of Thrombotic stroke involving left middle cerebral artery and presents with fair participation and motivation.  Pt with continued improvements with transfers & cognition.  Pt continues to demonstrate deficits in ADL's, ROM, strength, awareness of right side, transfers, bed mobility  & cognition.  Pt with fair endurance for EOB & OOB activities.    Rehab identified problem list/impairments: Rehab identified problem list/impairments: weakness, impaired endurance, impaired self care skills, impaired sensation, impaired  functional mobilty, decreased coordination, impaired cognition, visual deficits, decreased safety awareness, decreased lower extremity function, impaired balance, decreased upper extremity function    Rehab potential is fair.    Activity tolerance: Fair    Discharge recommendations: Discharge Facility/Level Of Care Needs: rehab     GOALS:   Occupational Therapy Goals        Problem: Occupational Therapy Goal    Goal Priority Disciplines Outcome Interventions   Occupational Therapy Goal     OT, PT/OT Ongoing (interventions implemented as appropriate)    Description:  Goals with expiration date, 3/29:  Patient will increase functional independence with ADLs by performing:    Patient will demonstrate rolling to the right with stand by assist.  Not met   Patient will demonstrate rolling to the left with mod assist.   Not met  Patient will demonstrate supine -sit with stand by assist.   Not met  Patient will demonstrate stand pivot transfers with Contact guard assist.   Not met  Patient will demonstrate grooming while standing with mod assist.   Not met  Patient will demonstrate upper body dressing with max assist while seated EOB.   Not met  Patient will demonstrate lower body dressing with min assist while seated EOB.   Not met  Patient will demonstrate toileting with max assist.   Not met  Patient will demonstrate bathing while seated EOB with max assist.   Not met  Patient's family / caregiver will demonstrate independence and safety with assisting patient with self-care skills and functional mobility.     Not met  Patient's family / caregiver will demonstrate independence with providing ROM and changes in bed positioning.   Not met  Patient and/or patient's family will verbalize understanding of stroke prevention guidelines, personal risk factors and stroke warning signs via teachback method.  Not met                       Plan:  Patient to be seen 6 x/week to address the above listed problems via self-care/home  management, sensory integration, therapeutic activities, neuromuscular re-education, cognitive retraining  Plan of Care expires: 04/19/17  Plan of Care reviewed with: patient         Payal Mauricio Hargrove, DONALDR  03/22/2017

## 2017-03-23 LAB
ALBUMIN SERPL BCP-MCNC: 2.8 G/DL
ALP SERPL-CCNC: 63 U/L
ALT SERPL W/O P-5'-P-CCNC: 14 U/L
ANION GAP SERPL CALC-SCNC: 10 MMOL/L
AST SERPL-CCNC: 16 U/L
BASOPHILS # BLD AUTO: 0.03 K/UL
BASOPHILS NFR BLD: 0.2 %
BILIRUB SERPL-MCNC: 0.7 MG/DL
BUN SERPL-MCNC: 21 MG/DL
CALCIUM SERPL-MCNC: 8.6 MG/DL
CHLORIDE SERPL-SCNC: 120 MMOL/L
CO2 SERPL-SCNC: 19 MMOL/L
CREAT SERPL-MCNC: 0.8 MG/DL
DIFFERENTIAL METHOD: ABNORMAL
EOSINOPHIL # BLD AUTO: 0.2 K/UL
EOSINOPHIL NFR BLD: 1.6 %
ERYTHROCYTE [DISTWIDTH] IN BLOOD BY AUTOMATED COUNT: 14.3 %
EST. GFR  (AFRICAN AMERICAN): >60 ML/MIN/1.73 M^2
EST. GFR  (NON AFRICAN AMERICAN): >60 ML/MIN/1.73 M^2
GLUCOSE SERPL-MCNC: 132 MG/DL
HCT VFR BLD AUTO: 35.6 %
HGB BLD-MCNC: 12 G/DL
INR PPP: 1
LYMPHOCYTES # BLD AUTO: 1.3 K/UL
LYMPHOCYTES NFR BLD: 11.1 %
MAGNESIUM SERPL-MCNC: 2.1 MG/DL
MCH RBC QN AUTO: 31.3 PG
MCHC RBC AUTO-ENTMCNC: 33.7 %
MCV RBC AUTO: 93 FL
MONOCYTES # BLD AUTO: 1 K/UL
MONOCYTES NFR BLD: 8 %
NEUTROPHILS # BLD AUTO: 9.5 K/UL
NEUTROPHILS NFR BLD: 78.9 %
PHOSPHATE SERPL-MCNC: 3.3 MG/DL
PLATELET # BLD AUTO: 164 K/UL
PMV BLD AUTO: 10.7 FL
POCT GLUCOSE: 124 MG/DL (ref 70–110)
POCT GLUCOSE: 130 MG/DL (ref 70–110)
POCT GLUCOSE: 135 MG/DL (ref 70–110)
POCT GLUCOSE: 142 MG/DL (ref 70–110)
POTASSIUM SERPL-SCNC: 3.7 MMOL/L
POTASSIUM SERPL-SCNC: 3.8 MMOL/L
POTASSIUM SERPL-SCNC: 3.8 MMOL/L
PROT SERPL-MCNC: 6 G/DL
PROTHROMBIN TIME: 10.5 SEC
RBC # BLD AUTO: 3.83 M/UL
SODIUM SERPL-SCNC: 148 MMOL/L
SODIUM SERPL-SCNC: 148 MMOL/L
SODIUM SERPL-SCNC: 149 MMOL/L
SODIUM SERPL-SCNC: 149 MMOL/L
SODIUM SERPL-SCNC: 150 MMOL/L
WBC # BLD AUTO: 12.04 K/UL

## 2017-03-23 PROCEDURE — 84295 ASSAY OF SERUM SODIUM: CPT | Mod: 91

## 2017-03-23 PROCEDURE — 92507 TX SP LANG VOICE COMM INDIV: CPT

## 2017-03-23 PROCEDURE — 99233 SBSQ HOSP IP/OBS HIGH 50: CPT | Mod: ,,, | Performed by: PSYCHIATRY & NEUROLOGY

## 2017-03-23 PROCEDURE — 92526 ORAL FUNCTION THERAPY: CPT

## 2017-03-23 PROCEDURE — 25000242 PHARM REV CODE 250 ALT 637 W/ HCPCS: Performed by: STUDENT IN AN ORGANIZED HEALTH CARE EDUCATION/TRAINING PROGRAM

## 2017-03-23 PROCEDURE — 94640 AIRWAY INHALATION TREATMENT: CPT

## 2017-03-23 PROCEDURE — 97535 SELF CARE MNGMENT TRAINING: CPT

## 2017-03-23 PROCEDURE — 25000003 PHARM REV CODE 250: Performed by: PSYCHIATRY & NEUROLOGY

## 2017-03-23 PROCEDURE — 63600175 PHARM REV CODE 636 W HCPCS: Performed by: PSYCHIATRY & NEUROLOGY

## 2017-03-23 PROCEDURE — 84132 ASSAY OF SERUM POTASSIUM: CPT

## 2017-03-23 PROCEDURE — 83735 ASSAY OF MAGNESIUM: CPT

## 2017-03-23 PROCEDURE — 63600175 PHARM REV CODE 636 W HCPCS: Performed by: STUDENT IN AN ORGANIZED HEALTH CARE EDUCATION/TRAINING PROGRAM

## 2017-03-23 PROCEDURE — 25000003 PHARM REV CODE 250: Performed by: NURSE PRACTITIONER

## 2017-03-23 PROCEDURE — 84100 ASSAY OF PHOSPHORUS: CPT

## 2017-03-23 PROCEDURE — 99900035 HC TECH TIME PER 15 MIN (STAT)

## 2017-03-23 PROCEDURE — 85025 COMPLETE CBC W/AUTO DIFF WBC: CPT

## 2017-03-23 PROCEDURE — 80053 COMPREHEN METABOLIC PANEL: CPT

## 2017-03-23 PROCEDURE — 99291 CRITICAL CARE FIRST HOUR: CPT | Mod: ,,, | Performed by: PSYCHIATRY & NEUROLOGY

## 2017-03-23 PROCEDURE — 63600175 PHARM REV CODE 636 W HCPCS: Performed by: NURSE PRACTITIONER

## 2017-03-23 PROCEDURE — 20000000 HC ICU ROOM

## 2017-03-23 PROCEDURE — 25000003 PHARM REV CODE 250: Performed by: PHYSICIAN ASSISTANT

## 2017-03-23 PROCEDURE — 25000003 PHARM REV CODE 250: Performed by: STUDENT IN AN ORGANIZED HEALTH CARE EDUCATION/TRAINING PROGRAM

## 2017-03-23 PROCEDURE — 85610 PROTHROMBIN TIME: CPT

## 2017-03-23 PROCEDURE — 97110 THERAPEUTIC EXERCISES: CPT

## 2017-03-23 PROCEDURE — 94668 MNPJ CHEST WALL SBSQ: CPT

## 2017-03-23 RX ORDER — ASPIRIN 325 MG
325 TABLET ORAL ONCE
Status: DISCONTINUED | OUTPATIENT
Start: 2017-03-23 | End: 2017-03-23

## 2017-03-23 RX ORDER — LABETALOL HYDROCHLORIDE 5 MG/ML
10 INJECTION, SOLUTION INTRAVENOUS ONCE
Status: DISCONTINUED | OUTPATIENT
Start: 2017-03-23 | End: 2017-03-23

## 2017-03-23 RX ORDER — NICARDIPINE HYDROCHLORIDE 0.2 MG/ML
INJECTION INTRAVENOUS
Status: DISPENSED
Start: 2017-03-23 | End: 2017-03-23

## 2017-03-23 RX ORDER — HEPARIN SODIUM 5000 [USP'U]/ML
5000 INJECTION, SOLUTION INTRAVENOUS; SUBCUTANEOUS EVERY 8 HOURS
Status: COMPLETED | OUTPATIENT
Start: 2017-03-23 | End: 2017-04-04

## 2017-03-23 RX ORDER — NAPROXEN SODIUM 220 MG/1
81 TABLET, FILM COATED ORAL DAILY
Status: DISPENSED | OUTPATIENT
Start: 2017-03-24 | End: 2017-04-04

## 2017-03-23 RX ORDER — AMLODIPINE BESYLATE 5 MG/1
5 TABLET ORAL DAILY
Status: DISCONTINUED | OUTPATIENT
Start: 2017-03-23 | End: 2017-03-23

## 2017-03-23 RX ORDER — ASPIRIN 325 MG
325 TABLET ORAL DAILY
Status: DISCONTINUED | OUTPATIENT
Start: 2017-03-23 | End: 2017-03-23

## 2017-03-23 RX ORDER — NAPROXEN SODIUM 220 MG/1
81 TABLET, FILM COATED ORAL DAILY
Status: DISCONTINUED | OUTPATIENT
Start: 2017-03-24 | End: 2017-03-23

## 2017-03-23 RX ORDER — SODIUM CHLORIDE 234 MG/ML
30 INJECTION, SOLUTION INTRAVENOUS EVERY 6 HOURS
Status: DISPENSED | OUTPATIENT
Start: 2017-03-23 | End: 2017-03-23

## 2017-03-23 RX ORDER — NICARDIPINE HYDROCHLORIDE 0.2 MG/ML
1 INJECTION INTRAVENOUS CONTINUOUS
Status: DISCONTINUED | OUTPATIENT
Start: 2017-03-23 | End: 2017-03-27

## 2017-03-23 RX ORDER — AMLODIPINE BESYLATE 5 MG/1
5 TABLET ORAL DAILY
Status: DISCONTINUED | OUTPATIENT
Start: 2017-03-23 | End: 2017-03-24

## 2017-03-23 RX ORDER — ASPIRIN 325 MG
325 TABLET ORAL ONCE
Status: COMPLETED | OUTPATIENT
Start: 2017-03-23 | End: 2017-03-23

## 2017-03-23 RX ADMIN — HEPARIN SODIUM 5000 UNITS: 5000 INJECTION, SOLUTION INTRAVENOUS; SUBCUTANEOUS at 09:03

## 2017-03-23 RX ADMIN — ASPIRIN 325 MG ORAL TABLET 325 MG: 325 PILL ORAL at 05:03

## 2017-03-23 RX ADMIN — HEPARIN SODIUM 5000 UNITS: 5000 INJECTION, SOLUTION INTRAVENOUS; SUBCUTANEOUS at 05:03

## 2017-03-23 RX ADMIN — NICARDIPINE HYDROCHLORIDE 5 MG/HR: 0.2 INJECTION, SOLUTION INTRAVENOUS at 05:03

## 2017-03-23 RX ADMIN — Medication 3 ML: at 09:03

## 2017-03-23 RX ADMIN — Medication 3 ML: at 01:03

## 2017-03-23 RX ADMIN — LABETALOL HYDROCHLORIDE 200 MG: 200 TABLET, FILM COATED ORAL at 02:03

## 2017-03-23 RX ADMIN — LABETALOL HYDROCHLORIDE 200 MG: 200 TABLET, FILM COATED ORAL at 09:03

## 2017-03-23 RX ADMIN — CEFEPIME HYDROCHLORIDE 1 G: 1 INJECTION, POWDER, FOR SOLUTION INTRAMUSCULAR; INTRAVENOUS at 06:03

## 2017-03-23 RX ADMIN — IPRATROPIUM BROMIDE AND ALBUTEROL SULFATE 3 ML: .5; 3 SOLUTION RESPIRATORY (INHALATION) at 08:03

## 2017-03-23 RX ADMIN — LABETALOL HYDROCHLORIDE 10 MG: 5 INJECTION, SOLUTION INTRAVENOUS at 01:03

## 2017-03-23 RX ADMIN — IPRATROPIUM BROMIDE AND ALBUTEROL SULFATE 3 ML: .5; 3 SOLUTION RESPIRATORY (INHALATION) at 07:03

## 2017-03-23 RX ADMIN — LOSARTAN POTASSIUM 100 MG: 50 TABLET, FILM COATED ORAL at 08:03

## 2017-03-23 RX ADMIN — ATORVASTATIN CALCIUM 80 MG: 20 TABLET, FILM COATED ORAL at 08:03

## 2017-03-23 RX ADMIN — Medication 10 ML: at 05:03

## 2017-03-23 RX ADMIN — Medication 3 ML: at 05:03

## 2017-03-23 RX ADMIN — VANCOMYCIN HYDROCHLORIDE 1500 MG: 1 INJECTION, POWDER, LYOPHILIZED, FOR SOLUTION INTRAVENOUS at 08:03

## 2017-03-23 RX ADMIN — SODIUM CHLORIDE 120 MEQ: 234 INJECTION INTRAMUSCULAR; INTRAVENOUS; SUBCUTANEOUS at 02:03

## 2017-03-23 RX ADMIN — NICARDIPINE HYDROCHLORIDE 2.5 MG/HR: 0.2 INJECTION, SOLUTION INTRAVENOUS at 03:03

## 2017-03-23 RX ADMIN — Medication 10 ML: at 11:03

## 2017-03-23 RX ADMIN — STANDARDIZED SENNA CONCENTRATE AND DOCUSATE SODIUM 1 TABLET: 8.6; 5 TABLET, FILM COATED ORAL at 08:03

## 2017-03-23 RX ADMIN — HYDRALAZINE HYDROCHLORIDE 10 MG: 20 INJECTION INTRAMUSCULAR; INTRAVENOUS at 02:03

## 2017-03-23 RX ADMIN — POLYETHYLENE GLYCOL 3350 17 G: 17 POWDER, FOR SOLUTION ORAL at 08:03

## 2017-03-23 RX ADMIN — IPRATROPIUM BROMIDE AND ALBUTEROL SULFATE 3 ML: .5; 3 SOLUTION RESPIRATORY (INHALATION) at 12:03

## 2017-03-23 RX ADMIN — VANCOMYCIN HYDROCHLORIDE 1500 MG: 1 INJECTION, POWDER, LYOPHILIZED, FOR SOLUTION INTRAVENOUS at 09:03

## 2017-03-23 RX ADMIN — POTASSIUM CHLORIDE 40 MEQ: 20 SOLUTION ORAL at 12:03

## 2017-03-23 RX ADMIN — AMLODIPINE BESYLATE 5 MG: 5 TABLET ORAL at 12:03

## 2017-03-23 RX ADMIN — LABETALOL HYDROCHLORIDE 200 MG: 200 TABLET, FILM COATED ORAL at 05:03

## 2017-03-23 RX ADMIN — CEFEPIME HYDROCHLORIDE 1 G: 1 INJECTION, POWDER, FOR SOLUTION INTRAMUSCULAR; INTRAVENOUS at 07:03

## 2017-03-23 RX ADMIN — IPRATROPIUM BROMIDE AND ALBUTEROL SULFATE 3 ML: .5; 3 SOLUTION RESPIRATORY (INHALATION) at 01:03

## 2017-03-23 RX ADMIN — POTASSIUM CHLORIDE 40 MEQ: 20 SOLUTION ORAL at 05:03

## 2017-03-23 RX ADMIN — STANDARDIZED SENNA CONCENTRATE AND DOCUSATE SODIUM 1 TABLET: 8.6; 5 TABLET, FILM COATED ORAL at 09:03

## 2017-03-23 RX ADMIN — Medication 10 ML: at 12:03

## 2017-03-23 NOTE — PT/OT/SLP PROGRESS
Speech Language Pathology  Treatment    Nathanael Velez   MRN: [id number]   Admitting Diagnosis: Thrombotic stroke involving left middle cerebral artery    Diet recommendations: Solid Diet Level: NPO  Liquid Diet Level: NPO   Strictly NPO; high risk for aspiration.       SLP Treatment Date: 03/23/17  Speech Start Time: 0914     Speech Stop Time: 0930     Speech Total (min): 16 min       TREATMENT BILLABLE MINUTES:  Speech Therapy Individual 8 and Treatment Swallowing Dysfunction 8    Has the patient been evaluated by SLP for swallowing? : Yes  Keep patient NPO?: Yes   General Precautions: Standard, aphasia, aspiration, fall          Subjective:  Pt was alert.    Objective:   Voice clear prior to PO trials; pt tolerated X2 icechips with no overt s/s of airway compromise. Pt tolerated X1 1/4 spoonfuls of thin liquid via spoon; X1 immediate strong cough; oral suction provided. Pt tolerated X2 1/4 spoonfuls of puree without overt s/s/ of airway compromise. Pt tolerated X1 full spoonful of puree; X1 immediate strong cough; oral suction provided. Oral phase cb excessive mastication of icechips and puree consistencies and weak oral labial closure. No other PO trials assessed 2/2 aspiration risk. PO trials will continue to be assessed until pt is appropriate for MBS.Skilled education provided on aspiration/ dietary precautions.   Whiteboard updated with dietary recommendations.      Pt participated in rote song task mouthing and singing X2 chorus and introduction of song (80's music) with 100% acc indep. Pt benefited from min encouragement to continually participate throughout session improving sustained attention. Pt continually presents with mod dysarthria versus neologism during fluent conversation. No further questions.    Assessment:  Nathanael Velez is a 61 y.o. male with a medical diagnosis of Thrombotic stroke involving left middle cerebral artery and presents with Aphasia, Dysarthria and oropharyngeal dysphagia. Continued  progress towards goals.    Discharge recommendations: Discharge Facility/Level Of Care Needs: rehabilitation facility (pending PT OT recs)     Goals:   SLP Goals        Problem: SLP Goal    Goal Priority Disciplines Outcome   SLP Goal     SLP    Description:  SLP Plan of Care: Speech Pathology will follow pt daily M-F and address the following goals x1 week:  3/26  1. Pt will participate in ongoing swallow assessment to determine least restrictive diet. ONGOING  2. Pt will participate in speech language cognitive eval to determine need for intervention. MET  3. When deemed appropriate by MD/SLP pt will participate in MBS to further determine safe swallow function. ONGOING        SLP Plan of Care: Speech Pathology will follow pt daily M-F and address the following goals x1 week:  3/27  1. Pt will participate in ongoing swallow assessment to determine least restrictive diet.  2. Pt will answer 3/4 orientation questions with mod assistance to improve cognitive function.   3. Pt will follow simple directives with 60% acc indep provided mod cues to improve receptive language.  4. Pt will complete automatic rote speech (counting) with 60% acc indep provided mod cues to improve expressive language.  5. When deemed appropriate by MD/SLP pt will participate in MBS to further determine safe swallow function.                       Plan:   Patient to be seen Therapy Frequency: 5 x/week   Plan of Care expires: 04/17/17  Plan of Care reviewed with: patient  SLP Follow-up?: Yes  SLP - Next Visit Date: 03/24/17           KAYLEE Mckeon  03/23/2017

## 2017-03-23 NOTE — ASSESSMENT & PLAN NOTE
Found on echo.   As per NCC  Vegitation vs mass repeat echo normal  Recommend clarification with cardiology on echo read

## 2017-03-23 NOTE — PROGRESS NOTES
[facility]  Vascular Neurology  Comprehensive Stroke Center  Progress Note      Neurologic Chief Complaint: L MCA with carotid stenosis     Subjective:     Interval History: Patient is seen for follow-up neurological assessment and treatment recommendations: RAYMUNDO TAM, Past Medical, Family, and Social History remains the same as documented in the initial encounter.     Review of Systems   Constitutional: Negative for chills and fever.   HENT: Negative for drooling.    Genitourinary: Negative for hematuria.   Neurological: Positive for facial asymmetry, speech difficulty and weakness.     Scheduled Meds:   albuterol-ipratropium 2.5mg-0.5mg/3mL  3 mL Nebulization Q6H    atorvastatin  80 mg Per NG tube Daily    ceFEPime (MAXIPIME) IVPB  1 g Intravenous Q12H    labetalol  200 mg Per NG tube Q8H    losartan  100 mg Per NG tube Daily    polyethylene glycol  17 g Per NG tube Daily    senna-docusate 8.6-50 mg  1 tablet Per NG tube BID    sodium chloride 0.9%  10 mL Intravenous Q6H    sodium chloride 0.9%  3 mL Intravenous Q8H    vancomycin (VANCOCIN) IVPB  1,500 mg Intravenous Q12H     Continuous Infusions:     PRN Meds:acetaminophen, hydrALAZINE, magnesium sulfate IVPB, magnesium sulfate IVPB, ondansetron, potassium chloride 10%, potassium chloride 10%, potassium chloride 10%, potassium, sodium phosphates, potassium, sodium phosphates, potassium, sodium phosphates, Flushing PICC Protocol **AND** sodium chloride 0.9% **AND** sodium chloride 0.9%    Objective:     Vital Signs (Most Recent):  Temp: 99.2 °F (37.3 °C) (03/22/17 1658)  Pulse: 64 (03/22/17 1800)  Resp: (!) 23 (03/22/17 1800)  BP: 131/63 (03/22/17 1800)  SpO2: 96 % (03/22/17 1800)  BP Location: Left arm    Vital Signs Range (Last 24H):  Temp:  [98.5 °F (36.9 °C)-100.7 °F (38.2 °C)]   Pulse:  [57-94]   Resp:  [10-29]   BP: (121-145)/(56-73)   SpO2:  [95 %-99 %]   BP Location: Left arm    Physical Exam   Constitutional: He appears  well-developed and well-nourished.   HENT:   Head: Normocephalic and atraumatic.   Eyes: Pupils are equal, round, and reactive to light.   Cardiovascular: Normal rate.    Pulmonary/Chest: Effort normal.   Neurological:   Drowsy    Skin: Skin is warm and dry.   Nursing note and vitals reviewed.      Neurological Exam:   LOC: follows simple request and drowsy  Language: Aphasia  Speech: Aphasic   Orientation: Aphasic  Memory: aphasic  Facial Movement (CN VII): lower weakness right lower  Motor* no movement in right arm   Sensation: siddharth-hypoesthesia right  Tone: Arm-Left: normal; Leg-Left: normal; Arm-Right: normal; Leg-Right: normal    NIH Stroke Scale:    Level of Consciousness: 1 - drowsy  LOC Questions: 2 - answers none correctly  LOC Commands: 2 - performs neither correctly  Best Gaze: 1 - partial gaze palsy  Visual: 2 - complete hemianopia  Facial Palsy: 1 - minor  Motor Left Arm: 0 - no drift  Motor Right Arm: 4 - no movement  Motor Left Le - no drift  Motor Right Le - no drift  Limb Ataxia: 0 - absent  Sensory: 0 - normal  Best Language: 2 - severe aphasia  Dysarthria: 2 - near to unintelligible  Extinction and Inattention: 0 - no neglect  NIH Stroke Scale Total: 17      Laboratory:  CMP:     Recent Labs  Lab 17  1745   CALCIUM 8.0*  --   --    ALBUMIN 2.7*  --   --    PROT 5.5*  --   --    *  < > 149*   K 3.6  < > 4.0   CO2 24  --   --    *  --   --    BUN 15  --   --    CREATININE 0.8  --   --    ALKPHOS 53*  --   --    ALT 13  --   --    AST 16  --   --    BILITOT 1.1*  --   --    < > = values in this interval not displayed.  CBC:     Recent Labs  Lab 17   WBC 14.80*   RBC 3.96*   HGB 12.4*   HCT 37.3*      MCV 94   MCH 31.3*   MCHC 33.2     Lipid Panel:     Recent Labs  Lab 17  1609   CHOL 175   LDLCALC 123.8   HDL 36*   TRIG 76     Coagulation:   Recent Labs  Lab 17  17517   INR  --   < > 1.1   APTT 28.5  --   --    <  > = values in this interval not displayed.  Platelet Aggregation Study: No results for input(s): PLTAGG, PLTAGINTERP, PLTAGREGLACO, ADPPLTAGGREG in the last 168 hours.  Hgb A1C:     Recent Labs  Lab 03/18/17  1752   HGBA1C 5.7     TSH:     Recent Labs  Lab 03/18/17  1609   TSH 0.169*       Diagnostic Results:  CT head 03/20/17  -Stable L MCA infarct with hemorrhagic conversion due to tissue reperfusion injury  -cytotoxic edema    CTA stroke multiphase 3/18/17  -Occlusion left cervical ICA   - occlusion of the left M1 segment of the MCA proximally.     CTA neck: Atherosclerotic disease carotid bifurcations and proximal ICAs bilaterally with high-grade  stenosis or  of the left proximal ICA and more distal left cervical ICA.    There is high-grade stenosis of the right ICA at its origin with only trace flow identified.    Atherosclerotic disease of the origin the vertebral arteries the dominant left vertebral artery.     .  Echo 3/21/17   Normal LA   EF 55%  Sclerotic aortic valve ( on 03/19/17 suspicious for mass)        Assessment/Plan:     61 year old male with HTN, HLD, and CAD and L MCA stroke symptoms post tpa and transferred here for higher level of care and potential thrombectomy. CTA multiphase upon arrival.   Patient found to have severe L carotid artery stenosis.  Angioplasty with stent performed and thrombectomy of L MCA occlusion.  Patient found to have L MCA stroke with reperfusion injury.  Possible aortic vegetation seen on echo but on repeat echo showed sclerotic aorta.      03/22/17  Patient drowsy, follows simple commands, unable to move RUE          * Thrombotic stroke involving left middle cerebral artery  Arrived and had STAT cta multiphase - patient with critical carotid stenosis, still with significant deficit post tPA -treated with IR thrombectomy.   Admit to [facility]     Antithrombotics for secondary stroke prevention: aspirin. plavix too (due to stent) when appropriate, currently with hemorrhagic  conversion   Statins for secondary stroke prevention and hyperlipidemia, if present: Atorvastatin- 40 mg oral daily  Aggressive risk factor modification: Hypertension, High Cholesterol and Obesity  VTE Prophylaxis: SCDs, Holding due hemorrhagic conversion   BP Parameters: SBP <180  Nursing Orders: Neuro checks- per tpa protocol, Antiembolic stockings, Swallowing evaluation by Nursing, Seizure precautions, Avoid Rucker catheter, Pneumatic compression device, Stroke Education, Blood glucose target 100-130, Up with assistance     Rehab teams recommending snf       Cytotoxic cerebral edema  Evident on imaging     Stroke due to stenosis of left carotid artery  Atheroembolic stroke of L ICA to L MCA  S/p tpa stent of L ICA and thrombectomy of L MCA    Essential HTN  -risk factor for stroke  -[facility] managing     Hyperlipidemia   Risk factor for stroke   .8  Atorvastatin 40mgqhs     Aphasia  Given tpa , status post thrombectomy   Due to stroke above  Aggressive PT/OT/ speech when appropriate    Abnormality of aortic valve  Found on echo.   As per NCC  Vegitation vs mass repeat echo normal  Recommend clarification with cardiology on echo read       Edwige Monaco PA-C  Comprehensive Stroke Center  Department of Vascular Neurology   [facility]

## 2017-03-23 NOTE — PROGRESS NOTES
Progress Note  Neurosurgery    Admit Date: 3/18/2017  Post-operative Day:    Hospital Day: 6    SUBJECTIVE:     Nathanael Velez is a 61 y.o. male pmh of HTN, HLD, and CAD admitted to neuro ICU with left MCA infarct, s/p   thrombectomy, left ICA, and tPA. HT s/p thrombectomy showed acute left basal ganglia hemorrhage. Repeat HCT was stable. Neurosurgery consulted for evaluation of the bleed. Pt was on aspirin.   Follow-up For:  * No surgery found *    NAEON.    Scheduled Meds:   albuterol-ipratropium 2.5mg-0.5mg/3mL  3 mL Nebulization Q6H    amlodipine  5 mg Per NG tube Daily    atorvastatin  80 mg Per NG tube Daily    ceFEPime (MAXIPIME) IVPB  1 g Intravenous Q12H    labetalol  200 mg Per NG tube Q8H    losartan  100 mg Per NG tube Daily    polyethylene glycol  17 g Per NG tube Daily    senna-docusate 8.6-50 mg  1 tablet Per NG tube BID    sodium chloride (23.4%)  30 mL Intravenous Q6H    sodium chloride 0.9%  10 mL Intravenous Q6H    sodium chloride 0.9%  3 mL Intravenous Q8H    vancomycin (VANCOCIN) IVPB  1,500 mg Intravenous Q12H     Continuous Infusions:   nicardipine Stopped (03/23/17 0807)     PRN Meds:acetaminophen, hydrALAZINE, magnesium sulfate IVPB, magnesium sulfate IVPB, ondansetron, potassium chloride 10%, potassium chloride 10%, potassium chloride 10%, potassium, sodium phosphates, potassium, sodium phosphates, potassium, sodium phosphates, buffered 2% sodium acetate 86meq, sodium chloride 86meq, sterile water for inj IV soln, Flushing PICC Protocol **AND** sodium chloride 0.9% **AND** sodium chloride 0.9%    Review of patient's allergies indicates:  Not on File    OBJECTIVE:     Vital Signs (Most Recent)  Temp: 99.2 °F (37.3 °C) (03/23/17 0701)  Pulse: 72 (03/23/17 1100)  Resp: (!) 22 (03/23/17 1100)  BP: (!) 130/55 (03/23/17 1100)  SpO2: 98 % (03/23/17 1100)    Vital Signs Range (Last 24H):  Temp:  [99.2 °F (37.3 °C)-100 °F (37.8 °C)]   Pulse:  [57-77]   Resp:  [10-26]   BP:  (109-150)/(55-86)   SpO2:  [96 %-100 %]     I & O (Last 24H):    Intake/Output Summary (Last 24 hours) at 03/23/17 1222  Last data filed at 03/23/17 1100   Gross per 24 hour   Intake          2132.08 ml   Output              795 ml   Net          1337.08 ml     Physical Exam:  General: no distress  Head: normocephalic  GCS: Motor: 5/Verbal: 2/Eyes: 3  Opens eyes to voice  localizes L side. R paresis   PERRL  Sensory: response to light touch throughout  nonlabored respiration  Abdomen: soft, non-tender   Extremities: no cyanosis or edema, or clubbing     Lines/Drains:       Peripheral IV - Single Lumen 03/18/17 1543 Right Hand (Active)   Site Assessment Clean;Dry;Intact 3/21/2017  7:01 AM   Line Status Infusing 3/21/2017  7:01 AM   Dressing Status Clean;Dry;Intact 3/21/2017  5:00 AM   Dressing Intervention Dressing reinforced 3/21/2017  7:01 AM   Dressing Change Due 03/22/17 3/21/2017  5:00 AM   Site Change Due 03/22/17 3/21/2017  7:01 AM   Reason Not Rotated Not due 3/21/2017  7:01 AM   Number of days:2            Peripheral IV - Single Lumen 03/20/17 1900 Antecubital (Active)   Site Assessment Clean;Dry;Intact 3/21/2017  7:01 AM   Line Status Infusing 3/21/2017  7:01 AM   Dressing Status Clean;Dry;Intact 3/21/2017  7:01 AM   Dressing Intervention Dressing reinforced 3/21/2017  7:01 AM   Dressing Change Due 03/24/17 3/21/2017  5:00 AM   Site Change Due 03/24/17 3/21/2017  7:01 AM   Reason Not Rotated Not due 3/21/2017  7:01 AM   Number of days:0            External Urinary Catheter 03/18/17 1758 Medium (Active)   Collection Container Urimeter 3/21/2017  7:01 AM   Securement Method secured to top of thigh w/ adhesive device 3/21/2017  7:01 AM   Skin no redness;no breakdown;penis/scrotum cleansed w/ soap and water;skin barrier applied 3/21/2017  7:01 AM   Tolerance no signs/symptoms of discomfort 3/21/2017  7:01 AM   Output (mL) 20 mL 3/21/2017  9:00 AM   Number of days:2       Wound/Incision:    Laboratory:  CBC:      Recent Labs  Lab 03/23/17  0308   WBC 12.04   RBC 3.83*   HGB 12.0*   HCT 35.6*      MCV 93   MCH 31.3*   MCHC 33.7     BMP:     Recent Labs  Lab 03/23/17  0308  03/23/17  1116   *  --   --    *  < > 148*   K 3.7  --  3.8   *  --   --    CO2 19*  --   --    BUN 21*  --   --    CREATININE 0.8  --   --    CALCIUM 8.6*  --   --    MG 2.1  --   --    < > = values in this interval not displayed.  CMP:     Recent Labs  Lab 03/23/17  0308 03/23/17  1116   *  --   --    CALCIUM 8.6*  --   --    ALBUMIN 2.8*  --   --    PROT 6.0  --   --    *  < > 148*   K 3.7  --  3.8   CO2 19*  --   --    *  --   --    BUN 21*  --   --    CREATININE 0.8  --   --    ALKPHOS 63  --   --    ALT 14  --   --    AST 16  --   --    BILITOT 0.7  --   --    < > = values in this interval not displayed.  LFTs:     Recent Labs  Lab 03/23/17  0308   ALT 14   AST 16   ALKPHOS 63   BILITOT 0.7   PROT 6.0   ALBUMIN 2.8*     Coagulation:   Recent Labs  Lab 03/18/17  1752  03/23/17  0308   INR  --   < > 1.0   APTT 28.5  --   --    < > = values in this interval not displayed.  Cardiac markers: No results for input(s): CKMB, CPKMB, TROPONINT, TROPONINI, MYOGLOBIN in the last 168 hours.  ABGs:     Recent Labs  Lab 03/18/17  1933   PH 7.384   PCO2 32.3*   PO2 72*   HCO3 19.3*   POCSATURATED 94*   BE -6     Microbiology Results (last 7 days)     Procedure Component Value Units Date/Time    Blood culture [[id number]] Collected:  03/21/17 0356    Order Status:  Completed Specimen:  Blood from Peripheral, Antecubital, Left Updated:  03/23/17 0812     Blood Culture, Routine No Growth to date     Blood Culture, Routine No Growth to date     Blood Culture, Routine No Growth to date    Narrative:       Blood cultures from 2 different sites. 4 bottles total.  Please draw before starting antibiotics.    Blood culture [[id number]] Collected:  03/19/17 1827    Order Status:  Completed Specimen:  Blood from Peripheral, Hand,  Left Updated:  03/22/17 2212     Blood Culture, Routine No Growth to date     Blood Culture, Routine No Growth to date     Blood Culture, Routine No Growth to date     Blood Culture, Routine No Growth to date    Narrative:       Blood cultures x 2 different sites. 4 bottles total. Please  draw cultures before administering antibiotics.    Blood culture [[id number]] Collected:  03/19/17 1827    Order Status:  Completed Specimen:  Blood from Peripheral, Hand, Right Updated:  03/22/17 2212     Blood Culture, Routine No Growth to date     Blood Culture, Routine No Growth to date     Blood Culture, Routine No Growth to date     Blood Culture, Routine No Growth to date    Narrative:       Blood cultures from 2 different sites. 4 bottles total.  Please draw before starting antibiotics.    Blood culture [[id number]] Collected:  03/20/17 1532    Order Status:  Completed Specimen:  Blood from Peripheral, Forearm, Right Updated:  03/22/17 2012     Blood Culture, Routine No Growth to date     Blood Culture, Routine No Growth to date     Blood Culture, Routine No Growth to date    Narrative:       Blood cultures from 2 different sites. 4 bottles total.  Please draw before starting antibiotics.    Blood culture [[id number]] Collected:  03/20/17 1533    Order Status:  Completed Specimen:  Blood from Peripheral, Lower Arm, Right Updated:  03/22/17 2012     Blood Culture, Routine No Growth to date     Blood Culture, Routine No Growth to date     Blood Culture, Routine No Growth to date    Narrative:       Blood cultures x 2 different sites. 4 bottles total. Please  draw cultures before administering antibiotics.    Blood culture [[id number]] Collected:  03/21/17 0356    Order Status:  Sent Specimen:  Blood from Peripheral, Antecubital, Left Updated:  03/21/17 0409    Narrative:       Blood cultures x 2 different sites. 4 bottles total. Please  draw cultures before administering antibiotics.    Culture, Respiratory [[id number]]     Order  Status:  No result Specimen:  Respiratory         Specimen     None          Recent Labs  Lab 03/20/17  0442   COLORU Yellow   SPECGRAV >1.030*   PHUR 6.0   PROTEINUA Negative   NITRITE Negative   LEUKOCYTESUR Negative   UROBILINOGEN 2.0-3.0*       Diagnostic Results:  Repeat CT head - stable hemorrhage.   ASSESSMENT/PLAN:     Assessment: 62 yo M L MCA infarct w/ hemorrhagic conversion    Neuro stable.   Cont q1 hour neuro checks  Repeat CT head stable  SBP < 160  No ASA x5 days total, will need stable head CT prior to restarting the ASA.   Get head CT today and if stable, ok to start ASA  Will continue to follow  Medical mgmt per [facility].

## 2017-03-23 NOTE — PLAN OF CARE
Problem: Patient Care Overview  Goal: Plan of Care Review  Outcome: Ongoing (interventions implemented as appropriate)  POC reviewed with pt and family at 2100. Family verbalized understanding. Questions and concerns addressed. No acute events overnight. Pt placed on Cardene drip overnight for SBP>140. Pt progressing toward goals. Will continue to monitor. See flowsheets for full assessment and VS info

## 2017-03-23 NOTE — ASSESSMENT & PLAN NOTE
Arrived and had STAT cta multiphase - patient with critical carotid stenosis, still with significant deficit post tPA -treated with IR thrombectomy.   Admit to [facility]     Antithrombotics for secondary stroke prevention: aspirin. plavix too (due to stent) when appropriate, currently with hemorrhagic conversion   Statins for secondary stroke prevention and hyperlipidemia, if present: Atorvastatin- 40 mg oral daily  Aggressive risk factor modification: Hypertension, High Cholesterol and Obesity  VTE Prophylaxis: SCDs, Holding due hemorrhagic conversion   BP Parameters: SBP <180  Nursing Orders: Neuro checks- per tpa protocol, Antiembolic stockings, Swallowing evaluation by Nursing, Seizure precautions, Avoid Rucker catheter, Pneumatic compression device, Stroke Education, Blood glucose target 100-130, Up with assistance     Rehab teams recommending snf

## 2017-03-23 NOTE — PROGRESS NOTES
Pt returned to unit from CT with RN, continuous monitoring maintained.  VSS.  Will continue to monitor.

## 2017-03-23 NOTE — PLAN OF CARE
Problem: Occupational Therapy Goal  Goal: Occupational Therapy Goal  Goals with expiration date, 3/29:  Patient will increase functional independence with ADLs by performing:    Patient will demonstrate rolling to the right with stand by assist. Not met   Patient will demonstrate rolling to the left with mod assist. Not met  Patient will demonstrate supine -sit with stand by assist. Not met  Patient will demonstrate stand pivot transfers with Contact guard assist. Not met  Patient will demonstrate grooming while standing with mod assist. Not met  Patient will demonstrate upper body dressing with max assist while seated EOB. Not met  Patient will demonstrate lower body dressing with min assist while seated EOB. Not met  Patient will demonstrate toileting with max assist. Not met  Patient will demonstrate bathing while seated EOB with max assist. Not met  Patients family / caregiver will demonstrate independence and safety with assisting patient with self-care skills and functional mobility. Not met  Patients family / caregiver will demonstrate independence with providing ROM and changes in bed positioning. Not met  Patient and/or patients family will verbalize understanding of stroke prevention guidelines, personal risk factors and stroke warning signs via teachback method. Not met        Outcome: Ongoing (interventions implemented as appropriate)  Goals remain appropriate

## 2017-03-23 NOTE — SUBJECTIVE & OBJECTIVE
Neurologic Chief Complaint: L MCA with carotid stenosis     Subjective:     Interval History: Patient is seen for follow-up neurological assessment and treatment recommendations: NAEON More awake today.  Temperature and leukocytosis resolving with vancomycin and cefepime.      HPI, Past Medical, Family, and Social History remains the same as documented in the initial encounter.     Review of Systems   Constitutional: Negative for chills and fever.   HENT: Negative for drooling.    Genitourinary: Negative for hematuria.   Neurological: Positive for facial asymmetry, speech difficulty and weakness.     Scheduled Meds:   albuterol-ipratropium 2.5mg-0.5mg/3mL  3 mL Nebulization Q6H    amlodipine  5 mg Per NG tube Daily    [START ON 3/24/2017] aspirin  81 mg Per NG tube Daily    atorvastatin  80 mg Per NG tube Daily    ceFEPime (MAXIPIME) IVPB  1 g Intravenous Q12H    heparin (porcine)  5,000 Units Subcutaneous Q8H    labetalol  200 mg Per NG tube Q8H    losartan  100 mg Per NG tube Daily    polyethylene glycol  17 g Per NG tube Daily    senna-docusate 8.6-50 mg  1 tablet Per NG tube BID    sodium chloride (23.4%)  30 mL Intravenous Q6H    sodium chloride 0.9%  10 mL Intravenous Q6H    sodium chloride 0.9%  3 mL Intravenous Q8H    vancomycin (VANCOCIN) IVPB  1,500 mg Intravenous Q12H     Continuous Infusions:   nicardipine 5 mg/hr (03/23/17 1800)     PRN Meds:acetaminophen, hydrALAZINE, magnesium sulfate IVPB, magnesium sulfate IVPB, ondansetron, potassium chloride 10%, potassium chloride 10%, potassium chloride 10%, potassium, sodium phosphates, potassium, sodium phosphates, potassium, sodium phosphates, buffered 2% sodium acetate 86meq, sodium chloride 86meq, sterile water for inj IV soln, Flushing PICC Protocol **AND** sodium chloride 0.9% **AND** sodium chloride 0.9%    Objective:     Vital Signs (Most Recent):  Temp: 99.4 °F (37.4 °C) (03/23/17 1500)  Pulse: 72 (03/23/17 1800)  Resp: (!) 23 (03/23/17  1800)  BP: 130/63 (17 1800)  SpO2: 96 % (17 1800)  BP Location: Left arm    Vital Signs Range (Last 24H):  Temp:  [99 °F (37.2 °C)-99.4 °F (37.4 °C)]   Pulse:  [59-77]   Resp:  [14-26]   BP: (109-162)/(55-86)   SpO2:  [96 %-100 %]   BP Location: Left arm    Physical Exam   Constitutional: He appears well-developed and well-nourished.   HENT:   Head: Normocephalic and atraumatic.   Eyes: Pupils are equal, round, and reactive to light.   Cardiovascular: Normal rate.    Pulmonary/Chest: Effort normal.   Neurological:   Drowsy    Skin: Skin is warm and dry.   Nursing note and vitals reviewed.      Neurological Exam:   LOC: follows simple request and alert  Language: Aphasic  Speech: Aphasic   Orientation: Aphasic  Memory: aphasic  Facial Movement (CN VII): lower weakness right lower  Motor*: able to move fingers on right side    Sensation: siddharth-hypoesthesia right  Tone: Arm-Left: normal; Leg-Left: normal; Arm-Right: normal; Leg-Right: normal    NIH Stroke Scale:    Level of Consciousness: 1 - drowsy  LOC Questions: 2 - answers none correctly  LOC Commands: 2 - performs neither correctly  Best Gaze: 1 - partial gaze palsy  Visual: 2 - complete hemianopia  Facial Palsy: 1 - minor  Motor Left Arm: 0 - no drift  Motor Right Arm: 3 - no effort against gravity  Motor Left Le - no drift  Motor Right Le - no drift  Limb Ataxia: 0 - absent  Sensory: 0 - normal  Best Language: 2 - severe aphasia  Dysarthria: 2 - near to unintelligible  Extinction and Inattention: 0 - no neglect  NIH Stroke Scale Total: 16      Laboratory:  CMP:     Recent Labs  Lab 17  0308  17  1721   CALCIUM 8.6*  --   --    ALBUMIN 2.8*  --   --    PROT 6.0  --   --    *  < > 150*   K 3.7  < > 3.8   CO2 19*  --   --    *  --   --    BUN 21*  --   --    CREATININE 0.8  --   --    ALKPHOS 63  --   --    ALT 14  --   --    AST 16  --   --    BILITOT 0.7  --   --    < > = values in this interval not displayed.  CBC:      Recent Labs  Lab 03/23/17  0308   WBC 12.04   RBC 3.83*   HGB 12.0*   HCT 35.6*      MCV 93   MCH 31.3*   MCHC 33.7     Lipid Panel:     Recent Labs  Lab 03/18/17  1609   CHOL 175   LDLCALC 123.8   HDL 36*   TRIG 76     Coagulation:   Recent Labs  Lab 03/18/17  1752  03/23/17  0308   INR  --   < > 1.0   APTT 28.5  --   --    < > = values in this interval not displayed.  Platelet Aggregation Study: No results for input(s): PLTAGG, PLTAGINTERP, PLTAGREGLACO, ADPPLTAGGREG in the last 168 hours.  Hgb A1C:     Recent Labs  Lab 03/18/17  1752   HGBA1C 5.7     TSH:     Recent Labs  Lab 03/18/17  1609   TSH 0.169*       Diagnostic Results:  CT head 03/23/17  -stable left mca/gay infarct   - stable hemorrhage with IVH       CT head 03/20/17  -Stable L MCA infarct with hemorrhagic conversion due to tissue reperfusion injury  -cytotoxic edema    CTA stroke multiphase 3/18/17  -Occlusion left cervical ICA   - occlusion of the left M1 segment of the MCA proximally.     CTA neck: Atherosclerotic disease carotid bifurcations and proximal ICAs bilaterally with high-grade  stenosis or  of the left proximal ICA and more distal left cervical ICA.    There is high-grade stenosis of the right ICA at its origin with only trace flow identified.    Atherosclerotic disease of the origin the vertebral arteries the dominant left vertebral artery.     .  Echo 3/21/17   Normal LA   EF 55%  Sclerotic aortic valve ( on 03/19/17 suspicious for mass)

## 2017-03-23 NOTE — PT/OT/SLP PROGRESS
Physical Therapy      Nathanael Velez  MRN: [id number]    Patient not seen today secondary to Unavailable (Comment) (Attempted to see pt at 1315, pt preparing for adminstration of sodium bolus. PT unable to return). Will follow-up as POC.    Idania Dumas PT, DPT  3/23/2017  [phone]

## 2017-03-23 NOTE — ASSESSMENT & PLAN NOTE
Atheroembolic stroke of L ICA to L MCA  S/p tpa stent of L ICA and thrombectomy of L MCA

## 2017-03-23 NOTE — SUBJECTIVE & OBJECTIVE
Neurologic Chief Complaint: L MCA with carotid stenosis     Subjective:     Interval History: Patient is seen for follow-up neurological assessment and treatment recommendations: NAEON    HPI, Past Medical, Family, and Social History remains the same as documented in the initial encounter.     Review of Systems   Constitutional: Negative for chills and fever.   HENT: Negative for drooling.    Genitourinary: Negative for hematuria.   Neurological: Positive for facial asymmetry, speech difficulty and weakness.     Scheduled Meds:   albuterol-ipratropium 2.5mg-0.5mg/3mL  3 mL Nebulization Q6H    atorvastatin  80 mg Per NG tube Daily    ceFEPime (MAXIPIME) IVPB  1 g Intravenous Q12H    labetalol  200 mg Per NG tube Q8H    losartan  100 mg Per NG tube Daily    polyethylene glycol  17 g Per NG tube Daily    senna-docusate 8.6-50 mg  1 tablet Per NG tube BID    sodium chloride 0.9%  10 mL Intravenous Q6H    sodium chloride 0.9%  3 mL Intravenous Q8H    vancomycin (VANCOCIN) IVPB  1,500 mg Intravenous Q12H     Continuous Infusions:     PRN Meds:acetaminophen, hydrALAZINE, magnesium sulfate IVPB, magnesium sulfate IVPB, ondansetron, potassium chloride 10%, potassium chloride 10%, potassium chloride 10%, potassium, sodium phosphates, potassium, sodium phosphates, potassium, sodium phosphates, Flushing PICC Protocol **AND** sodium chloride 0.9% **AND** sodium chloride 0.9%    Objective:     Vital Signs (Most Recent):  Temp: 99.2 °F (37.3 °C) (03/22/17 1658)  Pulse: 64 (03/22/17 1800)  Resp: (!) 23 (03/22/17 1800)  BP: 131/63 (03/22/17 1800)  SpO2: 96 % (03/22/17 1800)  BP Location: Left arm    Vital Signs Range (Last 24H):  Temp:  [98.5 °F (36.9 °C)-100.7 °F (38.2 °C)]   Pulse:  [57-94]   Resp:  [10-29]   BP: (121-145)/(56-73)   SpO2:  [95 %-99 %]   BP Location: Left arm    Physical Exam   Constitutional: He appears well-developed and well-nourished.   HENT:   Head: Normocephalic and atraumatic.   Eyes: Pupils are  equal, round, and reactive to light.   Cardiovascular: Normal rate.    Pulmonary/Chest: Effort normal.   Neurological:   Drowsy    Skin: Skin is warm and dry.   Nursing note and vitals reviewed.      Neurological Exam:   LOC: follows simple request and drowsy  Language: Aphasia  Speech: Aphasic   Orientation: Aphasic  Memory: aphasic  Facial Movement (CN VII): lower weakness right lower  Motor* no movement in right arm   Sensation: siddharth-hypoesthesia right  Tone: Arm-Left: normal; Leg-Left: normal; Arm-Right: normal; Leg-Right: normal    NIH Stroke Scale:    Level of Consciousness: 1 - drowsy  LOC Questions: 2 - answers none correctly  LOC Commands: 2 - performs neither correctly  Best Gaze: 1 - partial gaze palsy  Visual: 2 - complete hemianopia  Facial Palsy: 1 - minor  Motor Left Arm: 0 - no drift  Motor Right Arm: 4 - no movement  Motor Left Le - no drift  Motor Right Le - no drift  Limb Ataxia: 0 - absent  Sensory: 0 - normal  Best Language: 2 - severe aphasia  Dysarthria: 2 - near to unintelligible  Extinction and Inattention: 0 - no neglect  NIH Stroke Scale Total: 17      Laboratory:  CMP:     Recent Labs  Lab 17  01417  1745   CALCIUM 8.0*  --   --    ALBUMIN 2.7*  --   --    PROT 5.5*  --   --    *  < > 149*   K 3.6  < > 4.0   CO2 24  --   --    *  --   --    BUN 15  --   --    CREATININE 0.8  --   --    ALKPHOS 53*  --   --    ALT 13  --   --    AST 16  --   --    BILITOT 1.1*  --   --    < > = values in this interval not displayed.  CBC:     Recent Labs  Lab 17   WBC 14.80*   RBC 3.96*   HGB 12.4*   HCT 37.3*      MCV 94   MCH 31.3*   MCHC 33.2     Lipid Panel:     Recent Labs  Lab 17  1609   CHOL 175   LDLCALC 123.8   HDL 36*   TRIG 76     Coagulation:   Recent Labs  Lab 17  1752  17  0146   INR  --   < > 1.1   APTT 28.5  --   --    < > = values in this interval not displayed.  Platelet Aggregation Study: No results for input(s):  PLTAGG, PLTAGINTERP, PLTAGREGLACO, ADPPLTAGGREG in the last 168 hours.  Hgb A1C:     Recent Labs  Lab 03/18/17  1752   HGBA1C 5.7     TSH:     Recent Labs  Lab 03/18/17  1609   TSH 0.169*       Diagnostic Results:  CT head 03/20/17  -Stable L MCA infarct with hemorrhagic conversion due to tissue reperfusion injury  -cytotoxic edema    CTA stroke multiphase 3/18/17  -Occlusion left cervical ICA   - occlusion of the left M1 segment of the MCA proximally.     CTA neck: Atherosclerotic disease carotid bifurcations and proximal ICAs bilaterally with high-grade  stenosis or  of the left proximal ICA and more distal left cervical ICA.    There is high-grade stenosis of the right ICA at its origin with only trace flow identified.    Atherosclerotic disease of the origin the vertebral arteries the dominant left vertebral artery.     .  Echo 3/21/17   Normal LA   EF 55%  Sclerotic aortic valve ( on 03/19/17 suspicious for mass)

## 2017-03-23 NOTE — PROGRESS NOTES
ICU Progress Note  Neurocritical Care    Admit Date: 3/18/2017  LOS: 5    CC: Thrombotic stroke involving left middle cerebral artery    Code Status: Full Code     SUBJECTIVE:     Interval History/Significant Events: more alert this am, not moving RUE, following commands. Non expressive aphasia    Review of Symptoms: unable to assess due to mental status    Medications:  Continuous Infusions:   nicardipine Stopped (03/23/17 0807)     Scheduled Meds:   albuterol-ipratropium 2.5mg-0.5mg/3mL  3 mL Nebulization Q6H    aspirin  325 mg Oral Daily    atorvastatin  80 mg Per NG tube Daily    ceFEPime (MAXIPIME) IVPB  1 g Intravenous Q12H    labetalol  200 mg Per NG tube Q8H    losartan  100 mg Per NG tube Daily    polyethylene glycol  17 g Per NG tube Daily    senna-docusate 8.6-50 mg  1 tablet Per NG tube BID    sodium chloride 0.9%  10 mL Intravenous Q6H    sodium chloride 0.9%  3 mL Intravenous Q8H    vancomycin (VANCOCIN) IVPB  1,500 mg Intravenous Q12H     PRN Meds:.acetaminophen, hydrALAZINE, magnesium sulfate IVPB, magnesium sulfate IVPB, ondansetron, potassium chloride 10%, potassium chloride 10%, potassium chloride 10%, potassium, sodium phosphates, potassium, sodium phosphates, potassium, sodium phosphates, Flushing PICC Protocol **AND** sodium chloride 0.9% **AND** sodium chloride 0.9%    OBJECTIVE:   Vital Signs (Most Recent):   Temp: 99.2 °F (37.3 °C) (03/23/17 0701)  Pulse: (!) 59 (03/23/17 1000)  Resp: (!) 21 (03/23/17 1000)  BP: (!) 141/69 (03/23/17 1000)  SpO2: 99 % (03/23/17 1000)    Vital Signs (24h Range):   Temp:  [98.5 °F (36.9 °C)-100 °F (37.8 °C)] 99.2 °F (37.3 °C)  Pulse:  [57-77] 59  Resp:  [10-26] 21  SpO2:  [96 %-100 %] 99 %  BP: (109-150)/(59-86) 141/69    ICP/CPP (Last 24h):        I & O (Last 24h):     Intake/Output Summary (Last 24 hours) at 03/23/17 1020  Last data filed at 03/23/17 1000   Gross per 24 hour   Intake          2237.08 ml   Output              945 ml   Net           1292.08 ml     Physical Exam:  GA: Alert, comfortable, no acute distress. Opens eyes spontaneously  HEENT: No scleral icterus or JVD.   Pulmonary: Clear to auscultation A/P/L. No wheezing, crackles, or rhonchi. Lots of secretions  Cardiac: RRR S1 & S2 w/o rubs/murmurs/gallops.   Abdominal: Bowel sounds present x 4. No appreciable hepatosplenomegaly.  Skin: No jaundice, rashes, or visible lesions.  Neuro:  --GCS: E4 V1 M6  --Mental Status:  No speech. Follows simple commands  --CN II-XII grossly intact, dysphagia  -- PERRL.   --Corneal reflex, gag, cough intact.  --LUE strength: 5/5  --RUE strength: 1/5  --LLE strength: 5/5  --RLE strength: 3/5    Vent Data:        Lines/Drains/Airway:              External Urinary Catheter 03/18/17 1758 Medium (Active)   Collection Container Urimeter 3/21/2017  7:01 AM   Securement Method secured to top of thigh w/ adhesive device 3/21/2017  7:01 AM   Skin no redness;no breakdown;penis/scrotum cleansed w/ soap and water;skin barrier applied 3/21/2017  7:01 AM   Tolerance no signs/symptoms of discomfort 3/21/2017  7:01 AM   Output (mL) 20 mL 3/21/2017  9:00 AM     Nutrition/Tube Feeds (if NPO state why):  TF at goal    Labs:  ABG: No results for input(s): PH, PO2, PCO2, HCO3, POCSATURATED, BE in the last 24 hours.  BMP:    Recent Labs  Lab 03/23/17  0308 03/23/17  0518   * 148*   K 3.7  --    *  --    CO2 19*  --    BUN 21*  --    CREATININE 0.8  --    *  --    MG 2.1  --    PHOS 3.3  --      LFT:   Lab Results   Component Value Date    AST 16 03/23/2017    ALT 14 03/23/2017    ALKPHOS 63 03/23/2017    BILITOT 0.7 03/23/2017    ALBUMIN 2.8 (L) 03/23/2017    PROT 6.0 03/23/2017     CBC:   Lab Results   Component Value Date    WBC 12.04 03/23/2017    HGB 12.0 (L) 03/23/2017    HCT 35.6 (L) 03/23/2017    MCV 93 03/23/2017     03/23/2017     Microbiology x 7d:   Microbiology Results (last 7 days)     Procedure Component Value Units Date/Time    Blood culture  [[id number]] Collected:  03/21/17 0356    Order Status:  Completed Specimen:  Blood from Peripheral, Antecubital, Left Updated:  03/23/17 0812     Blood Culture, Routine No Growth to date     Blood Culture, Routine No Growth to date     Blood Culture, Routine No Growth to date    Narrative:       Blood cultures from 2 different sites. 4 bottles total.  Please draw before starting antibiotics.    Blood culture [[id number]] Collected:  03/19/17 1827    Order Status:  Completed Specimen:  Blood from Peripheral, Hand, Left Updated:  03/22/17 2212     Blood Culture, Routine No Growth to date     Blood Culture, Routine No Growth to date     Blood Culture, Routine No Growth to date     Blood Culture, Routine No Growth to date    Narrative:       Blood cultures x 2 different sites. 4 bottles total. Please  draw cultures before administering antibiotics.    Blood culture [[id number]] Collected:  03/19/17 1827    Order Status:  Completed Specimen:  Blood from Peripheral, Hand, Right Updated:  03/22/17 2212     Blood Culture, Routine No Growth to date     Blood Culture, Routine No Growth to date     Blood Culture, Routine No Growth to date     Blood Culture, Routine No Growth to date    Narrative:       Blood cultures from 2 different sites. 4 bottles total.  Please draw before starting antibiotics.    Blood culture [[id number]] Collected:  03/20/17 1532    Order Status:  Completed Specimen:  Blood from Peripheral, Forearm, Right Updated:  03/22/17 2012     Blood Culture, Routine No Growth to date     Blood Culture, Routine No Growth to date     Blood Culture, Routine No Growth to date    Narrative:       Blood cultures from 2 different sites. 4 bottles total.  Please draw before starting antibiotics.    Blood culture [[id number]] Collected:  03/20/17 1533    Order Status:  Completed Specimen:  Blood from Peripheral, Lower Arm, Right Updated:  03/22/17 2012     Blood Culture, Routine No Growth to date     Blood Culture, Routine No  Growth to date     Blood Culture, Routine No Growth to date    Narrative:       Blood cultures x 2 different sites. 4 bottles total. Please  draw cultures before administering antibiotics.    Blood culture [[id number]] Collected:  03/21/17 0356    Order Status:  Sent Specimen:  Blood from Peripheral, Antecubital, Left Updated:  03/21/17 0409    Narrative:       Blood cultures x 2 different sites. 4 bottles total. Please  draw cultures before administering antibiotics.    Culture, Respiratory [[id number]]     Order Status:  No result Specimen:  Respiratory         Imaging:  CT head 3/21/17  No significant interval detrimental change when compared to the CT dated 03/20/2017.    Evolving left MCA distribution infarct with persistent hemorrhagic conversion at the level of the basal ganglia and trace petechial hemorrhages about the superior aspect of the left frontal lobe. There is stable mass effect on the left lateral ventricle with minimal left to right midline shift.  No CT findings to suggest developing   hydrocephalus.    ASSESSMENT/PLAN:     Active Hospital Problems    Diagnosis  POA    *Thrombotic stroke involving left middle cerebral artery [I63.312]  Yes    Cytotoxic cerebral edema [G93.6]  Unknown    Stroke due to stenosis of left carotid artery [I63.232]  Unknown    Abnormality of aortic valve [Q23.9]  Not Applicable    Right sided weakness [M62.81]  Unknown    Aphasia [R47.01]  Unknown    Essential hypertension [I10]  Unknown      Resolved Hospital Problems    Diagnosis Date Resolved POA   No resolved problems to display.      Neuro:   L stroke with hemorrhagic transofrmation on caudate and putamen s/p TPA, thrombectomy and stent placement  - today after discussing with stroke. ASA daily following   -lipitor 80 mg  -will give Give 23.4% 30cc IVB at 10 AM and 4 PM instead of 2% HTS IVB  -continue Hypertonic saline 300cc q6 prn for Na <150 at night  -speech eval  -repeat imaging  Saturday  -PT/OT    Pulmonary:   -Stable on RA. Incentive spirometry  -patient has significant amount of secretions. Will keep seated 90 degrees  -duonebs q6, cpt q6, frequent suctioning    Cardiac:   TTE with abnormal aortic valve,concerning for vegetation. Following surveillance blood cultures. Repeat echo, aortic valve sclerosis . No evidence of vegetation  -SBP goal <140  -losartan 100mg daily. Labetalol 200mg q8  -needed cardene gtt overnight. Stopped this am. Will start amlodipine 5 mg    Renal:    Na goal >150. Hypertonic saline prn. 1x dose mannitol  -HTS 2% 300cc q 6hrs PRN for Na < 150.  -Give 23.4% 30cc IVB at 10 AM and 4 PM instead of 2% HTS IVB  -Free water 200 cc q 6hrs.    ID:   -vanc/cefepime   -bcx ngtd  -CRP elevated, will recheck on friday    Hem/Onc:   H/h stable  -subq hep for dvt ppx, on hold    Endocrine:    SSI q4    Fluids/Electrolytes/Nutrition/GI:   -tf at goal.   -free water boluses 200mg q6        Brian Amaya MD PGY-1

## 2017-03-23 NOTE — PT/OT/SLP PROGRESS
Occupational Therapy  Treatment    Nathanael Velez   MRN: [id number]   Admitting Diagnosis: Thrombotic stroke involving left middle cerebral artery    OT Date of Treatment: 17   OT Start Time: 1040  OT Stop Time: 1114  OT Total Time (min): 34 min    Billable Minutes:  Self Care/Home Management 20 and Therapeutic Exercise 14    General Precautions: Standard, aspiration, fall, NPO (cardiac)    Do you have any cultural, spiritual, Jewish conflicts, given your current situation?: Spiritism    Subjective:  Communicated with RN prior to session.  Pt with verbal garbled & difficult to comprehend statements on this date.    Pain Ratin/10  Pain Rating Post-Intervention: 0/10    Objective:  Patient found with: blood pressure cuff, peripheral IV, pulse ox (continuous), restraints, SCD, telemetry, NG tube (condom catheter)     Functional Mobility:  Bed Mobility:  Rolling/Turning Right: Moderate assistance  Scooting/Bridging: Minimum Assistance (scooting forward on EOB)  Supine to Sit: Moderate Assistance  Sit to Supine: Maximum Assistance    Transfers:   Sit <> Stand Assistance: Minimum Assistance (from EOB; Min (A) with taking ~ 3 steps to the right along side of bed; required initiation of steps via verbal & physical cues)  Sit <> Stand Assistive Device: No Assistive Device    Activities of Daily Living:     LE Dressing Level of Assistance: Moderate assistance (donning socks seated EOB with (A) for balance, to prevent donning both socks on same foot, & to initiate socks over toes)  Grooming Position: Seated, EOB  Grooming Level of Assistance: Total assistance (hand over hand (A) using RUE)     Therapeutic Activities and Exercises:  Provided RUE weight bearing while seated EOB.  Provided RUE PROM & LUE AAROM in all planes x 10 reps each while supine.  Provided cues to decrease pushing with LUE while seated EOB.  Pt followed 4/4 one step commands.  Provided elevated positioning of RUE once supine in bed for edema  management.  Pt with poor responses & clarity of responses verbally to questions on this date.  Pt had no further questions & when asked whether there were any concerns pt reported none.      AM-PAC 6 CLICK ADL   How much help from another person does this patient currently need?   1 = Unable, Total/Dependent Assistance  2 = A lot, Maximum/Moderate Assistance  3 = A little, Minimum/Contact Guard/Supervision  4 = None, Modified Hood/Independent    Putting on and taking off regular lower body clothing? : 2  Bathing (including washing, rinsing, drying)?: 1  Toileting, which includes using toilet, bedpan, or urinal? : 1  Putting on and taking off regular upper body clothing?: 1  Taking care of personal grooming such as brushing teeth?: 2  Eating meals?: 1  Total Score: 8     AM-PAC Raw Score CMS G-Code Modifier Level of Impairment Assistance   6 % Total / Unable   7 - 9 CM 80 - 100% Maximal Assist   10 - 14 CL 60 - 80% Moderate Assist   15 - 19 CK 40 - 60% Moderate Assist   20 - 22 CJ 20 - 40% Minimal Assist   23 CI 1-20% SBA / CGA   24 CH 0% Independent/ Mod I     Patient left supine with all lines intact, call button in reach, restraints reapplied at end of session, RN notified, son present and white board updated.    ASSESSMENT:  Nathanael Velez is a 61 y.o. male with a medical diagnosis of Thrombotic stroke involving left middle cerebral artery and presents with fair participation and motivation.  Pt with decreased clarity of speech on this date.  Pt with increased fidgetiness on this date requiring increased cues to attend to activities.  Pt continues to require (A) with all activities.    Rehab identified problem list/impairments: Rehab identified problem list/impairments: weakness, impaired endurance, impaired sensation, impaired self care skills, impaired functional mobilty, decreased coordination, impaired cognition, visual deficits, impaired balance, decreased lower extremity function, decreased  safety awareness, decreased upper extremity function    Rehab potential is fair.    Activity tolerance: Fair    Discharge recommendations: Discharge Facility/Level Of Care Needs: rehabilitation facility     GOALS:   Occupational Therapy Goals        Problem: Occupational Therapy Goal    Goal Priority Disciplines Outcome Interventions   Occupational Therapy Goal     OT, PT/OT Ongoing (interventions implemented as appropriate)    Description:  Goals with expiration date, 3/29:  Patient will increase functional independence with ADLs by performing:    Patient will demonstrate rolling to the right with stand by assist.  Not met   Patient will demonstrate rolling to the left with mod assist.   Not met  Patient will demonstrate supine -sit with stand by assist.   Not met  Patient will demonstrate stand pivot transfers with Contact guard assist.   Not met  Patient will demonstrate grooming while standing with mod assist.   Not met  Patient will demonstrate upper body dressing with max assist while seated EOB.   Not met  Patient will demonstrate lower body dressing with min assist while seated EOB.   Not met  Patient will demonstrate toileting with max assist.   Not met  Patient will demonstrate bathing while seated EOB with max assist.   Not met  Patient's family / caregiver will demonstrate independence and safety with assisting patient with self-care skills and functional mobility.     Not met  Patient's family / caregiver will demonstrate independence with providing ROM and changes in bed positioning.   Not met  Patient and/or patient's family will verbalize understanding of stroke prevention guidelines, personal risk factors and stroke warning signs via teachback method.  Not met                       Plan:  Patient to be seen 6 x/week to address the above listed problems via self-care/home management, cognitive retraining, sensory integration, therapeutic activities, therapeutic exercises, neuromuscular  re-education  Plan of Care expires: 04/19/17  Plan of Care reviewed with: patient, dewey Moe AVIS Baxter  03/23/2017

## 2017-03-23 NOTE — PLAN OF CARE
Problem: Patient Care Overview  Goal: Individualization & Mutuality  Admit Date: 3/18/2017 3:41 PM    Admit Diagnosis: Acute ischemic left MCA stroke [I63.512]  Embolic stroke involving left middle cerebral artery [I63.412]    Past Medical History: No past medical history on file.    Past Surgical History: No past surgical history on file.    Individualization:   1. Patients youngest son Brian is closest with him   2. Patient likes to sleep in cool room. ADINA Tucker 03.18.2017     Restraints applied to bilateral wrist for pulling lines on 3/22/17   Outcome: Ongoing (interventions implemented as appropriate)  Plan of care reviewed with pt and pt's sons.  Pt's sons verbalized understanding of the plan of care.  Pt had CT head this afternoon.  Cardene gtt.  Sodium bomb Q6 x 2.  Per Dr. Ledbetter, will give buffered 2% gtt prn AT NIGHT as ordered.  Pt remained free of any falls or injuries during this shift, skin integrity was maintained.  Will continue to monitor.

## 2017-03-24 ENCOUNTER — DOCUMENTATION ONLY (OUTPATIENT)
Dept: RESEARCH | Facility: HOSPITAL | Age: 62
End: 2017-03-24

## 2017-03-24 LAB
ALBUMIN SERPL BCP-MCNC: 2.7 G/DL
ALP SERPL-CCNC: 66 U/L
ALT SERPL W/O P-5'-P-CCNC: 15 U/L
ANION GAP SERPL CALC-SCNC: 11 MMOL/L
AST SERPL-CCNC: 15 U/L
BACTERIA BLD CULT: NORMAL
BACTERIA BLD CULT: NORMAL
BASOPHILS # BLD AUTO: 0.04 K/UL
BASOPHILS NFR BLD: 0.3 %
BILIRUB SERPL-MCNC: 0.8 MG/DL
BILIRUB UR QL STRIP: NEGATIVE
BUN SERPL-MCNC: 27 MG/DL
CALCIUM SERPL-MCNC: 8.2 MG/DL
CHLORIDE SERPL-SCNC: 119 MMOL/L
CLARITY UR REFRACT.AUTO: CLEAR
CO2 SERPL-SCNC: 20 MMOL/L
COLOR UR AUTO: YELLOW
CREAT SERPL-MCNC: 0.8 MG/DL
DIFFERENTIAL METHOD: ABNORMAL
EOSINOPHIL # BLD AUTO: 0.1 K/UL
EOSINOPHIL NFR BLD: 1 %
ERYTHROCYTE [DISTWIDTH] IN BLOOD BY AUTOMATED COUNT: 14.2 %
EST. GFR  (AFRICAN AMERICAN): >60 ML/MIN/1.73 M^2
EST. GFR  (NON AFRICAN AMERICAN): >60 ML/MIN/1.73 M^2
GLUCOSE SERPL-MCNC: 129 MG/DL
GLUCOSE UR QL STRIP: NEGATIVE
HCT VFR BLD AUTO: 35.6 %
HGB BLD-MCNC: 11.8 G/DL
HGB UR QL STRIP: NEGATIVE
INR PPP: 1
KETONES UR QL STRIP: NEGATIVE
LEUKOCYTE ESTERASE UR QL STRIP: NEGATIVE
LYMPHOCYTES # BLD AUTO: 1.3 K/UL
LYMPHOCYTES NFR BLD: 8.6 %
MAGNESIUM SERPL-MCNC: 2 MG/DL
MCH RBC QN AUTO: 31.2 PG
MCHC RBC AUTO-ENTMCNC: 33.1 %
MCV RBC AUTO: 94 FL
MONOCYTES # BLD AUTO: 1.2 K/UL
MONOCYTES NFR BLD: 8 %
NEUTROPHILS # BLD AUTO: 12 K/UL
NEUTROPHILS NFR BLD: 81.8 %
NITRITE UR QL STRIP: NEGATIVE
PH UR STRIP: 7 [PH] (ref 5–8)
PHOSPHATE SERPL-MCNC: 3.6 MG/DL
PLATELET # BLD AUTO: 159 K/UL
PMV BLD AUTO: 10.8 FL
POCT GLUCOSE: 115 MG/DL (ref 70–110)
POTASSIUM SERPL-SCNC: 3.4 MMOL/L
PROCALCITONIN SERPL IA-MCNC: <0.09 NG/ML
PROT SERPL-MCNC: 6 G/DL
PROT UR QL STRIP: NEGATIVE
PROTHROMBIN TIME: 10.5 SEC
RBC # BLD AUTO: 3.78 M/UL
SODIUM SERPL-SCNC: 147 MMOL/L
SODIUM SERPL-SCNC: 150 MMOL/L
SODIUM SERPL-SCNC: 151 MMOL/L
SODIUM SERPL-SCNC: 152 MMOL/L
SP GR UR STRIP: 1.01 (ref 1–1.03)
TB INDURATION 48 - 72 HR READ: 0 MM
URN SPEC COLLECT METH UR: ABNORMAL
UROBILINOGEN UR STRIP-ACNC: ABNORMAL EU/DL
WBC # BLD AUTO: 14.7 K/UL

## 2017-03-24 PROCEDURE — 25000003 PHARM REV CODE 250: Performed by: STUDENT IN AN ORGANIZED HEALTH CARE EDUCATION/TRAINING PROGRAM

## 2017-03-24 PROCEDURE — 20000000 HC ICU ROOM

## 2017-03-24 PROCEDURE — 25000242 PHARM REV CODE 250 ALT 637 W/ HCPCS: Performed by: STUDENT IN AN ORGANIZED HEALTH CARE EDUCATION/TRAINING PROGRAM

## 2017-03-24 PROCEDURE — 81003 URINALYSIS AUTO W/O SCOPE: CPT

## 2017-03-24 PROCEDURE — 25000003 PHARM REV CODE 250: Performed by: NURSE PRACTITIONER

## 2017-03-24 PROCEDURE — C1751 CATH, INF, PER/CENT/MIDLINE: HCPCS

## 2017-03-24 PROCEDURE — 99900026 HC AIRWAY MAINTENANCE (STAT)

## 2017-03-24 PROCEDURE — 87040 BLOOD CULTURE FOR BACTERIA: CPT | Mod: 59

## 2017-03-24 PROCEDURE — 94640 AIRWAY INHALATION TREATMENT: CPT

## 2017-03-24 PROCEDURE — 25000003 PHARM REV CODE 250: Performed by: PHYSICIAN ASSISTANT

## 2017-03-24 PROCEDURE — 76937 US GUIDE VASCULAR ACCESS: CPT

## 2017-03-24 PROCEDURE — 99233 SBSQ HOSP IP/OBS HIGH 50: CPT | Mod: ,,, | Performed by: PSYCHIATRY & NEUROLOGY

## 2017-03-24 PROCEDURE — 25000003 PHARM REV CODE 250: Performed by: PSYCHIATRY & NEUROLOGY

## 2017-03-24 PROCEDURE — 63600175 PHARM REV CODE 636 W HCPCS: Performed by: PSYCHIATRY & NEUROLOGY

## 2017-03-24 PROCEDURE — 84295 ASSAY OF SERUM SODIUM: CPT | Mod: 91

## 2017-03-24 PROCEDURE — 97530 THERAPEUTIC ACTIVITIES: CPT

## 2017-03-24 PROCEDURE — 85610 PROTHROMBIN TIME: CPT

## 2017-03-24 PROCEDURE — 63600175 PHARM REV CODE 636 W HCPCS: Performed by: STUDENT IN AN ORGANIZED HEALTH CARE EDUCATION/TRAINING PROGRAM

## 2017-03-24 PROCEDURE — 94761 N-INVAS EAR/PLS OXIMETRY MLT: CPT

## 2017-03-24 PROCEDURE — 94668 MNPJ CHEST WALL SBSQ: CPT

## 2017-03-24 PROCEDURE — 36569 INSJ PICC 5 YR+ W/O IMAGING: CPT

## 2017-03-24 PROCEDURE — 85025 COMPLETE CBC W/AUTO DIFF WBC: CPT

## 2017-03-24 PROCEDURE — 84100 ASSAY OF PHOSPHORUS: CPT

## 2017-03-24 PROCEDURE — 84145 PROCALCITONIN (PCT): CPT

## 2017-03-24 PROCEDURE — 80053 COMPREHEN METABOLIC PANEL: CPT

## 2017-03-24 PROCEDURE — 63600175 PHARM REV CODE 636 W HCPCS: Performed by: NURSE PRACTITIONER

## 2017-03-24 PROCEDURE — 83735 ASSAY OF MAGNESIUM: CPT

## 2017-03-24 PROCEDURE — 99900035 HC TECH TIME PER 15 MIN (STAT)

## 2017-03-24 PROCEDURE — 02H633Z INSERTION OF INFUSION DEVICE INTO RIGHT ATRIUM, PERCUTANEOUS APPROACH: ICD-10-PCS | Performed by: PSYCHIATRY & NEUROLOGY

## 2017-03-24 RX ORDER — SODIUM CHLORIDE 0.9 % (FLUSH) 0.9 %
10 SYRINGE (ML) INJECTION EVERY 6 HOURS
Status: DISCONTINUED | OUTPATIENT
Start: 2017-03-24 | End: 2017-04-06 | Stop reason: HOSPADM

## 2017-03-24 RX ORDER — AMLODIPINE BESYLATE 10 MG/1
10 TABLET ORAL DAILY
Status: DISCONTINUED | OUTPATIENT
Start: 2017-03-25 | End: 2017-04-05

## 2017-03-24 RX ORDER — COSYNTROPIN 0.25 MG/ML
0.25 INJECTION, POWDER, FOR SOLUTION INTRAMUSCULAR; INTRAVENOUS ONCE
Status: DISCONTINUED | OUTPATIENT
Start: 2017-03-25 | End: 2017-03-24

## 2017-03-24 RX ORDER — ACETYLCYSTEINE 100 MG/ML
4 SOLUTION ORAL; RESPIRATORY (INHALATION) EVERY 6 HOURS
Status: DISCONTINUED | OUTPATIENT
Start: 2017-03-24 | End: 2017-03-26

## 2017-03-24 RX ORDER — SODIUM CHLORIDE 234 MG/ML
30 INJECTION, SOLUTION INTRAVENOUS EVERY 6 HOURS
Status: DISCONTINUED | OUTPATIENT
Start: 2017-03-24 | End: 2017-03-24

## 2017-03-24 RX ORDER — SODIUM CHLORIDE 0.9 % (FLUSH) 0.9 %
10 SYRINGE (ML) INJECTION
Status: DISCONTINUED | OUTPATIENT
Start: 2017-03-24 | End: 2017-04-06 | Stop reason: HOSPADM

## 2017-03-24 RX ORDER — ALBUTEROL SULFATE 0.83 MG/ML
2.5 SOLUTION RESPIRATORY (INHALATION) EVERY 6 HOURS
Status: DISCONTINUED | OUTPATIENT
Start: 2017-03-24 | End: 2017-03-25

## 2017-03-24 RX ORDER — PHENYLEPHRINE HCL IN 0.9% NACL 1 MG/10 ML
SYRINGE (ML) INTRAVENOUS
Status: DISPENSED
Start: 2017-03-24 | End: 2017-03-24

## 2017-03-24 RX ADMIN — SODIUM CHLORIDE 120 MEQ: 234 INJECTION INTRAMUSCULAR; INTRAVENOUS; SUBCUTANEOUS at 09:03

## 2017-03-24 RX ADMIN — NICARDIPINE HYDROCHLORIDE 5 MG/HR: 0.2 INJECTION, SOLUTION INTRAVENOUS at 02:03

## 2017-03-24 RX ADMIN — LABETALOL HYDROCHLORIDE 200 MG: 200 TABLET, FILM COATED ORAL at 09:03

## 2017-03-24 RX ADMIN — Medication 3 ML: at 03:03

## 2017-03-24 RX ADMIN — LABETALOL HYDROCHLORIDE 200 MG: 200 TABLET, FILM COATED ORAL at 03:03

## 2017-03-24 RX ADMIN — HYDRALAZINE HYDROCHLORIDE 10 MG: 20 INJECTION INTRAMUSCULAR; INTRAVENOUS at 11:03

## 2017-03-24 RX ADMIN — ATORVASTATIN CALCIUM 80 MG: 20 TABLET, FILM COATED ORAL at 09:03

## 2017-03-24 RX ADMIN — PIPERACILLIN SODIUM AND TAZOBACTAM SODIUM 4.5 G: 4; .5 INJECTION, POWDER, FOR SOLUTION INTRAVENOUS at 09:03

## 2017-03-24 RX ADMIN — STANDARDIZED SENNA CONCENTRATE AND DOCUSATE SODIUM 1 TABLET: 8.6; 5 TABLET, FILM COATED ORAL at 09:03

## 2017-03-24 RX ADMIN — POTASSIUM CHLORIDE 40 MEQ: 20 SOLUTION ORAL at 03:03

## 2017-03-24 RX ADMIN — HEPARIN SODIUM 5000 UNITS: 5000 INJECTION, SOLUTION INTRAVENOUS; SUBCUTANEOUS at 03:03

## 2017-03-24 RX ADMIN — SODIUM CHLORIDE: 234 INJECTION INTRAMUSCULAR; INTRAVENOUS; SUBCUTANEOUS at 01:03

## 2017-03-24 RX ADMIN — ALBUTEROL SULFATE 2.5 MG: 2.5 SOLUTION RESPIRATORY (INHALATION) at 08:03

## 2017-03-24 RX ADMIN — ASPIRIN 81 MG CHEWABLE TABLET 81 MG: 81 TABLET CHEWABLE at 09:03

## 2017-03-24 RX ADMIN — ACETYLCYSTEINE 4 ML: 100 INHALANT RESPIRATORY (INHALATION) at 08:03

## 2017-03-24 RX ADMIN — ACETYLCYSTEINE 4 ML: 100 INHALANT RESPIRATORY (INHALATION) at 12:03

## 2017-03-24 RX ADMIN — LOSARTAN POTASSIUM 100 MG: 50 TABLET, FILM COATED ORAL at 09:03

## 2017-03-24 RX ADMIN — IPRATROPIUM BROMIDE AND ALBUTEROL SULFATE 3 ML: .5; 3 SOLUTION RESPIRATORY (INHALATION) at 07:03

## 2017-03-24 RX ADMIN — CEFEPIME HYDROCHLORIDE 1 G: 1 INJECTION, POWDER, FOR SOLUTION INTRAMUSCULAR; INTRAVENOUS at 07:03

## 2017-03-24 RX ADMIN — LABETALOL HYDROCHLORIDE 200 MG: 200 TABLET, FILM COATED ORAL at 05:03

## 2017-03-24 RX ADMIN — ACETAMINOPHEN 650 MG: 325 TABLET ORAL at 03:03

## 2017-03-24 RX ADMIN — Medication 10 ML: at 12:03

## 2017-03-24 RX ADMIN — HEPARIN SODIUM 5000 UNITS: 5000 INJECTION, SOLUTION INTRAVENOUS; SUBCUTANEOUS at 09:03

## 2017-03-24 RX ADMIN — Medication 10 ML: at 05:03

## 2017-03-24 RX ADMIN — Medication 3 ML: at 05:03

## 2017-03-24 RX ADMIN — POTASSIUM CHLORIDE 40 MEQ: 20 SOLUTION ORAL at 05:03

## 2017-03-24 RX ADMIN — ALBUTEROL SULFATE 2.5 MG: 2.5 SOLUTION RESPIRATORY (INHALATION) at 12:03

## 2017-03-24 RX ADMIN — VANCOMYCIN HYDROCHLORIDE 1500 MG: 1 INJECTION, POWDER, LYOPHILIZED, FOR SOLUTION INTRAVENOUS at 09:03

## 2017-03-24 RX ADMIN — POLYETHYLENE GLYCOL 3350 17 G: 17 POWDER, FOR SOLUTION ORAL at 09:03

## 2017-03-24 RX ADMIN — Medication 3 ML: at 09:03

## 2017-03-24 RX ADMIN — IPRATROPIUM BROMIDE AND ALBUTEROL SULFATE 3 ML: .5; 3 SOLUTION RESPIRATORY (INHALATION) at 12:03

## 2017-03-24 RX ADMIN — HEPARIN SODIUM 5000 UNITS: 5000 INJECTION, SOLUTION INTRAVENOUS; SUBCUTANEOUS at 05:03

## 2017-03-24 RX ADMIN — NICARDIPINE HYDROCHLORIDE 5 MG/HR: 0.2 INJECTION, SOLUTION INTRAVENOUS at 10:03

## 2017-03-24 NOTE — PROCEDURES
"Nathanael Velez is a 61 y.o. male patient.    Temp: 99 °F (37.2 °C) (03/24/17 1105)  Pulse: 73 (03/24/17 1605)  Resp: (!) 28 (03/24/17 1605)  BP: 136/62 (03/24/17 1605)  SpO2: 97 % (03/24/17 1605)  Weight: 73.8 kg (162 lb 11.2 oz) (03/24/17 0400)  Height: 5' 7" (170.2 cm) (03/18/17 1745)    PICC  Date/Time: 3/24/2017 5:28 PM  Performed by: SHOBHA MONTEZ  Consent Done: Yes  Time out: Immediately prior to procedure a time out was called to verify the correct patient, procedure, equipment, support staff and site/side marked as required  Indications: med administration and vascular access  Anesthesia: local infiltration  Local anesthetic: lidocaine 1% without epinephrine  Anesthetic Total (mL): 3  Preparation: skin prepped with ChloraPrep  Skin prep agent dried: skin prep agent completely dried prior to procedure  Sterile barriers: all five maximum sterile barriers used - cap, mask, sterile gown, sterile gloves, and large sterile sheet  Hand hygiene: hand hygiene performed prior to central venous catheter insertion  Location details: left basilic  Catheter type: double lumen  Catheter size: 5 Fr  Catheter Length: 41cm    Ultrasound guidance: yes  Vessel Caliber: medium and patent, compressibility normal  Needle advanced into vessel with real time Ultrasound guidance.  Guidewire confirmed in vessel.  Image recorded and saved.  Sterile sheath used.  no esophageal manometryNumber of attempts: 1  Post-procedure: blood return through all ports, chlorhexidine patch and sterile dressing applied  Technical procedures used: 3CG  Specimens: No  Implants: No  Assessment: placement verified by x-ray        Anna Hylton  3/24/2017  "

## 2017-03-24 NOTE — PLAN OF CARE
Problem: Patient Care Overview  Goal: Plan of Care Review  Outcome: Ongoing (interventions implemented as appropriate)  POC reviewed with pt and family at 2200. Family verbalized understanding. Questions and concerns addressed. Pt still on cardene drip. No acute events overnight. Pt progressing toward goals. Will continue to monitor. See flowsheets for full assessment and VS info

## 2017-03-24 NOTE — PLAN OF CARE
SW met with Pt son outside of [facility] to check in. He has people going to look at the facilities and will let this SW know of choices later.    Argentina Juarez, AKISW  Neurocritical Care   [facility]  [id number]

## 2017-03-24 NOTE — PROGRESS NOTES
Progress Note  Neurosurgery    Admit Date: 3/18/2017  Post-operative Day:    Hospital Day: 7    SUBJECTIVE:     Nathanael Velez is a 61 y.o. male pmh of HTN, HLD, and CAD admitted to neuro ICU with left MCA infarct, s/p   thrombectomy, left ICA, and tPA. HT s/p thrombectomy showed acute left basal ganglia hemorrhage. Repeat HCT was stable. Neurosurgery consulted for evaluation of the bleed. Pt was on aspirin.   Follow-up For:  * No surgery found *    No AE. Tm 101. OVSS    Scheduled Meds:   acetylcysteine 100 mg/ml (10%)  4 mL Nebulization Q6H    albuterol sulfate  2.5 mg Nebulization Q6H    [START ON 3/25/2017] amlodipine  10 mg Per NG tube Daily    aspirin  81 mg Per NG tube Daily    atorvastatin  80 mg Per NG tube Daily    ceFEPime (MAXIPIME) IVPB  1 g Intravenous Q12H    heparin (porcine)  5,000 Units Subcutaneous Q8H    labetalol  200 mg Per NG tube Q8H    losartan  100 mg Per NG tube Daily    phenylephrine HCl in 0.9% NaCl        polyethylene glycol  17 g Per NG tube Daily    senna-docusate 8.6-50 mg  1 tablet Per NG tube BID    sodium chloride (23.4%)  30 mL Intravenous Q6H    sodium chloride 0.9%  10 mL Intravenous Q6H    sodium chloride 0.9%  3 mL Intravenous Q8H    vancomycin (VANCOCIN) IVPB  1,500 mg Intravenous Q12H     Continuous Infusions:   nicardipine 5 mg/hr (03/24/17 0905)     PRN Meds:acetaminophen, hydrALAZINE, magnesium sulfate IVPB, magnesium sulfate IVPB, ondansetron, potassium chloride 10%, potassium chloride 10%, potassium chloride 10%, potassium, sodium phosphates, potassium, sodium phosphates, potassium, sodium phosphates, buffered 2% sodium acetate 86meq, sodium chloride 86meq, sterile water for inj IV soln, Flushing PICC Protocol **AND** sodium chloride 0.9% **AND** sodium chloride 0.9%    Review of patient's allergies indicates:  Not on File    OBJECTIVE:     Vital Signs (Most Recent)  Temp: 98.9 °F (37.2 °C) (03/24/17 0705)  Pulse: 64 (03/24/17 0805)  Resp: (!) 22  (03/24/17 0805)  BP: (!) 124/57 (03/24/17 0805)  SpO2: 97 % (03/24/17 0805)    Vital Signs Range (Last 24H):  Temp:  [98.9 °F (37.2 °C)-101 °F (38.3 °C)]   Pulse:  [59-83]   Resp:  [16-34]   BP: (116-162)/(57-72)   SpO2:  [91 %-99 %]     I & O (Last 24H):    Intake/Output Summary (Last 24 hours) at 03/24/17 1125  Last data filed at 03/24/17 0905   Gross per 24 hour   Intake          2249.58 ml   Output             1655 ml   Net           594.58 ml     Physical Exam:  General: no distress  Head: normocephalic  GCS: Motor: 5/Verbal: 2/Eyes: 3  Opens eyes to voice  localizes L side. R paresis   PERRL  Sensory: response to light touch throughout  nonlabored respiration  Abdomen: soft, non-tender   Extremities: no cyanosis or edema, or clubbing     Lines/Drains:       Peripheral IV - Single Lumen 03/18/17 1543 Right Hand (Active)   Site Assessment Clean;Dry;Intact 3/21/2017  7:01 AM   Line Status Infusing 3/21/2017  7:01 AM   Dressing Status Clean;Dry;Intact 3/21/2017  5:00 AM   Dressing Intervention Dressing reinforced 3/21/2017  7:01 AM   Dressing Change Due 03/22/17 3/21/2017  5:00 AM   Site Change Due 03/22/17 3/21/2017  7:01 AM   Reason Not Rotated Not due 3/21/2017  7:01 AM   Number of days:2            Peripheral IV - Single Lumen 03/20/17 1900 Antecubital (Active)   Site Assessment Clean;Dry;Intact 3/21/2017  7:01 AM   Line Status Infusing 3/21/2017  7:01 AM   Dressing Status Clean;Dry;Intact 3/21/2017  7:01 AM   Dressing Intervention Dressing reinforced 3/21/2017  7:01 AM   Dressing Change Due 03/24/17 3/21/2017  5:00 AM   Site Change Due 03/24/17 3/21/2017  7:01 AM   Reason Not Rotated Not due 3/21/2017  7:01 AM   Number of days:0            External Urinary Catheter 03/18/17 1758 Medium (Active)   Collection Container Urimeter 3/21/2017  7:01 AM   Securement Method secured to top of thigh w/ adhesive device 3/21/2017  7:01 AM   Skin no redness;no breakdown;penis/scrotum cleansed w/ soap and water;skin barrier  applied 3/21/2017  7:01 AM   Tolerance no signs/symptoms of discomfort 3/21/2017  7:01 AM   Output (mL) 20 mL 3/21/2017  9:00 AM   Number of days:2       Wound/Incision:    Laboratory:  CBC:     Recent Labs  Lab 03/24/17 0208   WBC 14.70*   RBC 3.78*   HGB 11.8*   HCT 35.6*      MCV 94   MCH 31.2*   MCHC 33.1     BMP:     Recent Labs  Lab 03/24/17 0208 03/24/17 0538   *  --    * 151*   K 3.4*  --    *  --    CO2 20*  --    BUN 27*  --    CREATININE 0.8  --    CALCIUM 8.2*  --    MG 2.0  --      CMP:     Recent Labs  Lab 03/24/17 0208 03/24/17 0538   *  --    CALCIUM 8.2*  --    ALBUMIN 2.7*  --    PROT 6.0  --    * 151*   K 3.4*  --    CO2 20*  --    *  --    BUN 27*  --    CREATININE 0.8  --    ALKPHOS 66  --    ALT 15  --    AST 15  --    BILITOT 0.8  --      LFTs:     Recent Labs  Lab 03/24/17 0208   ALT 15   AST 15   ALKPHOS 66   BILITOT 0.8   PROT 6.0   ALBUMIN 2.7*     Coagulation:   Recent Labs  Lab 03/18/17  1752  03/24/17 0208   INR  --   < > 1.0   APTT 28.5  --   --    < > = values in this interval not displayed.  Cardiac markers: No results for input(s): CKMB, CPKMB, TROPONINT, TROPONINI, MYOGLOBIN in the last 168 hours.  ABGs:     Recent Labs  Lab 03/18/17  1933   PH 7.384   PCO2 32.3*   PO2 72*   HCO3 19.3*   POCSATURATED 94*   BE -6     Microbiology Results (last 7 days)     Procedure Component Value Units Date/Time    Blood culture [[id number]]     Order Status:  No result Specimen:  Blood     Blood culture [[id number]]     Order Status:  No result Specimen:  Blood     Blood culture [[id number]] Collected:  03/21/17 0356    Order Status:  Completed Specimen:  Blood from Peripheral, Antecubital, Left Updated:  03/24/17 0812     Blood Culture, Routine No Growth to date     Blood Culture, Routine No Growth to date     Blood Culture, Routine No Growth to date     Blood Culture, Routine No Growth to date    Narrative:       Blood cultures from 2 different  sites. 4 bottles total.  Please draw before starting antibiotics.    Blood culture [[id number]] Collected:  03/19/17 1827    Order Status:  Completed Specimen:  Blood from Peripheral, Hand, Right Updated:  03/23/17 2212     Blood Culture, Routine No Growth to date     Blood Culture, Routine No Growth to date     Blood Culture, Routine No Growth to date     Blood Culture, Routine No Growth to date     Blood Culture, Routine No Growth to date    Narrative:       Blood cultures from 2 different sites. 4 bottles total.  Please draw before starting antibiotics.    Blood culture [[id number]] Collected:  03/19/17 1827    Order Status:  Completed Specimen:  Blood from Peripheral, Hand, Left Updated:  03/23/17 2212     Blood Culture, Routine No Growth to date     Blood Culture, Routine No Growth to date     Blood Culture, Routine No Growth to date     Blood Culture, Routine No Growth to date     Blood Culture, Routine No Growth to date    Narrative:       Blood cultures x 2 different sites. 4 bottles total. Please  draw cultures before administering antibiotics.    Blood culture [[id number]] Collected:  03/20/17 1532    Order Status:  Completed Specimen:  Blood from Peripheral, Forearm, Right Updated:  03/23/17 2012     Blood Culture, Routine No Growth to date     Blood Culture, Routine No Growth to date     Blood Culture, Routine No Growth to date     Blood Culture, Routine No Growth to date    Narrative:       Blood cultures from 2 different sites. 4 bottles total.  Please draw before starting antibiotics.    Blood culture [[id number]] Collected:  03/20/17 1533    Order Status:  Completed Specimen:  Blood from Peripheral, Lower Arm, Right Updated:  03/23/17 2012     Blood Culture, Routine No Growth to date     Blood Culture, Routine No Growth to date     Blood Culture, Routine No Growth to date     Blood Culture, Routine No Growth to date    Narrative:       Blood cultures x 2 different sites. 4 bottles total. Please  draw  cultures before administering antibiotics.    Blood culture [[id number]] Collected:  03/21/17 0356    Order Status:  Sent Specimen:  Blood from Peripheral, Antecubital, Left Updated:  03/21/17 0409    Narrative:       Blood cultures x 2 different sites. 4 bottles total. Please  draw cultures before administering antibiotics.    Culture, Respiratory [[id number]]     Order Status:  No result Specimen:  Respiratory         Specimen     None          Recent Labs  Lab 03/20/17 0442   COLORU Yellow   SPECGRAV >1.030*   PHUR 6.0   PROTEINUA Negative   NITRITE Negative   LEUKOCYTESUR Negative   UROBILINOGEN 2.0-3.0*       Diagnostic Results:  Repeat CT head - stable hemorrhage.   ASSESSMENT/PLAN:     Assessment: 62 yo M L MCA infarct w/ hemorrhagic conversion    Neuro stable.   Cont neuro checks  Repeat CT head stable  SBP < 160  Ok for ASA   Medical mgmt per NCC.  Call w/ questions/worsening neuro status.

## 2017-03-24 NOTE — PROGRESS NOTES
PROTOCOL: 2016.027.B [id number] Panel Pivotal Study  : Roberto Chisholm MD  DATE: 3/24/2017  TIME: 1:15 pm    * Met with LAR to discuss possible participation of patient in a research study.   * Patient confirmed to meet the criteria for participation in this study.   * LAR was given a copy of the informed consent form for review.   * LAR read the informed consent form in full.   * Informed consent form was read in full to the patient in the presence of witness.   * All risks, benefits, alternative therapies, confidentiality, and study requirements were discussed.   * Ample opportunity was provided for questions and answers.   * LAR verbalized that all questions were satisfactorily answered .  * LAR verbalized understanding of the protocol and its requirements.   * LAR was provided with research coordinator and MD contact for future questions or concerns.   * LAR denied involvement of patient in any other research study.   * LAR voluntarily agreed on behalf of patient for participation in the research study.   * Informed consent was signed and LAR was provided with a signed consent form for their records.   * Consent was obtained prior to conducting any study-related procedures.     COMMENTS: Attempted sample collection from left forearm, vein collapsed prior to complete collection of sample tube A.  Subject withdrawn, will monitor for future blood culture orders. Consent was provided by patient son for the study who acted as the LAR.    Signed,    DAVID Downing, PhD

## 2017-03-24 NOTE — PROGRESS NOTES
ICU Progress Note  Neurocritical Care    Admit Date: 3/18/2017  LOS: 6    CC: Thrombotic stroke involving left middle cerebral artery    Code Status: Full Code     SUBJECTIVE:     Interval History/Significant Events: Repeat CT stable, started on asa 325, than 81 daily. Febrile overnight with inc in wbc, and inc in oxygen requirements. Speech improved, able to name watch.    Review of Symptoms: unable to assess due to mental status    Medications:  Continuous Infusions:   nicardipine 5 mg/hr (03/24/17 0600)     Scheduled Meds:   albuterol-ipratropium 2.5mg-0.5mg/3mL  3 mL Nebulization Q6H    amlodipine  5 mg Per NG tube Daily    aspirin  81 mg Per NG tube Daily    atorvastatin  80 mg Per NG tube Daily    ceFEPime (MAXIPIME) IVPB  1 g Intravenous Q12H    heparin (porcine)  5,000 Units Subcutaneous Q8H    labetalol  200 mg Per NG tube Q8H    losartan  100 mg Per NG tube Daily    polyethylene glycol  17 g Per NG tube Daily    senna-docusate 8.6-50 mg  1 tablet Per NG tube BID    sodium chloride (23.4%)  30 mL Intravenous Q6H    sodium chloride 0.9%  10 mL Intravenous Q6H    sodium chloride 0.9%  3 mL Intravenous Q8H    vancomycin (VANCOCIN) IVPB  1,500 mg Intravenous Q12H     PRN Meds:.acetaminophen, hydrALAZINE, magnesium sulfate IVPB, magnesium sulfate IVPB, ondansetron, potassium chloride 10%, potassium chloride 10%, potassium chloride 10%, potassium, sodium phosphates, potassium, sodium phosphates, potassium, sodium phosphates, buffered 2% sodium acetate 86meq, sodium chloride 86meq, sterile water for inj IV soln, Flushing PICC Protocol **AND** sodium chloride 0.9% **AND** sodium chloride 0.9%    OBJECTIVE:   Vital Signs (Most Recent):   Temp: (!) 101 °F (38.3 °C) (03/24/17 0305)  Pulse: 75 (03/24/17 0732)  Resp: (!) 30 (03/24/17 0732)  BP: 125/62 (03/24/17 0600)  SpO2: (!) 94 % (03/24/17 0732)    Vital Signs (24h Range):   Temp:  [99 °F (37.2 °C)-101 °F (38.3 °C)] 101 °F (38.3 °C)  Pulse:  [59-83]  75  Resp:  [16-34] 30  SpO2:  [91 %-99 %] 94 %  BP: (116-162)/(55-72) 125/62    ICP/CPP (Last 24h):        I & O (Last 24h):     Intake/Output Summary (Last 24 hours) at 03/24/17 0903  Last data filed at 03/24/17 0600   Gross per 24 hour   Intake           2127.5 ml   Output             1655 ml   Net            472.5 ml     Physical Exam:  GA: Alert, comfortable, no acute distress. Opens eyes spontaneously, following commands  HEENT: No scleral icterus or JVD. Frequent thick secretions  Pulmonary: Clear to auscultation A/P/L. No wheezing, crackles, or rhonchi. Lots of secretions  Cardiac: RRR S1 & S2 w/o rubs/murmurs/gallops.   Abdominal: Bowel sounds present x 4. No appreciable hepatosplenomegaly.  Skin: No jaundice, rashes, or visible lesions.  Neuro:  --GCS: E4 V2 M6  --Mental Status:  Increased speech. Follows simple commands  --CN II-XII grossly intact, dysphagia  -- PERRL.   --Corneal reflex, gag, weak cough  --LUE strength: 5/5  --RUE strength: 1/5  --LLE strength: 5/5  --RLE strength: 3/5    Vent Data:        Lines/Drains/Airway:              External Urinary Catheter 03/18/17 1758 Medium (Active)   Collection Container Urimeter 3/21/2017  7:01 AM   Securement Method secured to top of thigh w/ adhesive device 3/21/2017  7:01 AM   Skin no redness;no breakdown;penis/scrotum cleansed w/ soap and water;skin barrier applied 3/21/2017  7:01 AM   Tolerance no signs/symptoms of discomfort 3/21/2017  7:01 AM   Output (mL) 20 mL 3/21/2017  9:00 AM     Nutrition/Tube Feeds (if NPO state why):  TF at goal    Labs:  ABG: No results for input(s): PH, PO2, PCO2, HCO3, POCSATURATED, BE in the last 24 hours.  BMP:    Recent Labs  Lab 03/24/17  0208 03/24/17  0538   * 151*   K 3.4*  --    *  --    CO2 20*  --    BUN 27*  --    CREATININE 0.8  --    *  --    MG 2.0  --    PHOS 3.6  --      LFT:   Lab Results   Component Value Date    AST 15 03/24/2017    ALT 15 03/24/2017    ALKPHOS 66 03/24/2017    BILITOT  0.8 03/24/2017    ALBUMIN 2.7 (L) 03/24/2017    PROT 6.0 03/24/2017     CBC:   Lab Results   Component Value Date    WBC 14.70 (H) 03/24/2017    HGB 11.8 (L) 03/24/2017    HCT 35.6 (L) 03/24/2017    MCV 94 03/24/2017     03/24/2017     Microbiology x 7d:   Microbiology Results (last 7 days)     Procedure Component Value Units Date/Time    Blood culture [[id number]] Collected:  03/21/17 0356    Order Status:  Completed Specimen:  Blood from Peripheral, Antecubital, Left Updated:  03/24/17 0812     Blood Culture, Routine No Growth to date     Blood Culture, Routine No Growth to date     Blood Culture, Routine No Growth to date     Blood Culture, Routine No Growth to date    Narrative:       Blood cultures from 2 different sites. 4 bottles total.  Please draw before starting antibiotics.    Blood culture [[id number]] Collected:  03/19/17 1827    Order Status:  Completed Specimen:  Blood from Peripheral, Hand, Right Updated:  03/23/17 2212     Blood Culture, Routine No Growth to date     Blood Culture, Routine No Growth to date     Blood Culture, Routine No Growth to date     Blood Culture, Routine No Growth to date     Blood Culture, Routine No Growth to date    Narrative:       Blood cultures from 2 different sites. 4 bottles total.  Please draw before starting antibiotics.    Blood culture [[id number]] Collected:  03/19/17 1827    Order Status:  Completed Specimen:  Blood from Peripheral, Hand, Left Updated:  03/23/17 2212     Blood Culture, Routine No Growth to date     Blood Culture, Routine No Growth to date     Blood Culture, Routine No Growth to date     Blood Culture, Routine No Growth to date     Blood Culture, Routine No Growth to date    Narrative:       Blood cultures x 2 different sites. 4 bottles total. Please  draw cultures before administering antibiotics.    Blood culture [[id number]] Collected:  03/20/17 1532    Order Status:  Completed Specimen:  Blood from Peripheral, Forearm, Right Updated:   03/23/17 2012     Blood Culture, Routine No Growth to date     Blood Culture, Routine No Growth to date     Blood Culture, Routine No Growth to date     Blood Culture, Routine No Growth to date    Narrative:       Blood cultures from 2 different sites. 4 bottles total.  Please draw before starting antibiotics.    Blood culture [[id number]] Collected:  03/20/17 1533    Order Status:  Completed Specimen:  Blood from Peripheral, Lower Arm, Right Updated:  03/23/17 2012     Blood Culture, Routine No Growth to date     Blood Culture, Routine No Growth to date     Blood Culture, Routine No Growth to date     Blood Culture, Routine No Growth to date    Narrative:       Blood cultures x 2 different sites. 4 bottles total. Please  draw cultures before administering antibiotics.    Blood culture [[id number]] Collected:  03/21/17 0356    Order Status:  Sent Specimen:  Blood from Peripheral, Antecubital, Left Updated:  03/21/17 0409    Narrative:       Blood cultures x 2 different sites. 4 bottles total. Please  draw cultures before administering antibiotics.    Culture, Respiratory [[id number]]     Order Status:  No result Specimen:  Respiratory         Imaging:  CT head 3/21/17  No significant interval detrimental change when compared to the CT dated 03/20/2017.    Evolving left MCA distribution infarct with persistent hemorrhagic conversion at the level of the basal ganglia and trace petechial hemorrhages about the superior aspect of the left frontal lobe. There is stable mass effect on the left lateral ventricle with minimal left to right midline shift.  No CT findings to suggest developing   hydrocephalus.    ASSESSMENT/PLAN:     Active Hospital Problems    Diagnosis  POA    *Thrombotic stroke involving left middle cerebral artery [I63.312]  Yes    Cytotoxic cerebral edema [G93.6]  Unknown    Stroke due to stenosis of left carotid artery [I63.232]  Unknown    Abnormality of aortic valve [Q23.9]  Not Applicable    Right  sided weakness [M62.81]  Unknown    Aphasia [R47.01]  Unknown    Essential hypertension [I10]  Unknown      Resolved Hospital Problems    Diagnosis Date Resolved POA   No resolved problems to display.      Neuro:   L stroke with hemorrhagic transofrmation on caudate and putamen s/p TPA, thrombectomy and stent placement  - x1. ASA 81 daily  -lipitor 80 mg  -will give Give 23.4% 30cc IVB at 10 AM and 4 PM instead of 2% HTS IVB  -continue Hypertonic saline 300cc q6 prn for Na <150 at night  -repeat imaging Sunday  -PT/OT/slp    Pulmonary:   -increase in oxygen requirements overnight, given thick secretions and poor cough concern for aspiration  -albuterol and Mucomyst q6, cpt and frequent suctioning  -chest xray  -keep seated at all times    Cardiac:   -TTE with abnormal aortic valve,concerning for vegetation. Following surveillance blood cultures. Repeat echo, aortic valve sclerosis . No evidence of vegetation  -SBP goal <140  -losartan 100mg daily. Labetalol 200mg q8  -needed cardene gtt overnight, will increase amlodipine to 10mg    Renal:    -Na goal >150. Hypertonic saline prn. 1x dose mannitol on 3/22  -HTS 2% 300cc q 6hrs PRN for Na < 150.  -Give 23.4% 30cc IVB at 9 AM and 3 PM instead of 2% HTS IVB  - inc free water 200 cc q 4hrs.  -condom cath. PVR monitoring    ID:   -vanc/cefepime 5/8days  -bcx ngtd  -repeat cultures given temperature and increase in wbc  -concern for aspiration pna  -check procal    Hem/Onc:   H/h stable  -subq hep for dvt ppx  -asa 81 daily    Endocrine:    SSI q4    Fluids/Electrolytes/Nutrition/GI:   -tf at goal.   -free water boluses 200mg q6      Brian Amaya MD PGY-1

## 2017-03-24 NOTE — PT/OT/SLP PROGRESS
Occupational Therapy      Nathanael Velez  MRN: [id number]    OT unable to see patient on this date. Will follow up over weekend as appropriate.   Sorry for the inconvenience.     AVIS Correa  3/24/2017

## 2017-03-24 NOTE — PLAN OF CARE
SW went to Pt room but no family was in there and Pt was asleep. Left rehab list in room as this is the new rec. Left note for Pt son to contact this SW with any questions or concerns.

## 2017-03-24 NOTE — ASSESSMENT & PLAN NOTE
Arrived and had STAT cta multiphase - patient with critical carotid stenosis, still with significant deficit post tPA -treated with IR thrombectomy.   Admit to [facility]     Antithrombotics for secondary stroke prevention:okay to start aspirin.   Plan to start plavix 03/24  Statins for secondary stroke prevention and hyperlipidemia, if present: Atorvastatin- 40 mg oral daily  Aggressive risk factor modification: Hypertension, High Cholesterol and Obesity  VTE Prophylaxis: SCDs, okay to start heparin 5k q8 hours  BP Parameters: SBP <180  Nursing Orders: Neuro checks- per tpa protocol, Antiembolic stockings, Swallowing evaluation by Nursing, Seizure precautions, Avoid Rucker catheter, Pneumatic compression device, Stroke Education, Blood glucose target 100-130, Up with assistance     Rehab teams recommending snf

## 2017-03-24 NOTE — PROGRESS NOTES
[facility]  Adult Nutrition  Consult Note    SUMMARY     Recommendations    Recommendation/Intervention:   1. Rec to increase TF goal rate to 55ml/hr to fully meet pt's EEN/EPN. This will provide 1980 cals, 90 gms prot, & 1008 mls free fl    2. RD to cont to monitor all nutr parameters     Goals: TF to meet >85% of EEN/EPN  Nutrition Goal Status: progressing towards goal  Communication of RD Recs: reviewed with RN    Continuum of Care Plan    Referral to Outpatient Services: other (see comments) (Nutr D/c Plan: unable to determine @ this time)    Reason for Assessment    Reason for Assessment: RD follow-up  Diagnosis: stroke/CVA  Relevent Medical History: HTN, HLD, CAD         General Information Comments: Patient asleep this a.m., nursing at bedside. Pt tolerating TF well. TF running @ 40 ml/hr (goal). Will continue to monitor    Nutrition Prescription Ordered    Current Diet Order: NPO  Nutrition Order Comments: TF running @ goal ordered  Current Nutrition Support Formula Ordered: Isosource 1.5  Current Nutrition Support Rate Ordered: 40 (ml)  Current Nutrition Support Frequency Ordered: ml/hr        Evaluation of Received Nutrients/Fluid Intake    Enteral Calories (kcal): 1440  Enteral Protein (gm): 65  Enteral (Free Water) Fluid (mL): 733      Energy Calories Required: not meeting needs  % Kcal Needs: 76      Protein Required: not meeting needs  % Protein Needs: 87     IV Fluid (mL): 1062      Total Fluid Intake (mL/kg): 26  Fluid Required: meeting needs  Comments: I/O noted, LBM unknown, good uop.  Tolerance: tolerating     Nutrition Risk Screen     Nutrition Risk Screen: no indicators present    Nutrition/Diet History     Typical Food/Fluid Intake: NPO currently  Food Preferences: SAMINA        Factors Affecting Nutritional Intake: difficulty/impaired swallowing, NPO     Labs/Tests/Procedures/Meds     Pertinent Labs Reviewed: reviewed  Pertinent Labs Comments: Na 151, K 3.4, Cl 119, CO2 20,  BUN 27, Glu 129  Pertinent Medications Reviewed: reviewed  Pertinent Medications Comments: IVF, statin, senna    Physical Findings    Overall Physical Appearance: on oxygen therapy, overweight  Tubes: nasogastric tube     Skin: intact    Anthropometrics    Height (inches): 67.01 in  Weight Method: Bed Scale  Weight (kg): 73.8 kg  Ideal Body Weight (IBW), Male: 148.06 lb     % Ideal Body Weight, Male (lb): 109.89 lb     BMI (kg/m2): 25.48  BMI Grade: 25 - 29.9 - overweight  Usual Body Weight (UBW), kg:  (unable to obtain)     Estimated/Assessed Needs    Weight Used For Calorie Calculations: 71.4 kg (157 lb 6.5 oz) (dosing weight)   Height (cm): 170.2 cm     Energy Need Method: [place]-St Jeor (1889 kcal (PAL x1.25))     RMR ([place]-St. Jeor Equation): 1510.81        Weight Used For Protein Calculations: 73.8 kg (162 lb 11.2 oz)  Protein Requirements: 74-89 g (1.0-1.2 g/kg)    Fluid Need Method: RDA Method (1ml/kcal or per MD)     Monitor and Evaluation    Food and Nutrient Intake: enteral nutrition intake, energy intake  Food and Nutrient Adminstration: enteral and parenteral nutrition administration, diet order        Anthropometric Measurements: weight change, weight  Biochemical Data, Medical Tests and Procedures: electrolyte and renal panel, glucose/endocrine profile  Nutrition-Focused Physical Findings: overall appearance    Nutrition Risk    Level of Risk: other (see comments) (2x weekly)    Nutrition Follow-Up    RD Follow-up?: Yes    Nutrition Diagnosis    Nutr Dx/problem: Inadequate energy intake  Related to/etiology: insufficient TF goal rate  As evidenced by: <85% of EEN being met  Status: continues

## 2017-03-24 NOTE — CONSULTS
Double lumen PICC placed to right basilic vein.  41cm in length, 29cm arm circumference, 0cm exposed.  Lot# [id number].

## 2017-03-24 NOTE — PROGRESS NOTES
[facility]  Vascular Neurology  Comprehensive Stroke Center  Progress Note      Neurologic Chief Complaint: L MCA with carotid stenosis     Subjective:     Interval History: Patient is seen for follow-up neurological assessment and treatment recommendations: NAEON More awake today.  Temperature and leukocytosis resolving with vancomycin and cefepime.      HPI, Past Medical, Family, and Social History remains the same as documented in the initial encounter.     Review of Systems   Constitutional: Negative for chills and fever.   HENT: Negative for drooling.    Genitourinary: Negative for hematuria.   Neurological: Positive for facial asymmetry, speech difficulty and weakness.     Scheduled Meds:   albuterol-ipratropium 2.5mg-0.5mg/3mL  3 mL Nebulization Q6H    amlodipine  5 mg Per NG tube Daily    [START ON 3/24/2017] aspirin  81 mg Per NG tube Daily    atorvastatin  80 mg Per NG tube Daily    ceFEPime (MAXIPIME) IVPB  1 g Intravenous Q12H    heparin (porcine)  5,000 Units Subcutaneous Q8H    labetalol  200 mg Per NG tube Q8H    losartan  100 mg Per NG tube Daily    polyethylene glycol  17 g Per NG tube Daily    senna-docusate 8.6-50 mg  1 tablet Per NG tube BID    sodium chloride (23.4%)  30 mL Intravenous Q6H    sodium chloride 0.9%  10 mL Intravenous Q6H    sodium chloride 0.9%  3 mL Intravenous Q8H    vancomycin (VANCOCIN) IVPB  1,500 mg Intravenous Q12H     Continuous Infusions:   nicardipine 5 mg/hr (03/23/17 1800)     PRN Meds:acetaminophen, hydrALAZINE, magnesium sulfate IVPB, magnesium sulfate IVPB, ondansetron, potassium chloride 10%, potassium chloride 10%, potassium chloride 10%, potassium, sodium phosphates, potassium, sodium phosphates, potassium, sodium phosphates, buffered 2% sodium acetate 86meq, sodium chloride 86meq, sterile water for inj IV soln, Flushing PICC Protocol **AND** sodium chloride 0.9% **AND** sodium chloride 0.9%    Objective:     Vital Signs (Most  Recent):  Temp: 99.4 °F (37.4 °C) (17 1500)  Pulse: 72 (17 1800)  Resp: (!) 23 (17 1800)  BP: 130/63 (17 1800)  SpO2: 96 % (17 1800)  BP Location: Left arm    Vital Signs Range (Last 24H):  Temp:  [99 °F (37.2 °C)-99.4 °F (37.4 °C)]   Pulse:  [59-77]   Resp:  [14-26]   BP: (109-162)/(55-86)   SpO2:  [96 %-100 %]   BP Location: Left arm    Physical Exam   Constitutional: He appears well-developed and well-nourished.   HENT:   Head: Normocephalic and atraumatic.   Eyes: Pupils are equal, round, and reactive to light.   Cardiovascular: Normal rate.    Pulmonary/Chest: Effort normal.   Neurological:   Drowsy    Skin: Skin is warm and dry.   Nursing note and vitals reviewed.      Neurological Exam:   LOC: follows simple request and alert  Language: Aphasic  Speech: Aphasic   Orientation: Aphasic  Memory: aphasic  Facial Movement (CN VII): lower weakness right lower  Motor*: able to move fingers on right side    Sensation: siddharth-hypoesthesia right  Tone: Arm-Left: normal; Leg-Left: normal; Arm-Right: normal; Leg-Right: normal    NIH Stroke Scale:    Level of Consciousness: 1 - drowsy  LOC Questions: 2 - answers none correctly  LOC Commands: 2 - performs neither correctly  Best Gaze: 1 - partial gaze palsy  Visual: 2 - complete hemianopia  Facial Palsy: 1 - minor  Motor Left Arm: 0 - no drift  Motor Right Arm: 3 - no effort against gravity  Motor Left Le - no drift  Motor Right Le - no drift  Limb Ataxia: 0 - absent  Sensory: 0 - normal  Best Language: 2 - severe aphasia  Dysarthria: 2 - near to unintelligible  Extinction and Inattention: 0 - no neglect  NIH Stroke Scale Total: 16      Laboratory:  CMP:     Recent Labs  Lab 17  0308  17  1721   CALCIUM 8.6*  --   --    ALBUMIN 2.8*  --   --    PROT 6.0  --   --    *  < > 150*   K 3.7  < > 3.8   CO2 19*  --   --    *  --   --    BUN 21*  --   --    CREATININE 0.8  --   --    ALKPHOS 63  --   --    ALT 14  --   --     AST 16  --   --    BILITOT 0.7  --   --    < > = values in this interval not displayed.  CBC:     Recent Labs  Lab 03/23/17  0308   WBC 12.04   RBC 3.83*   HGB 12.0*   HCT 35.6*      MCV 93   MCH 31.3*   MCHC 33.7     Lipid Panel:     Recent Labs  Lab 03/18/17  1609   CHOL 175   LDLCALC 123.8   HDL 36*   TRIG 76     Coagulation:   Recent Labs  Lab 03/18/17  1752 03/23/17  0308   INR  --   < > 1.0   APTT 28.5  --   --    < > = values in this interval not displayed.  Platelet Aggregation Study: No results for input(s): PLTAGG, PLTAGINTERP, PLTAGREGLACO, ADPPLTAGGREG in the last 168 hours.  Hgb A1C:     Recent Labs  Lab 03/18/17 1752   HGBA1C 5.7     TSH:     Recent Labs  Lab 03/18/17  1609   TSH 0.169*       Diagnostic Results:  CT head 03/23/17  -stable left mca/gay infarct   - stable hemorrhage with IVH       CT head 03/20/17  -Stable L MCA infarct with hemorrhagic conversion due to tissue reperfusion injury  -cytotoxic edema    CTA stroke multiphase 3/18/17  -Occlusion left cervical ICA   - occlusion of the left M1 segment of the MCA proximally.     CTA neck: Atherosclerotic disease carotid bifurcations and proximal ICAs bilaterally with high-grade  stenosis or  of the left proximal ICA and more distal left cervical ICA.    There is high-grade stenosis of the right ICA at its origin with only trace flow identified.    Atherosclerotic disease of the origin the vertebral arteries the dominant left vertebral artery.     .  Echo 3/21/17   Normal LA   EF 55%  Sclerotic aortic valve ( on 03/19/17 suspicious for mass)        Assessment/Plan:     61 year old male with HTN, HLD, and CAD and L MCA stroke symptoms post tpa and transferred here for higher level of care and potential thrombectomy. CTA multiphase upon arrival.   Patient found to have severe L carotid artery stenosis.  Angioplasty with stent performed and thrombectomy of L MCA occlusion.  Patient found to have L MCA stroke with reperfusion injury.   Possible aortic vegetation seen on echo but on repeat echo showed sclerotic aorta.      03/22/17  Patient drowsy, follows simple commands, unable to move RUE      03/23/17  More awake today.  Temperature and leukocytosis resolving with vancomycin and cefepime.          * Thrombotic stroke involving left middle cerebral artery  Arrived and had STAT cta multiphase - patient with critical carotid stenosis, still with significant deficit post tPA -treated with IR thrombectomy.   Admit to [facility]     Antithrombotics for secondary stroke prevention:okay to start aspirin.   Plan to start plavix 03/24  Statins for secondary stroke prevention and hyperlipidemia, if present: Atorvastatin- 40 mg oral daily  Aggressive risk factor modification: Hypertension, High Cholesterol and Obesity  VTE Prophylaxis: SCDs, okay to start heparin 5k q8 hours  BP Parameters: SBP <180  Nursing Orders: Neuro checks- per tpa protocol, Antiembolic stockings, Swallowing evaluation by Nursing, Seizure precautions, Avoid Rucker catheter, Pneumatic compression device, Stroke Education, Blood glucose target 100-130, Up with assistance     Rehab teams recommending snf       Cytotoxic cerebral edema  Evident on imaging     Stroke due to stenosis of left carotid artery  Atheroembolic stroke of L ICA to L MCA  S/p tpa stent of L ICA and thrombectomy of L MCA    Aphasia  Given tpa , status post thrombectomy   Due to stroke above  Aggressive PT/OT/ speech when appropriate    Abnormality of aortic valve  Found on echo.   As per NCC  Vegitation vs mass repeat echo normal  Recommend clarification with cardiology on echo read     Right sided weakness  Due to stroke above  Aggressive PT/OT/ speech    Essential hypertension  Stroke RF  <160        Edwige Monaco PA-C  Comprehensive Stroke Center  Department of Vascular Neurology   [facility]

## 2017-03-24 NOTE — PT/OT/SLP PROGRESS
Physical Therapy  Treatment    Nathanael Velez   MRN: [id number]   Admitting Diagnosis: Thrombotic stroke involving left middle cerebral artery    PT Received On: 17  PT Start Time: 1147     PT Stop Time: 1215    PT Total Time (min): 28 min       Billable Minutes:  Therapeutic Activity 28    Treatment Type: Treatment  PT/PTA: PT       General Precautions: Standard, aspiration, aphasia, fall, NPO  Orthopedic Precautions: N/A   Braces: N/A    Subjective:  Communicated with ADINA Dill prior to session.  Pt attempting to vocalizing, unable to decipher words.   Pt's son present at end of session.    Pain Ratin/10 (no s/s of distress noted)  Pain Rating Post-Intervention: 0/10    Objective:   Patient found with: blood pressure cuff, pulse ox (continuous), telemetry, restraints, NG tube, oxygen (condom catheter, LUE wrist restraint; 2L nasal cannula)    Functional Mobility:  Bed Mobility:    Supine <> Sit: From right sidelying: Minimal Assistance   Sit <> Supine: To right sidelying: Minimal Assistance   Scooting to HOB: Stand-by Assistance via supine bridges     Sitting Balance at Edge of Bed:   Assistance Level Required: Contact Guard Assistance   Time: 5 minutes   Postural deviations noted: Rounded shoulder and Head forward   Encouraged: RUE weight bearing   Comments: Pt with forward flexed posture    Transfers:    Sit <> Stand: x3 trials from EOB with Minimal Assistance with No Assistive Device and RUE HHA    Stand <> Sit: x3 trials to EOB with Minimal Assistance with No Assistive Device   Comments: Pt required tactile cues and support of RUE and R hip extension.     Gait:   Distance Ambulated: 10 steps forward/backward and to the right side steps    Assistance level: Minimal Assistance   Assistive Device used:  RUE support   Gait Pattern: 2-point gait   Gait Deviation(s): decreased hany, decreased step length, decreased swing-to-stance ratio and decreased weight-shifting ability   Impairments due to: decreased  strength, decreased balance, decreased safety awareness   Comments: Pt with forward flexed posture, requires multiple tactile cues for sequence and path. Pt able to follow commands.    Therapeutic Activities and Exercises:  Pt able to stand at EOB with RUE in weight bearing on bedside table. Pt able to turn pages of magazine with L hand. Pt standing for ~ 3 minutes with CGA.    Supine: RLE AAROM x 10 reps in all planes through available pain-free ROM. Exercises performed to develop and maintain pt's  endurance, ROM and flexibility.     Education:  Patient provided with daily orientation and goals of this PT session. Patient and family agreed to participate in session.Patient and family aware of patient's deficits and therapy progression. They were educated to transfer with therapy only. They were provided and educated on proper positioning in supine and in sitting with support of affected RUE extremities in order to increase awareness of extremity and to decrease the effects of immobility, specifically edema and pain. Time provided for therapeutic counseling and discussion of health disposition. All questions answered to patient's and family's satisfaction, within scope of PT practice; voiced no other concerns. White board updated in patient's room, RN notified of session.    AM-PAC 6 CLICK MOBILITY  How much help from another person does this patient currently need?   1 = Unable, Total/Dependent Assistance  2 = A lot, Maximum/Moderate Assistance  3 = A little, Minimum/Contact Guard/Supervision  4 = None, Modified [place]/Independent    Turning over in bed (including adjusting bedclothes, sheets and blankets)?: 3  Sitting down on and standing up from a chair with arms (e.g., wheelchair, bedside commode, etc.): 3  Moving from lying on back to sitting on the side of the bed?: 3  Moving to and from a bed to a chair (including a wheelchair)?: 3  Need to walk in hospital room?: 2  Climbing 3-5 steps with a railing?:  2  Total Score: 16    AM-PAC Raw Score CMS G-Code Modifier Level of Impairment Assistance   6 % Total / Unable   7 - 9 CM 80 - 100% Maximal Assist   10 - 14 CL 60 - 80% Moderate Assist   15 - 19 CK 40 - 60% Moderate Assist   20 - 22 CJ 20 - 40% Minimal Assist   23 CI 1-20% SBA / CGA   24 CH 0% Independent/ Mod I     Patient left supine with all lines intact, call button in reach, RN notified and son present.    Assessment:  Nathanael Velez is a 61 y.o. male with a medical diagnosis of Thrombotic stroke involving left middle cerebral artery. Patient is making progress towards goals, participating well in this session. Pt continues to require significant assist with stepping and pre gait activities. Pt with noted improvement with balance and trace R elbow flexion. Mr. Velez's deficits affect their ability to safely and independently participate in self-care tasks and functional mobility. Due to his physical therapy diagnosis of debility and deconditioning, they continue to benefit from acute PT services to address the following limitations: high fall risk, weakness, instability, the need for supervised instructions of exercise, and the decreased ability to perform functional activities which they were able to complete PTA. Education was provided to patient & family regarding importance of continued participation in therapy, patient's progress and discharge disposition. Pt would benefit from IP Rehab upon discharge to improve quality of life and focus on recovery of impairments.       Rehab identified problem list/impairments: Rehab identified problem list/impairments: weakness, impaired self care skills, impaired functional mobilty, gait instability, impaired balance, impaired cognition, decreased upper extremity function, decreased lower extremity function, decreased safety awareness (decreased communication)    Rehab potential is good.    Activity tolerance: Good    Discharge recommendations: Discharge  Facility/Level Of Care Needs: rehabilitation facility     Barriers to discharge: Barriers to Discharge: Inaccessible home environment, Decreased caregiver support    Equipment recommendations: Equipment Needed After Discharge: 3-in-1 commode, bath bench, walker, rolling, wheelchair     GOALS:   Physical Therapy Goals        Problem: Physical Therapy Goal    Goal Priority Disciplines Outcome Goal Variances Interventions   Physical Therapy Goal     PT/OT, PT Ongoing (interventions implemented as appropriate)     Description:  Goals to be met by: 3/31/17    1. Pt supine to sit with moderate assist- MET 3/21  Updated: Supine to sit to Minimal Assist. MET  Revised: supine to sit with CGA.  2. Pt sit to supine with moderate assist-MET 3/21  Updated: Sit to supine with Supervision. NOT MET  3. Pt sit to stand with appropriate AD with moderate assist. Met  Revised: sit to stand with QCN with CGA.  4. Pt to perform gait 5ft with appropriate AD with max assist.-not met  5. Pt to tolerate sitting on the EOB 20 min  with CGA.-not met  6. Pt to transfer bed to/from bedside chair with max assist.-not met  7. Pt to perform B LE exs in sitting or supine x 10 reps with AAROM on R LE. MET  Revised: Able to tolerate exercise for 15-20 reps with independence.  8. Dynamic sitting at edge of bed x 8 minutes with Stand-by Assistance.  9. Dynamic standing for 2 minutes with Contact Guard Assistance using Quad Cane.  10. Patient and family able to teachback stroke & positioning education independently.  11. Wheelchair propulsion x 100 feet with Minimal Assistance using bilateral lower extremities                      PLAN:    Patient to be seen 6 x/week  to address the above listed problems via gait training, therapeutic activities, therapeutic exercises, neuromuscular re-education, wheelchair management/training  Plan of Care expires: 04/18/17  Plan of Care reviewed with: patient     Idania DAVE Piter PT, DPT  3/24/2017  [phone]

## 2017-03-24 NOTE — PLAN OF CARE
Problem: Physical Therapy Goal  Goal: Physical Therapy Goal  Goals to be met by: 3/31/17    1. Pt supine to sit with moderate assist- MET 3/21  Updated: Supine to sit to Minimal Assist. MET  Revised: supine to sit with CGA.  2. Pt sit to supine with moderate assist-MET 3/21  Updated: Sit to supine with Supervision. NOT MET  3. Pt sit to stand with appropriate AD with moderate assist. Met  Revised: sit to stand with QCN with CGA.  4. Pt to perform gait 5ft with appropriate AD with max assist.-not met  5. Pt to tolerate sitting on the EOB 20 min with CGA.-not met  6. Pt to transfer bed to/from bedside chair with max assist.-not met  7. Pt to perform B LE exs in sitting or supine x 10 reps with AAROM on R LE. MET  Revised: Able to tolerate exercise for 15-20 reps with independence.  8. Dynamic sitting at edge of bed x 8 minutes with Stand-by Assistance.  9. Dynamic standing for 2 minutes with Contact Guard Assistance using Quad Cane.  10. Patient and family able to teachback stroke & positioning education independently.  11. Wheelchair propulsion x 100 feet with Minimal Assistance using bilateral lower extremities       Outcome: Ongoing (interventions implemented as appropriate)  Pt would benefit from Rehab upon discharge. Pt progressing well towards goals.  Idania Dumas PT, DPT  3/24/2017  [phone]

## 2017-03-25 PROBLEM — I63.239 SYMPTOMATIC CAROTID ARTERY STENOSIS WITH INFARCTION: Status: ACTIVE | Noted: 2017-03-25

## 2017-03-25 PROBLEM — I65.29 CAROTID ARTERY STENOSIS, SYMPTOMATIC: Status: ACTIVE | Noted: 2017-03-25

## 2017-03-25 LAB
ALBUMIN SERPL BCP-MCNC: 2.7 G/DL
ALLENS TEST: ABNORMAL
ALP SERPL-CCNC: 66 U/L
ALT SERPL W/O P-5'-P-CCNC: 24 U/L
ANION GAP SERPL CALC-SCNC: 12 MMOL/L
AST SERPL-CCNC: 23 U/L
BACTERIA BLD CULT: NORMAL
BACTERIA BLD CULT: NORMAL
BASOPHILS # BLD AUTO: 0.06 K/UL
BASOPHILS NFR BLD: 0.4 %
BILIRUB SERPL-MCNC: 0.9 MG/DL
BUN SERPL-MCNC: 34 MG/DL
CALCIUM SERPL-MCNC: 9 MG/DL
CHLORIDE SERPL-SCNC: 118 MMOL/L
CO2 SERPL-SCNC: 21 MMOL/L
CREAT SERPL-MCNC: 0.8 MG/DL
DELSYS: ABNORMAL
DIFFERENTIAL METHOD: ABNORMAL
EOSINOPHIL # BLD AUTO: 0.1 K/UL
EOSINOPHIL NFR BLD: 0.3 %
ERYTHROCYTE [DISTWIDTH] IN BLOOD BY AUTOMATED COUNT: 14.3 %
EST. GFR  (AFRICAN AMERICAN): >60 ML/MIN/1.73 M^2
EST. GFR  (NON AFRICAN AMERICAN): >60 ML/MIN/1.73 M^2
FLOW: 4
GLUCOSE SERPL-MCNC: 131 MG/DL
HCO3 UR-SCNC: 20 MMOL/L (ref 24–28)
HCT VFR BLD AUTO: 35.2 %
HGB BLD-MCNC: 11.9 G/DL
INR PPP: 1.1
LYMPHOCYTES # BLD AUTO: 1.5 K/UL
LYMPHOCYTES NFR BLD: 10.5 %
MAGNESIUM SERPL-MCNC: 1.9 MG/DL
MCH RBC QN AUTO: 31.6 PG
MCHC RBC AUTO-ENTMCNC: 33.8 %
MCV RBC AUTO: 93 FL
MODE: ABNORMAL
MONOCYTES # BLD AUTO: 1.3 K/UL
MONOCYTES NFR BLD: 9.2 %
NEUTROPHILS # BLD AUTO: 11.4 K/UL
NEUTROPHILS NFR BLD: 79.6 %
PCO2 BLDA: 25.3 MMHG (ref 35–45)
PH SMN: 7.51 [PH] (ref 7.35–7.45)
PHOSPHATE SERPL-MCNC: 4.1 MG/DL
PLATELET # BLD AUTO: 189 K/UL
PLATELET BLD QL SMEAR: ABNORMAL
PMV BLD AUTO: 10.9 FL
PO2 BLDA: 50 MMHG (ref 80–100)
POC BE: -3 MMOL/L
POC SATURATED O2: 89 % (ref 95–100)
POC TCO2: 21 MMOL/L (ref 23–27)
POCT GLUCOSE: 134 MG/DL (ref 70–110)
POCT GLUCOSE: 147 MG/DL (ref 70–110)
POCT GLUCOSE: 152 MG/DL (ref 70–110)
POCT GLUCOSE: 177 MG/DL (ref 70–110)
POTASSIUM SERPL-SCNC: 3.5 MMOL/L
PROT SERPL-MCNC: 6.1 G/DL
PROTHROMBIN TIME: 11.2 SEC
RBC # BLD AUTO: 3.77 M/UL
SAMPLE: ABNORMAL
SITE: ABNORMAL
SODIUM SERPL-SCNC: 148 MMOL/L
SODIUM SERPL-SCNC: 149 MMOL/L
SODIUM SERPL-SCNC: 150 MMOL/L
SODIUM SERPL-SCNC: 150 MMOL/L
SODIUM SERPL-SCNC: 151 MMOL/L
SP02: 92
VANCOMYCIN SERPL-MCNC: 6.5 UG/ML
WBC # BLD AUTO: 14.4 K/UL

## 2017-03-25 PROCEDURE — 84100 ASSAY OF PHOSPHORUS: CPT

## 2017-03-25 PROCEDURE — 25000242 PHARM REV CODE 250 ALT 637 W/ HCPCS: Performed by: NURSE PRACTITIONER

## 2017-03-25 PROCEDURE — 99233 SBSQ HOSP IP/OBS HIGH 50: CPT | Mod: ,,, | Performed by: PSYCHIATRY & NEUROLOGY

## 2017-03-25 PROCEDURE — 97116 GAIT TRAINING THERAPY: CPT

## 2017-03-25 PROCEDURE — 80053 COMPREHEN METABOLIC PANEL: CPT

## 2017-03-25 PROCEDURE — 25000003 PHARM REV CODE 250: Performed by: NURSE PRACTITIONER

## 2017-03-25 PROCEDURE — 25000003 PHARM REV CODE 250: Performed by: PHYSICIAN ASSISTANT

## 2017-03-25 PROCEDURE — 63600175 PHARM REV CODE 636 W HCPCS: Performed by: NURSE PRACTITIONER

## 2017-03-25 PROCEDURE — 25000003 PHARM REV CODE 250: Performed by: PSYCHIATRY & NEUROLOGY

## 2017-03-25 PROCEDURE — 25000003 PHARM REV CODE 250: Performed by: STUDENT IN AN ORGANIZED HEALTH CARE EDUCATION/TRAINING PROGRAM

## 2017-03-25 PROCEDURE — 94640 AIRWAY INHALATION TREATMENT: CPT

## 2017-03-25 PROCEDURE — 94761 N-INVAS EAR/PLS OXIMETRY MLT: CPT

## 2017-03-25 PROCEDURE — 80202 ASSAY OF VANCOMYCIN: CPT

## 2017-03-25 PROCEDURE — 84295 ASSAY OF SERUM SODIUM: CPT

## 2017-03-25 PROCEDURE — 63600175 PHARM REV CODE 636 W HCPCS: Performed by: STUDENT IN AN ORGANIZED HEALTH CARE EDUCATION/TRAINING PROGRAM

## 2017-03-25 PROCEDURE — 31720 CLEARANCE OF AIRWAYS: CPT

## 2017-03-25 PROCEDURE — 36600 WITHDRAWAL OF ARTERIAL BLOOD: CPT

## 2017-03-25 PROCEDURE — 83735 ASSAY OF MAGNESIUM: CPT

## 2017-03-25 PROCEDURE — 97530 THERAPEUTIC ACTIVITIES: CPT

## 2017-03-25 PROCEDURE — 20000000 HC ICU ROOM

## 2017-03-25 PROCEDURE — 63600175 PHARM REV CODE 636 W HCPCS: Performed by: PHYSICIAN ASSISTANT

## 2017-03-25 PROCEDURE — 84295 ASSAY OF SERUM SODIUM: CPT | Mod: 91

## 2017-03-25 PROCEDURE — 51798 US URINE CAPACITY MEASURE: CPT

## 2017-03-25 PROCEDURE — 25000242 PHARM REV CODE 250 ALT 637 W/ HCPCS: Performed by: STUDENT IN AN ORGANIZED HEALTH CARE EDUCATION/TRAINING PROGRAM

## 2017-03-25 PROCEDURE — 85025 COMPLETE CBC W/AUTO DIFF WBC: CPT

## 2017-03-25 PROCEDURE — 99900035 HC TECH TIME PER 15 MIN (STAT)

## 2017-03-25 PROCEDURE — 94668 MNPJ CHEST WALL SBSQ: CPT

## 2017-03-25 PROCEDURE — 82803 BLOOD GASES ANY COMBINATION: CPT

## 2017-03-25 PROCEDURE — 27000221 HC OXYGEN, UP TO 24 HOURS

## 2017-03-25 PROCEDURE — 85610 PROTHROMBIN TIME: CPT

## 2017-03-25 RX ORDER — CLOPIDOGREL BISULFATE 75 MG/1
75 TABLET ORAL DAILY
Status: DISCONTINUED | OUTPATIENT
Start: 2017-03-26 | End: 2017-04-05

## 2017-03-25 RX ORDER — CLOPIDOGREL BISULFATE 75 MG/1
75 TABLET ORAL DAILY
Status: DISCONTINUED | OUTPATIENT
Start: 2017-03-25 | End: 2017-03-25

## 2017-03-25 RX ORDER — SODIUM CHLORIDE 234 MG/ML
30 INJECTION, SOLUTION INTRAVENOUS ONCE
Status: COMPLETED | OUTPATIENT
Start: 2017-03-25 | End: 2017-03-25

## 2017-03-25 RX ORDER — IPRATROPIUM BROMIDE AND ALBUTEROL SULFATE 2.5; .5 MG/3ML; MG/3ML
3 SOLUTION RESPIRATORY (INHALATION) EVERY 6 HOURS
Status: DISCONTINUED | OUTPATIENT
Start: 2017-03-25 | End: 2017-03-31

## 2017-03-25 RX ADMIN — Medication 10 ML: at 05:03

## 2017-03-25 RX ADMIN — IPRATROPIUM BROMIDE AND ALBUTEROL SULFATE 3 ML: .5; 3 SOLUTION RESPIRATORY (INHALATION) at 11:03

## 2017-03-25 RX ADMIN — ATORVASTATIN CALCIUM 80 MG: 20 TABLET, FILM COATED ORAL at 08:03

## 2017-03-25 RX ADMIN — Medication 10 ML: at 12:03

## 2017-03-25 RX ADMIN — HEPARIN SODIUM 5000 UNITS: 5000 INJECTION, SOLUTION INTRAVENOUS; SUBCUTANEOUS at 09:03

## 2017-03-25 RX ADMIN — IPRATROPIUM BROMIDE AND ALBUTEROL SULFATE 3 ML: .5; 3 SOLUTION RESPIRATORY (INHALATION) at 12:03

## 2017-03-25 RX ADMIN — LABETALOL HYDROCHLORIDE 200 MG: 200 TABLET, FILM COATED ORAL at 01:03

## 2017-03-25 RX ADMIN — POLYETHYLENE GLYCOL 3350 17 G: 17 POWDER, FOR SOLUTION ORAL at 08:03

## 2017-03-25 RX ADMIN — AMLODIPINE BESYLATE 10 MG: 10 TABLET ORAL at 08:03

## 2017-03-25 RX ADMIN — PIPERACILLIN SODIUM AND TAZOBACTAM SODIUM 4.5 G: 4; .5 INJECTION, POWDER, FOR SOLUTION INTRAVENOUS at 09:03

## 2017-03-25 RX ADMIN — HEPARIN SODIUM 5000 UNITS: 5000 INJECTION, SOLUTION INTRAVENOUS; SUBCUTANEOUS at 01:03

## 2017-03-25 RX ADMIN — LOSARTAN POTASSIUM 100 MG: 50 TABLET, FILM COATED ORAL at 08:03

## 2017-03-25 RX ADMIN — PIPERACILLIN SODIUM AND TAZOBACTAM SODIUM 4.5 G: 4; .5 INJECTION, POWDER, FOR SOLUTION INTRAVENOUS at 01:03

## 2017-03-25 RX ADMIN — HYDRALAZINE HYDROCHLORIDE 10 MG: 20 INJECTION INTRAMUSCULAR; INTRAVENOUS at 09:03

## 2017-03-25 RX ADMIN — ALBUTEROL SULFATE 2.5 MG: 2.5 SOLUTION RESPIRATORY (INHALATION) at 07:03

## 2017-03-25 RX ADMIN — ALBUTEROL SULFATE 2.5 MG: 2.5 SOLUTION RESPIRATORY (INHALATION) at 01:03

## 2017-03-25 RX ADMIN — ACETAMINOPHEN 650 MG: 325 TABLET ORAL at 07:03

## 2017-03-25 RX ADMIN — LABETALOL HYDROCHLORIDE 200 MG: 200 TABLET, FILM COATED ORAL at 05:03

## 2017-03-25 RX ADMIN — STANDARDIZED SENNA CONCENTRATE AND DOCUSATE SODIUM 1 TABLET: 8.6; 5 TABLET, FILM COATED ORAL at 09:03

## 2017-03-25 RX ADMIN — ACETYLCYSTEINE 4 ML: 100 INHALANT RESPIRATORY (INHALATION) at 01:03

## 2017-03-25 RX ADMIN — ACETAMINOPHEN 650 MG: 325 TABLET ORAL at 11:03

## 2017-03-25 RX ADMIN — IPRATROPIUM BROMIDE AND ALBUTEROL SULFATE 3 ML: .5; 3 SOLUTION RESPIRATORY (INHALATION) at 07:03

## 2017-03-25 RX ADMIN — ACETYLCYSTEINE 4 ML: 100 INHALANT RESPIRATORY (INHALATION) at 07:03

## 2017-03-25 RX ADMIN — SODIUM CHLORIDE 120 MEQ: 234 INJECTION INTRAMUSCULAR; INTRAVENOUS; SUBCUTANEOUS at 08:03

## 2017-03-25 RX ADMIN — ACETYLCYSTEINE 4 ML: 100 INHALANT RESPIRATORY (INHALATION) at 11:03

## 2017-03-25 RX ADMIN — ACETYLCYSTEINE 4 ML: 100 INHALANT RESPIRATORY (INHALATION) at 12:03

## 2017-03-25 RX ADMIN — Medication 3 ML: at 09:03

## 2017-03-25 RX ADMIN — POTASSIUM CHLORIDE 40 MEQ: 20 SOLUTION ORAL at 05:03

## 2017-03-25 RX ADMIN — HEPARIN SODIUM 5000 UNITS: 5000 INJECTION, SOLUTION INTRAVENOUS; SUBCUTANEOUS at 05:03

## 2017-03-25 RX ADMIN — VANCOMYCIN HYDROCHLORIDE 1000 MG: 1 INJECTION, POWDER, LYOPHILIZED, FOR SOLUTION INTRAVENOUS at 04:03

## 2017-03-25 RX ADMIN — ASPIRIN 81 MG CHEWABLE TABLET 81 MG: 81 TABLET CHEWABLE at 08:03

## 2017-03-25 RX ADMIN — SODIUM CHLORIDE 120 MEQ: 234 INJECTION INTRAMUSCULAR; INTRAVENOUS; SUBCUTANEOUS at 02:03

## 2017-03-25 RX ADMIN — LABETALOL HYDROCHLORIDE 200 MG: 200 TABLET, FILM COATED ORAL at 09:03

## 2017-03-25 RX ADMIN — Medication 3 ML: at 05:03

## 2017-03-25 RX ADMIN — Medication 10 ML: at 11:03

## 2017-03-25 RX ADMIN — PIPERACILLIN SODIUM AND TAZOBACTAM SODIUM 4.5 G: 4; .5 INJECTION, POWDER, FOR SOLUTION INTRAVENOUS at 05:03

## 2017-03-25 RX ADMIN — STANDARDIZED SENNA CONCENTRATE AND DOCUSATE SODIUM 1 TABLET: 8.6; 5 TABLET, FILM COATED ORAL at 08:03

## 2017-03-25 RX ADMIN — ACETAMINOPHEN 650 MG: 325 TABLET ORAL at 04:03

## 2017-03-25 NOTE — PROGRESS NOTES
[facility]  Vascular Neurology  Comprehensive Stroke Center  Progress Note      Neurologic Chief Complaint: L MCA ischemic stroke    Subjective:     Interval History: Patient is seen for follow-up neurological assessment and treatment recommendations: NAEON. Still watching respiratory status. Patient otherwise neurologically improved.     HPI, Past Medical, Family, and Social History remains the same as documented in the initial encounter.     Review of Systems   Constitutional: Negative for fever.   Eyes: Negative for visual disturbance.   Respiratory: Positive for shortness of breath and wheezing.    Gastrointestinal: Negative for nausea.   Neurological: Positive for speech difficulty.   Psychiatric/Behavioral: Negative for agitation.     Scheduled Meds:   acetylcysteine 100 mg/ml (10%)  4 mL Nebulization Q6H    albuterol-ipratropium 2.5mg-0.5mg/3mL  3 mL Nebulization Q6H    amlodipine  10 mg Per NG tube Daily    aspirin  81 mg Per NG tube Daily    atorvastatin  80 mg Per NG tube Daily    heparin (porcine)  5,000 Units Subcutaneous Q8H    labetalol  200 mg Per NG tube Q8H    losartan  100 mg Per NG tube Daily    piperacillin-tazobactam 4.5 g in dextrose 5 % 100 mL IVPB (ready to mix system)  4.5 g Intravenous Q8H    polyethylene glycol  17 g Per NG tube Daily    senna-docusate 8.6-50 mg  1 tablet Per NG tube BID    sodium chloride 0.9%  10 mL Intravenous Q6H    sodium chloride 0.9%  10 mL Intravenous Q6H    sodium chloride 0.9%  3 mL Intravenous Q8H    vancomycin (VANCOCIN) IVPB  15 mg/kg (Dosing Weight) Intravenous Q12H     Continuous Infusions:   nicardipine Stopped (03/24/17 3575)     PRN Meds:acetaminophen, hydrALAZINE, magnesium sulfate IVPB, magnesium sulfate IVPB, ondansetron, potassium chloride 10%, potassium chloride 10%, potassium chloride 10%, potassium, sodium phosphates, potassium, sodium phosphates, potassium, sodium phosphates, buffered 2% sodium acetate 86meq,  sodium chloride 86meq, sterile water for inj IV soln, Flushing PICC Protocol **AND** sodium chloride 0.9% **AND** sodium chloride 0.9%, Flushing PICC Protocol **AND** sodium chloride 0.9% **AND** sodium chloride 0.9%    Objective:     Vital Signs (Most Recent):  Temp: 98.2 °F (36.8 °C) (17 1100)  Pulse: 65 (17 1200)  Resp: (!) 27 (17 1200)  BP: 127/86 (17 1200)  SpO2: 95 % (17 1200)  BP Location: Right leg    Vital Signs Range (Last 24H):  Temp:  [98.1 °F (36.7 °C)-100.2 °F (37.9 °C)]   Pulse:  [58-77]   Resp:  [16-30]   BP: ()/(58-87)   SpO2:  [93 %-99 %]   BP Location: Right leg    Physical Exam   Constitutional: He appears well-developed and well-nourished.   HENT:   Head: Normocephalic and atraumatic.   Eyes: Conjunctivae and EOM are normal. Pupils are equal, round, and reactive to light.   Neck: Normal range of motion.   Cardiovascular: Normal rate.    Pulmonary/Chest: Tachypnea noted. He has wheezes.   Neurological: He is alert.   Skin: Skin is warm and dry.   Psychiatric: He has a normal mood and affect.       Neurological Exam:   LOC: alert and follows requests  Language: Global aphasia  Speech: Dysarthria  Orientation: Aphasic  EOM (CN III, IV, VI): mild L gaze preference   Facial Movement (CN VII): lower weakness right lower  Motor*: Arm Left:  Normal (5/5), Leg Left:   Normal (5/5), Arm Right:   Normal (5/5), Leg Right:   Paretic:  4/5  Sensation: intact to light touch, temperature and vibration    NIH Stroke Scale:    Level of Consciousness: 0 - alert  LOC Questions: 2 - answers none correctly  LOC Commands: 0 - performs both correctly  Best Gaze: 1 - partial gaze palsy  Visual: 0 - no visual loss  Facial Palsy: 1 - minor  Motor Left Arm: 0 - no drift  Motor Right Arm: 1 - drift  Motor Left Le - no drift  Motor Right Le - no drift  Limb Ataxia: 0 - absent  Sensory: 0 - normal  Best Language: 2 - severe aphasia  Dysarthria: 2 - near to unintelligible  Extinction  and Inattention: 0 - no neglect  NIH Stroke Scale Total: 9        Laboratory:  CMP:   Recent Labs  Lab 03/25/17  0248 03/25/17  0535   CALCIUM 9.0  --    ALBUMIN 2.7*  --    PROT 6.1  --    * 150*   K 3.5  --    CO2 21*  --    *  --    BUN 34*  --    CREATININE 0.8  --    ALKPHOS 66  --    ALT 24  --    AST 23  --    BILITOT 0.9  --      BMP:   Recent Labs  Lab 03/25/17  0248 03/25/17  0535   * 150*   K 3.5  --    *  --    CO2 21*  --    BUN 34*  --    CREATININE 0.8  --    CALCIUM 9.0  --      CBC:   Recent Labs  Lab 03/25/17 0248   WBC 14.40*   RBC 3.77*   HGB 11.9*   HCT 35.2*      MCV 93   MCH 31.6*   MCHC 33.8     Lipid Panel:   Recent Labs  Lab 03/18/17  1609   CHOL 175   LDLCALC 123.8   HDL 36*   TRIG 76     Coagulation:   Recent Labs  Lab 03/18/17 1752 03/25/17  0248   INR  --   < > 1.1   APTT 28.5  --   --    < > = values in this interval not displayed.  Platelet Aggregation Study: No results for input(s): PLTAGG, PLTAGINTERP, PLTAGREGLACO, ADPPLTAGGREG in the last 168 hours.  Hgb A1C:   Recent Labs  Lab 03/18/17  1752   HGBA1C 5.7     TSH:   Recent Labs  Lab 03/18/17  1609   TSH 0.169*       Diagnostic Results:  I have personally reviewed: CT Head. Date: 3/23/17  Findings: Stable; evolution of L MCA/DARIEN infarct w/ L BG ICH and IVH           Assessment/Plan:     61 year old male with HTN, HLD, and CAD and L MCA stroke symptoms post tpa and transferred here for higher level of care and potential thrombectomy. CTA multiphase upon arrival.   Patient found to have severe L carotid artery stenosis.  Angioplasty with stent performed and thrombectomy of L MCA occlusion.  Patient found to have L MCA stroke with reperfusion injury.  Possible aortic vegetation seen on echo but on repeat echo showed sclerotic aorta.      03/22/17  Patient drowsy, follows simple commands, unable to move RUE      03/23/17  More awake today.  Temperature and leukocytosis resolving with vancomycin and  cefepime.      3/24/17  NAEON. Still with fever overnight.     3/25/17 Patient much more alert and interactive with exam. Still with labored breathing        * Thrombotic stroke involving left middle cerebral artery  Arrived and had STAT cta multiphase - patient with critical carotid stenosis, still with significant deficit post tPA -treated with IR thrombectomy & stent placed in L carotid.     Antithrombotics for secondary stroke prevention:  Aspirin 81mg, plavix recommended to start 03/24  Statins for secondary stroke prevention and hyperlipidemia, if present: Atorvastatin- 80 mg oral daily  Aggressive risk factor modification: Hypertension, High Cholesterol and Obesity  VTE Prophylaxis: SCDs, heparin 5k q8 hours  BP Parameters: SBP <150  Nursing Orders: Neuro checks- per tpa protocol, Antiembolic stockings, Swallowing evaluation by Nursing, Seizure precautions, Avoid Rucker catheter, Pneumatic compression device, Stroke Education, Blood glucose target 100-130, Up with assistance   -ECHO initially with sclerotic lesion vs vegetation of the aortic valve. Repeat ECHO suggested valve is just sclerotic.     Rehab teams recommending Rehab facility     Right sided weakness  Due to stroke above  Aggressive PT/OT/ speech    Aphasia  Given tpa , status post thrombectomy   Due to stroke above  Aggressive PT/OT/ speech when appropriate    Essential hypertension  Stroke RF  <150 (s/p tPA and hemorrhagic conversion)      Abnormality of aortic valve  Found on echo.   As per [facility]  Vegitation vs mass - repeat echo normal  Recommend clarification with cardiology on echo read     Cytotoxic cerebral edema  Evident on imaging     Stroke due to stenosis of left carotid artery  Atheroembolic stroke of L ICA to L MCA  S/p tpa stent of L ICA and thrombectomy of L MCA   Hemorrhagic conversion of L BG with IVH extension      Arlet Belcher PA-C  Comprehensive Stroke Center  Department of Vascular Neurology   [facility]

## 2017-03-25 NOTE — PROGRESS NOTES
[facility]  Vascular Neurology  Comprehensive Stroke Center  Progress Note      Neurologic Chief Complaint:  LMCA syndrome    Subjective:     Interval History: Patient is seen for follow-up neurological assessment and treatment recommendations: JOHNATHANEON. Occasional speech     HPI, Past Medical, Family, and Social History remains the same as documented in the initial encounter.     Review of Systems   Constitutional: Positive for fever.   Eyes: Negative for visual disturbance.   Respiratory: Positive for shortness of breath and wheezing.    Gastrointestinal: Negative for nausea.   Neurological: Positive for speech difficulty.   Psychiatric/Behavioral: Negative for agitation.     Scheduled Meds:   acetylcysteine 100 mg/ml (10%)  4 mL Nebulization Q6H    albuterol sulfate  2.5 mg Nebulization Q6H    [START ON 3/25/2017] amlodipine  10 mg Per NG tube Daily    aspirin  81 mg Per NG tube Daily    atorvastatin  80 mg Per NG tube Daily    heparin (porcine)  5,000 Units Subcutaneous Q8H    labetalol  200 mg Per NG tube Q8H    losartan  100 mg Per NG tube Daily    phenylephrine HCl in 0.9% NaCl        piperacillin-tazobactam 4.5 g in dextrose 5 % 100 mL IVPB (ready to mix system)  4.5 g Intravenous Q8H    polyethylene glycol  17 g Per NG tube Daily    senna-docusate 8.6-50 mg  1 tablet Per NG tube BID    sodium chloride 0.9%  10 mL Intravenous Q6H    sodium chloride 0.9%  10 mL Intravenous Q6H    sodium chloride 0.9%  3 mL Intravenous Q8H    [START ON 3/25/2017] vancomycin (VANCOCIN) IVPB  15 mg/kg (Dosing Weight) Intravenous Q12H     Continuous Infusions:   nicardipine Stopped (03/24/17 1605)     PRN Meds:acetaminophen, hydrALAZINE, magnesium sulfate IVPB, magnesium sulfate IVPB, ondansetron, potassium chloride 10%, potassium chloride 10%, potassium chloride 10%, potassium, sodium phosphates, potassium, sodium phosphates, potassium, sodium phosphates, buffered 2% sodium acetate 86meq, sodium  chloride 86meq, sterile water for inj IV soln, Flushing PICC Protocol **AND** sodium chloride 0.9% **AND** sodium chloride 0.9%, Flushing PICC Protocol **AND** sodium chloride 0.9% **AND** sodium chloride 0.9%    Objective:     Vital Signs (Most Recent):  Temp: 99.1 °F (37.3 °C) (17 1505)  Pulse: 69 (17 1805)  Resp: (!) 22 (17 1805)  BP: 134/66 (17 1805)  SpO2: 96 % (17 180)  BP Location: Right leg    Vital Signs Range (Last 24H):  Temp:  [98.9 °F (37.2 °C)-101 °F (38.3 °C)]   Pulse:  [64-83]   Resp:  [21-34]   BP: (116-142)/(57-72)   SpO2:  [91 %-97 %]   BP Location: Right leg    Physical Exam   Constitutional: He appears well-developed and well-nourished.   HENT:   Head: Normocephalic and atraumatic.   Eyes: Pupils are equal, round, and reactive to light.   Cardiovascular: Normal rate.    Pulmonary/Chest:   Breathing mildly labored, occasional cough   Neurological: He is alert.   Drowsy but arousable       Neurological Exam:   LOC: drowsy and follows requests rarely  Language: Global aphasia  Speech: Dysarthria  Orientation: Person  EOM (CN III, IV, VI): Gaze preference left  Facial Movement (CN VII): lower weakness right lower  Hearing (CN VIII): intact bilaterally  Tongue (CN XII): to midline  Motor*: leg drift bilaterally, right arm 3/5   Cerebellar*: Not testable due to unable to follow commands  Sensation: not testable due to decreased LOC    NIH Stroke Scale:    Level of Consciousness: 1 - drowsy  LOC Questions: 2 - answers none correctly  LOC Commands: 2 - performs neither correctly  Best Gaze: 1 - partial gaze palsy  Visual: 2 - complete hemianopia  Facial Palsy: 1 - minor  Motor Left Arm: 0 - no drift  Motor Right Arm: 3 - no effort against gravity  Motor Left Le - drift  Motor Right Le - drift  Limb Ataxia: 0 - absent  Sensory: 0 - normal  Best Language: 2 - severe aphasia  Dysarthria: 2 - near to unintelligible  Extinction and Inattention: 0 - no neglect  NIH  Stroke Scale Total: 18      Laboratory:  CMP:   Recent Labs  Lab 03/24/17  0208  03/24/17  1238   CALCIUM 8.2*  --   --    ALBUMIN 2.7*  --   --    PROT 6.0  --   --    *  < > 152*   K 3.4*  --   --    CO2 20*  --   --    *  --   --    BUN 27*  --   --    CREATININE 0.8  --   --    ALKPHOS 66  --   --    ALT 15  --   --    AST 15  --   --    BILITOT 0.8  --   --    < > = values in this interval not displayed.  BMP:   Recent Labs  Lab 03/24/17  0208  03/24/17  1238   *  < > 152*   K 3.4*  --   --    *  --   --    CO2 20*  --   --    BUN 27*  --   --    CREATININE 0.8  --   --    CALCIUM 8.2*  --   --    < > = values in this interval not displayed.  CBC:   Recent Labs  Lab 03/24/17  0208   WBC 14.70*   RBC 3.78*   HGB 11.8*   HCT 35.6*      MCV 94   MCH 31.2*   MCHC 33.1     Lipid Panel:   Recent Labs  Lab 03/18/17  1609   CHOL 175   LDLCALC 123.8   HDL 36*   TRIG 76     Coagulation:   Recent Labs  Lab 03/18/17  1752  03/24/17  0208   INR  --   < > 1.0   APTT 28.5  --   --    < > = values in this interval not displayed.  Platelet Aggregation Study: No results for input(s): PLTAGG, PLTAGINTERP, PLTAGREGLACO, ADPPLTAGGREG in the last 168 hours.  Hgb A1C:   Recent Labs  Lab 03/18/17  1752   HGBA1C 5.7     TSH:   Recent Labs  Lab 03/18/17  1609   TSH 0.169*       Diagnostic Results:  I have personally reviewed: CT Head. Date: 3/23/17  Findings: Stable L MCA/DARIEN distribution infarct w/ persistent left basal ganglia and IVH to L lateral ventricle         Assessment/Plan:     61 year old male with HTN, HLD, and CAD and L MCA stroke symptoms post tpa and transferred here for higher level of care and potential thrombectomy. CTA multiphase upon arrival.   Patient found to have severe L carotid artery stenosis.  Angioplasty with stent performed and thrombectomy of L MCA occlusion.  Patient found to have L MCA stroke with reperfusion injury.  Possible aortic vegetation seen on echo but on repeat  echo showed sclerotic aorta.      03/22/17  Patient drowsy, follows simple commands, unable to move RUE      03/23/17  More awake today.  Temperature and leukocytosis resolving with vancomycin and cefepime.      3/24/17  NAEON. Still with fever overnight.        * Thrombotic stroke involving left middle cerebral artery  Arrived and had STAT cta multiphase - patient with critical carotid stenosis, still with significant deficit post tPA -treated with IR thrombectomy & stent placed in L carotid.   Admit to [facility]     Antithrombotics for secondary stroke prevention:okay to start aspirin.   Plan to start plavix 03/24  Statins for secondary stroke prevention and hyperlipidemia, if present: Atorvastatin- 40 mg oral daily  Aggressive risk factor modification: Hypertension, High Cholesterol and Obesity  VTE Prophylaxis: SCDs, heparin 5k q8 hours  BP Parameters: SBP <180  Nursing Orders: Neuro checks- per tpa protocol, Antiembolic stockings, Swallowing evaluation by Nursing, Seizure precautions, Avoid Rucker catheter, Pneumatic compression device, Stroke Education, Blood glucose target 100-130, Up with assistance   -ECHO initially with sclerotic lesion vs vegetation of the aortic valve. Repeat ECHO suggested valve is just sclerotic.     Rehab teams recommending snf     Right sided weakness  Due to stroke above  Aggressive PT/OT/ speech    Aphasia  Given tpa , status post thrombectomy   Due to stroke above  Aggressive PT/OT/ speech when appropriate    Essential hypertension  Stroke RF  <150 (s/p tPA and hemorrhagic conversion)      Abnormality of aortic valve  Found on echo.   As per [facility]  Vegitation vs mass - repeat echo normal  Recommend clarification with cardiology on echo read     Cytotoxic cerebral edema  Evident on imaging     Stroke due to stenosis of left carotid artery  Atheroembolic stroke of L ICA to L MCA  S/p tpa stent of L ICA and thrombectomy of L MCA      Arlet Belcher PA-C  Comprehensive Stroke  [place]  Department of Vascular Neurology   [facility]

## 2017-03-25 NOTE — SUBJECTIVE & OBJECTIVE
Neurologic Chief Complaint: L MCA ischemic stroke    Subjective:     Interval History: Patient is seen for follow-up neurological assessment and treatment recommendations: NAEON. Still watching respiratory status. Patient otherwise neurologically improved.     HPI, Past Medical, Family, and Social History remains the same as documented in the initial encounter.     Review of Systems   Constitutional: Negative for fever.   Eyes: Negative for visual disturbance.   Respiratory: Positive for shortness of breath and wheezing.    Gastrointestinal: Negative for nausea.   Neurological: Positive for speech difficulty.   Psychiatric/Behavioral: Negative for agitation.     Scheduled Meds:   acetylcysteine 100 mg/ml (10%)  4 mL Nebulization Q6H    albuterol-ipratropium 2.5mg-0.5mg/3mL  3 mL Nebulization Q6H    amlodipine  10 mg Per NG tube Daily    aspirin  81 mg Per NG tube Daily    atorvastatin  80 mg Per NG tube Daily    heparin (porcine)  5,000 Units Subcutaneous Q8H    labetalol  200 mg Per NG tube Q8H    losartan  100 mg Per NG tube Daily    piperacillin-tazobactam 4.5 g in dextrose 5 % 100 mL IVPB (ready to mix system)  4.5 g Intravenous Q8H    polyethylene glycol  17 g Per NG tube Daily    senna-docusate 8.6-50 mg  1 tablet Per NG tube BID    sodium chloride 0.9%  10 mL Intravenous Q6H    sodium chloride 0.9%  10 mL Intravenous Q6H    sodium chloride 0.9%  3 mL Intravenous Q8H    vancomycin (VANCOCIN) IVPB  15 mg/kg (Dosing Weight) Intravenous Q12H     Continuous Infusions:   nicardipine Stopped (03/24/17 1605)     PRN Meds:acetaminophen, hydrALAZINE, magnesium sulfate IVPB, magnesium sulfate IVPB, ondansetron, potassium chloride 10%, potassium chloride 10%, potassium chloride 10%, potassium, sodium phosphates, potassium, sodium phosphates, potassium, sodium phosphates, buffered 2% sodium acetate 86meq, sodium chloride 86meq, sterile water for inj IV soln, Flushing PICC Protocol **AND** sodium chloride  0.9% **AND** sodium chloride 0.9%, Flushing PICC Protocol **AND** sodium chloride 0.9% **AND** sodium chloride 0.9%    Objective:     Vital Signs (Most Recent):  Temp: 98.2 °F (36.8 °C) (17 1100)  Pulse: 65 (17 1200)  Resp: (!) 27 (17 1200)  BP: 127/86 (17 1200)  SpO2: 95 % (17 1200)  BP Location: Right leg    Vital Signs Range (Last 24H):  Temp:  [98.1 °F (36.7 °C)-100.2 °F (37.9 °C)]   Pulse:  [58-77]   Resp:  [16-30]   BP: ()/(58-87)   SpO2:  [93 %-99 %]   BP Location: Right leg    Physical Exam   Constitutional: He appears well-developed and well-nourished.   HENT:   Head: Normocephalic and atraumatic.   Eyes: Conjunctivae and EOM are normal. Pupils are equal, round, and reactive to light.   Neck: Normal range of motion.   Cardiovascular: Normal rate.    Pulmonary/Chest: Tachypnea noted. He has wheezes.   Neurological: He is alert.   Skin: Skin is warm and dry.   Psychiatric: He has a normal mood and affect.       Neurological Exam:   LOC: alert and follows requests  Language: Global aphasia  Speech: Dysarthria  Orientation: Aphasic  EOM (CN III, IV, VI): mild L gaze preference   Facial Movement (CN VII): lower weakness right lower  Motor*: Arm Left:  Normal (5/5), Leg Left:   Normal (5/5), Arm Right:   Normal (5/5), Leg Right:   Paretic:  4/5  Sensation: intact to light touch, temperature and vibration    NIH Stroke Scale:    Level of Consciousness: 0 - alert  LOC Questions: 2 - answers none correctly  LOC Commands: 0 - performs both correctly  Best Gaze: 1 - partial gaze palsy  Visual: 0 - no visual loss  Facial Palsy: 1 - minor  Motor Left Arm: 0 - no drift  Motor Right Arm: 1 - drift  Motor Left Le - no drift  Motor Right Le - no drift  Limb Ataxia: 0 - absent  Sensory: 0 - normal  Best Language: 2 - severe aphasia  Dysarthria: 2 - near to unintelligible  Extinction and Inattention: 0 - no neglect  NIH Stroke Scale Total: 9        Laboratory:  CMP:   Recent  Labs  Lab 03/25/17  0248 03/25/17  0535   CALCIUM 9.0  --    ALBUMIN 2.7*  --    PROT 6.1  --    * 150*   K 3.5  --    CO2 21*  --    *  --    BUN 34*  --    CREATININE 0.8  --    ALKPHOS 66  --    ALT 24  --    AST 23  --    BILITOT 0.9  --      BMP:   Recent Labs  Lab 03/25/17  0248 03/25/17  0535   * 150*   K 3.5  --    *  --    CO2 21*  --    BUN 34*  --    CREATININE 0.8  --    CALCIUM 9.0  --      CBC:   Recent Labs  Lab 03/25/17  0248   WBC 14.40*   RBC 3.77*   HGB 11.9*   HCT 35.2*      MCV 93   MCH 31.6*   MCHC 33.8     Lipid Panel:   Recent Labs  Lab 03/18/17  1609   CHOL 175   LDLCALC 123.8   HDL 36*   TRIG 76     Coagulation:   Recent Labs  Lab 03/18/17 1752 03/25/17 0248   INR  --   < > 1.1   APTT 28.5  --   --    < > = values in this interval not displayed.  Platelet Aggregation Study: No results for input(s): PLTAGG, PLTAGINTERP, PLTAGREGLACO, ADPPLTAGGREG in the last 168 hours.  Hgb A1C:   Recent Labs  Lab 03/18/17 1752   HGBA1C 5.7     TSH:   Recent Labs  Lab 03/18/17  1609   TSH 0.169*       Diagnostic Results:  I have personally reviewed: CT Head. Date: 3/23/17  Findings: Stable; evolution of L MCA/DARIEN infarct w/ L BG ICH and IVH

## 2017-03-25 NOTE — SUBJECTIVE & OBJECTIVE
Neurologic Chief Complaint:  LMCA syndrome    Subjective:     Interval History: Patient is seen for follow-up neurological assessment and treatment recommendations: NAEON. Occasional speech     HPI, Past Medical, Family, and Social History remains the same as documented in the initial encounter.     Review of Systems   Constitutional: Positive for fever.   Eyes: Negative for visual disturbance.   Respiratory: Positive for shortness of breath and wheezing.    Gastrointestinal: Negative for nausea.   Neurological: Positive for speech difficulty.   Psychiatric/Behavioral: Negative for agitation.     Scheduled Meds:   acetylcysteine 100 mg/ml (10%)  4 mL Nebulization Q6H    albuterol sulfate  2.5 mg Nebulization Q6H    [START ON 3/25/2017] amlodipine  10 mg Per NG tube Daily    aspirin  81 mg Per NG tube Daily    atorvastatin  80 mg Per NG tube Daily    heparin (porcine)  5,000 Units Subcutaneous Q8H    labetalol  200 mg Per NG tube Q8H    losartan  100 mg Per NG tube Daily    phenylephrine HCl in 0.9% NaCl        piperacillin-tazobactam 4.5 g in dextrose 5 % 100 mL IVPB (ready to mix system)  4.5 g Intravenous Q8H    polyethylene glycol  17 g Per NG tube Daily    senna-docusate 8.6-50 mg  1 tablet Per NG tube BID    sodium chloride 0.9%  10 mL Intravenous Q6H    sodium chloride 0.9%  10 mL Intravenous Q6H    sodium chloride 0.9%  3 mL Intravenous Q8H    [START ON 3/25/2017] vancomycin (VANCOCIN) IVPB  15 mg/kg (Dosing Weight) Intravenous Q12H     Continuous Infusions:   nicardipine Stopped (03/24/17 1605)     PRN Meds:acetaminophen, hydrALAZINE, magnesium sulfate IVPB, magnesium sulfate IVPB, ondansetron, potassium chloride 10%, potassium chloride 10%, potassium chloride 10%, potassium, sodium phosphates, potassium, sodium phosphates, potassium, sodium phosphates, buffered 2% sodium acetate 86meq, sodium chloride 86meq, sterile water for inj IV soln, Flushing PICC Protocol **AND** sodium chloride 0.9%  **AND** sodium chloride 0.9%, Flushing PICC Protocol **AND** sodium chloride 0.9% **AND** sodium chloride 0.9%    Objective:     Vital Signs (Most Recent):  Temp: 99.1 °F (37.3 °C) (17 1505)  Pulse: 69 (17 1805)  Resp: (!) 22 (17 1805)  BP: 134/66 (17 1805)  SpO2: 96 % (17 1805)  BP Location: Right leg    Vital Signs Range (Last 24H):  Temp:  [98.9 °F (37.2 °C)-101 °F (38.3 °C)]   Pulse:  [64-83]   Resp:  [21-34]   BP: (116-142)/(57-72)   SpO2:  [91 %-97 %]   BP Location: Right leg    Physical Exam   Constitutional: He appears well-developed and well-nourished.   HENT:   Head: Normocephalic and atraumatic.   Eyes: Pupils are equal, round, and reactive to light.   Cardiovascular: Normal rate.    Pulmonary/Chest:   Breathing mildly labored, occasional cough   Neurological: He is alert.   Drowsy but arousable       Neurological Exam:   LOC: drowsy and follows requests rarely  Language: Global aphasia  Speech: Dysarthria  Orientation: Person  EOM (CN III, IV, VI): Gaze preference left  Facial Movement (CN VII): lower weakness right lower  Hearing (CN VIII): intact bilaterally  Tongue (CN XII): to midline  Motor*: leg drift bilaterally, right arm 3/5   Cerebellar*: Not testable due to unable to follow commands  Sensation: not testable due to decreased LOC    NIH Stroke Scale:    Level of Consciousness: 1 - drowsy  LOC Questions: 2 - answers none correctly  LOC Commands: 2 - performs neither correctly  Best Gaze: 1 - partial gaze palsy  Visual: 2 - complete hemianopia  Facial Palsy: 1 - minor  Motor Left Arm: 0 - no drift  Motor Right Arm: 3 - no effort against gravity  Motor Left Le - drift  Motor Right Le - drift  Limb Ataxia: 0 - absent  Sensory: 0 - normal  Best Language: 2 - severe aphasia  Dysarthria: 2 - near to unintelligible  Extinction and Inattention: 0 - no neglect  NIH Stroke Scale Total: 18      Laboratory:  CMP:   Recent Labs  Lab 17  0208  17  1234    CALCIUM 8.2*  --   --    ALBUMIN 2.7*  --   --    PROT 6.0  --   --    *  < > 152*   K 3.4*  --   --    CO2 20*  --   --    *  --   --    BUN 27*  --   --    CREATININE 0.8  --   --    ALKPHOS 66  --   --    ALT 15  --   --    AST 15  --   --    BILITOT 0.8  --   --    < > = values in this interval not displayed.  BMP:   Recent Labs  Lab 03/24/17  0208  03/24/17  1238   *  < > 152*   K 3.4*  --   --    *  --   --    CO2 20*  --   --    BUN 27*  --   --    CREATININE 0.8  --   --    CALCIUM 8.2*  --   --    < > = values in this interval not displayed.  CBC:   Recent Labs  Lab 03/24/17 0208   WBC 14.70*   RBC 3.78*   HGB 11.8*   HCT 35.6*      MCV 94   MCH 31.2*   MCHC 33.1     Lipid Panel:   Recent Labs  Lab 03/18/17  1609   CHOL 175   LDLCALC 123.8   HDL 36*   TRIG 76     Coagulation:   Recent Labs  Lab 03/18/17  1752  03/24/17  0208   INR  --   < > 1.0   APTT 28.5  --   --    < > = values in this interval not displayed.  Platelet Aggregation Study: No results for input(s): PLTAGG, PLTAGINTERP, PLTAGREGLACO, ADPPLTAGGREG in the last 168 hours.  Hgb A1C:   Recent Labs  Lab 03/18/17  1752   HGBA1C 5.7     TSH:   Recent Labs  Lab 03/18/17  1609   TSH 0.169*       Diagnostic Results:  I have personally reviewed: CT Head. Date: 3/23/17  Findings: Stable L MCA/DARIEN distribution infarct w/ persistent left basal ganglia and IVH to L lateral ventricle

## 2017-03-25 NOTE — ASSESSMENT & PLAN NOTE
Atheroembolic stroke of L ICA to L MCA  S/p tpa stent of L ICA and thrombectomy of L MCA   Hemorrhagic conversion of L BG with IVH extension

## 2017-03-25 NOTE — PLAN OF CARE
Problem: Patient Care Overview  Goal: Plan of Care Review  Outcome: Ongoing (interventions implemented as appropriate)  POC reviewed with pt/family. Remains free from fall/injury, on bedrest at this time. No skin breakdown noted, turned every two hours. VSS.  Neuro checks done every hour; no acute changes throughout shift. Remained pain free throughout shift.  Side rails up x2, wheels locked, bed in low position.

## 2017-03-25 NOTE — PROGRESS NOTES
Patient has some skin moles: middle chest, left upper arm, under left year. [facility] was notified. Emilia Chaudhry at bedside. Will continue to monitor.

## 2017-03-25 NOTE — PLAN OF CARE
Problem: Patient Care Overview  Goal: Plan of Care Review  Outcome: Ongoing (interventions implemented as appropriate)  No acute events throughout the day, VS and assessment per flow sheet, patient progressing towards goal as tolerated. Plan of care reviewed with Nathanael Velez and family, all concerns addressed. Will continue to monitor.      Pt spiked a fever today, tylenol administered and fever temp came down.  Pt's neuro assessment has remained the same throughout the shift.  Pt pulled off condom cath twice, unable to measure last two urination amounts.  Will continue to monitor the pt.  See chart for full details of today's events and v/s.

## 2017-03-25 NOTE — PLAN OF CARE
Problem: Physical Therapy Goal  Goal: Physical Therapy Goal  Goals to be met by: 3/31/17    1. Pt supine to sit with moderate assist- MET 3/21  Updated: Supine to sit to Minimal Assist. MET  Revised: supine to sit with CGA.  2. Pt sit to supine with moderate assist-MET 3/21  Updated: Sit to supine with Supervision. NOT MET  3. Pt sit to stand with appropriate AD with moderate assist. Met  Revised: sit to stand with QCN with CGA.  4. Pt to perform gait 5ft with appropriate AD with max assist.-not met  5. Pt to tolerate sitting on the EOB 20 min with CGA.-not met  6. Pt to transfer bed to/from bedside chair with max assist.-not met  7. Pt to perform B LE exs in sitting or supine x 10 reps with AAROM on R LE. MET  Revised: Able to tolerate exercise for 15-20 reps with independence.  8. Dynamic sitting at edge of bed x 8 minutes with Stand-by Assistance.  9. Dynamic standing for 2 minutes with Contact Guard Assistance using Quad Cane.  10. Patient and family able to teachback stroke & positioning education independently.  11. Wheelchair propulsion x 100 feet with Minimal Assistance using bilateral lower extremities       Outcome: Ongoing (interventions implemented as appropriate)  Patient showed good postural control and improved bed mobility.

## 2017-03-25 NOTE — ASSESSMENT & PLAN NOTE
Found on echo.   As per NCC  Vegitation vs mass - repeat echo normal  Recommend clarification with cardiology on echo read

## 2017-03-25 NOTE — PT/OT/SLP PROGRESS
"Physical Therapy  Treatment    Nathanael Velez   MRN: [id number]   Admitting Diagnosis: Thrombotic stroke involving left middle cerebral artery    PT Received On: 17  PT Start Time: 1104     PT Stop Time: 1133    PT Total Time (min): 29 min       Billable Minutes:  Gait Training 9 and Therapeutic Activity 20    Treatment Type: Treatment  PT/PTA: PTA     PTA Visit Number: 1       General Precautions: Standard, aspiration, aphasia, fall, NPO  Orthopedic Precautions: N/A   Braces:      Do you have any cultural, spiritual, Bahai conflicts, given your current situation?: Caodaism    Subjective:  Communicated with NSG prior to session.  Patient's family states " I'm glad you came to move him, He really needs it"    Pain Ratin/10              Pain Rating Post-Intervention: 0/10    Objective:   Patient found with: blood pressure cuff, pulse ox (continuous), restraints, telemetry, oxygen, peripheral IV, SCD    Functional Mobility:  Bed Mobility:   Rolling/Turning Right: Minimum assistance  Scooting/Bridging: Moderate Assistance  Supine to Sit: Moderate Assistance  Sit to Supine: Moderate Assistance, With leg lift    Transfers:  Sit <> Stand Assistance: Minimum Assistance  Sit <> Stand Assistive Device: Rolling Walker    Gait:   Gait Distance: 2 steps fwd/bwd x 5 trials (limited by medical lines and increased respiration rate)  Assistance 1: Minimum assistance  Gait Assistive Device: Rolling walker  Gait Pattern: 3-point gait  Gait Deviation(s): decreased hany, increased time in double stance, decreased velocity of limb motion, decreased step length, decreased stride length, decreased toe-to-floor clearance, decreased weight-shifting ability    Stairs:      Balance:   Static Sit: FAIR+: Able to take MINIMAL challenges from all directions  Dynamic Sit: FAIR+: Maintains balance through MINIMAL excursions of active trunk motion  Static Stand: FAIR: Maintains without assist but unable to take challenges  Dynamic " stand: FAIR: Needs CONTACT GUARD during gait     Therapeutic Activities and Exercises:  Static sitting balance at EOB x 20 minutes with close SBA as patient was impulsive. Static standing balance with RW for support 3x60 secs with  CGA and cues to correct anterior lean.     AM-PAC 6 CLICK MOBILITY  How much help from another person does this patient currently need?   1 = Unable, Total/Dependent Assistance  2 = A lot, Maximum/Moderate Assistance  3 = A little, Minimum/Contact Guard/Supervision  4 = None, Modified [place]/Independent    Turning over in bed (including adjusting bedclothes, sheets and blankets)?: 3  Sitting down on and standing up from a chair with arms (e.g., wheelchair, bedside commode, etc.): 3  Moving from lying on back to sitting on the side of the bed?: 3  Moving to and from a bed to a chair (including a wheelchair)?: 3  Need to walk in hospital room?: 2  Climbing 3-5 steps with a railing?: 1  Total Score: 15    AM-PAC Raw Score CMS G-Code Modifier Level of Impairment Assistance   6 % Total / Unable   7 - 9 CM 80 - 100% Maximal Assist   10 - 14 CL 60 - 80% Moderate Assist   15 - 19 CK 40 - 60% Moderate Assist   20 - 22 CJ 20 - 40% Minimal Assist   23 CI 1-20% SBA / CGA   24 CH 0% Independent/ Mod I     Patient left HOB elevated with all lines intact, call button in reach, restraints reapplied at end of session, RN notified and family present.    Assessment:  Nathanael Velez is a 61 y.o. male with a medical diagnosis of Thrombotic stroke involving left middle cerebral artery and presents with decreased endurance, strength and judgement. Patient showed good participation, but needs constant supervision as patient is very impulsive and attempted multiple times to pull the NG tube out of his nose. Walking range was limited by multiple medical lines and shortness of breath with min exertion. Patient would benefit from continued P.T. To address deficits.    Rehab identified problem  list/impairments: Rehab identified problem list/impairments: weakness, impaired endurance, impaired self care skills, impaired functional mobilty, impaired balance, impaired cognition, decreased safety awareness, impaired cardiopulmonary response to activity    Rehab potential is good.    Activity tolerance: Fair    Discharge recommendations: Discharge Facility/Level Of Care Needs: rehabilitation facility     Barriers to discharge: Barriers to Discharge: Inaccessible home environment, Decreased caregiver support    Equipment recommendations: Equipment Needed After Discharge: 3-in-1 commode, bath bench, walker, rolling, wheelchair     GOALS:   Physical Therapy Goals        Problem: Physical Therapy Goal    Goal Priority Disciplines Outcome Goal Variances Interventions   Physical Therapy Goal     PT/OT, PT Ongoing (interventions implemented as appropriate)     Description:  Goals to be met by: 3/31/17    1. Pt supine to sit with moderate assist- MET 3/21  Updated: Supine to sit to Minimal Assist. MET  Revised: supine to sit with CGA.  2. Pt sit to supine with moderate assist-MET 3/21  Updated: Sit to supine with Supervision. NOT MET  3. Pt sit to stand with appropriate AD with moderate assist. Met  Revised: sit to stand with QCN with CGA.  4. Pt to perform gait 5ft with appropriate AD with max assist.-not met  5. Pt to tolerate sitting on the EOB 20 min  with CGA.-not met  6. Pt to transfer bed to/from bedside chair with max assist.-not met  7. Pt to perform B LE exs in sitting or supine x 10 reps with AAROM on R LE. MET  Revised: Able to tolerate exercise for 15-20 reps with independence.  8. Dynamic sitting at edge of bed x 8 minutes with Stand-by Assistance.  9. Dynamic standing for 2 minutes with Contact Guard Assistance using Quad Cane.  10. Patient and family able to teachback stroke & positioning education independently.  11. Wheelchair propulsion x 100 feet with Minimal Assistance using bilateral lower  extremities                      PLAN:    Patient to be seen 6 x/week  to address the above listed problems via gait training, therapeutic activities, therapeutic exercises, neuromuscular re-education, wheelchair management/training  Plan of Care expires: 04/18/17  Plan of Care reviewed with: patient, son, family         Derik Elder, PTA  03/25/2017

## 2017-03-25 NOTE — ASSESSMENT & PLAN NOTE
Arrived and had STAT cta multiphase - patient with critical carotid stenosis, still with significant deficit post tPA -treated with IR thrombectomy & stent placed in L carotid.     Antithrombotics for secondary stroke prevention:  Aspirin 81mg, plavix recommended to start 03/24  Statins for secondary stroke prevention and hyperlipidemia, if present: Atorvastatin- 80 mg oral daily  Aggressive risk factor modification: Hypertension, High Cholesterol and Obesity  VTE Prophylaxis: SCDs, heparin 5k q8 hours  BP Parameters: SBP <150  Nursing Orders: Neuro checks- per tpa protocol, Antiembolic stockings, Swallowing evaluation by Nursing, Seizure precautions, Avoid Rucker catheter, Pneumatic compression device, Stroke Education, Blood glucose target 100-130, Up with assistance   -ECHO initially with sclerotic lesion vs vegetation of the aortic valve. Repeat ECHO suggested valve is just sclerotic.     Rehab teams recommending Rehab facility

## 2017-03-25 NOTE — ASSESSMENT & PLAN NOTE
Arrived and had STAT cta multiphase - patient with critical carotid stenosis, still with significant deficit post tPA -treated with IR thrombectomy & stent placed in L carotid.   Admit to [facility]     Antithrombotics for secondary stroke prevention:okay to start aspirin.   Plan to start plavix 03/24  Statins for secondary stroke prevention and hyperlipidemia, if present: Atorvastatin- 40 mg oral daily  Aggressive risk factor modification: Hypertension, High Cholesterol and Obesity  VTE Prophylaxis: SCDs, heparin 5k q8 hours  BP Parameters: SBP <180  Nursing Orders: Neuro checks- per tpa protocol, Antiembolic stockings, Swallowing evaluation by Nursing, Seizure precautions, Avoid Rucker catheter, Pneumatic compression device, Stroke Education, Blood glucose target 100-130, Up with assistance   -ECHO initially with sclerotic lesion vs vegetation of the aortic valve. Repeat ECHO suggested valve is just sclerotic.     Rehab teams recommending snf

## 2017-03-26 LAB
ALBUMIN SERPL BCP-MCNC: 2.9 G/DL
ALLENS TEST: ABNORMAL
ALP SERPL-CCNC: 71 U/L
ALT SERPL W/O P-5'-P-CCNC: 35 U/L
ANION GAP SERPL CALC-SCNC: 10 MMOL/L
AST SERPL-CCNC: 30 U/L
BACTERIA BLD CULT: NORMAL
BASOPHILS # BLD AUTO: 0.07 K/UL
BASOPHILS NFR BLD: 0.4 %
BILIRUB SERPL-MCNC: 0.9 MG/DL
BUN SERPL-MCNC: 31 MG/DL
CALCIUM SERPL-MCNC: 9 MG/DL
CHLORIDE SERPL-SCNC: 118 MMOL/L
CO2 SERPL-SCNC: 24 MMOL/L
CREAT SERPL-MCNC: 0.9 MG/DL
DELSYS: ABNORMAL
DIFFERENTIAL METHOD: ABNORMAL
EOSINOPHIL # BLD AUTO: 0.1 K/UL
EOSINOPHIL NFR BLD: 0.4 %
ERYTHROCYTE [DISTWIDTH] IN BLOOD BY AUTOMATED COUNT: 14.1 %
EST. GFR  (AFRICAN AMERICAN): >60 ML/MIN/1.73 M^2
EST. GFR  (NON AFRICAN AMERICAN): >60 ML/MIN/1.73 M^2
FIO2: 55
FIO2: 55
FLOW: 15
FLOW: 15
GLUCOSE SERPL-MCNC: 107 MG/DL
HCO3 UR-SCNC: 21.6 MMOL/L (ref 24–28)
HCO3 UR-SCNC: 22.1 MMOL/L (ref 24–28)
HCO3 UR-SCNC: 23.1 MMOL/L (ref 24–28)
HCO3 UR-SCNC: 23.5 MMOL/L (ref 24–28)
HCT VFR BLD AUTO: 38.7 %
HGB BLD-MCNC: 12.7 G/DL
INR PPP: 1.1
LYMPHOCYTES # BLD AUTO: 1.4 K/UL
LYMPHOCYTES NFR BLD: 7.1 %
MAGNESIUM SERPL-MCNC: 1.8 MG/DL
MCH RBC QN AUTO: 31.1 PG
MCHC RBC AUTO-ENTMCNC: 32.8 %
MCV RBC AUTO: 95 FL
MODE: ABNORMAL
MODE: ABNORMAL
MONOCYTES # BLD AUTO: 1.8 K/UL
MONOCYTES NFR BLD: 9.1 %
NEUTROPHILS # BLD AUTO: 15.9 K/UL
NEUTROPHILS NFR BLD: 82.5 %
PCO2 BLDA: 28.7 MMHG (ref 35–45)
PCO2 BLDA: 29.3 MMHG (ref 35–45)
PCO2 BLDA: 30.8 MMHG (ref 35–45)
PCO2 BLDA: 34.7 MMHG (ref 35–45)
PH SMN: 7.43 [PH] (ref 7.35–7.45)
PH SMN: 7.45 [PH] (ref 7.35–7.45)
PH SMN: 7.49 [PH] (ref 7.35–7.45)
PH SMN: 7.52 [PH] (ref 7.35–7.45)
PHOSPHATE SERPL-MCNC: 3.3 MG/DL
PLATELET # BLD AUTO: 227 K/UL
PMV BLD AUTO: 11.4 FL
PO2 BLDA: 32 MMHG (ref 40–60)
PO2 BLDA: 46 MMHG (ref 80–100)
PO2 BLDA: 68 MMHG (ref 80–100)
PO2 BLDA: 68 MMHG (ref 80–100)
POC BE: -1 MMOL/L
POC BE: -1 MMOL/L
POC BE: -2 MMOL/L
POC BE: 1 MMOL/L
POC SATURATED O2: 64 % (ref 95–100)
POC SATURATED O2: 85 % (ref 95–100)
POC SATURATED O2: 95 % (ref 95–100)
POC SATURATED O2: 95 % (ref 95–100)
POC TCO2: 22 MMOL/L (ref 23–27)
POC TCO2: 23 MMOL/L (ref 23–27)
POC TCO2: 24 MMOL/L (ref 23–27)
POC TCO2: 24 MMOL/L (ref 24–29)
POCT GLUCOSE: 102 MG/DL (ref 70–110)
POCT GLUCOSE: 132 MG/DL (ref 70–110)
POCT GLUCOSE: 140 MG/DL (ref 70–110)
POCT GLUCOSE: 141 MG/DL (ref 70–110)
POCT GLUCOSE: 433 MG/DL (ref 70–110)
POTASSIUM SERPL-SCNC: 3.3 MMOL/L
POTASSIUM SERPL-SCNC: 3.7 MMOL/L
PROCALCITONIN SERPL IA-MCNC: <0.09 NG/ML
PROT SERPL-MCNC: 6.5 G/DL
PROTHROMBIN TIME: 11.2 SEC
RBC # BLD AUTO: 4.09 M/UL
SAMPLE: ABNORMAL
SITE: ABNORMAL
SODIUM SERPL-SCNC: 130 MMOL/L
SODIUM SERPL-SCNC: 148 MMOL/L
SODIUM SERPL-SCNC: 148 MMOL/L
SODIUM SERPL-SCNC: 150 MMOL/L
SODIUM SERPL-SCNC: 151 MMOL/L
SODIUM SERPL-SCNC: 152 MMOL/L
SP02: 95
SP02: 95
VANCOMYCIN TROUGH SERPL-MCNC: 10.4 UG/ML
WBC # BLD AUTO: 19.28 K/UL

## 2017-03-26 PROCEDURE — 36620 INSERTION CATHETER ARTERY: CPT | Mod: ,,, | Performed by: NURSE PRACTITIONER

## 2017-03-26 PROCEDURE — 63600175 PHARM REV CODE 636 W HCPCS: Performed by: PHYSICIAN ASSISTANT

## 2017-03-26 PROCEDURE — 31720 CLEARANCE OF AIRWAYS: CPT

## 2017-03-26 PROCEDURE — 25000003 PHARM REV CODE 250: Performed by: PSYCHIATRY & NEUROLOGY

## 2017-03-26 PROCEDURE — 63600175 PHARM REV CODE 636 W HCPCS: Performed by: STUDENT IN AN ORGANIZED HEALTH CARE EDUCATION/TRAINING PROGRAM

## 2017-03-26 PROCEDURE — 99900035 HC TECH TIME PER 15 MIN (STAT)

## 2017-03-26 PROCEDURE — 63600175 PHARM REV CODE 636 W HCPCS: Performed by: NURSE PRACTITIONER

## 2017-03-26 PROCEDURE — 20000000 HC ICU ROOM

## 2017-03-26 PROCEDURE — 94640 AIRWAY INHALATION TREATMENT: CPT

## 2017-03-26 PROCEDURE — 84100 ASSAY OF PHOSPHORUS: CPT

## 2017-03-26 PROCEDURE — 99233 SBSQ HOSP IP/OBS HIGH 50: CPT | Mod: ,,, | Performed by: PSYCHIATRY & NEUROLOGY

## 2017-03-26 PROCEDURE — 94668 MNPJ CHEST WALL SBSQ: CPT

## 2017-03-26 PROCEDURE — 80053 COMPREHEN METABOLIC PANEL: CPT

## 2017-03-26 PROCEDURE — 25000003 PHARM REV CODE 250: Performed by: NURSE PRACTITIONER

## 2017-03-26 PROCEDURE — 25000003 PHARM REV CODE 250: Performed by: STUDENT IN AN ORGANIZED HEALTH CARE EDUCATION/TRAINING PROGRAM

## 2017-03-26 PROCEDURE — 99900026 HC AIRWAY MAINTENANCE (STAT)

## 2017-03-26 PROCEDURE — 82803 BLOOD GASES ANY COMBINATION: CPT

## 2017-03-26 PROCEDURE — 25000003 PHARM REV CODE 250: Performed by: PHYSICIAN ASSISTANT

## 2017-03-26 PROCEDURE — 84295 ASSAY OF SERUM SODIUM: CPT | Mod: 91

## 2017-03-26 PROCEDURE — 25000242 PHARM REV CODE 250 ALT 637 W/ HCPCS: Performed by: NURSE PRACTITIONER

## 2017-03-26 PROCEDURE — 85025 COMPLETE CBC W/AUTO DIFF WBC: CPT

## 2017-03-26 PROCEDURE — 85610 PROTHROMBIN TIME: CPT

## 2017-03-26 PROCEDURE — 84145 PROCALCITONIN (PCT): CPT

## 2017-03-26 PROCEDURE — 84132 ASSAY OF SERUM POTASSIUM: CPT

## 2017-03-26 PROCEDURE — 63600175 PHARM REV CODE 636 W HCPCS: Performed by: PSYCHIATRY & NEUROLOGY

## 2017-03-26 PROCEDURE — 25000003 PHARM REV CODE 250

## 2017-03-26 PROCEDURE — 36600 WITHDRAWAL OF ARTERIAL BLOOD: CPT

## 2017-03-26 PROCEDURE — 27000221 HC OXYGEN, UP TO 24 HOURS

## 2017-03-26 PROCEDURE — 83735 ASSAY OF MAGNESIUM: CPT

## 2017-03-26 PROCEDURE — 80202 ASSAY OF VANCOMYCIN: CPT

## 2017-03-26 RX ORDER — SODIUM CHLORIDE 9 MG/ML
INJECTION, SOLUTION INTRAVENOUS CONTINUOUS
Status: DISCONTINUED | OUTPATIENT
Start: 2017-03-26 | End: 2017-03-27

## 2017-03-26 RX ORDER — RAMELTEON 8 MG/1
8 TABLET ORAL NIGHTLY PRN
Status: DISCONTINUED | OUTPATIENT
Start: 2017-03-26 | End: 2017-04-06

## 2017-03-26 RX ORDER — LIDOCAINE HYDROCHLORIDE 10 MG/ML
5 INJECTION, SOLUTION EPIDURAL; INFILTRATION; INTRACAUDAL; PERINEURAL ONCE
Status: DISCONTINUED | OUTPATIENT
Start: 2017-03-26 | End: 2017-04-03

## 2017-03-26 RX ORDER — FUROSEMIDE 10 MG/ML
20 INJECTION INTRAMUSCULAR; INTRAVENOUS ONCE
Status: COMPLETED | OUTPATIENT
Start: 2017-03-26 | End: 2017-03-26

## 2017-03-26 RX ORDER — SODIUM CHLORIDE, SODIUM LACTATE, POTASSIUM CHLORIDE, CALCIUM CHLORIDE 600; 310; 30; 20 MG/100ML; MG/100ML; MG/100ML; MG/100ML
INJECTION, SOLUTION INTRAVENOUS CONTINUOUS
Status: DISCONTINUED | OUTPATIENT
Start: 2017-03-26 | End: 2017-03-30

## 2017-03-26 RX ORDER — MIDAZOLAM HYDROCHLORIDE 1 MG/ML
1 INJECTION INTRAMUSCULAR; INTRAVENOUS ONCE
Status: COMPLETED | OUTPATIENT
Start: 2017-03-26 | End: 2017-03-26

## 2017-03-26 RX ORDER — LABETALOL 100 MG/1
100 TABLET, FILM COATED ORAL EVERY 8 HOURS
Status: DISCONTINUED | OUTPATIENT
Start: 2017-03-26 | End: 2017-03-29

## 2017-03-26 RX ORDER — ACETAMINOPHEN 10 MG/ML
1000 INJECTION, SOLUTION INTRAVENOUS ONCE
Status: COMPLETED | OUTPATIENT
Start: 2017-03-26 | End: 2017-03-26

## 2017-03-26 RX ORDER — LIDOCAINE HYDROCHLORIDE 10 MG/ML
INJECTION INFILTRATION; PERINEURAL
Status: COMPLETED
Start: 2017-03-26 | End: 2017-03-26

## 2017-03-26 RX ORDER — ACETYLCYSTEINE 100 MG/ML
4 SOLUTION ORAL; RESPIRATORY (INHALATION) EVERY 6 HOURS
Status: DISCONTINUED | OUTPATIENT
Start: 2017-03-26 | End: 2017-03-31

## 2017-03-26 RX ADMIN — AMLODIPINE BESYLATE 10 MG: 10 TABLET ORAL at 08:03

## 2017-03-26 RX ADMIN — Medication 10 ML: at 05:03

## 2017-03-26 RX ADMIN — IPRATROPIUM BROMIDE AND ALBUTEROL SULFATE 3 ML: .5; 3 SOLUTION RESPIRATORY (INHALATION) at 07:03

## 2017-03-26 RX ADMIN — PIPERACILLIN SODIUM AND TAZOBACTAM SODIUM 4.5 G: 4; .5 INJECTION, POWDER, FOR SOLUTION INTRAVENOUS at 10:03

## 2017-03-26 RX ADMIN — RAMELTEON 8 MG: 8 TABLET, FILM COATED ORAL at 01:03

## 2017-03-26 RX ADMIN — SODIUM CHLORIDE: 0.9 INJECTION, SOLUTION INTRAVENOUS at 04:03

## 2017-03-26 RX ADMIN — HYDRALAZINE HYDROCHLORIDE 10 MG: 20 INJECTION INTRAMUSCULAR; INTRAVENOUS at 01:03

## 2017-03-26 RX ADMIN — LABETALOL HYDROCHLORIDE 200 MG: 200 TABLET, FILM COATED ORAL at 05:03

## 2017-03-26 RX ADMIN — VANCOMYCIN HYDROCHLORIDE 1000 MG: 1 INJECTION, POWDER, LYOPHILIZED, FOR SOLUTION INTRAVENOUS at 04:03

## 2017-03-26 RX ADMIN — LIDOCAINE HYDROCHLORIDE 10 MG: 10 INJECTION, SOLUTION INFILTRATION; PERINEURAL at 02:03

## 2017-03-26 RX ADMIN — SODIUM CHLORIDE, SODIUM LACTATE, POTASSIUM CHLORIDE, AND CALCIUM CHLORIDE: .6; .31; .03; .02 INJECTION, SOLUTION INTRAVENOUS at 10:03

## 2017-03-26 RX ADMIN — HEPARIN SODIUM 5000 UNITS: 5000 INJECTION, SOLUTION INTRAVENOUS; SUBCUTANEOUS at 10:03

## 2017-03-26 RX ADMIN — POTASSIUM CHLORIDE 40 MEQ: 20 SOLUTION ORAL at 08:03

## 2017-03-26 RX ADMIN — ACETAMINOPHEN 1000 MG: 10 INJECTION, SOLUTION INTRAVENOUS at 04:03

## 2017-03-26 RX ADMIN — ACETYLCYSTEINE 4 ML: 100 INHALANT RESPIRATORY (INHALATION) at 12:03

## 2017-03-26 RX ADMIN — POTASSIUM CHLORIDE 40 MEQ: 20 SOLUTION ORAL at 05:03

## 2017-03-26 RX ADMIN — PIPERACILLIN SODIUM AND TAZOBACTAM SODIUM 4.5 G: 4; .5 INJECTION, POWDER, FOR SOLUTION INTRAVENOUS at 12:03

## 2017-03-26 RX ADMIN — IPRATROPIUM BROMIDE AND ALBUTEROL SULFATE 3 ML: .5; 3 SOLUTION RESPIRATORY (INHALATION) at 12:03

## 2017-03-26 RX ADMIN — LABETALOL HCL 100 MG: 100 TABLET, FILM COATED ORAL at 10:03

## 2017-03-26 RX ADMIN — LOSARTAN POTASSIUM 100 MG: 50 TABLET, FILM COATED ORAL at 08:03

## 2017-03-26 RX ADMIN — ACETYLCYSTEINE 4 ML: 100 INHALANT RESPIRATORY (INHALATION) at 07:03

## 2017-03-26 RX ADMIN — MAGNESIUM SULFATE IN WATER 2 G: 40 INJECTION, SOLUTION INTRAVENOUS at 05:03

## 2017-03-26 RX ADMIN — ACETYLCYSTEINE 4 ML: 100 SOLUTION ORAL; RESPIRATORY (INHALATION) at 07:03

## 2017-03-26 RX ADMIN — STANDARDIZED SENNA CONCENTRATE AND DOCUSATE SODIUM 1 TABLET: 8.6; 5 TABLET, FILM COATED ORAL at 08:03

## 2017-03-26 RX ADMIN — HYDRALAZINE HYDROCHLORIDE 10 MG: 20 INJECTION INTRAMUSCULAR; INTRAVENOUS at 07:03

## 2017-03-26 RX ADMIN — ATORVASTATIN CALCIUM 80 MG: 20 TABLET, FILM COATED ORAL at 08:03

## 2017-03-26 RX ADMIN — HYDRALAZINE HYDROCHLORIDE 10 MG: 20 INJECTION INTRAMUSCULAR; INTRAVENOUS at 03:03

## 2017-03-26 RX ADMIN — Medication 3 ML: at 05:03

## 2017-03-26 RX ADMIN — HEPARIN SODIUM 5000 UNITS: 5000 INJECTION, SOLUTION INTRAVENOUS; SUBCUTANEOUS at 05:03

## 2017-03-26 RX ADMIN — PIPERACILLIN SODIUM AND TAZOBACTAM SODIUM 4.5 G: 4; .5 INJECTION, POWDER, FOR SOLUTION INTRAVENOUS at 04:03

## 2017-03-26 RX ADMIN — CLOPIDOGREL 75 MG: 75 TABLET, FILM COATED ORAL at 08:03

## 2017-03-26 RX ADMIN — Medication 3 ML: at 10:03

## 2017-03-26 RX ADMIN — ASPIRIN 81 MG CHEWABLE TABLET 81 MG: 81 TABLET CHEWABLE at 08:03

## 2017-03-26 RX ADMIN — POLYETHYLENE GLYCOL 3350 17 G: 17 POWDER, FOR SOLUTION ORAL at 08:03

## 2017-03-26 RX ADMIN — HEPARIN SODIUM 5000 UNITS: 5000 INJECTION, SOLUTION INTRAVENOUS; SUBCUTANEOUS at 01:03

## 2017-03-26 RX ADMIN — FUROSEMIDE 20 MG: 10 INJECTION, SOLUTION INTRAMUSCULAR; INTRAVENOUS at 12:03

## 2017-03-26 RX ADMIN — ACETAMINOPHEN 650 MG: 325 TABLET ORAL at 11:03

## 2017-03-26 RX ADMIN — STANDARDIZED SENNA CONCENTRATE AND DOCUSATE SODIUM 1 TABLET: 8.6; 5 TABLET, FILM COATED ORAL at 10:03

## 2017-03-26 RX ADMIN — MIDAZOLAM HYDROCHLORIDE 1 MG: 1 INJECTION, SOLUTION INTRAMUSCULAR; INTRAVENOUS at 02:03

## 2017-03-26 NOTE — PROCEDURES
"Nathanael Velez is a 61 y.o. male patient.    Temp: 97.9 °F (36.6 °C) (17 1100)  Pulse: (!) 57 (17 1400)  Resp: (!) 29 (17 1400)  BP: 136/62 (17 1400)  SpO2: 99 % (17 1400)  Weight: 73.8 kg (162 lb 11.2 oz) (17 0400)  Height: 5' 7" (170.2 cm) (17 1745)       Arterial Line  Date/Time: 3/26/2017 3:03 PM  Performed by: JOSH BELL  Authorized by: JOSH BELL   Consent Done: Yes  Consent: Verbal consent obtained. Written consent obtained.  Risks and benefits: risks, benefits and alternatives were discussed  Consent given by: power of   Patient understanding: patient states understanding of the procedure being performed  Patient consent: the patient's understanding of the procedure matches consent given  Procedure consent: procedure consent matches procedure scheduled  Relevant documents: relevant documents present and verified  Test results: test results available and properly labeled  Patient identity confirmed: , MRN and name  Time out: Immediately prior to procedure a "time out" was called to verify the correct patient, procedure, equipment, support staff and site/side marked as required.  Preparation: Patient was prepped and draped in the usual sterile fashion.  Indications: multiple ABGs and respiratory failure  Location: left radial  Anesthesia: local infiltration    Anesthesia:  Anesthesia: local infiltration  Local Anesthetic: lidocaine 1% without epinephrine   Patient sedated: no  Gume's test normal: yes  Needle gauge: 22  Seldinger technique: Seldinger technique used  Number of attempts: 3  Complications: No  Specimens: No  Implants: No  Post-procedure: dressing applied  Post-procedure CMS: normal and unchanged          Josh Bell  3/26/2017  "

## 2017-03-26 NOTE — PROGRESS NOTES
CAROLINE Bell informed of the difference between pt's cuff and a-line pressure readings.  CAROLINE Bell also informed pt's SBP is >140 going off a-line, ordered that pt's BP is fine.  Will continue to monitor the pt.

## 2017-03-26 NOTE — PLAN OF CARE
Problem: Patient Care Overview  Goal: Plan of Care Review  Outcome: Ongoing (interventions implemented as appropriate)  POC reviewed with the patient.  Patient was restless and tachipnic during shift. At 22:00 O 2 saturation 90-92 on 4 L by NC. NCC was notified. Provider said that will place appropriate orders. Patient was placed on 8 L 45 % Venti Mask, O 2 Sat. 90-94. At 00:16 patient received 20 mg Lasix, Chest X-Ray was done. Patient had moderate amount of secretions and could not be suction through the nose. Nasal airway was placed. CT was done. Patient was febrile and successfully treated with Tylenol. Patient remains in bed free from falls and injuries. Will continue to monitor.

## 2017-03-26 NOTE — PROGRESS NOTES
ICU Progress Note  Neurocritical Care    Admit Date: 3/18/2017  LOS: 8    CC: Embolic stroke involving left middle cerebral artery    Code Status: Full Code     SUBJECTIVE:     Interval History/Significant Events:  The patient is having respiratory difficulties due to constant aspiration of secretions. He was placed on venti mask. Getting aggressive chest PT. On intubation watch. CXR: The lungs are symmetrically expanded bilaterally with patchy increased interstitial and parenchymal attenuation bilaterally, grossly similar in extent and distribution as compared to previous exam noting The issues Were discussed with his family. CT of the head is unchanged. Still L- lateral ventricle is effaced.     HPI:  Mr Velez is a 61 y.o. male presenting with pmh of HTN, HLD, and CAD who presents after receiving tPA at [facility] for CTA multiphase and potential IR thrombectomy. The patients tpa infusion ended at 1347 and was seen in the ED at 1528 upon arrival. The patient with symptoms of right sided weakness, left gaze preference, aphasia, numbness of the right side and facial droop - last known normal at 10 am. The patients son was contacted and advised of his arrival and verified the patient has no known drug allergies   The patients ems transport reported some spontaneous movement of the Rside while in transport. Remains aphasic.       S/P rTPA and thrombectomy s/p L-ICA stenting. L-head of the caudate and putaminal hemorrhage with vasogenic edema.Slight increased on ICH and edema on last CT head.  Review of Symptoms:    ROS   Limited  Medications:  Continuous Infusions:   sodium chloride 0.9% 15 mL/hr at 03/26/17 0600    lactated ringers 50 mL/hr at 03/26/17 1500    nicardipine Stopped (03/24/17 1605)     Scheduled Meds:   acetylcysteine 100 mg/ml (10%)  4 mL Nebulization Q6H    albuterol-ipratropium 2.5mg-0.5mg/3mL  3 mL Nebulization Q6H    amlodipine  10 mg Per NG tube Daily    aspirin  81 mg Per NG tube Daily     atorvastatin  80 mg Per NG tube Daily    clopidogrel  75 mg Per NG tube Daily    heparin (porcine)  5,000 Units Subcutaneous Q8H    labetalol  200 mg Per NG tube Q8H    lidocaine (PF) 10 mg/ml (1%)  5 mL Intradermal Once    losartan  100 mg Per NG tube Daily    piperacillin-tazobactam 4.5 g in dextrose 5 % 100 mL IVPB (ready to mix system)  4.5 g Intravenous Q8H    polyethylene glycol  17 g Per NG tube Daily    senna-docusate 8.6-50 mg  1 tablet Per NG tube BID    sodium chloride 0.9%  10 mL Intravenous Q6H    sodium chloride 0.9%  10 mL Intravenous Q6H    sodium chloride 0.9%  3 mL Intravenous Q8H    vancomycin (VANCOCIN) IVPB  15 mg/kg (Dosing Weight) Intravenous Q12H     PRN Meds:.acetaminophen, hydrALAZINE, magnesium sulfate IVPB, magnesium sulfate IVPB, ondansetron, potassium chloride 10%, potassium chloride 10%, potassium chloride 10%, potassium, sodium phosphates, potassium, sodium phosphates, potassium, sodium phosphates, ramelteon, Flushing PICC Protocol **AND** sodium chloride 0.9% **AND** sodium chloride 0.9%, Flushing PICC Protocol **AND** sodium chloride 0.9% **AND** sodium chloride 0.9%    OBJECTIVE:   Vital Signs (Most Recent):   Temp: 99.1 °F (37.3 °C) (03/26/17 1500)  Pulse: 62 (03/26/17 1500)  Resp: (!) 28 (03/26/17 1500)  BP: (!) 147/67 (PRN BP med administered) (03/26/17 1500)  SpO2: 98 % (03/26/17 1500)    Vital Signs (24h Range):   Temp:  [97.9 °F (36.6 °C)-101.8 °F (38.8 °C)] 99.1 °F (37.3 °C)  Pulse:  [53-86] 62  Resp:  [18-45] 28  SpO2:  [90 %-99 %] 98 %  BP: ()/(53-93) 147/67  Arterial Line BP: (145)/(60) 145/60    ICP/CPP (Last 24h):        I & O (Last 24h):     Intake/Output Summary (Last 24 hours) at 03/26/17 1728  Last data filed at 03/26/17 1600   Gross per 24 hour   Intake          1839.08 ml   Output             2465 ml   Net          -625.92 ml     Physical Exam:  General   HEENT: High risk for aspiration. R neck and back soft tissue m superficial  masses.  Chest Heart RRR / Lungs Coarse breath sounds.  Abdomen: Soft nontender + BS  Extremities: OK distal pulses.  Skin: UK  Neurological Exam:  MS; Awake. Increased speech outoput but significant dysarthria. Following simple commands. Expressive aphasia.  CN: II-XII R-UMN facial, significant dysphagia and dysarthria. Improving dysarthria. High risk for aspiration.  Motor: LUE 5 /5 / RUE 1/5 Tone normal bilaterally  LLE 5 /5 / RLE 2/5 Tone normal bilaterally  Sensory: LT/PP/T/ Vibration   Complex sensory modalities: not tested  DTR: normal throughout.  Coordination /Fine motor:   Gait: Not tested.  Meningeal signs: Absent    Vent Data:   Oxygen Concentration (%):  [40-55] 40    Lines/Drains/Airway:              NG/OG Tube 03/19/17 1145 Right nostril (Active)   Placement Check placement verified by x-ray;placement verified by aspirate characteristics 3/20/2017  3:05 PM   Tube advanced (cm) 65 3/19/2017  3:15 PM   Distal Tube Length (cm) 65 3/19/2017  7:01 PM   Tolerance no signs/symptoms of discomfort 3/20/2017  3:05 PM   Securement anchored to nostril center w/ adhesive device 3/20/2017  3:05 PM   Clamp Status/Tolerance unclamped 3/20/2017  3:05 PM   Flush/Irrigation flushed w/;water 3/20/2017  3:05 PM   Feeding Method continuous 3/20/2017  3:05 PM   Current Rate (mL/hr) 10 mL/hr 3/19/2017  7:01 PM   Goal Rate (mL/hr) 40 mL/hr 3/19/2017  7:01 PM   Intake (mL) 100 mL 3/20/2017  8:05 AM   Intake (mL) - Formula Tube Feeding 40 3/20/2017  6:05 PM   Residual Amount (ml) 15 ml 3/20/2017  7:05 AM            External Urinary Catheter 03/18/17 3108 Medium (Active)   Collection Container Urimeter 3/20/2017  3:05 PM   Securement Method secured to top of thigh w/ adhesive device 3/20/2017  3:05 PM   Skin no redness;no breakdown;penis/scrotum cleansed w/ soap and water;skin barrier applied 3/20/2017  3:05 PM   Tolerance no signs/symptoms of discomfort 3/20/2017  3:05 PM   Output (mL) 250 mL 3/20/2017 11:05 AM      Nutrition/Tube Feeds (if NPO state why): NPO. NGT confirmed.    Labs:  ABG:     Recent Labs  Lab 03/26/17  1102   PH 7.453*   PO2 68*   PCO2 30.8*   HCO3 21.6*   POCSATURATED 95   BE -2     BMP:  Recent Labs  Lab 03/26/17  0338  03/26/17  1145   *  < > 148*   K 3.3*  --  3.7   *  --   --    CO2 24  --   --    BUN 31*  --   --    CREATININE 0.9  --   --      --   --    MG 1.8  --   --    PHOS 3.3  --   --    < > = values in this interval not displayed.  LFT:   Lab Results   Component Value Date    AST 30 03/26/2017    ALT 35 03/26/2017    ALKPHOS 71 03/26/2017    BILITOT 0.9 03/26/2017    ALBUMIN 2.9 (L) 03/26/2017    PROT 6.5 03/26/2017     CBC:   Lab Results   Component Value Date    WBC 19.28 (H) 03/26/2017    HGB 12.7 (L) 03/26/2017    HCT 38.7 (L) 03/26/2017    MCV 95 03/26/2017     03/26/2017     Microbiology x 7d:   Microbiology Results (last 7 days)     Procedure Component Value Units Date/Time    Blood culture [[id number]] Collected:  03/21/17 0356    Order Status:  Completed Specimen:  Blood from Peripheral, Antecubital, Left Updated:  03/26/17 0812     Blood Culture, Routine No growth after 5 days.    Narrative:       Blood cultures from 2 different sites. 4 bottles total.  Please draw before starting antibiotics.    Blood culture [[id number]] Collected:  03/24/17 1315    Order Status:  Completed Specimen:  Blood from Peripheral, Antecubital, Left Updated:  03/25/17 2012     Blood Culture, Routine No Growth to date     Blood Culture, Routine No Growth to date    Blood culture [[id number]] Collected:  03/20/17 1532    Order Status:  Completed Specimen:  Blood from Peripheral, Forearm, Right Updated:  03/25/17 2012     Blood Culture, Routine No growth after 5 days.    Narrative:       Blood cultures from 2 different sites. 4 bottles total.  Please draw before starting antibiotics.    Blood culture [[id number]] Collected:  03/20/17 1533    Order Status:  Completed Specimen:  Blood  from Peripheral, Lower Arm, Right Updated:  03/25/17 2012     Blood Culture, Routine No growth after 5 days.    Narrative:       Blood cultures x 2 different sites. 4 bottles total. Please  draw cultures before administering antibiotics.    Blood culture [[id number]] Collected:  03/24/17 1314    Order Status:  Completed Specimen:  Blood from Peripheral, Forearm, Left Updated:  03/25/17 1822     Blood Culture, Routine No Growth to date     Blood Culture, Routine No Growth to date    Blood culture [[id number]] Collected:  03/19/17 1827    Order Status:  Completed Specimen:  Blood from Peripheral, Hand, Left Updated:  03/24/17 2212     Blood Culture, Routine No growth after 5 days.    Narrative:       Blood cultures x 2 different sites. 4 bottles total. Please  draw cultures before administering antibiotics.    Blood culture [[id number]] Collected:  03/19/17 1827    Order Status:  Completed Specimen:  Blood from Peripheral, Hand, Right Updated:  03/24/17 2212     Blood Culture, Routine No growth after 5 days.    Narrative:       Blood cultures from 2 different sites. 4 bottles total.  Please draw before starting antibiotics.    Blood culture [[id number]] Collected:  03/21/17 0356    Order Status:  Sent Specimen:  Blood from Peripheral, Antecubital, Left Updated:  03/21/17 0409    Narrative:       Blood cultures x 2 different sites. 4 bottles total. Please  draw cultures before administering antibiotics.    Culture, Respiratory [[id number]]     Order Status:  No result Specimen:  Respiratory         Imaging:  3/20/17: CT head:  L-head of the caudate and putaminal hemorrhage with vasogenic edema.Slight increased on ICH and edema.  I personally reviewed the above image.    ASSESSMENT/PLAN:     Active Hospital Problems    Diagnosis    *Embolic stroke involving left middle cerebral artery    Carotid artery stenosis, symptomatic    Cytotoxic cerebral edema    Abnormality of aortic valve    Right sided weakness    Aphasia     Essential hypertension             Active Problem List:   1 L-stroke with hemorrhagic transformation on Caudate and putamen.  2. R/O endocarditis. Aortic valve with possible vegetation.  3. HTN  4. Secondary dysphagia and dysarthria  5. RUE Flebitis.         Assessment / Plan:  Neuro: Na 150-155.  -ECASA 81 mg qd  -Lipitor 80 mg qd.  -Plavix 75 mg qd..  -Has NGT.  -PRN 2%  HTS 300cc q 6hrs PRN for serum NA < 145  (During the day 23.4% 30cc IVB during the day)  -Speech eval. High risk for aspiration.  -PT, OT. and rehab  -Repeat head CT on Sunday..  Resp: Assess for pneumonia. Worsening respiratory function. High risk for aspiration pneumonia.  -NC 2 Lt.   -IS  -Allbuterol and Mucomyst  q 6hrs   -Nasal trumped and NTS frequently. q4hrs  -Frequent suctioning  -Chest PT q 6hrs. Cough induction.  -Keep sitted.  -Keep sitted 90 degrees.  -[place]cliffe placement pending.  -CXR in AM  -ABG in 30 min..  CV: TTE normal. Abbnormal aortic valve. May have a vegetation. HTN controlled.  -Keep SBP{ <=140-mmhG  -Hydralazine PRN  -Amlodipine 10mg qd.  -Lozartan 100mg qd.   -Labetalol  200mg q 8hrs hold for HR < 55 and or SBP < 120 mmHg.  -PICC line.  -ASA 81mg qd.   -Lipitor 80mg qd.  -Start Plavix 75 mg qd..  -[place].pending.  IVF/nutrition/Renal/GI: Na 148. Increased BUN/Cr ration. Bladder scanning OK.   -Speech evaluation on Friday,  -NPO   -TF on hold.  -HTS 2% 300cc q 6hrs PRN for Na < 145.  -Give HTS. 23.4% 30cc IVB at 10 AM and 4 PM instead of 2% HTS IVB  -Serum Na q 6hrs  -Free water 200 cc q 6hrs.  -LR at 50 cc/hr IVD while TF are held.   -NGT confirmed.  -Condom catheter.  -Please check PVR with bladder scanner.  -BR.  Hem / ID: R/O vegetations. WBC eleted. 19 K. R/O aspiration pneumonia. RUE Flebitis.  Procal: nl.  -Zosyn 4.5 q 6hrs IV  -Vanc 1gr q 12hrs.  -CXR,   -UA  -Blood cultures.  Surveillance blood cultures.  -Bladder scanner q6hrs.  -ESD  -ASA  81 mg qd   -Elevate the RUE. Warm compresses to RUE.  -Repeat Procal on  Tuesday,  Endo: HLP.  SSI q 4hrs  -Lipitor 80mg qd.  Prophylaxis:  -SCDs  -SC Heparin 5000 U q 8hrs.   Advance Directives and Disposition:    Full Code. Most likely will need PEG tube. High risk for aspiration and intubation..           Uninterrupted level 3 Follow-up /Counseling Time (not including procedures): = 35 min                      Bhavesh Ledbetter MD, MPH, FAHA,  Neurocritical Care Department Faculty

## 2017-03-26 NOTE — PROGRESS NOTES
CAROLINE Bell informed me that pt's SBP is now ordered to remain <160.  CAROLINE Bell said he would put in the order when he got a chance.

## 2017-03-26 NOTE — PROGRESS NOTES
ICU Progress Note  Neurocritical Care    Admit Date: 3/18/2017  LOS: 7    CC: Embolic stroke involving left middle cerebral artery    Code Status: Full Code     SUBJECTIVE:     Interval History/Significant Events:  No events. Getting 2% 300 cc IVB. Febrile this AM. Vanc trough not done appropriately..    HPI:  Mr Velez is a 61 y.o. male presenting with pmh of HTN, HLD, and CAD who presents after receiving tPA at [facility] for CTA multiphase and potential IR thrombectomy. The patients tpa infusion ended at 1347 and was seen in the ED at 1528 upon arrival. The patient with symptoms of right sided weakness, left gaze preference, aphasia, numbness of the right side and facial droop - last known normal at 10 am. The patients son was contacted and advised of his arrival and verified the patient has no known drug allergies   The patients ems transport reported some spontaneous movement of the Rside while in transport. Remains aphasic.   Review of Symptoms:    ROS   Limited  Medications:  Continuous Infusions:   nicardipine Stopped (03/24/17 1605)     Scheduled Meds:   acetylcysteine 100 mg/ml (10%)  4 mL Nebulization Q6H    albuterol-ipratropium 2.5mg-0.5mg/3mL  3 mL Nebulization Q6H    amlodipine  10 mg Per NG tube Daily    aspirin  81 mg Per NG tube Daily    atorvastatin  80 mg Per NG tube Daily    [START ON 3/26/2017] clopidogrel  75 mg Per NG tube Daily    heparin (porcine)  5,000 Units Subcutaneous Q8H    labetalol  200 mg Per NG tube Q8H    losartan  100 mg Per NG tube Daily    piperacillin-tazobactam 4.5 g in dextrose 5 % 100 mL IVPB (ready to mix system)  4.5 g Intravenous Q8H    polyethylene glycol  17 g Per NG tube Daily    senna-docusate 8.6-50 mg  1 tablet Per NG tube BID    sodium chloride 0.9%  10 mL Intravenous Q6H    sodium chloride 0.9%  10 mL Intravenous Q6H    sodium chloride 0.9%  3 mL Intravenous Q8H    vancomycin (VANCOCIN) IVPB  15 mg/kg (Dosing Weight) Intravenous Q12H      PRN Meds:.acetaminophen, hydrALAZINE, magnesium sulfate IVPB, magnesium sulfate IVPB, ondansetron, potassium chloride 10%, potassium chloride 10%, potassium chloride 10%, potassium, sodium phosphates, potassium, sodium phosphates, potassium, sodium phosphates, buffered 2% sodium acetate 86meq, sodium chloride 86meq, sterile water for inj IV soln, Flushing PICC Protocol **AND** sodium chloride 0.9% **AND** sodium chloride 0.9%, Flushing PICC Protocol **AND** sodium chloride 0.9% **AND** sodium chloride 0.9%    OBJECTIVE:   Vital Signs (Most Recent):   Temp: 98.5 °F (36.9 °C) (03/25/17 1900)  Pulse: 73 (03/25/17 2344)  Resp: (!) 22 (03/25/17 2344)  BP: 125/80 (03/25/17 2000)  SpO2: (!) 93 % (03/25/17 2344)    Vital Signs (24h Range):   Temp:  [98.1 °F (36.7 °C)-101.2 °F (38.4 °C)] 98.5 °F (36.9 °C)  Pulse:  [58-77] 73  Resp:  [16-44] 22  SpO2:  [93 %-99 %] 93 %  BP: ()/(57-89) 125/80    ICP/CPP (Last 24h):        I & O (Last 24h):     Intake/Output Summary (Last 24 hours) at 03/25/17 2357  Last data filed at 03/25/17 1900   Gross per 24 hour   Intake             3040 ml   Output              600 ml   Net             2440 ml     Physical Exam:  General   HEENT: High risk for aspiration.  Chest Heart RRR / Lungs Clear to auscultation  Abdomen: Soft nontender + BS  Extremities: OK distal pulses.  Skin: UK  Neurological Exam:  MS; Awake. Increased speech outoput but significant dysarthria. Following simple commands. Expressive aphasia.  CN: II-XII R-UMN facial, significant dysphagia and dysarthria. Improving dysarthria. High risk for aspiration.  Motor: LUE 5 /5 / RUE 1/5 Tone normal bilaterally  LLE 5 /5 / RLE 2/5 Tone normal bilaterally  Sensory: LT/PP/T/ Vibration UK  Complex sensory modalities: not tested  DTR: normal throughout.  Coordination /Fine motor: UK  Gait: Not tested.  Meningeal signs: Absent    Vent Data:   Oxygen Concentration (%):  [40] 40    Lines/Drains/Airway:              NG/OG Tube 03/19/17  1145 Right nostril (Active)   Placement Check placement verified by x-ray;placement verified by aspirate characteristics 3/20/2017  3:05 PM   Tube advanced (cm) 65 3/19/2017  3:15 PM   Distal Tube Length (cm) 65 3/19/2017  7:01 PM   Tolerance no signs/symptoms of discomfort 3/20/2017  3:05 PM   Securement anchored to nostril center w/ adhesive device 3/20/2017  3:05 PM   Clamp Status/Tolerance unclamped 3/20/2017  3:05 PM   Flush/Irrigation flushed w/;water 3/20/2017  3:05 PM   Feeding Method continuous 3/20/2017  3:05 PM   Current Rate (mL/hr) 10 mL/hr 3/19/2017  7:01 PM   Goal Rate (mL/hr) 40 mL/hr 3/19/2017  7:01 PM   Intake (mL) 100 mL 3/20/2017  8:05 AM   Intake (mL) - Formula Tube Feeding 40 3/20/2017  6:05 PM   Residual Amount (ml) 15 ml 3/20/2017  7:05 AM            External Urinary Catheter 03/18/17 1758 Medium (Active)   Collection Container Urimeter 3/20/2017  3:05 PM   Securement Method secured to top of thigh w/ adhesive device 3/20/2017  3:05 PM   Skin no redness;no breakdown;penis/scrotum cleansed w/ soap and water;skin barrier applied 3/20/2017  3:05 PM   Tolerance no signs/symptoms of discomfort 3/20/2017  3:05 PM   Output (mL) 250 mL 3/20/2017 11:05 AM     Nutrition/Tube Feeds (if NPO state why): NPO. NGT confirmed.    Labs:  ABG:     Recent Labs  Lab 03/25/17 2232   PH 7.506*   PO2 50*   PCO2 25.3*   HCO3 20.0*   POCSATURATED 89*   BE -3     BMP:  Recent Labs  Lab 03/25/17  0248  03/25/17  1755   *  < > 148*   K 3.5  --   --    *  --   --    CO2 21*  --   --    BUN 34*  --   --    CREATININE 0.8  --   --    *  --   --    MG 1.9  --   --    PHOS 4.1  --   --    < > = values in this interval not displayed.  LFT:   Lab Results   Component Value Date    AST 23 03/25/2017    ALT 24 03/25/2017    ALKPHOS 66 03/25/2017    BILITOT 0.9 03/25/2017    ALBUMIN 2.7 (L) 03/25/2017    PROT 6.1 03/25/2017     CBC:   Lab Results   Component Value Date    WBC 14.40 (H) 03/25/2017    HGB 11.9 (L)  03/25/2017    HCT 35.2 (L) 03/25/2017    MCV 93 03/25/2017     03/25/2017     Microbiology x 7d:   Microbiology Results (last 7 days)     Procedure Component Value Units Date/Time    Blood culture [[id number]] Collected:  03/24/17 1315    Order Status:  Completed Specimen:  Blood from Peripheral, Antecubital, Left Updated:  03/25/17 2012     Blood Culture, Routine No Growth to date     Blood Culture, Routine No Growth to date    Blood culture [[id number]] Collected:  03/20/17 1532    Order Status:  Completed Specimen:  Blood from Peripheral, Forearm, Right Updated:  03/25/17 2012     Blood Culture, Routine No growth after 5 days.    Narrative:       Blood cultures from 2 different sites. 4 bottles total.  Please draw before starting antibiotics.    Blood culture [[id number]] Collected:  03/20/17 1533    Order Status:  Completed Specimen:  Blood from Peripheral, Lower Arm, Right Updated:  03/25/17 2012     Blood Culture, Routine No growth after 5 days.    Narrative:       Blood cultures x 2 different sites. 4 bottles total. Please  draw cultures before administering antibiotics.    Blood culture [[id number]] Collected:  03/24/17 1314    Order Status:  Completed Specimen:  Blood from Peripheral, Forearm, Left Updated:  03/25/17 1822     Blood Culture, Routine No Growth to date     Blood Culture, Routine No Growth to date    Blood culture [[id number]] Collected:  03/21/17 0356    Order Status:  Completed Specimen:  Blood from Peripheral, Antecubital, Left Updated:  03/25/17 0812     Blood Culture, Routine No Growth to date     Blood Culture, Routine No Growth to date     Blood Culture, Routine No Growth to date     Blood Culture, Routine No Growth to date     Blood Culture, Routine No Growth to date    Narrative:       Blood cultures from 2 different sites. 4 bottles total.  Please draw before starting antibiotics.    Blood culture [[id number]] Collected:  03/19/17 1827    Order Status:  Completed Specimen:  Blood  from Peripheral, Hand, Left Updated:  03/24/17 2212     Blood Culture, Routine No growth after 5 days.    Narrative:       Blood cultures x 2 different sites. 4 bottles total. Please  draw cultures before administering antibiotics.    Blood culture [[id number]] Collected:  03/19/17 1827    Order Status:  Completed Specimen:  Blood from Peripheral, Hand, Right Updated:  03/24/17 2212     Blood Culture, Routine No growth after 5 days.    Narrative:       Blood cultures from 2 different sites. 4 bottles total.  Please draw before starting antibiotics.    Blood culture [[id number]] Collected:  03/21/17 0356    Order Status:  Sent Specimen:  Blood from Peripheral, Antecubital, Left Updated:  03/21/17 0409    Narrative:       Blood cultures x 2 different sites. 4 bottles total. Please  draw cultures before administering antibiotics.    Culture, Respiratory [[id number]]     Order Status:  No result Specimen:  Respiratory         Imaging:  3/20/17: CT head:  L-head of the caudate and putaminal hemorrhage with vasogenic edema.Slight increased on ICH and edema.  I personally reviewed the above image.    ASSESSMENT/PLAN:     Active Hospital Problems    Diagnosis    *Embolic stroke involving left middle cerebral artery    Carotid artery stenosis, symptomatic    Cytotoxic cerebral edema    Abnormality of aortic valve    Right sided weakness    Aphasia    Essential hypertension           Active Problem List:   1 L-stroke with hemorrhagic transformation on Caudate and putamen.  2. R/O endocarditis. Aortic valve with possible vegetation.  3. HTN  4. Secondary dysphagia and dysarthria  5. RUE Flebitis.         Assessment / Plan:  Neuro: Na 150-155.  -ECASA 81 mg qd  -Lipitor 80 mg qd.  -Start Plavix 75 mg qd..  -Has NGT.  -PRN 2%  HTS 300cc q 6hrs PRN for serum NA < 150.   (During the day 23.4% 30cc IVB during the day)  -Speech eval. High risk for aspiration.  -PT, OT. and rehab  -Repeat head CT on Sunday..  Resp: Assess for  pneumonia.   -NC 2 Lt.   -IS  -Allbuterol and Mucomyst  q 6hrs   -Frequent suctioning  -Chest PT q 6hrs. Cough induction.  -Keep sitted.  -Keep sitted 90 degrees.  -CXR.  CV: TTE normal. Abnormal aortic valve. May have a vegetation. HTN  -Keep SBP{ <=140-mmhG  -Hydralazine PRN  -Amlodipine 10mg qd. Increase.  -Lozartan 100mg qd.   -Labetalol  200mg q 8hrs hold for HR < 55 and or SBP < 120 mmHg.  -PICC line.  -ASA 81mg qd.   -Lipitor 80mg qd.  -Start Plavix 75 mg qd..  IVF/nutrition/Renal/GI: Na 149. Increased BUN/Cr ration.  -Speech evaluation on Friday,  -NPO   -TF at goal  -HTS 2% 300cc q 6hrs PRN for Na < 150.  -Give HTS. 23.4% 30cc IVB at 10 AM and 4 PM instead of 2% HTS IVB  -Serum Na q 6hrs  -Free water 250 cc q 4hrs.  -NGT confirmed.  -Condom catheter.  -Please check PVR with bladder scanner.  -BR.  Hem / ID: R/O vegetations. WBC eleted. R/O aspiration pneumonia. RUE Flebitis.  Procal: nl.  -Cefepime 1 gr q 8hr  -Vanc 1gr q 12hrs.  -CXR,   -UA  -Blood cultures.  Surveillance blood cultures.  -Bladder scanner q6hrs.  -ESD  -ASA  81 mg qd   -Elevate the RUE. Warm compresses to RUE.  Endo: HLP.  SSI q 4hrs  -Lipitor 80mg qd.  Prophylaxis:  -SCDs  -SC Heparin 5000 U q 8hrs.   Advance Directives and Disposition:    Full Code. Most likely will need PEG tube. High risk for aspiration.           Uninterrupted level 3 Follow-up /Counseling Time (not including procedures): = 35 min                   Bhavesh Ledbetter MD, MPH, FA,  Neurocritical Care Department Faculty

## 2017-03-26 NOTE — PT/OT/SLP PROGRESS
Occupational Therapy      Nathanael Velez  MRN: [id number]    Patient not seen today secondary to Patient unwilling to participate (Nursing report pt is having respiratory issues but ROM would be appropriate. Pt not willing to participate, speech difficult to understand. Provided optimal positioning as pt was leaning all the way to the L, propped with pillow to maintain. ). Will follow-up tomorrow.    AVIS Rutledge  3/26/2017

## 2017-03-27 LAB
ALBUMIN SERPL BCP-MCNC: 2.4 G/DL
ALLENS TEST: ABNORMAL
ALLENS TEST: ABNORMAL
ALP SERPL-CCNC: 55 U/L
ALT SERPL W/O P-5'-P-CCNC: 28 U/L
ANION GAP SERPL CALC-SCNC: 9 MMOL/L
AST SERPL-CCNC: 26 U/L
BASOPHILS # BLD AUTO: 0.1 K/UL
BASOPHILS NFR BLD: 0.8 %
BILIRUB SERPL-MCNC: 0.7 MG/DL
BUN SERPL-MCNC: 36 MG/DL
CALCIUM SERPL-MCNC: 8.4 MG/DL
CHLORIDE SERPL-SCNC: 118 MMOL/L
CO2 SERPL-SCNC: 21 MMOL/L
CREAT SERPL-MCNC: 0.8 MG/DL
DELSYS: ABNORMAL
DELSYS: ABNORMAL
DIFFERENTIAL METHOD: ABNORMAL
EOSINOPHIL # BLD AUTO: 0.3 K/UL
EOSINOPHIL NFR BLD: 2.2 %
ERYTHROCYTE [DISTWIDTH] IN BLOOD BY AUTOMATED COUNT: 13.8 %
ERYTHROCYTE [SEDIMENTATION RATE] IN BLOOD BY WESTERGREN METHOD: 20 MM/H
EST. GFR  (AFRICAN AMERICAN): >60 ML/MIN/1.73 M^2
EST. GFR  (NON AFRICAN AMERICAN): >60 ML/MIN/1.73 M^2
FIO2: 40
FLOW: 10
FLOW: 10
GLUCOSE SERPL-MCNC: 104 MG/DL
HCO3 UR-SCNC: 19.2 MMOL/L (ref 24–28)
HCO3 UR-SCNC: 21 MMOL/L (ref 24–28)
HCT VFR BLD AUTO: 33.2 %
HGB BLD-MCNC: 11.3 G/DL
INR PPP: 1.1
LYMPHOCYTES # BLD AUTO: 2.1 K/UL
LYMPHOCYTES NFR BLD: 16.2 %
MAGNESIUM SERPL-MCNC: 2 MG/DL
MCH RBC QN AUTO: 31.5 PG
MCHC RBC AUTO-ENTMCNC: 34 %
MCV RBC AUTO: 93 FL
MODE: ABNORMAL
MODE: ABNORMAL
MONOCYTES # BLD AUTO: 1.4 K/UL
MONOCYTES NFR BLD: 10.4 %
NEUTROPHILS # BLD AUTO: 9 K/UL
NEUTROPHILS NFR BLD: 70.4 %
PCO2 BLDA: 29.1 MMHG (ref 35–45)
PCO2 BLDA: 30.1 MMHG (ref 35–45)
PH SMN: 7.43 [PH] (ref 7.35–7.45)
PH SMN: 7.45 [PH] (ref 7.35–7.45)
PHOSPHATE SERPL-MCNC: 3.2 MG/DL
PLATELET # BLD AUTO: 226 K/UL
PLATELET BLD QL SMEAR: ABNORMAL
PMV BLD AUTO: 10.5 FL
PO2 BLDA: 101 MMHG (ref 80–100)
PO2 BLDA: 102 MMHG (ref 80–100)
POC BE: -3 MMOL/L
POC BE: -5 MMOL/L
POC SATURATED O2: 98 % (ref 95–100)
POC SATURATED O2: 98 % (ref 95–100)
POC TCO2: 20 MMOL/L (ref 23–27)
POC TCO2: 22 MMOL/L (ref 23–27)
POCT GLUCOSE: 105 MG/DL (ref 70–110)
POCT GLUCOSE: 111 MG/DL (ref 70–110)
POCT GLUCOSE: 132 MG/DL (ref 70–110)
POCT GLUCOSE: 95 MG/DL (ref 70–110)
POCT GLUCOSE: 97 MG/DL (ref 70–110)
POTASSIUM SERPL-SCNC: 3.3 MMOL/L
POTASSIUM SERPL-SCNC: 3.8 MMOL/L
POTASSIUM SERPL-SCNC: 3.8 MMOL/L
PROT SERPL-MCNC: 5.7 G/DL
PROTHROMBIN TIME: 11.4 SEC
RBC # BLD AUTO: 3.59 M/UL
SAMPLE: ABNORMAL
SAMPLE: ABNORMAL
SITE: ABNORMAL
SITE: ABNORMAL
SODIUM SERPL-SCNC: 146 MMOL/L
SODIUM SERPL-SCNC: 147 MMOL/L
SODIUM SERPL-SCNC: 147 MMOL/L
SODIUM SERPL-SCNC: 148 MMOL/L
SODIUM SERPL-SCNC: 148 MMOL/L
SP02: 99
VANCOMYCIN SERPL-MCNC: 8.1 UG/ML
VANCOMYCIN TROUGH SERPL-MCNC: 7 UG/ML
WBC # BLD AUTO: 12.92 K/UL

## 2017-03-27 PROCEDURE — 25000003 PHARM REV CODE 250: Performed by: PSYCHIATRY & NEUROLOGY

## 2017-03-27 PROCEDURE — 85025 COMPLETE CBC W/AUTO DIFF WBC: CPT

## 2017-03-27 PROCEDURE — 83735 ASSAY OF MAGNESIUM: CPT

## 2017-03-27 PROCEDURE — 82803 BLOOD GASES ANY COMBINATION: CPT

## 2017-03-27 PROCEDURE — 94640 AIRWAY INHALATION TREATMENT: CPT

## 2017-03-27 PROCEDURE — 99233 SBSQ HOSP IP/OBS HIGH 50: CPT | Mod: ,,, | Performed by: ANESTHESIOLOGY

## 2017-03-27 PROCEDURE — 63600175 PHARM REV CODE 636 W HCPCS: Performed by: NURSE PRACTITIONER

## 2017-03-27 PROCEDURE — 25000003 PHARM REV CODE 250: Performed by: NURSE PRACTITIONER

## 2017-03-27 PROCEDURE — 80053 COMPREHEN METABOLIC PANEL: CPT

## 2017-03-27 PROCEDURE — 80202 ASSAY OF VANCOMYCIN: CPT

## 2017-03-27 PROCEDURE — 84100 ASSAY OF PHOSPHORUS: CPT

## 2017-03-27 PROCEDURE — 97110 THERAPEUTIC EXERCISES: CPT

## 2017-03-27 PROCEDURE — 37799 UNLISTED PX VASCULAR SURGERY: CPT

## 2017-03-27 PROCEDURE — 31720 CLEARANCE OF AIRWAYS: CPT

## 2017-03-27 PROCEDURE — 94668 MNPJ CHEST WALL SBSQ: CPT

## 2017-03-27 PROCEDURE — 63600175 PHARM REV CODE 636 W HCPCS: Performed by: PHYSICIAN ASSISTANT

## 2017-03-27 PROCEDURE — 25000003 PHARM REV CODE 250: Performed by: PHYSICIAN ASSISTANT

## 2017-03-27 PROCEDURE — 99233 SBSQ HOSP IP/OBS HIGH 50: CPT | Mod: ,,, | Performed by: PSYCHIATRY & NEUROLOGY

## 2017-03-27 PROCEDURE — 63600175 PHARM REV CODE 636 W HCPCS: Performed by: PSYCHIATRY & NEUROLOGY

## 2017-03-27 PROCEDURE — 99900035 HC TECH TIME PER 15 MIN (STAT)

## 2017-03-27 PROCEDURE — 80202 ASSAY OF VANCOMYCIN: CPT | Mod: 91

## 2017-03-27 PROCEDURE — 20000000 HC ICU ROOM

## 2017-03-27 PROCEDURE — 85610 PROTHROMBIN TIME: CPT

## 2017-03-27 PROCEDURE — 84295 ASSAY OF SERUM SODIUM: CPT

## 2017-03-27 PROCEDURE — 27000221 HC OXYGEN, UP TO 24 HOURS

## 2017-03-27 PROCEDURE — 84145 PROCALCITONIN (PCT): CPT

## 2017-03-27 PROCEDURE — 25000003 PHARM REV CODE 250: Performed by: STUDENT IN AN ORGANIZED HEALTH CARE EDUCATION/TRAINING PROGRAM

## 2017-03-27 PROCEDURE — 25000242 PHARM REV CODE 250 ALT 637 W/ HCPCS: Performed by: NURSE PRACTITIONER

## 2017-03-27 PROCEDURE — 84132 ASSAY OF SERUM POTASSIUM: CPT | Mod: 91

## 2017-03-27 PROCEDURE — 84295 ASSAY OF SERUM SODIUM: CPT | Mod: 91

## 2017-03-27 PROCEDURE — 94761 N-INVAS EAR/PLS OXIMETRY MLT: CPT

## 2017-03-27 RX ORDER — HYDRALAZINE HYDROCHLORIDE 20 MG/ML
10 INJECTION INTRAMUSCULAR; INTRAVENOUS EVERY 4 HOURS PRN
Status: DISCONTINUED | OUTPATIENT
Start: 2017-03-27 | End: 2017-04-03

## 2017-03-27 RX ORDER — NICARDIPINE HYDROCHLORIDE 0.2 MG/ML
1 INJECTION INTRAVENOUS CONTINUOUS
Status: DISCONTINUED | OUTPATIENT
Start: 2017-03-27 | End: 2017-03-27

## 2017-03-27 RX ADMIN — HYDRALAZINE HYDROCHLORIDE 10 MG: 20 INJECTION INTRAMUSCULAR; INTRAVENOUS at 03:03

## 2017-03-27 RX ADMIN — POTASSIUM CHLORIDE 40 MEQ: 20 SOLUTION ORAL at 05:03

## 2017-03-27 RX ADMIN — POTASSIUM CHLORIDE 40 MEQ: 20 SOLUTION ORAL at 04:03

## 2017-03-27 RX ADMIN — ATORVASTATIN CALCIUM 80 MG: 20 TABLET, FILM COATED ORAL at 08:03

## 2017-03-27 RX ADMIN — Medication 10 ML: at 05:03

## 2017-03-27 RX ADMIN — Medication 10 ML: at 12:03

## 2017-03-27 RX ADMIN — Medication 3 ML: at 02:03

## 2017-03-27 RX ADMIN — IPRATROPIUM BROMIDE AND ALBUTEROL SULFATE 3 ML: .5; 3 SOLUTION RESPIRATORY (INHALATION) at 08:03

## 2017-03-27 RX ADMIN — LABETALOL HCL 100 MG: 100 TABLET, FILM COATED ORAL at 02:03

## 2017-03-27 RX ADMIN — IPRATROPIUM BROMIDE AND ALBUTEROL SULFATE 3 ML: .5; 3 SOLUTION RESPIRATORY (INHALATION) at 07:03

## 2017-03-27 RX ADMIN — VANCOMYCIN HYDROCHLORIDE 1000 MG: 1 INJECTION, POWDER, LYOPHILIZED, FOR SOLUTION INTRAVENOUS at 03:03

## 2017-03-27 RX ADMIN — ACETYLCYSTEINE 4 ML: 100 SOLUTION ORAL; RESPIRATORY (INHALATION) at 01:03

## 2017-03-27 RX ADMIN — STANDARDIZED SENNA CONCENTRATE AND DOCUSATE SODIUM 1 TABLET: 8.6; 5 TABLET, FILM COATED ORAL at 09:03

## 2017-03-27 RX ADMIN — IPRATROPIUM BROMIDE AND ALBUTEROL SULFATE 3 ML: .5; 3 SOLUTION RESPIRATORY (INHALATION) at 12:03

## 2017-03-27 RX ADMIN — STANDARDIZED SENNA CONCENTRATE AND DOCUSATE SODIUM 1 TABLET: 8.6; 5 TABLET, FILM COATED ORAL at 08:03

## 2017-03-27 RX ADMIN — LABETALOL HCL 100 MG: 100 TABLET, FILM COATED ORAL at 09:03

## 2017-03-27 RX ADMIN — ACETYLCYSTEINE 4 ML: 100 SOLUTION ORAL; RESPIRATORY (INHALATION) at 12:03

## 2017-03-27 RX ADMIN — ACETYLCYSTEINE 4 ML: 100 SOLUTION ORAL; RESPIRATORY (INHALATION) at 08:03

## 2017-03-27 RX ADMIN — PIPERACILLIN SODIUM AND TAZOBACTAM SODIUM 4.5 G: 4; .5 INJECTION, POWDER, FOR SOLUTION INTRAVENOUS at 09:03

## 2017-03-27 RX ADMIN — HEPARIN SODIUM 5000 UNITS: 5000 INJECTION, SOLUTION INTRAVENOUS; SUBCUTANEOUS at 05:03

## 2017-03-27 RX ADMIN — ACETYLCYSTEINE 4 ML: 100 SOLUTION ORAL; RESPIRATORY (INHALATION) at 07:03

## 2017-03-27 RX ADMIN — PIPERACILLIN SODIUM AND TAZOBACTAM SODIUM 4.5 G: 4; .5 INJECTION, POWDER, FOR SOLUTION INTRAVENOUS at 02:03

## 2017-03-27 RX ADMIN — LOSARTAN POTASSIUM 100 MG: 50 TABLET, FILM COATED ORAL at 08:03

## 2017-03-27 RX ADMIN — LABETALOL HCL 100 MG: 100 TABLET, FILM COATED ORAL at 05:03

## 2017-03-27 RX ADMIN — AMLODIPINE BESYLATE 10 MG: 10 TABLET ORAL at 08:03

## 2017-03-27 RX ADMIN — IPRATROPIUM BROMIDE AND ALBUTEROL SULFATE 3 ML: .5; 3 SOLUTION RESPIRATORY (INHALATION) at 01:03

## 2017-03-27 RX ADMIN — POTASSIUM CHLORIDE 40 MEQ: 20 SOLUTION ORAL at 09:03

## 2017-03-27 RX ADMIN — Medication 10 ML: at 11:03

## 2017-03-27 RX ADMIN — Medication 3 ML: at 05:03

## 2017-03-27 RX ADMIN — POLYETHYLENE GLYCOL 3350 17 G: 17 POWDER, FOR SOLUTION ORAL at 08:03

## 2017-03-27 RX ADMIN — VANCOMYCIN HYDROCHLORIDE 1000 MG: 1 INJECTION, POWDER, LYOPHILIZED, FOR SOLUTION INTRAVENOUS at 04:03

## 2017-03-27 RX ADMIN — HEPARIN SODIUM 5000 UNITS: 5000 INJECTION, SOLUTION INTRAVENOUS; SUBCUTANEOUS at 02:03

## 2017-03-27 RX ADMIN — HEPARIN SODIUM 5000 UNITS: 5000 INJECTION, SOLUTION INTRAVENOUS; SUBCUTANEOUS at 09:03

## 2017-03-27 RX ADMIN — PIPERACILLIN SODIUM AND TAZOBACTAM SODIUM 4.5 G: 4; .5 INJECTION, POWDER, FOR SOLUTION INTRAVENOUS at 05:03

## 2017-03-27 RX ADMIN — HYDRALAZINE HYDROCHLORIDE 10 MG: 20 INJECTION INTRAMUSCULAR; INTRAVENOUS at 09:03

## 2017-03-27 RX ADMIN — Medication 3 ML: at 09:03

## 2017-03-27 NOTE — PLAN OF CARE
03/27/17 1219   Right Care Assessment   Can the patient answer the patient profile reliably? No, cognitively impaired   How often would a person be available to care for the patient? Often   Describe the patient's ability to walk at the present time. Major restrictions/daily assistance from another person   How does the patient rate their overall health at the present time? (xiang)   Number of comorbid conditions (as recorded on the chart) One   During the past month, has the patient often been bothered by feeling down, depressed or hopeless? (xiang)   During the past month, has the patient often been bothered by little interest or pleasure in doing things? (xiang)       Discharge plan: [facility]    Fifiestefania Alcocer   [id number]

## 2017-03-27 NOTE — PT/OT/SLP PROGRESS
"Physical Therapy  Treatment    Nathanael Velez   MRN: [id number]   Admitting Diagnosis: Embolic stroke involving left middle cerebral artery    PT Received On: 17  PT Start Time: 1227     PT Stop Time: 1240    PT Total Time (min): 13 min       Billable Minutes:  Therapeutic Exercise 13    Treatment Type: Treatment  PT/PTA: PT     PTA Visit Number: 0       General Precautions: Standard, aspiration, aphasia, fall, NPO  Orthopedic Precautions: N/A   Braces: N/A    Do you have any cultural, spiritual, Shinto conflicts, given your current situation?: Hindu    Subjective:  Communicated with RN prior to session. PT and RN decided to keep today's treatment conservative and bed level 2/2 pt's respiratory status and increase needs for supplemental O2. Pt found on venti mask.       Family present at bedside and agreeable to PT session. "He's going to say no, but he needs it so go ahead."  Pt's eyes remained closed throughout session, but pt would mumble and responded to commands & cueing. Pt able to open eyes 3x at end of session for PT, but did not sustain.     No pain stated or indicated throughout session.   Pain Ratin/10  Pain Rating Post-Intervention: 0/10    Objective:   Patient found with: arterial line, blood pressure cuff, bed alarm, oxygen, pulse ox (continuous), restraints, NG tube, telemetry, SCD, peripheral IV (condom catheter)    Functional Mobility:  Did not perform this visit.      Balance:   Unable to assess this visit; will defer to next visit.     Therapeutic Activities and Exercises:  Performed the following exercises in supine x 10 reps each:    Bilateral Lower Extremity:   AP    Heel slides    Hip abd/add    Modified SAQ    SLR      Patient required verbal/tactile cueing to complete exercises. Rest break provided between sets as tolerated. Pt's eyes remained closed throughout. Active Assist performed on initial reps, but pt able to progress to AROM with only tactile/verbal cueing to complete. "     Education:  Education provided to pt and family regarding: safety with mobility; goals for PT. Verbalized understanding.     Whiteboard updated with correct mobility information. RN/PCT notified.  Transfer with therapy only at this time.       AM-PAC 6 CLICK MOBILITY  How much help from another person does this patient currently need?   1 = Unable, Total/Dependent Assistance  2 = A lot, Maximum/Moderate Assistance  3 = A little, Minimum/Contact Guard/Supervision  4 = None, Modified Caroline/Independent    Turning over in bed (including adjusting bedclothes, sheets and blankets)?: 3  Sitting down on and standing up from a chair with arms (e.g., wheelchair, bedside commode, etc.): 3  Moving from lying on back to sitting on the side of the bed?: 3  Moving to and from a bed to a chair (including a wheelchair)?: 3  Need to walk in hospital room?: 2  Climbing 3-5 steps with a railing?: 1  Total Score: 15    AM-PAC Raw Score CMS G-Code Modifier Level of Impairment Assistance   6 % Total / Unable   7 - 9 CM 80 - 100% Maximal Assist   10 - 14 CL 60 - 80% Moderate Assist   15 - 19 CK 40 - 60% Moderate Assist   20 - 22 CJ 20 - 40% Minimal Assist   23 CI 1-20% SBA / CGA   24 CH 0% Independent/ Mod I     Patient left supine with all lines intact and call button in reach.    Assessment:  Nathanael Velez is a 61 y.o. male with a medical diagnosis of Embolic stroke involving left middle cerebral artery and presents with decrease endurance, strength, balance, coordination, and cognition with overall mobility. Pt tolerated session well and continues to progress towards goals.  Pt tolerated LE exercises well this visit. Today's treatment limited to bed level only 2/2 pt's increase respiratory needs. Progress towards goals limited/impacted by endurance and medical status.  Pt would benefit from continued skilled physical therapy to address listed impairments, improve functional independence, and maximize safety with  mobility.  PT recommends dc disposition to Rehab for further IP therapy. Pt demonstrates good potential to progress and would benefit from 3 h/day to maximize recovery to gain functional independence prior to d/c home.    .    Rehab identified problem list/impairments: Rehab identified problem list/impairments: weakness, impaired endurance, impaired self care skills, impaired functional mobilty, impaired balance, decreased coordination, decreased upper extremity function, decreased safety awareness, decreased lower extremity function, impaired cognition, gait instability, impaired cardiopulmonary response to activity    Rehab potential is good.    Activity tolerance: Good    Discharge recommendations: Discharge Facility/Level Of Care Needs: rehabilitation facility     Barriers to discharge: Barriers to Discharge: Inaccessible home environment, Decreased caregiver support    Equipment recommendations: Equipment Needed After Discharge:  (TBD pending progress at next level of care)     GOALS:   Physical Therapy Goals        Problem: Physical Therapy Goal    Goal Priority Disciplines Outcome Goal Variances Interventions   Physical Therapy Goal     PT/OT, PT Ongoing (interventions implemented as appropriate)     Description:  Active Goals to be met by: 4/7/17    1. Supine to sit with CGA.  2. Sit to supine with Supervision. NOT MET  3. Sit to stand with QCN with CGA.  4. Pt to perform gait 5ft with appropriate AD with max assist.-not met  5. Pt to tolerate sitting on the EOB 20 min  with CGA.-not met  6. Pt to transfer bed to/from bedside chair with max assist.-not met  7. Able to tolerate exercise for 15-20 reps with independence.  8. Dynamic sitting at edge of bed x 8 minutes with Stand-by Assistance.  9. Dynamic standing for 2 minutes with Contact Guard Assistance using Quad Cane.  10. Patient and family able to teachback stroke & positioning education independently.  11. Wheelchair propulsion x 100 feet with Minimal  Assistance using bilateral lower extremities    MET Goals:  Pt supine to sit with moderate assist- MET 3/21  Supine to sit to Minimal Assist. MET  Pt sit to supine with moderate assist-MET 3/21  Pt sit to stand with appropriate AD with moderate assist. Met    Pt to perform B LE exs in sitting or supine x 10 reps with AAROM on R LE. MET              PLAN:    Patient to be seen 6 x/week  to address the above listed problems via gait training, therapeutic activities, therapeutic exercises, neuromuscular re-education  Plan of Care expires: 04/18/17  Plan of Care reviewed with: patient, dewey         Yissel WILLARD Sumeet, PT  03/27/2017

## 2017-03-27 NOTE — PLAN OF CARE
Problem: Patient Care Overview  Goal: Plan of Care Review  Outcome: Ongoing (interventions implemented as appropriate)  No acute events throughout the day, VS and assessment per flow sheet, patient progressing towards goal as tolerated. Plan of care reviewed with Nathanael Velez and family, all concerns addressed. Will continue to monitor.      See chart for full details of today's events and V/S

## 2017-03-27 NOTE — PROGRESS NOTES
Instructed to hold (1200) dose of  200 ml water bolus per NGT due to pt being NPO at this time. Will continue to monitor.

## 2017-03-27 NOTE — SUBJECTIVE & OBJECTIVE
Neurologic Chief Complaint: L MCA stroke with hemorrhage    Subjective:     Interval History: Patient is seen for follow-up neurological assessment and treatment recommendations: Patient to have IVC filter placed when stable from a respiratory perspective. On intubation watch. CXR is stable.     HPI, Past Medical, Family, and Social History remains the same as documented in the initial encounter.     Review of Systems   Constitutional: Negative for fever.   Eyes: Negative for visual disturbance.   Respiratory: Positive for shortness of breath and wheezing.    Gastrointestinal: Negative for nausea.   Neurological: Positive for speech difficulty.   Psychiatric/Behavioral: Negative for agitation.     Scheduled Meds:   acetylcysteine 100 mg/ml (10%)  4 mL Nebulization Q6H    albuterol-ipratropium 2.5mg-0.5mg/3mL  3 mL Nebulization Q6H    amlodipine  10 mg Per NG tube Daily    aspirin  81 mg Per NG tube Daily    atorvastatin  80 mg Per NG tube Daily    clopidogrel  75 mg Per NG tube Daily    heparin (porcine)  5,000 Units Subcutaneous Q8H    labetalol  100 mg Per NG tube Q8H    lidocaine (PF) 10 mg/ml (1%)  5 mL Intradermal Once    losartan  100 mg Per NG tube Daily    piperacillin-tazobactam 4.5 g in dextrose 5 % 100 mL IVPB (ready to mix system)  4.5 g Intravenous Q8H    polyethylene glycol  17 g Per NG tube Daily    senna-docusate 8.6-50 mg  1 tablet Per NG tube BID    sodium chloride 0.9%  10 mL Intravenous Q6H    sodium chloride 0.9%  10 mL Intravenous Q6H    sodium chloride 0.9%  3 mL Intravenous Q8H    vancomycin (VANCOCIN) IVPB  15 mg/kg (Dosing Weight) Intravenous Q12H     Continuous Infusions:   lactated ringers 50 mL/hr at 03/27/17 1700     PRN Meds:acetaminophen, hydrALAZINE, magnesium sulfate IVPB, magnesium sulfate IVPB, ondansetron, potassium chloride 10%, potassium chloride 10%, potassium chloride 10%, potassium, sodium phosphates, potassium, sodium phosphates, potassium, sodium  phosphates, ramelteon, Flushing PICC Protocol **AND** sodium chloride 0.9% **AND** sodium chloride 0.9%, Flushing PICC Protocol **AND** sodium chloride 0.9% **AND** sodium chloride 0.9%    Objective:     Vital Signs (Most Recent):  Temp: 98.4 °F (36.9 °C) (17 1500)  Pulse: 62 (17 1700)  Resp: (!) 24 (17 1700)  BP: (!) 151/90 (17 0900)  SpO2: 98 % (17 1700)  BP Location: Right leg    Vital Signs Range (Last 24H):  Temp:  [98.1 °F (36.7 °C)-99.6 °F (37.6 °C)]   Pulse:  [54-66]   Resp:  [21-32]   BP: ()/(11-90)   SpO2:  [96 %-99 %]   Arterial Line BP: (128-173)/(52-71)   BP Location: Right leg    Physical Exam   Constitutional: He appears well-developed.   HENT:   Head: Normocephalic and atraumatic.   Eyes: Pupils are equal, round, and reactive to light.   Neck: Normal range of motion. Neck supple.   Cardiovascular: Normal rate.    Neurological: He is alert.   Psychiatric: He has a normal mood and affect.       Neurological Exam:   LOC: alert and follows requests  Language: Global aphasia  Speech: Dysarthria  Orientation: Aphasic  Visual Fields (CN II): Full  EOM (CN III, IV, VI): Gaze preference left  Facial Movement (CN VII): lower weakness right lower  Tongue (CN XII): to midline  Motor*: RUE & LUE moves against gravity; legs 4/5  Sensation: intact to light touch, temperature and vibration    NIH Stroke Scale:    Level of Consciousness: 0 - alert  LOC Questions: 1 - answers one correctly  LOC Commands: 0 - performs both correctly  Best Gaze: 1 - partial gaze palsy  Visual: 0 - no visual loss  Facial Palsy: 1 - minor  Motor Left Arm: 0 - no drift  Motor Right Arm: 1 - drift  Motor Left Le - drift  Motor Right Le - drift  Limb Ataxia: 0 - absent  Sensory: 0 - normal  Best Language: 2 - severe aphasia  Dysarthria: 1 - mild to moderate dysarthria  Extinction and Inattention: 0 - no neglect  NIH Stroke Scale Total: 9      Laboratory:  CMP:   Recent Labs  Lab 17  0236   03/27/17  1126 03/27/17  1507   CALCIUM 8.4*  --   --   --    ALBUMIN 2.4*  --   --   --    PROT 5.7*  --   --   --    *  < > 148*  --    K 3.3*  --   --  3.8   CO2 21*  --   --   --    *  --   --   --    BUN 36*  --   --   --    CREATININE 0.8  --   --   --    ALKPHOS 55  --   --   --    ALT 28  --   --   --    AST 26  --   --   --    BILITOT 0.7  --   --   --    < > = values in this interval not displayed.  BMP:   Recent Labs  Lab 03/27/17 0237 03/27/17 1126 03/27/17  1507   *  < > 148*  --    K 3.3*  --   --  3.8   *  --   --   --    CO2 21*  --   --   --    BUN 36*  --   --   --    CREATININE 0.8  --   --   --    CALCIUM 8.4*  --   --   --    < > = values in this interval not displayed.  CBC:   Recent Labs  Lab 03/27/17 0237   WBC 12.92*   RBC 3.59*   HGB 11.3*   HCT 33.2*      MCV 93   MCH 31.5*   MCHC 34.0     Lipid Panel: No results for input(s): CHOL, LDLCALC, HDL, TRIG in the last 168 hours.  Coagulation:   Recent Labs  Lab 03/27/17 0237   INR 1.1     Platelet Aggregation Study: No results for input(s): PLTAGG, PLTAGINTERP, PLTAGREGLACO, ADPPLTAGGREG in the last 168 hours.  Hgb A1C: No results for input(s): HGBA1C in the last 168 hours.  TSH: No results for input(s): TSH in the last 168 hours.    Diagnostic Results:  I have personally reviewed: CT Head. Date: 3/26/17  Findings: Evolving L MCA infarct w/ L BG hemorrhage and IVH. Mass effect upon frontal horn of L lateral ventricle without midline shift/hydrocephalus.

## 2017-03-27 NOTE — ASSESSMENT & PLAN NOTE
Antithrombotics for secondary stroke prevention: Aspirin 81mg, plavix recommended to start 03/24  Statins for secondary stroke prevention and hyperlipidemia, if present: Atorvastatin- 80 mg oral daily  Aggressive risk factor modification: Hypertension, High Cholesterol and Obesity  VTE Prophylaxis: SCDs, heparin 5k q8 hours  BP Parameters: SBP <150  Nursing Orders: Neuro checks- per tpa protocol, Antiembolic stockings, Swallowing evaluation by Nursing, Seizure precautions, Avoid Rucker catheter, Pneumatic compression device, Stroke Education, Blood glucose target 100-130, Up with assistance   -ECHO initially with sclerotic lesion vs vegetation of the aortic valve. Repeat ECHO suggested valve is just sclerotic.      Rehab teams recommending Rehab facility

## 2017-03-27 NOTE — PROGRESS NOTES
[facility]  Vascular Neurology  Comprehensive Stroke Center  Progress Note      Neurologic Chief Complaint: L MCA stroke with hemorrhage    Subjective:     Interval History: Patient is seen for follow-up neurological assessment and treatment recommendations: Patient to have IVC filter placed when stable from a respiratory perspective. On intubation watch. CXR is stable.     HPI, Past Medical, Family, and Social History remains the same as documented in the initial encounter.     Review of Systems   Constitutional: Negative for fever.   Eyes: Negative for visual disturbance.   Respiratory: Positive for shortness of breath and wheezing.    Gastrointestinal: Negative for nausea.   Neurological: Positive for speech difficulty.   Psychiatric/Behavioral: Negative for agitation.     Scheduled Meds:   acetylcysteine 100 mg/ml (10%)  4 mL Nebulization Q6H    albuterol-ipratropium 2.5mg-0.5mg/3mL  3 mL Nebulization Q6H    amlodipine  10 mg Per NG tube Daily    aspirin  81 mg Per NG tube Daily    atorvastatin  80 mg Per NG tube Daily    clopidogrel  75 mg Per NG tube Daily    heparin (porcine)  5,000 Units Subcutaneous Q8H    labetalol  100 mg Per NG tube Q8H    lidocaine (PF) 10 mg/ml (1%)  5 mL Intradermal Once    losartan  100 mg Per NG tube Daily    piperacillin-tazobactam 4.5 g in dextrose 5 % 100 mL IVPB (ready to mix system)  4.5 g Intravenous Q8H    polyethylene glycol  17 g Per NG tube Daily    senna-docusate 8.6-50 mg  1 tablet Per NG tube BID    sodium chloride 0.9%  10 mL Intravenous Q6H    sodium chloride 0.9%  10 mL Intravenous Q6H    sodium chloride 0.9%  3 mL Intravenous Q8H    vancomycin (VANCOCIN) IVPB  15 mg/kg (Dosing Weight) Intravenous Q12H     Continuous Infusions:   lactated ringers 50 mL/hr at 03/27/17 1700     PRN Meds:acetaminophen, hydrALAZINE, magnesium sulfate IVPB, magnesium sulfate IVPB, ondansetron, potassium chloride 10%, potassium chloride 10%, potassium  chloride 10%, potassium, sodium phosphates, potassium, sodium phosphates, potassium, sodium phosphates, ramelteon, Flushing PICC Protocol **AND** sodium chloride 0.9% **AND** sodium chloride 0.9%, Flushing PICC Protocol **AND** sodium chloride 0.9% **AND** sodium chloride 0.9%    Objective:     Vital Signs (Most Recent):  Temp: 98.4 °F (36.9 °C) (17 1500)  Pulse: 62 (17 1700)  Resp: (!) 24 (17 1700)  BP: (!) 151/90 (17 0900)  SpO2: 98 % (17 1700)  BP Location: Right leg    Vital Signs Range (Last 24H):  Temp:  [98.1 °F (36.7 °C)-99.6 °F (37.6 °C)]   Pulse:  [54-66]   Resp:  [21-32]   BP: ()/(11-90)   SpO2:  [96 %-99 %]   Arterial Line BP: (128-173)/(52-71)   BP Location: Right leg    Physical Exam   Constitutional: He appears well-developed.   HENT:   Head: Normocephalic and atraumatic.   Eyes: Pupils are equal, round, and reactive to light.   Neck: Normal range of motion. Neck supple.   Cardiovascular: Normal rate.    Neurological: He is alert.   Psychiatric: He has a normal mood and affect.       Neurological Exam:   LOC: alert and follows requests  Language: Global aphasia  Speech: Dysarthria  Orientation: Aphasic  Visual Fields (CN II): Full  EOM (CN III, IV, VI): Gaze preference left  Facial Movement (CN VII): lower weakness right lower  Tongue (CN XII): to midline  Motor*: RUE & LUE moves against gravity; legs 4/5  Sensation: intact to light touch, temperature and vibration    NIH Stroke Scale:    Level of Consciousness: 0 - alert  LOC Questions: 1 - answers one correctly  LOC Commands: 0 - performs both correctly  Best Gaze: 1 - partial gaze palsy  Visual: 0 - no visual loss  Facial Palsy: 1 - minor  Motor Left Arm: 0 - no drift  Motor Right Arm: 1 - drift  Motor Left Le - drift  Motor Right Le - drift  Limb Ataxia: 0 - absent  Sensory: 0 - normal  Best Language: 2 - severe aphasia  Dysarthria: 1 - mild to moderate dysarthria  Extinction and Inattention: 0 - no  neglect  NIH Stroke Scale Total: 9      Laboratory:  CMP:   Recent Labs  Lab 03/27/17 0237 03/27/17  1126 03/27/17  1507   CALCIUM 8.4*  --   --   --    ALBUMIN 2.4*  --   --   --    PROT 5.7*  --   --   --    *  < > 148*  --    K 3.3*  --   --  3.8   CO2 21*  --   --   --    *  --   --   --    BUN 36*  --   --   --    CREATININE 0.8  --   --   --    ALKPHOS 55  --   --   --    ALT 28  --   --   --    AST 26  --   --   --    BILITOT 0.7  --   --   --    < > = values in this interval not displayed.  BMP:   Recent Labs  Lab 03/27/17 0237 03/27/17 1126 03/27/17  1507   *  < > 148*  --    K 3.3*  --   --  3.8   *  --   --   --    CO2 21*  --   --   --    BUN 36*  --   --   --    CREATININE 0.8  --   --   --    CALCIUM 8.4*  --   --   --    < > = values in this interval not displayed.  CBC:   Recent Labs  Lab 03/27/17 0237   WBC 12.92*   RBC 3.59*   HGB 11.3*   HCT 33.2*      MCV 93   MCH 31.5*   MCHC 34.0     Lipid Panel: No results for input(s): CHOL, LDLCALC, HDL, TRIG in the last 168 hours.  Coagulation:   Recent Labs  Lab 03/27/17 0237   INR 1.1     Platelet Aggregation Study: No results for input(s): PLTAGG, PLTAGINTERP, PLTAGREGLACO, ADPPLTAGGREG in the last 168 hours.  Hgb A1C: No results for input(s): HGBA1C in the last 168 hours.  TSH: No results for input(s): TSH in the last 168 hours.    Diagnostic Results:  I have personally reviewed: CT Head. Date: 3/26/17  Findings: Evolving L MCA infarct w/ L BG hemorrhage and IVH. Mass effect upon frontal horn of L lateral ventricle without midline shift/hydrocephalus.     Assessment/Plan:     61 year old male with HTN, HLD, and CAD and L MCA stroke symptoms post tpa and transferred here for higher level of care and potential thrombectomy. CTA multiphase upon arrival.   Patient found to have severe L carotid artery stenosis.  Angioplasty with stent performed and thrombectomy of L MCA occlusion.  Patient found to have L MCA stroke  with reperfusion injury.  Possible aortic vegetation seen on echo but on repeat echo showed sclerotic aorta.      03/22/17  Patient drowsy, follows simple commands, unable to move RUE      03/23/17  More awake today.  Temperature and leukocytosis resolving with vancomycin and cefepime.      3/24/17  NAEON. Still with fever overnight.     3/25/17 Patient much more alert and interactive with exam. Still with labored breathing    3/27/17  Patient has a R lower extremity DVT. Unable to have IVC filter placed d/t respiratory status- on intubation watch.         * Embolic stroke involving left middle cerebral artery  Antithrombotics for secondary stroke prevention: Aspirin 81mg, plavix recommended to start 03/24  Statins for secondary stroke prevention and hyperlipidemia, if present: Atorvastatin- 80 mg oral daily  Aggressive risk factor modification: Hypertension, High Cholesterol and Obesity  VTE Prophylaxis: SCDs, heparin 5k q8 hours  BP Parameters: SBP <150  Nursing Orders: Neuro checks- per tpa protocol, Antiembolic stockings, Swallowing evaluation by Nursing, Seizure precautions, Avoid Rucker catheter, Pneumatic compression device, Stroke Education, Blood glucose target 100-130, Up with assistance   -ECHO initially with sclerotic lesion vs vegetation of the aortic valve. Repeat ECHO suggested valve is just sclerotic.      Rehab teams recommending Rehab facility     Right sided weakness  Due to stroke above  Aggressive PT/OT/ speech    Aphasia  Given tpa , status post thrombectomy   Due to stroke above  Aggressive PT/OT/ speech when appropriate    Essential hypertension  Stroke RF  <150 (s/p tPA and hemorrhagic conversion)      Abnormality of aortic valve  Found on echo.   As per NCC  Vegitation vs mass - repeat echo normal  Recommend clarification with cardiology on echo read     Cytotoxic cerebral edema  Evident on imaging     Carotid artery stenosis, symptomatic  S/p IR thrombectomy of distant MCA clot and  angioplasty with stent on L ICA  Likely etiology of patient's L MCA stroke   Complication- With hemorrhagic conversion s/p tPA and thrombectomy/stenting        Arlet Belcher PA-C  Comprehensive Stroke Center  Department of Vascular Neurology   [facility]

## 2017-03-27 NOTE — PROGRESS NOTES
Pt was brought to IR procedure for IVC filter placement per order. Upon arrival, IR team stated that they were unable to perform procedure due to pt requiring high levels of oxygen (15 L per venti mask) to maintain adequate O2 sat. Stated that they would consult anesthesia to determine possible needs for future attempts. Pt was then transported back to room 7088 per bed on portable telemetry monitor and O2. Vital signs remained stable throughout transport. Pt family aware of situation. Will continue to monitor.

## 2017-03-27 NOTE — PROGRESS NOTES
Spoke with CAROLINE Ag with NCC team in regards to pt SBP >170 per arterial line with cuff pressure of 120's. Also informed her of BP orders to keep SBP < 140 however, was verbally told to keep SBP <160. Instructed to administer pt morning BP medications as ordered and that they would address conflicting orders during rounds. Will continue to monitor.

## 2017-03-27 NOTE — ASSESSMENT & PLAN NOTE
S/p IR thrombectomy of distant MCA clot and angioplasty with stent on L ICA  Likely etiology of patient's L MCA stroke   Complication- With hemorrhagic conversion s/p tPA and thrombectomy/stenting

## 2017-03-27 NOTE — PLAN OF CARE
Problem: Physical Therapy Goal  Goal: Physical Therapy Goal  Active Goals to be met by: 4/7/17    1. Supine to sit with CGA.  2. Sit to supine with Supervision. NOT MET  3. Sit to stand with QCN with CGA.  4. Pt to perform gait 5ft with appropriate AD with max assist.-not met  5. Pt to tolerate sitting on the EOB 20 min with CGA.-not met  6. Pt to transfer bed to/from bedside chair with max assist.-not met  7. Able to tolerate exercise for 15-20 reps with independence.  8. Dynamic sitting at edge of bed x 8 minutes with Stand-by Assistance.  9. Dynamic standing for 2 minutes with Contact Guard Assistance using Quad Cane.  10. Patient and family able to teachback stroke & positioning education independently.  11. Wheelchair propulsion x 100 feet with Minimal Assistance using bilateral lower extremities    MET Goals:  Pt supine to sit with moderate assist- MET 3/21  Supine to sit to Minimal Assist. MET  Pt sit to supine with moderate assist-MET 3/21  Pt sit to stand with appropriate AD with moderate assist. Met   Pt to perform B LE exs in sitting or supine x 10 reps with AAROM on R LE. MET           No goals met this visit. Date extended. Pt progressing towards goals. All goals remain appropriate at this time.     Yissel Fay, PT, DPT  3/27/2017

## 2017-03-27 NOTE — PLAN OF CARE
Problem: Patient Care Overview  Goal: Plan of Care Review  Outcome: Ongoing (interventions implemented as appropriate)  No acute events throughout day. Pt remains on LR at 50 ml/hr. Pt also remains on partial rebreather mask to remain adequate oxygenation. VS and assessment per flow sheet. Patient progressing towards goals as tolerated. Plan of care reviewed with Nathanael Velez and family. All concerns addressed, will continue to monitor.

## 2017-03-27 NOTE — PROGRESS NOTES
ICU Progress Note  Neurocritical Care    Admit Date: 3/18/2017  LOS: 9    CC: Embolic stroke involving left middle cerebral artery    Code Status: Full Code     SUBJECTIVE:     Interval History/Significant Events:   Aphasic   Difficult to arouse  Dvt; partially occlusive R fem vein  resp . insuff  Hypernatremia  Hypokalemia  Adult ftt  Cytotoxic cerebral edema    Medications:  Continuous Infusions:   sodium chloride 0.9% 15 mL/hr at 03/27/17 1000    lactated ringers 50 mL/hr at 03/27/17 1000    nicardipine Stopped (03/27/17 0915)     Scheduled Meds:   acetylcysteine 100 mg/ml (10%)  4 mL Nebulization Q6H    albuterol-ipratropium 2.5mg-0.5mg/3mL  3 mL Nebulization Q6H    amlodipine  10 mg Per NG tube Daily    aspirin  81 mg Per NG tube Daily    atorvastatin  80 mg Per NG tube Daily    clopidogrel  75 mg Per NG tube Daily    heparin (porcine)  5,000 Units Subcutaneous Q8H    labetalol  100 mg Per NG tube Q8H    lidocaine (PF) 10 mg/ml (1%)  5 mL Intradermal Once    losartan  100 mg Per NG tube Daily    piperacillin-tazobactam 4.5 g in dextrose 5 % 100 mL IVPB (ready to mix system)  4.5 g Intravenous Q8H    polyethylene glycol  17 g Per NG tube Daily    senna-docusate 8.6-50 mg  1 tablet Per NG tube BID    sodium chloride 0.9%  10 mL Intravenous Q6H    sodium chloride 0.9%  10 mL Intravenous Q6H    sodium chloride 0.9%  3 mL Intravenous Q8H    vancomycin (VANCOCIN) IVPB  15 mg/kg (Dosing Weight) Intravenous Q12H     PRN Meds:.acetaminophen, hydrALAZINE, magnesium sulfate IVPB, magnesium sulfate IVPB, ondansetron, potassium chloride 10%, potassium chloride 10%, potassium chloride 10%, potassium, sodium phosphates, potassium, sodium phosphates, potassium, sodium phosphates, ramelteon, Flushing PICC Protocol **AND** sodium chloride 0.9% **AND** sodium chloride 0.9%, Flushing PICC Protocol **AND** sodium chloride 0.9% **AND** sodium chloride 0.9%    OBJECTIVE:   Vital Signs (Most Recent):   Temp: 98.8  °F (37.1 °C) (03/27/17 0700)  Pulse: 62 (03/27/17 1000)  Resp: (!) 26 (03/27/17 1000)  BP: (!) 151/90 (03/27/17 0900)  SpO2: 98 % (03/27/17 1000)    Vital Signs (24h Range):   Temp:  [97.9 °F (36.6 °C)-99.6 °F (37.6 °C)] 98.8 °F (37.1 °C)  Pulse:  [53-64] 62  Resp:  [22-32] 26  SpO2:  [95 %-99 %] 98 %  BP: ()/(11-90) 151/90  Arterial Line BP: (143-173)/(54-71) 152/63    ICP/CPP (Last 24h):        I & O (Last 24h):   Intake/Output Summary (Last 24 hours) at 03/27/17 1004  Last data filed at 03/27/17 1000   Gross per 24 hour   Intake          2333.33 ml   Output             1490 ml   Net           843.33 ml     Physical Exam:  GA: Alert, comfortable, no acute distress.   HEENT: No scleral icterus or JVD.   Pulmonary: Clear to auscultation A/P/L. No wheezing, crackles, or rhonchi.  Cardiac: RRR S1 & S2 w/o rubs/murmurs/gallops.   Abdominal: Bowel sounds present x 4. No appreciable hepatosplenomegaly.  Skin: No jaundice, rashes, or visible lesions.  Neuro:  Pupils ; 3 mm b/l reactive  Opens eyes to voice  Not following commands    Lines/Drains/Airway:   l picc;2  [place];        Nasopharyngeal Airway (Active)   Measured At Nare 3/27/2017  3:00 AM   Secured Location Left 3/27/2017  3:00 AM           PICC Double Lumen 03/24/17 1720 left basilic (Active)   Site Assessment Clean;Dry;Intact;No redness;No swelling 3/27/2017  7:01 AM   Lumen 1 Status Flushed;Infusing 3/27/2017  7:01 AM   Lumen 2 Status Infusing 3/27/2017  7:01 AM   Length arnulfo (cm) 41 cm 3/24/2017  5:00 PM   Current Exposed Catheter (cm) 0 cm 3/24/2017  5:00 PM   Extremity Circumference (cm) 29 cm 3/24/2017  5:00 PM   Dressing Type Transparent;Securing device 3/27/2017  7:01 AM   Dressing Status Biopatch in place;Clean;Dry;Intact 3/27/2017  7:01 AM   Dressing Intervention Dressing reinforced 3/27/2017  7:01 AM   Dressing Change Due 03/31/17 3/27/2017  7:01 AM   Daily Line Review Performed 3/27/2017  7:01 AM             NG/OG Tube 03/20/17 2100 (Active)    Placement Check placement verified by distal tube length measurement 3/27/2017  7:01 AM   Distal Tube Length (cm) 60 3/25/2017  7:01 AM   Tolerance no signs/symptoms of discomfort 3/27/2017  7:01 AM   Securement anchored to nostril center w/ adhesive device 3/27/2017  7:01 AM   Clamp Status/Tolerance no abdominal distention;no emesis;no residual;no restlessness;clamped 3/27/2017  7:01 AM   Insertion Site Appearance no redness, warmth, tenderness, skin breakdown, drainage 3/27/2017  7:01 AM   Flush/Irrigation flushed w/;water;no resistance met 3/27/2017  7:01 AM   Feeding Method continuous 3/26/2017  3:00 AM   Current Rate (mL/hr) 0 mL/hr 3/26/2017  7:01 AM   Goal Rate (mL/hr) 40 mL/hr 3/26/2017  7:01 AM   Intake (mL) 120 mL 3/26/2017 10:00 PM   Intake (mL) - Formula Tube Feeding 0 3/26/2017  5:00 AM   Residual Amount (ml) 0 ml 3/25/2017  7:01 AM            External Urinary Catheter 03/24/17 1836 Small (Active)   Collection Container Urimeter 3/27/2017  7:01 AM   Securement Method secured to top of thigh w/ leg strap 3/27/2017  7:01 AM   Skin no redness;no breakdown 3/27/2017  7:01 AM   Tolerance no signs/symptoms of discomfort 3/27/2017  7:01 AM   Output (mL) 0 mL 3/27/2017 10:00 AM     Nutrition/Tube Feeds (if NPO state why): npo     Labs:  ABG:   Recent Labs  Lab 03/26/17  1102   PH 7.453*   PO2 68*   PCO2 30.8*   HCO3 21.6*   POCSATURATED 95   BE -2     BMP:  Recent Labs  Lab 03/27/17  0237 03/27/17  0602   * 147*   K 3.3*  --    *  --    CO2 21*  --    BUN 36*  --    CREATININE 0.8  --      --    MG 2.0  --    PHOS 3.2  --      LFT: Lab Results   Component Value Date    AST 26 03/27/2017    ALT 28 03/27/2017    ALKPHOS 55 03/27/2017    BILITOT 0.7 03/27/2017    ALBUMIN 2.4 (L) 03/27/2017    PROT 5.7 (L) 03/27/2017     CBC:   Lab Results   Component Value Date    WBC 12.92 (H) 03/27/2017    HGB 11.3 (L) 03/27/2017    HCT 33.2 (L) 03/27/2017    MCV 93 03/27/2017     03/27/2017      Microbiology x 7d:   Microbiology Results (last 7 days)     Procedure Component Value Units Date/Time    Blood culture [[id number]] Collected:  03/24/17 1315    Order Status:  Completed Specimen:  Blood from Peripheral, Antecubital, Left Updated:  03/26/17 2012     Blood Culture, Routine No Growth to date     Blood Culture, Routine No Growth to date     Blood Culture, Routine No Growth to date    Blood culture [[id number]] Collected:  03/24/17 1314    Order Status:  Completed Specimen:  Blood from Peripheral, Forearm, Left Updated:  03/26/17 1822     Blood Culture, Routine No Growth to date     Blood Culture, Routine No Growth to date     Blood Culture, Routine No Growth to date    Blood culture [[id number]] Collected:  03/21/17 0356    Order Status:  Completed Specimen:  Blood from Peripheral, Antecubital, Left Updated:  03/26/17 0812     Blood Culture, Routine No growth after 5 days.    Narrative:       Blood cultures from 2 different sites. 4 bottles total.  Please draw before starting antibiotics.    Blood culture [[id number]] Collected:  03/20/17 1532    Order Status:  Completed Specimen:  Blood from Peripheral, Forearm, Right Updated:  03/25/17 2012     Blood Culture, Routine No growth after 5 days.    Narrative:       Blood cultures from 2 different sites. 4 bottles total.  Please draw before starting antibiotics.    Blood culture [[id number]] Collected:  03/20/17 1533    Order Status:  Completed Specimen:  Blood from Peripheral, Lower Arm, Right Updated:  03/25/17 2012     Blood Culture, Routine No growth after 5 days.    Narrative:       Blood cultures x 2 different sites. 4 bottles total. Please  draw cultures before administering antibiotics.    Blood culture [[id number]] Collected:  03/19/17 1827    Order Status:  Completed Specimen:  Blood from Peripheral, Hand, Left Updated:  03/24/17 2212     Blood Culture, Routine No growth after 5 days.    Narrative:       Blood cultures x 2 different sites. 4 bottles  total. Please  draw cultures before administering antibiotics.    Blood culture [[id number]] Collected:  03/19/17 1827    Order Status:  Completed Specimen:  Blood from Peripheral, Hand, Right Updated:  03/24/17 2212     Blood Culture, Routine No growth after 5 days.    Narrative:       Blood cultures from 2 different sites. 4 bottles total.  Please draw before starting antibiotics.    Blood culture [[id number]] Collected:  03/21/17 0356    Order Status:  Sent Specimen:  Blood from Peripheral, Antecubital, Left Updated:  03/21/17 0409    Narrative:       Blood cultures x 2 different sites. 4 bottles total. Please  draw cultures before administering antibiotics.        Imaging:  Repeat cxr  I personally reviewed the above image.    ASSESSMENT/PLAN:     Active Hospital Problems    Diagnosis    *Embolic stroke involving left middle cerebral artery    Carotid artery stenosis, symptomatic    Cytotoxic cerebral edema    Abnormality of aortic valve    Right sided weakness    Aphasia    Essential hypertension      Plan:  Check cxr  abg  Follow exam  IVC filter    Level;3

## 2017-03-27 NOTE — PLAN OF CARE
SW spoke with Pt son this morning who asked for list of rehab facilities to be emailed to him. Sent email.    Per RN request, Pt girlfriend in room asked for list. Met with her and Pt at bedside. Gave list of rehab referrals in [place]. She will review and let this SW know which of the two are her preference but she was leaning towards [facility] rehab.    Argentina Juarez, GRAHAM  Neurocritical Care   [facility]  [id number]

## 2017-03-27 NOTE — TREATMENT PLAN
Occupational Therapy   Pt not seen    Nathanael Velez  MRN: [id number]  Room/Bed: 70/70 A    Pt not seen secondary to nursing procedure first attempt, and X Ray tech performing  Procedure on second attempt. Will attempt again if able to return.    Scott Pittman, OTR/L  3/27/2017

## 2017-03-28 LAB
ALBUMIN SERPL BCP-MCNC: 2.3 G/DL
ALLENS TEST: ABNORMAL
ALLENS TEST: ABNORMAL
ALP SERPL-CCNC: 58 U/L
ALT SERPL W/O P-5'-P-CCNC: 38 U/L
ANION GAP SERPL CALC-SCNC: 9 MMOL/L
ANISOCYTOSIS BLD QL SMEAR: SLIGHT
AST SERPL-CCNC: 31 U/L
BASO STIPL BLD QL SMEAR: ABNORMAL
BASOPHILS # BLD AUTO: 0.06 K/UL
BASOPHILS NFR BLD: 0.4 %
BILIRUB SERPL-MCNC: 0.6 MG/DL
BUN SERPL-MCNC: 28 MG/DL
CALCIUM SERPL-MCNC: 8.2 MG/DL
CHLORIDE SERPL-SCNC: 116 MMOL/L
CO2 SERPL-SCNC: 21 MMOL/L
CREAT SERPL-MCNC: 0.8 MG/DL
DELSYS: ABNORMAL
DELSYS: ABNORMAL
DIFFERENTIAL METHOD: ABNORMAL
EOSINOPHIL # BLD AUTO: 0.3 K/UL
EOSINOPHIL NFR BLD: 2 %
ERYTHROCYTE [DISTWIDTH] IN BLOOD BY AUTOMATED COUNT: 13.5 %
ERYTHROCYTE [SEDIMENTATION RATE] IN BLOOD BY WESTERGREN METHOD: 15 MM/H
EST. GFR  (AFRICAN AMERICAN): >60 ML/MIN/1.73 M^2
EST. GFR  (NON AFRICAN AMERICAN): >60 ML/MIN/1.73 M^2
FIO2: 40
FLOW: 10
FLOW: 4
GLUCOSE SERPL-MCNC: 97 MG/DL
HCO3 UR-SCNC: 19 MMOL/L (ref 24–28)
HCO3 UR-SCNC: 21.9 MMOL/L (ref 24–28)
HCT VFR BLD AUTO: 33.2 %
HGB BLD-MCNC: 11.1 G/DL
INR PPP: 1.1
LYMPHOCYTES # BLD AUTO: 2 K/UL
LYMPHOCYTES NFR BLD: 14.5 %
MAGNESIUM SERPL-MCNC: 2 MG/DL
MCH RBC QN AUTO: 31.4 PG
MCHC RBC AUTO-ENTMCNC: 33.4 %
MCV RBC AUTO: 94 FL
MODE: ABNORMAL
MODE: ABNORMAL
MONOCYTES # BLD AUTO: 1.4 K/UL
MONOCYTES NFR BLD: 10.1 %
NEUTROPHILS # BLD AUTO: 9.9 K/UL
NEUTROPHILS NFR BLD: 73 %
PCO2 BLDA: 30.4 MMHG (ref 35–45)
PCO2 BLDA: 35.8 MMHG (ref 35–45)
PH SMN: 7.39 [PH] (ref 7.35–7.45)
PH SMN: 7.4 [PH] (ref 7.35–7.45)
PHOSPHATE SERPL-MCNC: 2.6 MG/DL
PHOSPHATE SERPL-MCNC: 2.7 MG/DL
PLATELET # BLD AUTO: 256 K/UL
PLATELET BLD QL SMEAR: ABNORMAL
PMV BLD AUTO: 10.8 FL
PO2 BLDA: 105 MMHG (ref 80–100)
PO2 BLDA: 114 MMHG (ref 80–100)
POC BE: -3 MMOL/L
POC BE: -6 MMOL/L
POC SATURATED O2: 98 % (ref 95–100)
POC SATURATED O2: 99 % (ref 95–100)
POC TCO2: 20 MMOL/L (ref 23–27)
POC TCO2: 23 MMOL/L (ref 23–27)
POCT GLUCOSE: 103 MG/DL (ref 70–110)
POCT GLUCOSE: 110 MG/DL (ref 70–110)
POCT GLUCOSE: 98 MG/DL (ref 70–110)
POLYCHROMASIA BLD QL SMEAR: ABNORMAL
POTASSIUM SERPL-SCNC: 3.6 MMOL/L
POTASSIUM SERPL-SCNC: 4.2 MMOL/L
PROCALCITONIN SERPL IA-MCNC: 0.09 NG/ML
PROT SERPL-MCNC: 5.8 G/DL
PROTHROMBIN TIME: 11.4 SEC
RBC # BLD AUTO: 3.54 M/UL
SAMPLE: ABNORMAL
SAMPLE: ABNORMAL
SITE: ABNORMAL
SITE: ABNORMAL
SODIUM SERPL-SCNC: 143 MMOL/L
SODIUM SERPL-SCNC: 143 MMOL/L
SODIUM SERPL-SCNC: 144 MMOL/L
SODIUM SERPL-SCNC: 144 MMOL/L
SODIUM SERPL-SCNC: 146 MMOL/L
SP02: 100
SP02: 99
WBC # BLD AUTO: 13.7 K/UL

## 2017-03-28 PROCEDURE — 94668 MNPJ CHEST WALL SBSQ: CPT

## 2017-03-28 PROCEDURE — 25000003 PHARM REV CODE 250: Performed by: NURSE PRACTITIONER

## 2017-03-28 PROCEDURE — 25000242 PHARM REV CODE 250 ALT 637 W/ HCPCS: Performed by: NURSE PRACTITIONER

## 2017-03-28 PROCEDURE — 63600175 PHARM REV CODE 636 W HCPCS: Performed by: PHYSICIAN ASSISTANT

## 2017-03-28 PROCEDURE — 25000003 PHARM REV CODE 250: Performed by: PSYCHIATRY & NEUROLOGY

## 2017-03-28 PROCEDURE — 83735 ASSAY OF MAGNESIUM: CPT

## 2017-03-28 PROCEDURE — 84132 ASSAY OF SERUM POTASSIUM: CPT

## 2017-03-28 PROCEDURE — 97530 THERAPEUTIC ACTIVITIES: CPT

## 2017-03-28 PROCEDURE — 99900029 HC O2 SETUP (STAT)

## 2017-03-28 PROCEDURE — 97112 NEUROMUSCULAR REEDUCATION: CPT

## 2017-03-28 PROCEDURE — 37799 UNLISTED PX VASCULAR SURGERY: CPT

## 2017-03-28 PROCEDURE — 99233 SBSQ HOSP IP/OBS HIGH 50: CPT | Mod: ,,, | Performed by: ANESTHESIOLOGY

## 2017-03-28 PROCEDURE — 84100 ASSAY OF PHOSPHORUS: CPT

## 2017-03-28 PROCEDURE — 95951 HC EEG MONITORING/VIDEO RECORD: CPT

## 2017-03-28 PROCEDURE — 99900035 HC TECH TIME PER 15 MIN (STAT)

## 2017-03-28 PROCEDURE — 82803 BLOOD GASES ANY COMBINATION: CPT

## 2017-03-28 PROCEDURE — 84295 ASSAY OF SERUM SODIUM: CPT

## 2017-03-28 PROCEDURE — 85610 PROTHROMBIN TIME: CPT

## 2017-03-28 PROCEDURE — 80053 COMPREHEN METABOLIC PANEL: CPT

## 2017-03-28 PROCEDURE — 84100 ASSAY OF PHOSPHORUS: CPT | Mod: 91

## 2017-03-28 PROCEDURE — 99900026 HC AIRWAY MAINTENANCE (STAT)

## 2017-03-28 PROCEDURE — 20000000 HC ICU ROOM

## 2017-03-28 PROCEDURE — 63600175 PHARM REV CODE 636 W HCPCS: Performed by: PSYCHIATRY & NEUROLOGY

## 2017-03-28 PROCEDURE — 27000221 HC OXYGEN, UP TO 24 HOURS

## 2017-03-28 PROCEDURE — 94761 N-INVAS EAR/PLS OXIMETRY MLT: CPT

## 2017-03-28 PROCEDURE — 95951 PR EEG MONITORING/VIDEORECORD: CPT | Mod: 26,52,, | Performed by: PSYCHIATRY & NEUROLOGY

## 2017-03-28 PROCEDURE — 92507 TX SP LANG VOICE COMM INDIV: CPT

## 2017-03-28 PROCEDURE — 85025 COMPLETE CBC W/AUTO DIFF WBC: CPT

## 2017-03-28 PROCEDURE — 94640 AIRWAY INHALATION TREATMENT: CPT

## 2017-03-28 PROCEDURE — 4A10X4Z MONITORING OF CENTRAL NERVOUS ELECTRICAL ACTIVITY, EXTERNAL APPROACH: ICD-10-PCS | Performed by: PSYCHIATRY & NEUROLOGY

## 2017-03-28 PROCEDURE — 25000003 PHARM REV CODE 250: Performed by: STUDENT IN AN ORGANIZED HEALTH CARE EDUCATION/TRAINING PROGRAM

## 2017-03-28 PROCEDURE — 97803 MED NUTRITION INDIV SUBSEQ: CPT

## 2017-03-28 PROCEDURE — 95957 EEG DIGITAL ANALYSIS: CPT

## 2017-03-28 PROCEDURE — 25000003 PHARM REV CODE 250: Performed by: PHYSICIAN ASSISTANT

## 2017-03-28 RX ADMIN — ACETYLCYSTEINE 4 ML: 100 SOLUTION ORAL; RESPIRATORY (INHALATION) at 07:03

## 2017-03-28 RX ADMIN — HEPARIN SODIUM 5000 UNITS: 5000 INJECTION, SOLUTION INTRAVENOUS; SUBCUTANEOUS at 02:03

## 2017-03-28 RX ADMIN — POTASSIUM & SODIUM PHOSPHATES POWDER PACK 280-160-250 MG 2 PACKET: 280-160-250 PACK at 09:03

## 2017-03-28 RX ADMIN — POTASSIUM & SODIUM PHOSPHATES POWDER PACK 280-160-250 MG 2 PACKET: 280-160-250 PACK at 05:03

## 2017-03-28 RX ADMIN — PIPERACILLIN SODIUM AND TAZOBACTAM SODIUM 4.5 G: 4; .5 INJECTION, POWDER, FOR SOLUTION INTRAVENOUS at 01:03

## 2017-03-28 RX ADMIN — Medication 10 ML: at 05:03

## 2017-03-28 RX ADMIN — VANCOMYCIN HYDROCHLORIDE 1000 MG: 1 INJECTION, POWDER, LYOPHILIZED, FOR SOLUTION INTRAVENOUS at 03:03

## 2017-03-28 RX ADMIN — VANCOMYCIN HYDROCHLORIDE 1000 MG: 1 INJECTION, POWDER, LYOPHILIZED, FOR SOLUTION INTRAVENOUS at 04:03

## 2017-03-28 RX ADMIN — ATORVASTATIN CALCIUM 80 MG: 20 TABLET, FILM COATED ORAL at 09:03

## 2017-03-28 RX ADMIN — AMLODIPINE BESYLATE 10 MG: 10 TABLET ORAL at 09:03

## 2017-03-28 RX ADMIN — IPRATROPIUM BROMIDE AND ALBUTEROL SULFATE 3 ML: .5; 3 SOLUTION RESPIRATORY (INHALATION) at 09:03

## 2017-03-28 RX ADMIN — POTASSIUM CHLORIDE 40 MEQ: 20 SOLUTION ORAL at 05:03

## 2017-03-28 RX ADMIN — ACETYLCYSTEINE 4 ML: 100 SOLUTION ORAL; RESPIRATORY (INHALATION) at 09:03

## 2017-03-28 RX ADMIN — HEPARIN SODIUM 5000 UNITS: 5000 INJECTION, SOLUTION INTRAVENOUS; SUBCUTANEOUS at 09:03

## 2017-03-28 RX ADMIN — Medication 3 ML: at 05:03

## 2017-03-28 RX ADMIN — LOSARTAN POTASSIUM 100 MG: 50 TABLET, FILM COATED ORAL at 09:03

## 2017-03-28 RX ADMIN — Medication 10 ML: at 07:03

## 2017-03-28 RX ADMIN — ASPIRIN 81 MG CHEWABLE TABLET 81 MG: 81 TABLET CHEWABLE at 09:03

## 2017-03-28 RX ADMIN — ACETYLCYSTEINE 4 ML: 100 SOLUTION ORAL; RESPIRATORY (INHALATION) at 02:03

## 2017-03-28 RX ADMIN — PIPERACILLIN SODIUM AND TAZOBACTAM SODIUM 4.5 G: 4; .5 INJECTION, POWDER, FOR SOLUTION INTRAVENOUS at 05:03

## 2017-03-28 RX ADMIN — IPRATROPIUM BROMIDE AND ALBUTEROL SULFATE 3 ML: .5; 3 SOLUTION RESPIRATORY (INHALATION) at 01:03

## 2017-03-28 RX ADMIN — IPRATROPIUM BROMIDE AND ALBUTEROL SULFATE 3 ML: .5; 3 SOLUTION RESPIRATORY (INHALATION) at 07:03

## 2017-03-28 RX ADMIN — CLOPIDOGREL 75 MG: 75 TABLET, FILM COATED ORAL at 09:03

## 2017-03-28 RX ADMIN — ACETYLCYSTEINE 4 ML: 100 SOLUTION ORAL; RESPIRATORY (INHALATION) at 01:03

## 2017-03-28 RX ADMIN — Medication 3 ML: at 09:03

## 2017-03-28 RX ADMIN — LABETALOL HCL 100 MG: 100 TABLET, FILM COATED ORAL at 09:03

## 2017-03-28 RX ADMIN — LABETALOL HCL 100 MG: 100 TABLET, FILM COATED ORAL at 05:03

## 2017-03-28 RX ADMIN — Medication 10 ML: at 12:03

## 2017-03-28 RX ADMIN — Medication 3 ML: at 02:03

## 2017-03-28 RX ADMIN — HEPARIN SODIUM 5000 UNITS: 5000 INJECTION, SOLUTION INTRAVENOUS; SUBCUTANEOUS at 05:03

## 2017-03-28 RX ADMIN — PIPERACILLIN SODIUM AND TAZOBACTAM SODIUM 4.5 G: 4; .5 INJECTION, POWDER, FOR SOLUTION INTRAVENOUS at 09:03

## 2017-03-28 RX ADMIN — IPRATROPIUM BROMIDE AND ALBUTEROL SULFATE 3 ML: .5; 3 SOLUTION RESPIRATORY (INHALATION) at 02:03

## 2017-03-28 NOTE — PROGRESS NOTES
Pt was alert this AM, moving all extremities, following some commands. Notified MD Jakub with [facility] to inform him pt is no longer alert, pt responds only to repeated stimuli, not following commands. No new orders obtained at this time. Will continue to monitor

## 2017-03-28 NOTE — PLAN OF CARE
Problem: Physical Therapy Goal  Goal: Physical Therapy Goal  Active Goals to be met by: 4/7/17    1. Supine to sit with CGA.  2. Sit to supine with Supervision. NOT MET  3. Sit to stand with QCN with CGA.  4. Pt to perform gait 5ft with appropriate AD with max assist.-not met  5. Pt to tolerate sitting on the EOB 20 min with CGA.-not met  6. Pt to transfer bed to/from bedside chair with max assist.-not met  7. Able to tolerate exercise for 15-20 reps with independence.  8. Dynamic sitting at edge of bed x 8 minutes with Stand-by Assistance.  9. Dynamic standing for 2 minutes with Contact Guard Assistance using Quad Cane.  10. Patient and family able to teachback stroke & positioning education independently.  11. Wheelchair propulsion x 100 feet with Minimal Assistance using bilateral lower extremities    MET Goals:  Pt supine to sit with moderate assist- MET 3/21  Supine to sit to Minimal Assist. MET  Pt sit to supine with moderate assist-MET 3/21  Pt sit to stand with appropriate AD with moderate assist. Met   Pt to perform B LE exs in sitting or supine x 10 reps with AAROM on R LE. MET         Goals remain appropriate     Martin Kuo, SPTA  3/28/2017

## 2017-03-28 NOTE — PLAN OF CARE
LIZANDRO was advised by RN of Pt family in room wanting to meet with this SW. She advised Pt son is not happy Pt brenda had the list that was left in room. LIZANDRO met with Pt nephew and fiance at bedside. Nephew contacted Pt son over his phone and SW spoke with him in [place]. He received this SW list and wants one for [place]. LIZANDRO will send in AM. He does not want Pt fiteresa to have access to any lists or make any decisions. LIZANDRO will send to him and follow up via phone.     Argentina Juarez, GRAHAM  Neurocritical Care   [facility]  [id number]

## 2017-03-28 NOTE — PLAN OF CARE
Problem: Patient Care Overview  Goal: Plan of Care Review  Outcome: Ongoing (interventions implemented as appropriate)  No acute events occurred throughout the shift. See flowsheets for assessments and VS. Plan of care reviewed with Nathanael Velez and Nathanael Velez's family. All questions answered in turn. Pt progressing towards goals as noted. Will continue to monitor.

## 2017-03-28 NOTE — PLAN OF CARE
Problem: Occupational Therapy Goal  Goal: Occupational Therapy Goal  Goals with expiration date, 3/29:  Patient will increase functional independence with ADLs by performing:    Patient will demonstrate rolling to the right with stand by assist. Not met   Patient will demonstrate rolling to the left with mod assist. MET 3/28 with bed rail  -revised: with min(A)  Patient will demonstrate supine -sit with stand by assist. Not met  Patient will demonstrate stand pivot transfers with Contact guard assist. Not met  Patient will demonstrate grooming while standing with mod assist. Not met  Patient will demonstrate upper body dressing with max assist while seated EOB. Not met  Patient will demonstrate lower body dressing with min assist while seated EOB. Not met  Patient will demonstrate toileting with max assist. Not met  Patient will demonstrate bathing while seated EOB with max assist. Not met  Patients family / caregiver will demonstrate independence and safety with assisting patient with self-care skills and functional mobility. Not met  Patients family / caregiver will demonstrate independence with providing ROM and changes in bed positioning. Not met  Patient and/or patients family will verbalize understanding of stroke prevention guidelines, personal risk factors and stroke warning signs via teachback method. Not met   Outcome: Ongoing (interventions implemented as appropriate)  Goals remain appropriate. AVIS Correa 3/28/2017

## 2017-03-28 NOTE — PROGRESS NOTES
ICU Progress Note  Neurocritical Care    Admit Date: 3/18/2017  LOS: 10    CC: Embolic stroke involving left middle cerebral artery    Code Status: Full Code     SUBJECTIVE:     Interval History/Significant Events:   Very agitated at time  No awake back to baseline  Wbc ; high  Afebrile  resp isnuff  Adult ftt      Medications:  Continuous Infusions:   lactated ringers 50 mL/hr at 03/28/17 1503     Scheduled Meds:   acetylcysteine 100 mg/ml (10%)  4 mL Nebulization Q6H    albuterol-ipratropium 2.5mg-0.5mg/3mL  3 mL Nebulization Q6H    amlodipine  10 mg Per NG tube Daily    aspirin  81 mg Per NG tube Daily    atorvastatin  80 mg Per NG tube Daily    clopidogrel  75 mg Per NG tube Daily    heparin (porcine)  5,000 Units Subcutaneous Q8H    labetalol  100 mg Per NG tube Q8H    lidocaine (PF) 10 mg/ml (1%)  5 mL Intradermal Once    losartan  100 mg Per NG tube Daily    piperacillin-tazobactam 4.5 g in dextrose 5 % 100 mL IVPB (ready to mix system)  4.5 g Intravenous Q8H    polyethylene glycol  17 g Per NG tube Daily    senna-docusate 8.6-50 mg  1 tablet Per NG tube BID    sodium chloride 0.9%  10 mL Intravenous Q6H    sodium chloride 0.9%  10 mL Intravenous Q6H    sodium chloride 0.9%  3 mL Intravenous Q8H    vancomycin (VANCOCIN) IVPB  15 mg/kg (Dosing Weight) Intravenous Q12H     PRN Meds:.acetaminophen, hydrALAZINE, magnesium sulfate IVPB, magnesium sulfate IVPB, ondansetron, potassium chloride 10%, potassium chloride 10%, potassium chloride 10%, potassium, sodium phosphates, potassium, sodium phosphates, potassium, sodium phosphates, ramelteon, Flushing PICC Protocol **AND** sodium chloride 0.9% **AND** sodium chloride 0.9%, Flushing PICC Protocol **AND** sodium chloride 0.9% **AND** sodium chloride 0.9%    OBJECTIVE:   Vital Signs (Most Recent):   Temp: 98.7 °F (37.1 °C) (03/28/17 1200)  Pulse: 71 (03/28/17 1503)  Resp: 19 (03/28/17 1503)  BP: (!) 134/96 (03/28/17 1503)  SpO2: 100 % (03/28/17  1503)    Vital Signs (24h Range):   Temp:  [98.5 °F (36.9 °C)-99.6 °F (37.6 °C)] 98.7 °F (37.1 °C)  Pulse:  [50-71] 71  Resp:  [18-46] 19  SpO2:  [96 %-100 %] 100 %  BP: ()/(53-96) 134/96  Arterial Line BP: ()/(52-96) 134/96    ICP/CPP (Last 24h):        I & O (Last 24h):   Intake/Output Summary (Last 24 hours) at 03/28/17 1542  Last data filed at 03/28/17 1503   Gross per 24 hour   Intake           2452.5 ml   Output             1060 ml   Net           1392.5 ml     Physical Exam:  GA: Alert, comfortable, no acute distress.   HEENT: No scleral icterus or JVD.   Pulmonary: Clear to auscultation A/P/L. No wheezing, crackles, or rhonchi.  Cardiac: RRR S1 & S2 w/o rubs/murmurs/gallops.   Abdominal: Bowel sounds present x 4. No appreciable hepatosplenomegaly.  Skin: No jaundice, rashes, or visible lesions.  Neuro:  Awake  ox1  No change in exam  Very sleepy and confused earlier  Vent Data:   Oxygen Concentration (%):  [40] 40    Lines/Drains/Airway:     L rad;2       PICC Double Lumen 03/24/17 1720 left basilic (Active)   Site Assessment Clean;Dry;Intact;No redness;No swelling 3/28/2017  3:03 PM   Lumen 1 Status Flushed;Normal saline locked 3/28/2017  3:03 PM   Lumen 2 Status Normal saline locked;Infusing 3/28/2017  3:03 PM   Length arnulfo (cm) 41 cm 3/24/2017  5:00 PM   Current Exposed Catheter (cm) 0 cm 3/24/2017  5:00 PM   Extremity Circumference (cm) 29 cm 3/24/2017  5:00 PM   Dressing Type Transparent;Securing device 3/28/2017  3:03 PM   Dressing Status Biopatch in place;Clean;Dry;Intact 3/28/2017  3:03 PM   Dressing Intervention Dressing reinforced 3/28/2017  3:03 PM   Dressing Change Due 03/31/17 3/28/2017  3:03 PM   Daily Line Review Performed 3/28/2017  3:03 PM           Arterial Line 03/26/17 1500 Left Radial (Active)   Site Assessment Clean;Dry;Intact;No redness;No swelling 3/28/2017  3:03 PM   Line Status Pulsatile blood flow;Positional 3/28/2017  3:03 PM   Art Line Waveform Appropriate 3/28/2017   3:03 PM   Arterial Line Interventions Zeroed and calibrated;Leveled;Connections checked and tightened;Flushed per protocol 3/28/2017  3:03 PM   Color/Movement/Sensation Capillary refill less than 3 sec 3/28/2017  3:03 PM   Dressing Type Transparent 3/28/2017  3:03 PM   Dressing Status Biopatch in place;Clean;Dry;Intact 3/28/2017  3:03 PM   Dressing Intervention Dressing reinforced 3/28/2017  3:03 PM   Dressing Change Due 04/02/17 3/28/2017  3:03 PM           NG/OG Tube 03/20/17 2100 (Active)   Placement Check placement verified by aspirate characteristics;placement verified by distal tube length measurement;placement verified by x-ray 3/28/2017  3:03 PM   Distal Tube Length (cm) 60 3/25/2017  7:01 AM   Tolerance no signs/symptoms of discomfort 3/28/2017  3:03 PM   Securement anchored to nostril center w/ adhesive device 3/28/2017  3:03 PM   Clamp Status/Tolerance unclamped;no abdominal distention;no emesis;no residual;no restlessness 3/28/2017  3:03 PM   Insertion Site Appearance no redness, warmth, tenderness, skin breakdown, drainage 3/28/2017  3:03 PM   Flush/Irrigation flushed w/;water;no resistance met 3/28/2017  3:03 PM   Feeding Method continuous 3/28/2017  3:03 PM   Current Rate (mL/hr) 0 mL/hr 3/26/2017  7:01 AM   Goal Rate (mL/hr) 40 mL/hr 3/26/2017  7:01 AM   Intake (mL) 200 mL 3/27/2017  5:00 PM   Intake (mL) - Formula Tube Feeding 0 3/26/2017  5:00 AM   Residual Amount (ml) 0 ml 3/25/2017  7:01 AM            External Urinary Catheter 03/24/17 1836 Small (Active)   Collection Container Urimeter 3/28/2017  3:03 PM   Securement Method secured to top of thigh w/ leg strap 3/28/2017  3:03 PM   Skin no redness;no breakdown 3/28/2017  3:03 PM   Tolerance no signs/symptoms of discomfort 3/28/2017  3:03 PM   Output (mL) 25 mL 3/28/2017  2:00 PM     Nutrition/Tube Feeds (if NPO state why): npo    Labs:  ABG:   Recent Labs  Lab 03/28/17  0818   PH 7.394   PO2 105*   PCO2 35.8   HCO3 21.9*   POCSATURATED 98   BE -3      BMP:  Recent Labs  Lab 03/28/17  0305  03/28/17  0930 03/28/17  1211   *  < >  --  144   K 3.6  --  4.2  --    *  --   --   --    CO2 21*  --   --   --    BUN 28*  --   --   --    CREATININE 0.8  --   --   --    GLU 97  --   --   --    MG 2.0  --   --   --    PHOS 2.7  --   --   --    < > = values in this interval not displayed.  LFT: Lab Results   Component Value Date    AST 31 03/28/2017    ALT 38 03/28/2017    ALKPHOS 58 03/28/2017    BILITOT 0.6 03/28/2017    ALBUMIN 2.3 (L) 03/28/2017    PROT 5.8 (L) 03/28/2017     CBC:   Lab Results   Component Value Date    WBC 13.70 (H) 03/28/2017    HGB 11.1 (L) 03/28/2017    HCT 33.2 (L) 03/28/2017    MCV 94 03/28/2017     03/28/2017     Microbiology x 7d:   Microbiology Results (last 7 days)     Procedure Component Value Units Date/Time    Blood culture [[id number]] Collected:  03/24/17 1315    Order Status:  Completed Specimen:  Blood from Peripheral, Antecubital, Left Updated:  03/27/17 2012     Blood Culture, Routine No Growth to date     Blood Culture, Routine No Growth to date     Blood Culture, Routine No Growth to date     Blood Culture, Routine No Growth to date    Blood culture [[id number]] Collected:  03/24/17 1314    Order Status:  Completed Specimen:  Blood from Peripheral, Forearm, Left Updated:  03/27/17 1822     Blood Culture, Routine No Growth to date     Blood Culture, Routine No Growth to date     Blood Culture, Routine No Growth to date     Blood Culture, Routine No Growth to date    Blood culture [[id number]] Collected:  03/21/17 0356    Order Status:  Completed Specimen:  Blood from Peripheral, Antecubital, Left Updated:  03/26/17 0812     Blood Culture, Routine No growth after 5 days.    Narrative:       Blood cultures from 2 different sites. 4 bottles total.  Please draw before starting antibiotics.    Blood culture [[id number]] Collected:  03/20/17 1532    Order Status:  Completed Specimen:  Blood from Peripheral, Forearm, Right  Updated:  03/25/17 2012     Blood Culture, Routine No growth after 5 days.    Narrative:       Blood cultures from 2 different sites. 4 bottles total.  Please draw before starting antibiotics.    Blood culture [[id number]] Collected:  03/20/17 1533    Order Status:  Completed Specimen:  Blood from Peripheral, Lower Arm, Right Updated:  03/25/17 2012     Blood Culture, Routine No growth after 5 days.    Narrative:       Blood cultures x 2 different sites. 4 bottles total. Please  draw cultures before administering antibiotics.    Blood culture [[id number]] Collected:  03/19/17 1827    Order Status:  Completed Specimen:  Blood from Peripheral, Hand, Left Updated:  03/24/17 2212     Blood Culture, Routine No growth after 5 days.    Narrative:       Blood cultures x 2 different sites. 4 bottles total. Please  draw cultures before administering antibiotics.    Blood culture [[id number]] Collected:  03/19/17 1827    Order Status:  Completed Specimen:  Blood from Peripheral, Hand, Right Updated:  03/24/17 2212     Blood Culture, Routine No growth after 5 days.    Narrative:       Blood cultures from 2 different sites. 4 bottles total.  Please draw before starting antibiotics.        Imaging:  Duplex le's wnl    I personally reviewed the above image.    ASSESSMENT/PLAN:     Active Hospital Problems    Diagnosis    *Embolic stroke involving left middle cerebral artery    Acute ischemic left MCA stroke    Carotid artery stenosis, symptomatic    Cytotoxic cerebral edema    Abnormality of aortic valve    Right sided weakness    Aphasia    Essential hypertension      Plan:  EEG to r/o seizures  Keep npo  Follow exam  abx for now    Level;3

## 2017-03-28 NOTE — PLAN OF CARE
SW reviewed list of rehabs in [place]. Sent email to [facility] to complete referrals to the following rehab facilities:   [facility] [address] [place] [address] Phone: [phone]  Fax: [phone]   [facility] [address] [place] [address] Phone: [phone]  Fax: [phone]     LIZANDRO will follow up with Dalton gutierrez for preferences.    Argentina Juarez LMSW  Neurocritical Care   [facility]  [id number]

## 2017-03-28 NOTE — PLAN OF CARE
Ssc sent referral to :    [facility] ( connected ) [address] [place] [address] Phone: [phone]  Fax: [phone]    [address] [address] Phone: [phone]  Fax: [phone]       Anel/nelda

## 2017-03-28 NOTE — PT/OT/SLP PROGRESS
"Speech Language Pathology  Treatment    Nathanael Velez   MRN: [id number]   Admitting Diagnosis: Embolic stroke involving left middle cerebral artery    Diet recommendations: Solid Diet Level: NPO  Liquid Diet Level: NPO     SLP Treatment Date: 17  Speech Start Time: 1152     Speech Stop Time: 1202     Speech Total (min): 10 min       TREATMENT BILLABLE MINUTES:  Speech Therapy Individual 10    Has the patient been evaluated by SLP for swallowing? : Yes  Keep patient NPO?: Yes   General Precautions: Standard, aspiration, aphasia, fall, NPO          Subjective:  "Yes ma'am"    Pain Ratin/10              Pain Rating Post-Intervention: 0/10    Objective:     pt seen bedside with his nephew present. Nursing reported pt with increased lethargy and still on intubation watch. Pt lethargic but did open his eyes briefly. When iced placed to his lips, he did lick his lips. Wet breath sounds noted. Oral suctioning provided. No po trials given due to high risk for aspiration at this time. Pt did not respond to orientation questions or when he did respond, his answers were unintelligible. He occasionally followed a simple command. He did not respond to most questions but when asked his name by MD he repeated the doctor saying " Mr. Sibley". Education provided to pt and nephew. White board updated.     FIM:  Social Interaction: 1 Total Assistance--The patient interact appropriately les than 25% of the time, or not at all, and may need restraint due to socially inappropriate behaviors.                            Assessment:  Nathanael Velez is a 61 y.o. male with a medical diagnosis of Embolic stroke involving left middle cerebral artery and presents with dysphagia and cog linguistic impairment; Limited progress due to lethargy.    Discharge recommendations: Discharge Facility/Level Of Care Needs: rehabilitation facility     Goals:   SLP Goals        Problem: SLP Goal    Goal Priority Disciplines Outcome   SLP Goal     SLP  "   Description:  SLP Plan of Care: Speech Pathology will follow pt daily M-F and address the following goals x1 week:  3/26  1. Pt will participate in ongoing swallow assessment to determine least restrictive diet. ONGOING  2. Pt will participate in speech language cognitive eval to determine need for intervention. MET  3. When deemed appropriate by MD/SLP pt will participate in MBS to further determine safe swallow function. ONGOING        SLP Plan of Care: Speech Pathology will follow pt daily M-F and address the following goals x1 week:  3/27  1. Pt will participate in ongoing swallow assessment to determine least restrictive diet.  2. Pt will answer 3/4 orientation questions with mod assistance to improve cognitive function.   3. Pt will follow simple directives with 60% acc indep provided mod cues to improve receptive language.  4. Pt will complete automatic rote speech (counting) with 60% acc indep provided mod cues to improve expressive language.  5. When deemed appropriate by MD/SLP pt will participate in MBS to further determine safe swallow function.                       Plan:   Patient to be seen Therapy Frequency: 5 x/week   Plan of Care expires: 04/17/17  Plan of Care reviewed with: patient, other (see comments) (nephew)  SLP Follow-up?: Yes  SLP - Next Visit Date: 03/24/17           PAOLA Smith, CCC-SLP  03/28/2017

## 2017-03-28 NOTE — PLAN OF CARE
Problem: SLP Goal  Goal: SLP Goal  SLP Plan of Care: Speech Pathology will follow pt daily M-F and address the following goals x1 week:  3/26  1. Pt will participate in ongoing swallow assessment to determine least restrictive diet. ONGOING  2. Pt will participate in speech language cognitive eval to determine need for intervention. MET  3. When deemed appropriate by MD/SLP pt will participate in MBS to further determine safe swallow function. ONGOING        SLP Plan of Care: Speech Pathology will follow pt daily M-F and address the following goals x1 week:  3/27  1. Pt will participate in ongoing swallow assessment to determine least restrictive diet.  2. Pt will answer 3/4 orientation questions with mod assistance to improve cognitive function.   3. Pt will follow simple directives with 60% acc indep provided mod cues to improve receptive language.  4. Pt will complete automatic rote speech (counting) with 60% acc indep provided mod cues to improve expressive language.  5. When deemed appropriate by MD/SLP pt will participate in MBS to further determine safe swallow function.         Pt with increased lethargy and wet breath sounds. Pt not safe for po trials.      PAOLA Smith, CCC-SLP  3/28/2017

## 2017-03-28 NOTE — PT/OT/SLP PROGRESS
Physical Therapy  Treatment    Nathanael Velez   MRN: [id number]   Admitting Diagnosis: Embolic stroke involving left middle cerebral artery    PT Received On: 17  PT Start Time: 1443     PT Stop Time: 1515    PT Total Time (min): 32 min       Billable Minutes:  Therapeutic Activity 17 and Neuromuscular Re-education 15    Treatment Type: Treatment  PT/PTA: PTA     PTA Visit Number: 1       General Precautions: Standard, aphasia, aspiration, fall, NPO  Orthopedic Precautions: N/A   Braces:      Do you have any cultural, spiritual, Hoahaoism conflicts, given your current situation?: Yazidism    Subjective:  Communicated with ADINA Baeza prior to session.  Pt. Agreeable to PT session.     Pain Ratin/10     Pain Rating Post-Intervention: 0/10    Objective:   Patient found with: arterial line, telemetry, NG tube, oxygen, restraints, PICC line, peripheral IV (HOB elevated with condom catheter and nephew present in the room. )    Functional Mobility:  Bed Mobility:   Rolling/Turning Right: Minimum assistance (Patient required verbal cueing to roll to side and required asistance to help arm reach across body to initiate rolling movment. )  Scooting/Bridging: Minimum Assistance (Patient needed verbal instructions to slide hips forward but also required assitance to initiate movment to place feet properly on the floor sitting at the EOB. )  Supine to Sit: Minimum Assistance (Patient required assistance supporting trunk 2* decreased R UE function but pt. was able to move legs off the EOB. )  Sit to Supine: Minimum Assistance (Patient required assistance with trunk to get back in the supine position in bed. )    Transfers:  Sit <> Stand Assistance: Minimum Assistance from the EOB (Patient was given verbal instructions to push through extremities when going from sit to stand position. )  Sit <> Stand Assistive Device: No Assistive Device    Gait:   Gait Distance:  (4 steps forward and 3 lateral steps to the right to reach  the HOB )  Assistance 1: Minimum assistance (Patient required minimal assistance to help maintain balance but towards the end of session patient became contact guard after patient was able to maintain proper standing balance. )  Gait Assistive Device: No device  Gait Pattern:  (Step to gait. )  Gait Deviation(s): decreased hany, decreased stride length, decreased weight-shifting ability, decreased step length      Therapeutic Activities and Exercises:        Sitting   Pt. Tolerated sitting at EOB for balance activities for 5 minutes requiring   Contact guard assistance to encourage proper body alignment and work on sitting balance.   Patient required verbal cues to keep B UE placed in weight bearing position to promote proper midline shift.      Standing  Pt. Performed standing at 1 trial for 10 min requiring minimum to contact guard assistance to   Improve TAI when standing and improve postural sway.  Patient required verbal cues throughout session to not lean  Back on heels and was able to self correct at time but still requires repeated cueing.       Patient was educated on proper hand placement when sitting on EOB and to use R UE as much as possible to help with edema.  Family was also   Educated on hand positioning of R UE while also given information on inpatient rehabilitation requirements.       AM-PAC 6 CLICK MOBILITY  How much help from another person does this patient currently need?   1 = Unable, Total/Dependent Assistance  2 = A lot, Maximum/Moderate Assistance  3 = A little, Minimum/Contact Guard/Supervision  4 = None, Modified Harris/Independent    Turning over in bed (including adjusting bedclothes, sheets and blankets)?: 3  Sitting down on and standing up from a chair with arms (e.g., wheelchair, bedside commode, etc.): 3  Moving from lying on back to sitting on the side of the bed?: 3  Moving to and from a bed to a chair (including a wheelchair)?: 3  Need to walk in hospital room?:  3  Climbing 3-5 steps with a railing?: 2  Total Score: 17    AM-PAC Raw Score CMS G-Code Modifier Level of Impairment Assistance   6 % Total / Unable   7 - 9 CM 80 - 100% Maximal Assist   10 - 14 CL 60 - 80% Moderate Assist   15 - 19 CK 40 - 60% Moderate Assist   20 - 22 CJ 20 - 40% Minimal Assist   23 CI 1-20% SBA / CGA   24 CH 0% Independent/ Mod I     Patient left HOB elevated with all lines intact, call button in reach, RN Felisha  notified and nephew and wife  present.    Assessment:  Nathanael Velez is a 61 y.o. male with a medical diagnosis of Embolic stroke involving left middle cerebral artery and presents with impaired standing balance deficits.  Patient tends to shift weight onto heels and is encouraged to evenly distribute weight evenly through his LE's to improve base of support when standing.  Patient is not suitable for discharge from PT at this time due to risk of falling without proper supervision from staff.  Patient can still benefit from therapy interventions to improve standing balance and to increase ambulating distance.      Rehab identified problem list/impairments: Rehab identified problem list/impairments: weakness, impaired endurance, impaired balance, impaired self care skills, gait instability, decreased coordination, impaired functional mobilty, decreased safety awareness, impaired coordination, decreased lower extremity function    Rehab potential is good.    Activity tolerance: Good    Discharge recommendations: Discharge Facility/Level Of Care Needs: rehabilitation facility     Barriers to discharge: Barriers to Discharge: Inaccessible home environment, Decreased caregiver support    Equipment recommendations: Equipment Needed After Discharge:  (TBD)     GOALS:   Physical Therapy Goals        Problem: Physical Therapy Goal    Goal Priority Disciplines Outcome Goal Variances Interventions   Physical Therapy Goal     PT/OT, PT Ongoing (interventions implemented as appropriate)      Description:  Active Goals to be met by: 4/7/17    1. Supine to sit with CGA.  2. Sit to supine with Supervision. NOT MET  3. Sit to stand with QCN with CGA.  4. Pt to perform gait 5ft with appropriate AD with max assist.-not met  5. Pt to tolerate sitting on the EOB 20 min  with CGA.-not met  6. Pt to transfer bed to/from bedside chair with max assist.-not met  7. Able to tolerate exercise for 15-20 reps with independence.  8. Dynamic sitting at edge of bed x 8 minutes with Stand-by Assistance.  9. Dynamic standing for 2 minutes with Contact Guard Assistance using Quad Cane.  10. Patient and family able to teachback stroke & positioning education independently.  11. Wheelchair propulsion x 100 feet with Minimal Assistance using bilateral lower extremities    MET Goals:  Pt supine to sit with moderate assist- MET 3/21  Supine to sit to Minimal Assist. MET  Pt sit to supine with moderate assist-MET 3/21  Pt sit to stand with appropriate AD with moderate assist. Met    Pt to perform B LE exs in sitting or supine x 10 reps with AAROM on R LE. MET              PLAN:    Patient to be seen 6 x/week  to address the above listed problems via gait training, therapeutic activities, therapeutic exercises, neuromuscular re-education  Plan of Care expires: 04/18/17  Plan of Care reviewed with: patient, spouse         Martin Ayaan, LAKSHMIA  03/28/2017

## 2017-03-28 NOTE — PROGRESS NOTES
Pt was to have IVC filter placement rescheduled for today 3/28/2017 with anesthesia on board. I called IR, nurse said she was 100% positive the IVC filter placement was canceled. Orders still show that the procedure is scheduled today. Will mention the matter in rounds.

## 2017-03-28 NOTE — PROGRESS NOTES
[facility]  Adult Nutrition  Consult Note    SUMMARY     Recommendations    Recommendation/Intervention:   As medically able, restart TF at increased goal rate. Isosource 1.5 @ goal rate 55mL/hr.   -Hold for residuals >500ml.     RD to monitor.       Goals: TF to meet >85% of EEN/EPN  Nutrition Goal Status: progressing towards goal  Communication of RD Recs: reviewed with RN    Continuum of Care Plan    Referral to Outpatient Services: other (see comments) (Nutr D/c Plan: unable to determine @ this time)    Reason for Assessment    Reason for Assessment: RD follow-up  Diagnosis: stroke/CVA  Relevent Medical History: HTN, HLD, CAD         General Information Comments: TF held at this time. s/p IVC filter. NPO per SLP.    Nutrition Prescription Ordered    Current Diet Order: NPO  Nutrition Order Comments: TF held  Current Nutrition Support Formula Ordered: Isosource 1.5  Current Nutrition Support Rate Ordered: 40 (ml)  Current Nutrition Support Frequency Ordered: ml/hr        Evaluation of Received Nutrients/Fluid Intake    Enteral Calories (kcal): 1440  Enteral Protein (gm): 65  Enteral (Free Water) Fluid (mL): 733   Energy Calories Required: not meeting needs  % Kcal Needs: 76      Protein Required: not meeting needs  % Protein Needs: 87     IV Fluid (mL): 1200      Total Fluid Intake (mL/kg): 0  Fluid Required: meeting needs  Comments: +I/O, + BM, good UOP  Tolerance: tolerating     Nutrition Risk Screen     Nutrition Risk Screen: no indicators present    Nutrition/Diet History       Typical Food/Fluid Intake: NPO currently  Food Preferences: SAMINA        Factors Affecting Nutritional Intake: difficulty/impaired swallowing, NPO                Labs/Tests/Procedures/Meds       Pertinent Labs Reviewed: reviewed  Pertinent Labs Comments: Na 146, BUN 28, POCT Glu   Pertinent Medications Reviewed: reviewed  Pertinent Medications Comments: asa, statin, losartan, IVF    Physical Findings    Overall  Physical Appearance: on oxygen therapy, overweight  Tubes: nasogastric tube     Skin: intact    Anthropometrics       Height (inches): 67.01 in  Weight Method: Bed Scale  Weight (kg): 73.8 kg  Ideal Body Weight (IBW), Male: 148.06 lb     % Ideal Body Weight, Male (lb): 109.89 lb     BMI (kg/m2): 25.48  BMI Grade: 25 - 29.9 - overweight  Usual Body Weight (UBW), kg:  (unable to obtain)     Estimated/Assessed Needs    Weight Used For Calorie Calculations: 71.4 kg (157 lb 6.5 oz) (dosing weight)   Height (cm): 170.2 cm     Energy Need Method: Sunflower-St Jeor (1889 kcal (PAL x1.25))     RMR (Sunflower-St. Jeor Equation): 1510.81        Weight Used For Protein Calculations: 73.8 kg (162 lb 11.2 oz)  Protein Requirements: 74-89 g (1.0-1.2 g/kg)    Fluid Need Method: RDA Method (1ml/kcal or per MD)                           Monitor and Evaluation    Food and Nutrient Intake: enteral nutrition intake, energy intake  Food and Nutrient Adminstration: enteral and parenteral nutrition administration, diet order        Anthropometric Measurements: weight change, weight  Biochemical Data, Medical Tests and Procedures: electrolyte and renal panel, glucose/endocrine profile  Nutrition-Focused Physical Findings: overall appearance    Nutrition Risk    Level of Risk: other (see comments) (2x weekly)    Nutrition Follow-Up    RD Follow-up?: Yes        Nutrition Diagnosis     Nutr Dx/problem: Inadequate energy intake  Related to/etiology: insufficient TF goal rate  As evidenced by: <85% of EEN being met  Status: continues

## 2017-03-28 NOTE — PLAN OF CARE
Problem: Patient Care Overview  Goal: Plan of Care Review  Outcome: Ongoing (interventions implemented as appropriate)  Pt was bradycardic most of the shift, HR ranging 49-62. NCCT notified and no new orders were given. VS and assessment per flow sheet. Patient progressing towards goal as tolerated. Plan of care reviewed with pt and family. All concerns addressed. Will continue to monitor.

## 2017-03-28 NOTE — PLAN OF CARE
Juan Durbin [facility], 1927–1983 [phone] per Ghada received referral

## 2017-03-28 NOTE — PROGRESS NOTES
Pt became bradycardic, HR ranging from 49-55. Notified Jolanta VASQUEZ PA to bedside and stated that the pt was next in rounds where they would address the concern. No orders given at this time. Will continue to monitor

## 2017-03-28 NOTE — PT/OT/SLP PROGRESS
"Occupational Therapy  Treatment    Nathanael Velez   MRN: [id number]   Admitting Diagnosis: Embolic stroke involving left middle cerebral artery    OT Date of Treatment: 17   OT Start Time: 1214  OT Stop Time: 1244  OT Total Time (min): 30 min    Billable Minutes:  Therapeutic Activity 30    General Precautions: Standard, aphasia, aspiration, fall, NPO, respiratory  Orthopedic Precautions: N/A  Braces: N/A    Do you have any cultural, spiritual, Nondenominational conflicts, given your current situation?: Taoism    Subjective:  Communicated with RN prior to session.   Pt's nephew/cousin present throughout. Pt reported "All right all right now" -with ROM for R UE    Pain Ratin/10- no reports or indications  Pain Rating Post-Intervention: 0/10    Objective:  Patient found with: arterial line, blood pressure cuff, NG tube, oxygen, SCD, telemetry, bed alarm, peripheral IV, restraints (condom cath; 5L via NC)     Functional Mobility:  Bed Mobility:  Rolling/Turning to Left: Moderate assistance (for initiation)  Rolling/Turning Right: Moderate assistance  Scooting/Bridging: Minimum Assistance (to weightshift to EOB; max(a) to HOB)  Supine to Sit: Moderate Assistance  Sit to Supine: Maximum Assistance    Transfers:   Sit <> Stand Assistance: Activity did not occur (2/2 fatigue)    Activities of Daily Living:  LE Dressing Level of Assistance: Total assistance (to don B non-slip socks)  Grooming Position: Seated, EOB  Grooming Level of Assistance: Total assistance (hand over hand for initation)  Toileting Where Assessed: Bed level (Pt soiled; condom cath removed upon arrival)  Toileting Level of Assistance: Total assistance    Balance:   Static Sit: CGA-mod(A); R UE in extended arm weightbearing  Dynamic Sit: mod-max(A); R lateral lean; Pt with post pelvic tilt    Therapeutic Exercises:  AAROM/PROM for R UE in all available planes x10 reps; support provided for GH joint  Edu family member on handling and technique; edu on " positioning for edema mgmt (moderate edema with sores noted to bicep)- positioning pillow added for elevation and abduction while supine    Additional:  -Pt with eyes closed ~90% of session; pt initiated rolling for EOB with moderate tactile cuing  -Provided pt with daily orientation and re-edu on OT role in care; excess auditory input removed (Ie: door closed and TV off)- edu family member on importance  -Completed EOB activity ~8 min with varied level of assistance for postural control (see above); R UE in extended arm weightbearing; encouraged neutral cervical spine with upward gaze  -Pt with little-no interaction or attempts for engagement with activity   -Communication board updated     AM-PAC 6 CLICK ADL   How much help from another person does this patient currently need?   1 = Unable, Total/Dependent Assistance  2 = A lot, Maximum/Moderate Assistance  3 = A little, Minimum/Contact Guard/Supervision  4 = None, Modified [place]/Independent    Putting on and taking off regular lower body clothing? : 2  Bathing (including washing, rinsing, drying)?: 1  Toileting, which includes using toilet, bedpan, or urinal? : 1  Putting on and taking off regular upper body clothing?: 2  Taking care of personal grooming such as brushing teeth?: 2  Eating meals?: 1  Total Score: 9     AM-PAC Raw Score CMS G-Code Modifier Level of Impairment Assistance   6 % Total / Unable   7 - 9 CM 80 - 100% Maximal Assist   10 - 14 CL 60 - 80% Moderate Assist   15 - 19 CK 40 - 60% Moderate Assist   20 - 22 CJ 20 - 40% Minimal Assist   23 CI 1-20% SBA / CGA   24 CH 0% Independent/ Mod I     Patient left HOB elevated with all lines intact, call button in reach, bed alarm on, restraints reapplied at end of session, RN notified and family present    ASSESSMENT:  Nathanael Velez is a 61 y.o. male with a medical diagnosis of Embolic stroke involving left middle cerebral artery and presents s/p tPA and thrombectomy with L ICA angioplasty  with stent placement. Pt awaiting IVC filter placement at this time 2/2 resp status. Pt cont to demo decreased active participation in sessions. He will cont to benefit from skilled OT services at this time to address the following:    Rehab identified problem list/impairments: Rehab identified problem list/impairments: weakness, impaired endurance, impaired self care skills, impaired functional mobilty, gait instability, impaired balance, visual deficits, impaired cognition, impaired cardiopulmonary response to activity, impaired muscle length, impaired joint extensibility, edema, impaired skin, decreased upper extremity function, decreased lower extremity function, pain, decreased safety awareness, abnormal tone    Rehab potential is fair+    Activity tolerance: Fair    Discharge recommendations: Discharge Facility/Level Of Care Needs: rehabilitation facility     Barriers to discharge: Barriers to Discharge: Inaccessible home environment, Decreased caregiver support    Equipment recommendations:  (TBD with increased activity)     GOALS:   Occupational Therapy Goals        Problem: Occupational Therapy Goal    Goal Priority Disciplines Outcome Interventions   Occupational Therapy Goal     OT, PT/OT Ongoing (interventions implemented as appropriate)    Description:  Goals with expiration date, 3/29:  Patient will increase functional independence with ADLs by performing:    Patient will demonstrate rolling to the right with stand by assist.  Not met   Patient will demonstrate rolling to the left with mod assist.   MET 3/28 with bed rail  -revised: with min(A)  Patient will demonstrate supine -sit with stand by assist.   Not met  Patient will demonstrate stand pivot transfers with Contact guard assist.   Not met  Patient will demonstrate grooming while standing with mod assist.   Not met  Patient will demonstrate upper body dressing with max assist while seated EOB.   Not met  Patient will demonstrate lower body  dressing with min assist while seated EOB.   Not met  Patient will demonstrate toileting with max assist.   Not met  Patient will demonstrate bathing while seated EOB with max assist.   Not met  Patient's family / caregiver will demonstrate independence and safety with assisting patient with self-care skills and functional mobility.     Not met  Patient's family / caregiver will demonstrate independence with providing ROM and changes in bed positioning.   Not met  Patient and/or patient's family will verbalize understanding of stroke prevention guidelines, personal risk factors and stroke warning signs via teachback method.  Not met               Plan:  Patient to be seen 6 x/week to address the above listed problems via self-care/home management, therapeutic activities, therapeutic exercises, neuromuscular re-education, cognitive retraining, sensory integration  Plan of Care expires: 04/19/17  Plan of Care reviewed with: patient (nephew/cousin)    AVIS Correa  03/28/2017

## 2017-03-29 PROBLEM — I63.512 ACUTE ISCHEMIC LEFT MCA STROKE: Status: ACTIVE | Noted: 2017-03-29

## 2017-03-29 LAB
ALBUMIN SERPL BCP-MCNC: 2.3 G/DL
ALLENS TEST: ABNORMAL
ALLENS TEST: ABNORMAL
ALP SERPL-CCNC: 62 U/L
ALT SERPL W/O P-5'-P-CCNC: 70 U/L
ANION GAP SERPL CALC-SCNC: 9 MMOL/L
ANISOCYTOSIS BLD QL SMEAR: SLIGHT
AST SERPL-CCNC: 67 U/L
BACTERIA BLD CULT: NORMAL
BACTERIA BLD CULT: NORMAL
BASO STIPL BLD QL SMEAR: ABNORMAL
BASOPHILS # BLD AUTO: 0.09 K/UL
BASOPHILS NFR BLD: 0.7 %
BILIRUB SERPL-MCNC: 0.7 MG/DL
BUN SERPL-MCNC: 22 MG/DL
CALCIUM SERPL-MCNC: 8 MG/DL
CHLORIDE SERPL-SCNC: 112 MMOL/L
CO2 SERPL-SCNC: 20 MMOL/L
CREAT SERPL-MCNC: 0.7 MG/DL
DELSYS: ABNORMAL
DELSYS: ABNORMAL
DIFFERENTIAL METHOD: ABNORMAL
EOSINOPHIL # BLD AUTO: 0.3 K/UL
EOSINOPHIL NFR BLD: 2.6 %
ERYTHROCYTE [DISTWIDTH] IN BLOOD BY AUTOMATED COUNT: 13.1 %
ERYTHROCYTE [SEDIMENTATION RATE] IN BLOOD BY WESTERGREN METHOD: 16 MM/H
EST. GFR  (AFRICAN AMERICAN): >60 ML/MIN/1.73 M^2
EST. GFR  (NON AFRICAN AMERICAN): >60 ML/MIN/1.73 M^2
FIO2: 21
FLOW: 4
GLUCOSE SERPL-MCNC: 88 MG/DL
HCO3 UR-SCNC: 20.3 MMOL/L (ref 24–28)
HCO3 UR-SCNC: 21.5 MMOL/L (ref 24–28)
HCT VFR BLD AUTO: 30.8 %
HGB BLD-MCNC: 10.7 G/DL
INR PPP: 1.1
LYMPHOCYTES # BLD AUTO: 2 K/UL
LYMPHOCYTES NFR BLD: 16.1 %
MAGNESIUM SERPL-MCNC: 1.9 MG/DL
MCH RBC QN AUTO: 31.8 PG
MCHC RBC AUTO-ENTMCNC: 34.7 %
MCV RBC AUTO: 92 FL
MODE: ABNORMAL
MODE: ABNORMAL
MONOCYTES # BLD AUTO: 1.3 K/UL
MONOCYTES NFR BLD: 10.9 %
NEUTROPHILS # BLD AUTO: 8.4 K/UL
NEUTROPHILS NFR BLD: 69.7 %
PCO2 BLDA: 31.1 MMHG (ref 35–45)
PCO2 BLDA: 32.4 MMHG (ref 35–45)
PH SMN: 7.42 [PH] (ref 7.35–7.45)
PH SMN: 7.43 [PH] (ref 7.35–7.45)
PHOSPHATE SERPL-MCNC: 2.4 MG/DL
PLATELET # BLD AUTO: 252 K/UL
PLATELET BLD QL SMEAR: ABNORMAL
PMV BLD AUTO: 10.2 FL
PO2 BLDA: 154 MMHG (ref 80–100)
PO2 BLDA: 83 MMHG (ref 80–100)
POC BE: -3 MMOL/L
POC BE: -4 MMOL/L
POC SATURATED O2: 97 % (ref 95–100)
POC SATURATED O2: 99 % (ref 95–100)
POC TCO2: 21 MMOL/L (ref 23–27)
POC TCO2: 22 MMOL/L (ref 23–27)
POCT GLUCOSE: 118 MG/DL (ref 70–110)
POCT GLUCOSE: 87 MG/DL (ref 70–110)
POCT GLUCOSE: 96 MG/DL (ref 70–110)
POLYCHROMASIA BLD QL SMEAR: ABNORMAL
POTASSIUM SERPL-SCNC: 3.3 MMOL/L
POTASSIUM SERPL-SCNC: 3.7 MMOL/L
PROT SERPL-MCNC: 5.6 G/DL
PROTHROMBIN TIME: 11.5 SEC
RBC # BLD AUTO: 3.36 M/UL
SAMPLE: ABNORMAL
SAMPLE: ABNORMAL
SITE: ABNORMAL
SITE: ABNORMAL
SODIUM SERPL-SCNC: 138 MMOL/L
SODIUM SERPL-SCNC: 140 MMOL/L
SODIUM SERPL-SCNC: 141 MMOL/L
SODIUM SERPL-SCNC: 142 MMOL/L
SP02: 98
SP02: 98
WBC # BLD AUTO: 12.15 K/UL

## 2017-03-29 PROCEDURE — 37799 UNLISTED PX VASCULAR SURGERY: CPT

## 2017-03-29 PROCEDURE — 84132 ASSAY OF SERUM POTASSIUM: CPT

## 2017-03-29 PROCEDURE — 85610 PROTHROMBIN TIME: CPT

## 2017-03-29 PROCEDURE — 93010 ELECTROCARDIOGRAM REPORT: CPT | Mod: ,,, | Performed by: INTERNAL MEDICINE

## 2017-03-29 PROCEDURE — 99291 CRITICAL CARE FIRST HOUR: CPT | Mod: ,,, | Performed by: PSYCHIATRY & NEUROLOGY

## 2017-03-29 PROCEDURE — 63600175 PHARM REV CODE 636 W HCPCS: Performed by: PSYCHIATRY & NEUROLOGY

## 2017-03-29 PROCEDURE — 94668 MNPJ CHEST WALL SBSQ: CPT

## 2017-03-29 PROCEDURE — 92526 ORAL FUNCTION THERAPY: CPT

## 2017-03-29 PROCEDURE — 25000242 PHARM REV CODE 250 ALT 637 W/ HCPCS: Performed by: NURSE PRACTITIONER

## 2017-03-29 PROCEDURE — 97112 NEUROMUSCULAR REEDUCATION: CPT

## 2017-03-29 PROCEDURE — 84100 ASSAY OF PHOSPHORUS: CPT

## 2017-03-29 PROCEDURE — 83735 ASSAY OF MAGNESIUM: CPT

## 2017-03-29 PROCEDURE — 63600175 PHARM REV CODE 636 W HCPCS: Performed by: PHYSICIAN ASSISTANT

## 2017-03-29 PROCEDURE — 93005 ELECTROCARDIOGRAM TRACING: CPT

## 2017-03-29 PROCEDURE — 92507 TX SP LANG VOICE COMM INDIV: CPT

## 2017-03-29 PROCEDURE — 25000003 PHARM REV CODE 250: Performed by: NURSE PRACTITIONER

## 2017-03-29 PROCEDURE — 25000003 PHARM REV CODE 250: Performed by: PSYCHIATRY & NEUROLOGY

## 2017-03-29 PROCEDURE — 63600175 PHARM REV CODE 636 W HCPCS: Performed by: NURSE PRACTITIONER

## 2017-03-29 PROCEDURE — 99900035 HC TECH TIME PER 15 MIN (STAT)

## 2017-03-29 PROCEDURE — 82803 BLOOD GASES ANY COMBINATION: CPT

## 2017-03-29 PROCEDURE — 97535 SELF CARE MNGMENT TRAINING: CPT

## 2017-03-29 PROCEDURE — 25000003 PHARM REV CODE 250: Performed by: STUDENT IN AN ORGANIZED HEALTH CARE EDUCATION/TRAINING PROGRAM

## 2017-03-29 PROCEDURE — 20000000 HC ICU ROOM

## 2017-03-29 PROCEDURE — 94640 AIRWAY INHALATION TREATMENT: CPT

## 2017-03-29 PROCEDURE — 85025 COMPLETE CBC W/AUTO DIFF WBC: CPT

## 2017-03-29 PROCEDURE — 27000221 HC OXYGEN, UP TO 24 HOURS

## 2017-03-29 PROCEDURE — 84295 ASSAY OF SERUM SODIUM: CPT

## 2017-03-29 PROCEDURE — 80053 COMPREHEN METABOLIC PANEL: CPT

## 2017-03-29 PROCEDURE — 25000003 PHARM REV CODE 250: Performed by: PHYSICIAN ASSISTANT

## 2017-03-29 RX ORDER — HYDRALAZINE HYDROCHLORIDE 50 MG/1
50 TABLET, FILM COATED ORAL EVERY 8 HOURS
Status: DISCONTINUED | OUTPATIENT
Start: 2017-03-29 | End: 2017-04-05

## 2017-03-29 RX ADMIN — HEPARIN SODIUM 5000 UNITS: 5000 INJECTION, SOLUTION INTRAVENOUS; SUBCUTANEOUS at 05:03

## 2017-03-29 RX ADMIN — ACETYLCYSTEINE 4 ML: 100 SOLUTION ORAL; RESPIRATORY (INHALATION) at 07:03

## 2017-03-29 RX ADMIN — LABETALOL HCL 100 MG: 100 TABLET, FILM COATED ORAL at 05:03

## 2017-03-29 RX ADMIN — HYDRALAZINE HYDROCHLORIDE 10 MG: 20 INJECTION INTRAMUSCULAR; INTRAVENOUS at 11:03

## 2017-03-29 RX ADMIN — LOSARTAN POTASSIUM 100 MG: 50 TABLET, FILM COATED ORAL at 08:03

## 2017-03-29 RX ADMIN — ACETYLCYSTEINE 4 ML: 100 SOLUTION ORAL; RESPIRATORY (INHALATION) at 01:03

## 2017-03-29 RX ADMIN — POTASSIUM & SODIUM PHOSPHATES POWDER PACK 280-160-250 MG 2 PACKET: 280-160-250 PACK at 08:03

## 2017-03-29 RX ADMIN — IPRATROPIUM BROMIDE AND ALBUTEROL SULFATE 3 ML: .5; 3 SOLUTION RESPIRATORY (INHALATION) at 07:03

## 2017-03-29 RX ADMIN — PIPERACILLIN SODIUM AND TAZOBACTAM SODIUM 4.5 G: 4; .5 INJECTION, POWDER, FOR SOLUTION INTRAVENOUS at 05:03

## 2017-03-29 RX ADMIN — IPRATROPIUM BROMIDE AND ALBUTEROL SULFATE 3 ML: .5; 3 SOLUTION RESPIRATORY (INHALATION) at 01:03

## 2017-03-29 RX ADMIN — HYDRALAZINE HYDROCHLORIDE 50 MG: 50 TABLET ORAL at 02:03

## 2017-03-29 RX ADMIN — POTASSIUM CHLORIDE 40 MEQ: 20 SOLUTION ORAL at 08:03

## 2017-03-29 RX ADMIN — CLOPIDOGREL 75 MG: 75 TABLET, FILM COATED ORAL at 08:03

## 2017-03-29 RX ADMIN — HYDRALAZINE HYDROCHLORIDE 50 MG: 50 TABLET ORAL at 10:03

## 2017-03-29 RX ADMIN — HEPARIN SODIUM 5000 UNITS: 5000 INJECTION, SOLUTION INTRAVENOUS; SUBCUTANEOUS at 09:03

## 2017-03-29 RX ADMIN — ATORVASTATIN CALCIUM 80 MG: 20 TABLET, FILM COATED ORAL at 08:03

## 2017-03-29 RX ADMIN — ASPIRIN 81 MG CHEWABLE TABLET 81 MG: 81 TABLET CHEWABLE at 08:03

## 2017-03-29 RX ADMIN — HEPARIN SODIUM 5000 UNITS: 5000 INJECTION, SOLUTION INTRAVENOUS; SUBCUTANEOUS at 02:03

## 2017-03-29 RX ADMIN — AMLODIPINE BESYLATE 10 MG: 10 TABLET ORAL at 08:03

## 2017-03-29 RX ADMIN — POTASSIUM & SODIUM PHOSPHATES POWDER PACK 280-160-250 MG 2 PACKET: 280-160-250 PACK at 05:03

## 2017-03-29 RX ADMIN — VANCOMYCIN HYDROCHLORIDE 1000 MG: 1 INJECTION, POWDER, LYOPHILIZED, FOR SOLUTION INTRAVENOUS at 04:03

## 2017-03-29 RX ADMIN — POTASSIUM CHLORIDE 40 MEQ: 20 SOLUTION ORAL at 05:03

## 2017-03-29 NOTE — PLAN OF CARE
Problem: Patient Care Overview  Goal: Plan of Care Review  Outcome: Ongoing (interventions implemented as appropriate)  No acute events throughout the day, VS and assessment per flow sheet, patient progressing towards goal as tolerated. Plan of care reviewed with Nathanael Velez and family, all concerns addressed. Will continue to monitor.

## 2017-03-29 NOTE — PLAN OF CARE
Problem: SLP Goal  Goal: SLP Goal  SLP Plan of Care: Speech Pathology will follow pt daily M-F and address the following goals x1 week:  3/27  1. Pt will participate in ongoing swallow assessment to determine least restrictive diet. ONGIONG  2. Pt will answer 3/4 orientation questions with mod assistance to improve cognitive function. NOT MET/DCED  3. Pt will follow simple directives with 60% acc indep provided mod cues to improve receptive language. MET/UPDATED  4. Pt will complete automatic rote speech (counting) with 60% acc indep provided mod cues to improve expressive language. ONGOING  5. When deemed appropriate by MD/SLP pt will participate in MBS to further determine safe swallow function. ONGOING    SLP Plan of Care: Speech Pathology will follow pt 5x/week and address the following goals x1 week:  4/5  1. Pt will participate in ongoing swallow assessment to determine least restrictive diet.   2. Pt will answer simple YN questions with 70% acc provided min cues to improve receptive language.   3. Pt will follow 2-step directives with 60% acc indep provided mod cues to improve receptive language.  4. Pt will complete automatic rote speech (counting) with 60% acc indep provided mod cues to improve expressive language.   5. When deemed appropriate by MD/SLP pt will participate in MBS to further determine safe swallow function.        Pt with continued progress towards goals. Updated POC.     Brenda Card M.A. CCC-SLP  Speech Language Pathologist  [phone]  3/29/2017

## 2017-03-29 NOTE — PLAN OF CARE
Problem: Patient Care Overview  Goal: Plan of Care Review  Outcome: Ongoing (interventions implemented as appropriate)  No acute events occurred throughout the shift. See flowsheets for assessments and VS. Plan of care reviewed with Nathanael Velez and Nathanael Velez's family. All questions answered in turn. Pt progressing towards goals as noted. Pt remains restrained because he is pulling at lines. Pt pulled out NG tube, new one placed. Will continue to monitor.

## 2017-03-29 NOTE — PROGRESS NOTES
ICU Progress Note  Neurocritical Care    Admit Date: 3/18/2017  LOS: 11    CC: Embolic stroke involving left middle cerebral artery    Code Status: Full Code     SUBJECTIVE:     Interval History/Significant Events: Improvement in resp status. NRB weaned off, on nasal cannula. Much more alert and talkative today.    Review of Symptoms: unable to assess due to mental status    Medications:  Continuous Infusions:   lactated ringers 50 mL/hr at 03/29/17 1000     Scheduled Meds:   acetylcysteine 100 mg/ml (10%)  4 mL Nebulization Q6H    albuterol-ipratropium 2.5mg-0.5mg/3mL  3 mL Nebulization Q6H    amlodipine  10 mg Per NG tube Daily    aspirin  81 mg Per NG tube Daily    atorvastatin  80 mg Per NG tube Daily    clopidogrel  75 mg Per NG tube Daily    heparin (porcine)  5,000 Units Subcutaneous Q8H    hydrALAZINE  50 mg Per NG tube Q8H    lidocaine (PF) 10 mg/ml (1%)  5 mL Intradermal Once    losartan  100 mg Per NG tube Daily    sodium chloride 0.9%  10 mL Intravenous Q6H    sodium chloride 0.9%  10 mL Intravenous Q6H    sodium chloride 0.9%  3 mL Intravenous Q8H     PRN Meds:.acetaminophen, hydrALAZINE, magnesium sulfate IVPB, magnesium sulfate IVPB, ondansetron, potassium chloride 10%, potassium chloride 10%, potassium chloride 10%, potassium, sodium phosphates, potassium, sodium phosphates, potassium, sodium phosphates, ramelteon, Flushing PICC Protocol **AND** sodium chloride 0.9% **AND** sodium chloride 0.9%, Flushing PICC Protocol **AND** sodium chloride 0.9% **AND** sodium chloride 0.9%    OBJECTIVE:   Vital Signs (Most Recent):   Temp: 99.3 °F (37.4 °C) (03/29/17 0700)  Pulse: 60 (03/29/17 1315)  Resp: 15 (03/29/17 1315)  BP: 136/65 (03/29/17 1000)  SpO2: 99 % (03/29/17 1315)    Vital Signs (24h Range):   Temp:  [98.4 °F (36.9 °C)-99.3 °F (37.4 °C)] 99.3 °F (37.4 °C)  Pulse:  [50-71] 60  Resp:  [14-27] 15  SpO2:  [93 %-100 %] 99 %  BP: (120-143)/(58-96) 136/65  Arterial Line BP: (124-164)/(53-96)  135/63    ICP/CPP (Last 24h):        I & O (Last 24h):     Intake/Output Summary (Last 24 hours) at 03/29/17 1318  Last data filed at 03/29/17 1000   Gross per 24 hour   Intake           2567.5 ml   Output             1079 ml   Net           1488.5 ml     Physical Exam:  GA: Alert, comfortable, no acute distress. Follows commands, answers questions but inappropriately. Able to say his name  HEENT: No scleral icterus or JVD. Frequent thick secretions  Pulmonary: Coarse breath sounds  Cardiac: RRR S1 & S2 w/o rubs/murmurs/gallops.   Abdominal: Bowel sounds present x 4. No appreciable hepatosplenomegaly.  Skin: No jaundice, rashes, or visible lesions.  Neuro:  --GCS: E4 V3 M6  --Mental Status:  Increased speech. Follows simple commands  --CN II-XII grossly intact, dysphagia  -- PERRL.   --Corneal reflex, gag, weak cough  --LUE strength: 5/5  --RUE strength: 1/5  --LLE strength: 5/5  --RLE strength: 3/5    Vent Data:   Oxygen Concentration (%):  [4] 4    Lines/Drains/Airway:              External Urinary Catheter 03/18/17 1758 Medium (Active)   Collection Container Urimeter 3/21/2017  7:01 AM   Securement Method secured to top of thigh w/ adhesive device 3/21/2017  7:01 AM   Skin no redness;no breakdown;penis/scrotum cleansed w/ soap and water;skin barrier applied 3/21/2017  7:01 AM   Tolerance no signs/symptoms of discomfort 3/21/2017  7:01 AM   Output (mL) 20 mL 3/21/2017  9:00 AM     Nutrition/Tube Feeds (if NPO state why):  TF at goal    Labs:  ABG:     Recent Labs  Lab 03/29/17  0756   PH 7.423   PO2 83   PCO2 31.1*   HCO3 20.3*   POCSATURATED 97   BE -4     BMP:    Recent Labs  Lab 03/29/17  0208 03/29/17  0526    140   K 3.3*  --    *  --    CO2 20*  --    BUN 22*  --    CREATININE 0.7  --    GLU 88  --    MG 1.9  --    PHOS 2.4*  --      LFT:   Lab Results   Component Value Date    AST 67 (H) 03/29/2017    ALT 70 (H) 03/29/2017    ALKPHOS 62 03/29/2017    BILITOT 0.7 03/29/2017    ALBUMIN 2.3 (L)  03/29/2017    PROT 5.6 (L) 03/29/2017     CBC:   Lab Results   Component Value Date    WBC 12.15 03/29/2017    HGB 10.7 (L) 03/29/2017    HCT 30.8 (L) 03/29/2017    MCV 92 03/29/2017     03/29/2017     Microbiology x 7d:   Microbiology Results (last 7 days)     Procedure Component Value Units Date/Time    Blood culture [[id number]] Collected:  03/24/17 1315    Order Status:  Completed Specimen:  Blood from Peripheral, Antecubital, Left Updated:  03/28/17 2012     Blood Culture, Routine No Growth to date     Blood Culture, Routine No Growth to date     Blood Culture, Routine No Growth to date     Blood Culture, Routine No Growth to date     Blood Culture, Routine No Growth to date    Blood culture [[id number]] Collected:  03/24/17 1314    Order Status:  Completed Specimen:  Blood from Peripheral, Forearm, Left Updated:  03/28/17 1822     Blood Culture, Routine No Growth to date     Blood Culture, Routine No Growth to date     Blood Culture, Routine No Growth to date     Blood Culture, Routine No Growth to date     Blood Culture, Routine No Growth to date    Blood culture [[id number]] Collected:  03/21/17 0356    Order Status:  Completed Specimen:  Blood from Peripheral, Antecubital, Left Updated:  03/26/17 0812     Blood Culture, Routine No growth after 5 days.    Narrative:       Blood cultures from 2 different sites. 4 bottles total.  Please draw before starting antibiotics.    Blood culture [[id number]] Collected:  03/20/17 1532    Order Status:  Completed Specimen:  Blood from Peripheral, Forearm, Right Updated:  03/25/17 2012     Blood Culture, Routine No growth after 5 days.    Narrative:       Blood cultures from 2 different sites. 4 bottles total.  Please draw before starting antibiotics.    Blood culture [[id number]] Collected:  03/20/17 1533    Order Status:  Completed Specimen:  Blood from Peripheral, Lower Arm, Right Updated:  03/25/17 2012     Blood Culture, Routine No growth after 5 days.     Narrative:       Blood cultures x 2 different sites. 4 bottles total. Please  draw cultures before administering antibiotics.    Blood culture [[id number]] Collected:  03/19/17 1827    Order Status:  Completed Specimen:  Blood from Peripheral, Hand, Left Updated:  03/24/17 2212     Blood Culture, Routine No growth after 5 days.    Narrative:       Blood cultures x 2 different sites. 4 bottles total. Please  draw cultures before administering antibiotics.    Blood culture [[id number]] Collected:  03/19/17 1827    Order Status:  Completed Specimen:  Blood from Peripheral, Hand, Right Updated:  03/24/17 2212     Blood Culture, Routine No growth after 5 days.    Narrative:       Blood cultures from 2 different sites. 4 bottles total.  Please draw before starting antibiotics.        Imaging:  CT head 3/21/17  No significant interval detrimental change when compared to the CT dated 03/20/2017.    Evolving left MCA distribution infarct with persistent hemorrhagic conversion at the level of the basal ganglia and trace petechial hemorrhages about the superior aspect of the left frontal lobe. There is stable mass effect on the left lateral ventricle with minimal left to right midline shift.  No CT findings to suggest developing   hydrocephalus.    ASSESSMENT/PLAN:     Active Hospital Problems    Diagnosis  POA    *Embolic stroke involving left middle cerebral artery [I63.412]  Unknown    Acute ischemic left MCA stroke [I63.512]  Unknown    Carotid artery stenosis, symptomatic [I65.29]  Yes    Cytotoxic cerebral edema [G93.6]  Unknown    Abnormality of aortic valve [Q23.9]  Not Applicable    Right sided weakness [M62.81]  Unknown    Aphasia [R47.01]  Unknown    Essential hypertension [I10]  Unknown      Resolved Hospital Problems    Diagnosis Date Resolved POA   No resolved problems to display.      Neuro:   L stroke with hemorrhagic transofrmation on caudate and putamen s/p TPA, thrombectomy and stent placement  -Asa,  plavix, statin  -repeat scan stable  -mental status improving, speech is improving but still has some word finding diffculty  -SLP following, patient NPO. May need PEG if dysphagia continues    Pulmonary:   -improvement in O2 requirements, on 4L NC  -secretions improved. mucormyst q6, cpt, upright in bed    Recent Labs  Lab 03/29/17  0756   PH 7.423   PCO2 31.1*   PO2 83   HCO3 20.3*   POCSATURATED 97   BE -4         Cardiac:   -SBP goal <140  -losartan 100mg daily, amlodipine 10 mg  -patient with some bradycardia on labetalol 200 q8.  -d/c labetalol and start hydralazine 50mg q8  -ekg NSR, with LBBB(old)    Renal:    -cr stable, good uop  -condom catheter    ID:   -no growth on any cultures, leukocytosis improving. Has been on vanc/cefepime since 3/24, will d/c abx today    Hem/Onc:   -partial occlusive thrombus in right common femoral vein. Repeat scan is stable  -plan was for IVC filter but was cancelled due to respiratory symptoms  -will repeat scan tmrw and determine if IVC filter is needed  -continue subq heparin ppx    Endocrine:    SSI q4    Fluids/Electrolytes/Nutrition/GI:   -restart TF  -patient has had loose BM, will hold bowel regimen for now      Brian Amaya MD PGY-1

## 2017-03-29 NOTE — PT/OT/SLP PROGRESS
"Occupational Therapy  Treatment    Nathanael Velez   MRN: [id number]   Admitting Diagnosis: Embolic stroke involving left middle cerebral artery    OT Date of Treatment: 17   OT Start Time: 1245  OT Stop Time: 1322  OT Total Time (min): 37 min    Billable Minutes:  Self Care/Home Management 20 and Neuromuscular Re-education 17    General Precautions: Standard, aphasia, aspiration, fall, NPO, seizure, respiratory (R LE DVT)  Orthopedic Precautions: N/A  Braces: N/A    Do you have any cultural, spiritual, Jehovah's witness conflicts, given your current situation?: Baptism    Subjective:  Communicated with RN prior to session.  Pt's nephew/cousin present for portion of session. Pt reported "hello"    Pain Ratin/10 (no indications)  Pain Rating Post-Intervention: 0/10    Objective:  Patient found with: arterial line, bed alarm, bowel management system, blood pressure cuff, peripheral IV, pulse ox (continuous), restraints, SCD, telemetry, oxygen, PICC line (condom cath)     Functional Mobility:  Bed Mobility:  Rolling/Turning to Left: Minimum assistance, With side rail  Rolling/Turning Right: Minimum assistance, With side rail  Scooting/Bridging: Moderate Assistance (to HOB; CGA for scooting to EOB)  Supine to Sit: Moderate Assistance  Sit to Supine: Moderate Assistance    Transfers:  Sit <> Stand Assistance: Minimum Assistance  Sit <> Stand Assistive Device: No Assistive Device    Side steps to R x4 with Min(a) for weight shifting     Activities of Daily Living:  Feeding Level of Assistance: Activity did not occur (NPO;NG)  UE Dressing Level of Assistance: Moderate assistance (gown)  Grooming Position: Seated, EOB  Grooming Level of Assistance: Minimum assistance (wash face; comb hair with L)    Balance:   Static Sit: CGA; R UE in extended arm weight bearing  Dynamic Sit: Min(A)  Static Stand: Min(a); mild posterior lean at EOB    Therapeutic Exercises: R UE 1/5  AAROM/PROM for R UE in all available planes x10 reps "   Completed while seated EOB; support provided for GH stability  Re-edu family member to complete throughout the day for edema mgmt    Additional:  -Pt alert with eyes open throughout; demo grabbled speech with word salad with few coherent phrases  -Completed EOB activity for ~20 min for postural control, weight shift, R UE weightbearing and to encourage R side attention  -Pt following ~75% of verbal commands with visual cue for task with bouts of inattention  -Completed reaching with with R UE while L UE in weightbearing for weight shifts and core control for ADL prep  -R UE cont to demo mod edema (BP cuff removed for ROM and weightbearing); re-edu family member on ROM and elevation with abduction with positioning on pillow with need for reinforcement  -Communication board accurate    AM-PAC 6 CLICK ADL   How much help from another person does this patient currently need?   1 = Unable, Total/Dependent Assistance  2 = A lot, Maximum/Moderate Assistance  3 = A little, Minimum/Contact Guard/Supervision  4 = None, Modified [place]/Independent    Putting on and taking off regular lower body clothing? : 2  Bathing (including washing, rinsing, drying)?: 1  Toileting, which includes using toilet, bedpan, or urinal? : 1  Putting on and taking off regular upper body clothing?: 2  Taking care of personal grooming such as brushing teeth?: 2  Eating meals?: 1  Total Score: 9     AM-PAC Raw Score CMS G-Code Modifier Level of Impairment Assistance   6 % Total / Unable   7 - 9 CM 80 - 100% Maximal Assist   10 - 14 CL 60 - 80% Moderate Assist   15 - 19 CK 40 - 60% Moderate Assist   20 - 22 CJ 20 - 40% Minimal Assist   23 CI 1-20% SBA / CGA   24 CH 0% Independent/ Mod I     Patient left HOB elevated with all lines intact, call button in reach, restraints reapplied at end of session, RN notified and RT and family present    ASSESSMENT:  Nathanael Velez is a 61 y.o. male with a medical diagnosis of Embolic stroke involving  left middle cerebral artery and presents with R side weakness, aphasia, edema to R UE, impaired postural control and overall decline in all functional indep with daily tasks and activities. Pt with improvement with active participation this session.    Rehab identified problem list/impairments: Rehab identified problem list/impairments: weakness, impaired endurance, impaired sensation, impaired self care skills, impaired functional mobilty, gait instability, impaired balance, visual deficits, impaired cognition, decreased upper extremity function, decreased lower extremity function, decreased safety awareness, edema, impaired skin, impaired cardiopulmonary response to activity    Rehab potential is good.    Activity tolerance: Fair+    Discharge recommendations: Discharge Facility/Level Of Care Needs: rehabilitation facility     Barriers to discharge: Barriers to Discharge: Inaccessible home environment, Decreased caregiver support    Equipment recommendations:  (TBD at next level)     GOALS:   Occupational Therapy Goals        Problem: Occupational Therapy Goal    Goal Priority Disciplines Outcome Interventions   Occupational Therapy Goal     OT, PT/OT Ongoing (interventions implemented as appropriate)    Description:  Goals with expiration date, 4/12:  Patient will increase functional independence with ADLs by performing:    Visual item location for 3/5 items on R.   Bed Mobility with min(a) and bed-rails as needed.  Supine to sit with CGA.  Stand pivot transfers with Contact Guard Assist.    Grooming while standing with min(a).  UB dressing with min(a) while seated EOB.   LB dressing with min(a) while seated EOB.  Patient's family / caregiver will demonstrate independence and safety with assisting patient with self-care skills and functional mobility.      Patient's family / caregiver will demonstrate independence with providing ROM and changes in bed positioning.    Patient and/or patient's family will verbalize  understanding of stroke prevention guidelines, personal risk factors and stroke warning signs via teachback method.               Plan:  Patient to be seen 6 x/week to address the above listed problems via self-care/home management, therapeutic activities, therapeutic exercises, neuromuscular re-education, sensory integration, cognitive retraining  Plan of Care expires: 04/19/17  Plan of Care reviewed with: patient, family    AVIS Correa  03/29/2017

## 2017-03-29 NOTE — PLAN OF CARE
LIZANDRO sent Pt son the requested email for [facility] rehab facilities. Received reply immediately that he received the email and will review then call this SW with choices. LIZANDRO will follow up with him in AM.    Argentina Juarez LMSW  Neurocritical Care   [facility]  [phone]

## 2017-03-29 NOTE — PLAN OF CARE
03/29/17 0804   Right Care Assessment   Can the patient answer the patient profile reliably? No, cognitively impaired  (confused at times)   How often would a person be available to care for the patient? Often   Describe the patient's ability to walk at the present time. Major restrictions/daily assistance from another person   How does the patient rate their overall health at the present time? (xiang)   Number of comorbid conditions (as recorded on the chart) One   During the past month, has the patient often been bothered by feeling down, depressed or hopeless? (xiang)   During the past month, has the patient often been bothered by little interest or pleasure in doing things? (xiang)     Discharge Facility/Level Of Care Needs: rehabilitation facility        [facility]  [id number]

## 2017-03-29 NOTE — PT/OT/SLP PROGRESS
Speech Language Pathology  Treatment    Nathanael Velez   MRN: [id number]   Admitting Diagnosis: Embolic stroke involving left middle cerebral artery    Diet recommendations: Solid Diet Level: NPO  Liquid Diet Level: NPO     SLP Treatment Date: 17  Speech Start Time: 1327     Speech Stop Time: 1345     Speech Total (min): 18 min       TREATMENT BILLABLE MINUTES:  Speech Therapy Individual 8 and Treatment Swallowing Dysfunction 10    Has the patient been evaluated by SLP for swallowing? : Yes  Keep patient NPO?: Yes   General Precautions: Standard, aspiration, aphasia, fall, NPO          Subjective:  Pt awake; pleasant    Pain Ratin/10              Pain Rating Post-Intervention: 0/10    Objective:      Pt tolerated single ice chip X2 with immediate cough X2. Pt tolerated less than 1/4 spoonful puree X3 with immediate cough X2. Oral phase cb mild decreased seal over spoon with mild impairment in manipulation; functional.  Weak cough. No further PO trials attempted 2/2 aspiration risk. Skilled education provided on aspiration precautions and diet recommendations. Whiteboard updated with diet recommendations/aspiration precautions. No further questions.    Pt followed 1-step commands X3 with 100% acc indep. Pt followed 2 step command X1 with 0% acc indep; cues attempted and not beneficial . Pt identified objects from FC 2 with 100% acc indep. Pt named common object x1 with 0% acc indep; cues attempted though not beneficial. Pt answered simple YN questions X3 with 66% acc indep; cues attempted though not beneficial. Pt answered personal YN questions X4 with 50% acc indep; cues not beneficial. Pt's language present with neologisms; limited intelligibility. Pt imitated rote song with approx 50% acc provided min cues tactile cues for sustained attention; correct prosody with music and less linguistic errors. Skilled education provided on aphasia to family. No further questions.                                    Assessment:  Nathanael Velez is a 61 y.o. male with a medical diagnosis of Embolic stroke involving left middle cerebral artery and presents with dysphagia, aphasia, apraxia, cognitive impairment, dysarthria. continued progress towards goals.      Discharge recommendations: Discharge Facility/Level Of Care Needs: rehabilitation facility (pending PT OT recs)     Goals:   SLP Goals        Problem: SLP Goal    Goal Priority Disciplines Outcome   SLP Goal     SLP    Description:  SLP Plan of Care: Speech Pathology will follow pt daily M-F and address the following goals x1 week:  3/27  1. Pt will participate in ongoing swallow assessment to determine least restrictive diet. ONGIONG  2. Pt will answer 3/4 orientation questions with mod assistance to improve cognitive function. NOT MET/DC'ED  3. Pt will follow simple directives with 60% acc indep provided mod cues to improve receptive language. MET/UPDATED  4. Pt will complete automatic rote speech (counting) with 60% acc indep provided mod cues to improve expressive language. ONGOING  5. When deemed appropriate by MD/SLP pt will participate in MBS to further determine safe swallow function. ONGOING    SLP Plan of Care: Speech Pathology will follow pt 5x/week and address the following goals x1 week:  4/5  1. Pt will participate in ongoing swallow assessment to determine least restrictive diet.   2. Pt will answer simple YN questions with 70% acc provided min cues to improve receptive language.   3. Pt will follow 2-step directives with 60% acc indep provided mod cues to improve receptive language.  4. Pt will complete automatic rote speech (counting) with 60% acc indep provided mod cues to improve expressive language.   5. When deemed appropriate by MD/SLP pt will participate in MBS to further determine safe swallow function.                       Plan:   Patient to be seen Therapy Frequency: 5 x/week   Plan of Care expires: 04/17/17  Plan of Care reviewed with: patient,  family  SLP Follow-up?: Yes  SLP - Next Visit Date: 03/30/17          Brenda Card M.A. CCC-SLP  Speech Language Pathologist  [phone]  3/29/2017

## 2017-03-29 NOTE — PT/OT/SLP PROGRESS
Physical Therapy      Nathanael Velez  MRN: [id number]    Patient not seen today secondary to Unavailable (Comment) (PT attempted to see pt at 842, pt connected to continuous EEG. PT unable to return. Pt participated well in OT session.). Will follow-up as POC allows.    Idania Dumas PT, DPT  3/29/2017  [phone]

## 2017-03-30 LAB
ALBUMIN SERPL BCP-MCNC: 2.4 G/DL
ALLENS TEST: ABNORMAL
ALLENS TEST: ABNORMAL
ALP SERPL-CCNC: 74 U/L
ALT SERPL W/O P-5'-P-CCNC: 105 U/L
ANION GAP SERPL CALC-SCNC: 9 MMOL/L
AST SERPL-CCNC: 76 U/L
BASOPHILS # BLD AUTO: 0.05 K/UL
BASOPHILS NFR BLD: 0.4 %
BILIRUB SERPL-MCNC: 0.4 MG/DL
BUN SERPL-MCNC: 14 MG/DL
CALCIUM SERPL-MCNC: 8.2 MG/DL
CHLORIDE SERPL-SCNC: 108 MMOL/L
CO2 SERPL-SCNC: 21 MMOL/L
CREAT SERPL-MCNC: 0.7 MG/DL
DELSYS: ABNORMAL
DELSYS: ABNORMAL
DIFFERENTIAL METHOD: ABNORMAL
EOSINOPHIL # BLD AUTO: 0.2 K/UL
EOSINOPHIL NFR BLD: 2 %
ERYTHROCYTE [DISTWIDTH] IN BLOOD BY AUTOMATED COUNT: 12.8 %
ERYTHROCYTE [SEDIMENTATION RATE] IN BLOOD BY WESTERGREN METHOD: 14 MM/H
EST. GFR  (AFRICAN AMERICAN): >60 ML/MIN/1.73 M^2
EST. GFR  (NON AFRICAN AMERICAN): >60 ML/MIN/1.73 M^2
FLOW: 3
GLUCOSE SERPL-MCNC: 102 MG/DL
HCO3 UR-SCNC: 18.8 MMOL/L (ref 24–28)
HCO3 UR-SCNC: 21.4 MMOL/L (ref 24–28)
HCT VFR BLD AUTO: 31.9 %
HGB BLD-MCNC: 11.2 G/DL
INR PPP: 1.1
LYMPHOCYTES # BLD AUTO: 1.3 K/UL
LYMPHOCYTES NFR BLD: 11.2 %
MAGNESIUM SERPL-MCNC: 1.8 MG/DL
MCH RBC QN AUTO: 31.3 PG
MCHC RBC AUTO-ENTMCNC: 35.1 %
MCV RBC AUTO: 89 FL
MODE: ABNORMAL
MONOCYTES # BLD AUTO: 1.1 K/UL
MONOCYTES NFR BLD: 9.4 %
NEUTROPHILS # BLD AUTO: 8.6 K/UL
NEUTROPHILS NFR BLD: 76.1 %
PCO2 BLDA: 24.8 MMHG (ref 35–45)
PCO2 BLDA: 30.3 MMHG (ref 35–45)
PH SMN: 7.46 [PH] (ref 7.35–7.45)
PH SMN: 7.49 [PH] (ref 7.35–7.45)
PHOSPHATE SERPL-MCNC: 2.2 MG/DL
PLATELET # BLD AUTO: 289 K/UL
PMV BLD AUTO: 10.6 FL
PO2 BLDA: 53 MMHG (ref 80–100)
PO2 BLDA: 93 MMHG (ref 80–100)
POC BE: -2 MMOL/L
POC BE: -5 MMOL/L
POC SATURATED O2: 90 % (ref 95–100)
POC SATURATED O2: 98 % (ref 95–100)
POC TCO2: 20 MMOL/L (ref 23–27)
POC TCO2: 22 MMOL/L (ref 23–27)
POTASSIUM SERPL-SCNC: 3.3 MMOL/L
PROT SERPL-MCNC: 5.9 G/DL
PROTHROMBIN TIME: 11.4 SEC
RBC # BLD AUTO: 3.58 M/UL
SAMPLE: ABNORMAL
SAMPLE: ABNORMAL
SITE: ABNORMAL
SITE: ABNORMAL
SODIUM SERPL-SCNC: 138 MMOL/L
SP02: 100
WBC # BLD AUTO: 11.36 K/UL

## 2017-03-30 PROCEDURE — 97535 SELF CARE MNGMENT TRAINING: CPT

## 2017-03-30 PROCEDURE — 92507 TX SP LANG VOICE COMM INDIV: CPT

## 2017-03-30 PROCEDURE — 99291 CRITICAL CARE FIRST HOUR: CPT | Mod: ,,, | Performed by: PSYCHIATRY & NEUROLOGY

## 2017-03-30 PROCEDURE — 99900026 HC AIRWAY MAINTENANCE (STAT)

## 2017-03-30 PROCEDURE — 85025 COMPLETE CBC W/AUTO DIFF WBC: CPT

## 2017-03-30 PROCEDURE — 27000221 HC OXYGEN, UP TO 24 HOURS

## 2017-03-30 PROCEDURE — 63600175 PHARM REV CODE 636 W HCPCS: Performed by: PHYSICIAN ASSISTANT

## 2017-03-30 PROCEDURE — 94640 AIRWAY INHALATION TREATMENT: CPT

## 2017-03-30 PROCEDURE — 25000003 PHARM REV CODE 250: Performed by: PSYCHIATRY & NEUROLOGY

## 2017-03-30 PROCEDURE — 25000003 PHARM REV CODE 250: Performed by: NURSE PRACTITIONER

## 2017-03-30 PROCEDURE — 20000000 HC ICU ROOM

## 2017-03-30 PROCEDURE — 99233 SBSQ HOSP IP/OBS HIGH 50: CPT | Mod: ,,, | Performed by: PSYCHIATRY & NEUROLOGY

## 2017-03-30 PROCEDURE — 84100 ASSAY OF PHOSPHORUS: CPT

## 2017-03-30 PROCEDURE — 25000003 PHARM REV CODE 250: Performed by: PHYSICIAN ASSISTANT

## 2017-03-30 PROCEDURE — 97530 THERAPEUTIC ACTIVITIES: CPT

## 2017-03-30 PROCEDURE — 94668 MNPJ CHEST WALL SBSQ: CPT

## 2017-03-30 PROCEDURE — 97110 THERAPEUTIC EXERCISES: CPT

## 2017-03-30 PROCEDURE — 37799 UNLISTED PX VASCULAR SURGERY: CPT

## 2017-03-30 PROCEDURE — 92526 ORAL FUNCTION THERAPY: CPT

## 2017-03-30 PROCEDURE — 82803 BLOOD GASES ANY COMBINATION: CPT

## 2017-03-30 PROCEDURE — 83735 ASSAY OF MAGNESIUM: CPT

## 2017-03-30 PROCEDURE — 80053 COMPREHEN METABOLIC PANEL: CPT

## 2017-03-30 PROCEDURE — 99900035 HC TECH TIME PER 15 MIN (STAT)

## 2017-03-30 PROCEDURE — 25000242 PHARM REV CODE 250 ALT 637 W/ HCPCS: Performed by: NURSE PRACTITIONER

## 2017-03-30 PROCEDURE — 97803 MED NUTRITION INDIV SUBSEQ: CPT

## 2017-03-30 PROCEDURE — 25000003 PHARM REV CODE 250: Performed by: STUDENT IN AN ORGANIZED HEALTH CARE EDUCATION/TRAINING PROGRAM

## 2017-03-30 PROCEDURE — 63600175 PHARM REV CODE 636 W HCPCS: Performed by: PSYCHIATRY & NEUROLOGY

## 2017-03-30 PROCEDURE — 85610 PROTHROMBIN TIME: CPT

## 2017-03-30 RX ADMIN — HEPARIN SODIUM 5000 UNITS: 5000 INJECTION, SOLUTION INTRAVENOUS; SUBCUTANEOUS at 10:03

## 2017-03-30 RX ADMIN — ACETYLCYSTEINE 4 ML: 100 SOLUTION ORAL; RESPIRATORY (INHALATION) at 12:03

## 2017-03-30 RX ADMIN — IPRATROPIUM BROMIDE AND ALBUTEROL SULFATE 3 ML: .5; 3 SOLUTION RESPIRATORY (INHALATION) at 07:03

## 2017-03-30 RX ADMIN — POTASSIUM CHLORIDE 40 MEQ: 20 SOLUTION ORAL at 05:03

## 2017-03-30 RX ADMIN — HYDRALAZINE HYDROCHLORIDE 50 MG: 50 TABLET ORAL at 02:03

## 2017-03-30 RX ADMIN — ATORVASTATIN CALCIUM 80 MG: 20 TABLET, FILM COATED ORAL at 08:03

## 2017-03-30 RX ADMIN — LOSARTAN POTASSIUM 100 MG: 50 TABLET, FILM COATED ORAL at 08:03

## 2017-03-30 RX ADMIN — IPRATROPIUM BROMIDE AND ALBUTEROL SULFATE 3 ML: .5; 3 SOLUTION RESPIRATORY (INHALATION) at 12:03

## 2017-03-30 RX ADMIN — ASPIRIN 81 MG CHEWABLE TABLET 81 MG: 81 TABLET CHEWABLE at 08:03

## 2017-03-30 RX ADMIN — POTASSIUM & SODIUM PHOSPHATES POWDER PACK 280-160-250 MG 2 PACKET: 280-160-250 PACK at 05:03

## 2017-03-30 RX ADMIN — Medication 3 ML: at 10:03

## 2017-03-30 RX ADMIN — HEPARIN SODIUM 5000 UNITS: 5000 INJECTION, SOLUTION INTRAVENOUS; SUBCUTANEOUS at 05:03

## 2017-03-30 RX ADMIN — MAGNESIUM SULFATE IN WATER 2 G: 40 INJECTION, SOLUTION INTRAVENOUS at 05:03

## 2017-03-30 RX ADMIN — AMLODIPINE BESYLATE 10 MG: 10 TABLET ORAL at 08:03

## 2017-03-30 RX ADMIN — HEPARIN SODIUM 5000 UNITS: 5000 INJECTION, SOLUTION INTRAVENOUS; SUBCUTANEOUS at 02:03

## 2017-03-30 RX ADMIN — POTASSIUM & SODIUM PHOSPHATES POWDER PACK 280-160-250 MG 2 PACKET: 280-160-250 PACK at 08:03

## 2017-03-30 RX ADMIN — HYDRALAZINE HYDROCHLORIDE 50 MG: 50 TABLET ORAL at 10:03

## 2017-03-30 RX ADMIN — HYDRALAZINE HYDROCHLORIDE 50 MG: 50 TABLET ORAL at 06:03

## 2017-03-30 RX ADMIN — IPRATROPIUM BROMIDE AND ALBUTEROL SULFATE 3 ML: .5; 3 SOLUTION RESPIRATORY (INHALATION) at 08:03

## 2017-03-30 RX ADMIN — ACETYLCYSTEINE 4 ML: 100 SOLUTION ORAL; RESPIRATORY (INHALATION) at 07:03

## 2017-03-30 RX ADMIN — ACETYLCYSTEINE 4 ML: 100 SOLUTION ORAL; RESPIRATORY (INHALATION) at 08:03

## 2017-03-30 RX ADMIN — POTASSIUM CHLORIDE 40 MEQ: 20 SOLUTION ORAL at 08:03

## 2017-03-30 RX ADMIN — CLOPIDOGREL 75 MG: 75 TABLET, FILM COATED ORAL at 08:03

## 2017-03-30 NOTE — PLAN OF CARE
Problem: Patient Care Overview  Goal: Plan of Care Review  No acute events throughout the day, VS and assessment per flow sheet, patient progressing towards goal as tolerated. Plan of care reviewed with Nathanael Velez and family, all concerns addressed. Will continue to monitor.

## 2017-03-30 NOTE — PT/OT/SLP PROGRESS
"Speech Language Pathology  Treatment    Nathanael Velez   MRN: [id number]   Admitting Diagnosis: Embolic stroke involving left middle cerebral artery    Diet recommendations: Solid Diet Level: NPO  Liquid Diet Level: NPO     SLP Treatment Date: 17  Speech Start Time: 1110     Speech Stop Time: 1128     Speech Total (min): 18 min       TREATMENT BILLABLE MINUTES:  Speech Therapy Individual 10 and Treatment Swallowing Dysfunction 8    Has the patient been evaluated by SLP for swallowing? : Yes  Keep patient NPO?: Yes   General Precautions: Standard, aphasia, aspiration, fall, NPO          Subjective:  "watch" during naming task    Pain Ratin/10              Pain Rating Post-Intervention: 0/10    Objective:     Pt seen bedside with his nephew present. Pt with eyes closed but with fair participation. With cues, pt did eye his eyes. Vocal quality remained wet with pooling of secretions in oral cavity. Oral suctioning provided. Pt with frequent coughing throughout the session. Pt was able to count to 10 and sing a familiar song. He completed automatic phrases with 90% acc and automatic sentences with 80% acc. He named objects with 70% acc and with phonemic cues 90% acc. Mild perseverations. He Id objects in a f=2 with 75% acc. He followed 2 step commands with 60% acc given increased time and repetition. He responded to simple y/n questions with 90% acc. Limited po trials given due to high risk for aspiration. Pt given 2 teaspoons of nectar thick liquids. Pt with improved oral ability and pt did take the bolus from the spoon. Delayed swallow response with significantly reduced hyolaryngeal elevation. Pt with wet cough noted following both swallows. White board updated. Ed provided re: aphasia, word finding strategies and need to remain npo. Discussed pt will needs mbs but not appropriate today. Nephew expressed understanding of all information.     FIM:                    Expression: 2 Maximal Prompting--The patient " expresses basic daily needs and ideas 25 to 49% of the time.  The patient uses only single words or gestures, and (s)he needs prompting more than half the time.          Assessment:  Nathanael Velez is a 61 y.o. male with a medical diagnosis of Embolic stroke involving left middle cerebral artery and presents with oral pharyngeal dysphagia and aphasia. Pt progressing towards goals.     Discharge recommendations: Discharge Facility/Level Of Care Needs: rehabilitation facility     Goals:   SLP Goals        Problem: SLP Goal    Goal Priority Disciplines Outcome   SLP Goal     SLP    Description:  SLP Plan of Care: Speech Pathology will follow pt daily M-F and address the following goals x1 week:  3/27  1. Pt will participate in ongoing swallow assessment to determine least restrictive diet. ONGIONG  2. Pt will answer 3/4 orientation questions with mod assistance to improve cognitive function. NOT MET/DC'ED  3. Pt will follow simple directives with 60% acc indep provided mod cues to improve receptive language. MET/UPDATED  4. Pt will complete automatic rote speech (counting) with 60% acc indep provided mod cues to improve expressive language. ONGOING  5. When deemed appropriate by MD/SLP pt will participate in MBS to further determine safe swallow function. ONGOING    SLP Plan of Care: Speech Pathology will follow pt 5x/week and address the following goals x1 week:  4/5  1. Pt will participate in ongoing swallow assessment to determine least restrictive diet.   2. Pt will answer simple YN questions with 70% acc provided min cues to improve receptive language.   3. Pt will follow 2-step directives with 60% acc indep provided mod cues to improve receptive language.  4. Pt will complete automatic rote speech (counting) with 60% acc indep provided mod cues to improve expressive language.   5. When deemed appropriate by MD/SLP pt will participate in MBS to further determine safe swallow function.                       Plan:    Patient to be seen Therapy Frequency: 5 x/week   Plan of Care expires: 04/17/17  Plan of Care reviewed with: patient, family  SLP Follow-up?: Yes  SLP - Next Visit Date: 03/30/17           PAOLA Smith, CCC-SLP  03/30/2017

## 2017-03-30 NOTE — ASSESSMENT & PLAN NOTE
Antithrombotics for secondary stroke prevention: Aspirin 81mg, plavix recommended to start 03/24  Statins for secondary stroke prevention and hyperlipidemia, if present: Atorvastatin- 80 mg oral daily  Aggressive risk factor modification: Hypertension, High Cholesterol and Obesity  VTE Prophylaxis: SCDs, heparin 5k q8 hours  BP Parameters: SBP <150  Nursing Orders: Neuro checks- per tpa protocol, Antiembolic stockings, Swallowing evaluation by Nursing, Seizure precautions, Avoid Rucker catheter, Pneumatic compression device, Stroke Education, Blood glucose target 100-130, Up with assistance   -ECHO initially with sclerotic lesion vs vegetation of the aortic valve. Repeat ECHO suggested valve is just sclerotic.      Rehab teams recommending Rehab facility. Will need PEG tube prior to d/c.

## 2017-03-30 NOTE — PLAN OF CARE
SW advised by CM that Pt [facility] reported [facility] He will decide which one later.    LIZANDRO emailed [facility] for referral.    Argentina Juarez LMSW  Neurocritical Care   [facility]  [id number]

## 2017-03-30 NOTE — PROGRESS NOTES
[facility]  Vascular Neurology  Comprehensive Stroke Center  Progress Note      Neurologic Chief Complaint: L mCA stroke 2/2 carotid stenosis    Subjective:     Interval History: Patient is seen for follow-up neurological assessment and treatment recommendations: BP elevated overnight requiring PRNs. Will need PEG. Now on NC.     HPI, Past Medical, Family, and Social History remains the same as documented in the initial encounter.     Review of Systems   Constitutional: Negative for fever.   Respiratory: Negative for shortness of breath.    Neurological: Positive for facial asymmetry.   Psychiatric/Behavioral: Positive for confusion.     Scheduled Meds:   acetylcysteine 100 mg/ml (10%)  4 mL Nebulization Q6H    albuterol-ipratropium 2.5mg-0.5mg/3mL  3 mL Nebulization Q6H    amlodipine  10 mg Per NG tube Daily    aspirin  81 mg Per NG tube Daily    atorvastatin  80 mg Per NG tube Daily    clopidogrel  75 mg Per NG tube Daily    heparin (porcine)  5,000 Units Subcutaneous Q8H    hydrALAZINE  50 mg Per NG tube Q8H    lidocaine (PF) 10 mg/ml (1%)  5 mL Intradermal Once    losartan  100 mg Per NG tube Daily    sodium chloride 0.9%  10 mL Intravenous Q6H    sodium chloride 0.9%  10 mL Intravenous Q6H    sodium chloride 0.9%  3 mL Intravenous Q8H     Continuous Infusions:   PRN Meds:acetaminophen, hydrALAZINE, magnesium sulfate IVPB, magnesium sulfate IVPB, ondansetron, potassium chloride 10%, potassium chloride 10%, potassium chloride 10%, potassium, sodium phosphates, potassium, sodium phosphates, potassium, sodium phosphates, ramelteon, Flushing PICC Protocol **AND** sodium chloride 0.9% **AND** sodium chloride 0.9%, Flushing PICC Protocol **AND** sodium chloride 0.9% **AND** sodium chloride 0.9%    Objective:     Vital Signs (Most Recent):  Temp: 98.7 °F (37.1 °C) (03/30/17 0700)  Pulse: 62 (03/30/17 0800)  Resp: (!) 25 (03/30/17 0800)  BP: (!) 142/67 (03/30/17 0800)  SpO2: 99 %  (17 0800)  BP Location: Right leg    Vital Signs Range (Last 24H):  Temp:  [97.2 °F (36.2 °C)-98.7 °F (37.1 °C)]   Pulse:  [58-79]   Resp:  [14-28]   BP: (125-174)/(58-77)   SpO2:  [86 %-100 %]   Arterial Line BP: (116-180)/(46-66)   BP Location: Right leg    Physical Exam   Constitutional: He appears well-developed and well-nourished.   Eyes: Pupils are equal, round, and reactive to light.   Neck: Normal range of motion. Neck supple.   Cardiovascular: Normal rate.    Pulmonary/Chest: Effort normal.   Musculoskeletal: Normal range of motion.   Neurological:   Drowsy, aphasic, not oriented to time   Skin: Skin is warm and dry.       Neurological Exam:   LOC: alert and follows some commands  Language: Global aphasia  Speech: Dysarthria  Orientation: Person  Memory: aphasic, not oriented to time  EOM (CN III, IV, VI): Gaze preference left  Pupils (CN III, IV, VI): PERRL  Facial Movement (CN VII): lower weakness left lower  Motor*: no drift x4 extremities  Cerebellar*: Normal limb  Sensation: intact to light touch, temperature and vibration  Tone: Arm-Left: normal; Leg-Left: normal; Arm-Right: normal; Leg-Right: normal    NIH Stroke Scale:    Level of Consciousness: 0 - alert  LOC Questions: 1 - answers one correctly  LOC Commands: 1 - performs one correctly  Best Gaze: 1 - partial gaze palsy  Visual: 0 - no visual loss  Facial Palsy: 1 - minor  Motor Left Arm: 0 - no drift  Motor Right Arm: 0 - no drift  Motor Left Le - drift  Motor Right Le - no drift  Limb Ataxia: 0 - absent  Sensory: 0 - normal  Best Language: 2 - severe aphasia  Dysarthria: 1 - mild to moderate dysarthria  Extinction and Inattention: 0 - no neglect  NIH Stroke Scale Total: 8      Laboratory:  CMP:   Recent Labs  Lab 17   CALCIUM 8.2*   ALBUMIN 2.4*   PROT 5.9*      K 3.3*   CO2 21*      BUN 14   CREATININE 0.7   ALKPHOS 74   *   AST 76*   BILITOT 0.4     BMP:   Recent Labs  Lab 17       K 3.3*      CO2 21*   BUN 14   CREATININE 0.7   CALCIUM 8.2*     CBC:   Recent Labs  Lab 03/30/17  0223   WBC 11.36   RBC 3.58*   HGB 11.2*   HCT 31.9*      MCV 89   MCH 31.3*   MCHC 35.1     Lipid Panel: No results for input(s): CHOL, LDLCALC, HDL, TRIG in the last 168 hours.  Coagulation:   Recent Labs  Lab 03/30/17 0223   INR 1.1     Platelet Aggregation Study: No results for input(s): PLTAGG, PLTAGINTERP, PLTAGREGLACO, ADPPLTAGGREG in the last 168 hours.  Hgb A1C: No results for input(s): HGBA1C in the last 168 hours.  TSH: No results for input(s): TSH in the last 168 hours.    Diagnostic Results:  I have personally reviewed: CT Head. Date: 3/26/17  Findings: Evolving left MCA territory infarct with associated left basal ganglia intraparenchymal hemorrhage and intraventricular extension. There is persistent mass effect upon the frontal horn of the left lateral ventricle without significant midline shift or hydrocephalus.    No evidence of new intracranial hemorrhage or large region of abnormal parenchymal hypoattenuation.        Assessment/Plan:     61 year old male with HTN, HLD, and CAD and L MCA stroke symptoms post tpa and transferred here for higher level of care and potential thrombectomy. CTA multiphase upon arrival.   Patient found to have severe L carotid artery stenosis.  Angioplasty with stent performed and thrombectomy of L MCA occlusion.  Patient found to have L MCA stroke with reperfusion injury.  Possible aortic vegetation seen on echo but on repeat echo showed sclerotic aorta.      03/22/17  Patient drowsy, follows simple commands, unable to move RUE      03/23/17  More awake today.  Temperature and leukocytosis resolving with vancomycin and cefepime.      3/24/17  NAEON. Still with fever overnight.     3/25/17 Patient much more alert and interactive with exam. Still with labored breathing    3/27/17  Patient has a R lower extremity DVT. Unable to have IVC filter placed d/t  respiratory status- on intubation watch.     3/30/17  BP elevated to 180s-200s overnight requiring PRNs for BP. Patient will need PEG. Patients respiratory status now improved and on NC. Will have repeat doppler today on R leg to reevaluate if IVC filter to be placed.         * Embolic stroke involving left middle cerebral artery  Antithrombotics for secondary stroke prevention: Aspirin 81mg, plavix recommended to start 03/24  Statins for secondary stroke prevention and hyperlipidemia, if present: Atorvastatin- 80 mg oral daily  Aggressive risk factor modification: Hypertension, High Cholesterol and Obesity  VTE Prophylaxis: SCDs, heparin 5k q8 hours  BP Parameters: SBP <150  Nursing Orders: Neuro checks- per tpa protocol, Antiembolic stockings, Swallowing evaluation by Nursing, Seizure precautions, Avoid Rucker catheter, Pneumatic compression device, Stroke Education, Blood glucose target 100-130, Up with assistance   -ECHO initially with sclerotic lesion vs vegetation of the aortic valve. Repeat ECHO suggested valve is just sclerotic.      Rehab teams recommending Rehab facility. Will need PEG tube prior to d/c.    Right sided weakness  Due to stroke above  Aggressive PT/OT/ speech    Aphasia  Given tpa , status post thrombectomy   Due to stroke above  Aggressive PT/OT/ speech when appropriate    Abnormality of aortic valve  Found on echo.   As per NCC  Vegitation vs mass - repeat echo normal  Recommend clarification with cardiology on echo read     Cytotoxic cerebral edema  Evident on imaging     Carotid artery stenosis, symptomatic  S/p IR thrombectomy of distant MCA clot and angioplasty with stent on L ICA  Likely etiology of patient's L MCA stroke   Complication- With hemorrhagic conversion s/p tPA and thrombectomy/stenting        Arlet Belcher PA-C  Comprehensive Stroke Center  Department of Vascular Neurology   [facility]

## 2017-03-30 NOTE — SUBJECTIVE & OBJECTIVE
Neurologic Chief Complaint: L mCA stroke 2/2 carotid stenosis    Subjective:     Interval History: Patient is seen for follow-up neurological assessment and treatment recommendations: BP elevated overnight requiring PRNs. Will need PEG. Now on NC.     HPI, Past Medical, Family, and Social History remains the same as documented in the initial encounter.     Review of Systems   Constitutional: Negative for fever.   Respiratory: Negative for shortness of breath.    Neurological: Positive for facial asymmetry.   Psychiatric/Behavioral: Positive for confusion.     Scheduled Meds:   acetylcysteine 100 mg/ml (10%)  4 mL Nebulization Q6H    albuterol-ipratropium 2.5mg-0.5mg/3mL  3 mL Nebulization Q6H    amlodipine  10 mg Per NG tube Daily    aspirin  81 mg Per NG tube Daily    atorvastatin  80 mg Per NG tube Daily    clopidogrel  75 mg Per NG tube Daily    heparin (porcine)  5,000 Units Subcutaneous Q8H    hydrALAZINE  50 mg Per NG tube Q8H    lidocaine (PF) 10 mg/ml (1%)  5 mL Intradermal Once    losartan  100 mg Per NG tube Daily    sodium chloride 0.9%  10 mL Intravenous Q6H    sodium chloride 0.9%  10 mL Intravenous Q6H    sodium chloride 0.9%  3 mL Intravenous Q8H     Continuous Infusions:   PRN Meds:acetaminophen, hydrALAZINE, magnesium sulfate IVPB, magnesium sulfate IVPB, ondansetron, potassium chloride 10%, potassium chloride 10%, potassium chloride 10%, potassium, sodium phosphates, potassium, sodium phosphates, potassium, sodium phosphates, ramelteon, Flushing PICC Protocol **AND** sodium chloride 0.9% **AND** sodium chloride 0.9%, Flushing PICC Protocol **AND** sodium chloride 0.9% **AND** sodium chloride 0.9%    Objective:     Vital Signs (Most Recent):  Temp: 98.7 °F (37.1 °C) (03/30/17 0700)  Pulse: 62 (03/30/17 0800)  Resp: (!) 25 (03/30/17 0800)  BP: (!) 142/67 (03/30/17 0800)  SpO2: 99 % (03/30/17 0800)  BP Location: Right leg    Vital Signs Range (Last 24H):  Temp:  [97.2 °F (36.2 °C)-98.7 °F  (37.1 °C)]   Pulse:  [58-79]   Resp:  [14-28]   BP: (125-174)/(58-77)   SpO2:  [86 %-100 %]   Arterial Line BP: (116-180)/(46-66)   BP Location: Right leg    Physical Exam   Constitutional: He appears well-developed and well-nourished.   Eyes: Pupils are equal, round, and reactive to light.   Neck: Normal range of motion. Neck supple.   Cardiovascular: Normal rate.    Pulmonary/Chest: Effort normal.   Musculoskeletal: Normal range of motion.   Neurological:   Drowsy, aphasic, not oriented to time   Skin: Skin is warm and dry.       Neurological Exam:   LOC: alert and follows some commands  Language: Global aphasia  Speech: Dysarthria  Orientation: Person  Memory: aphasic, not oriented to time  EOM (CN III, IV, VI): Gaze preference left  Pupils (CN III, IV, VI): PERRL  Facial Movement (CN VII): lower weakness left lower  Motor*: no drift x4 extremities  Cerebellar*: Normal limb  Sensation: intact to light touch, temperature and vibration  Tone: Arm-Left: normal; Leg-Left: normal; Arm-Right: normal; Leg-Right: normal    NIH Stroke Scale:    Level of Consciousness: 0 - alert  LOC Questions: 1 - answers one correctly  LOC Commands: 1 - performs one correctly  Best Gaze: 1 - partial gaze palsy  Visual: 0 - no visual loss  Facial Palsy: 1 - minor  Motor Left Arm: 0 - no drift  Motor Right Arm: 0 - no drift  Motor Left Le - drift  Motor Right Le - no drift  Limb Ataxia: 0 - absent  Sensory: 0 - normal  Best Language: 2 - severe aphasia  Dysarthria: 1 - mild to moderate dysarthria  Extinction and Inattention: 0 - no neglect  NIH Stroke Scale Total: 8      Laboratory:  CMP:   Recent Labs  Lab 17   CALCIUM 8.2*   ALBUMIN 2.4*   PROT 5.9*      K 3.3*   CO2 21*      BUN 14   CREATININE 0.7   ALKPHOS 74   *   AST 76*   BILITOT 0.4     BMP:   Recent Labs  Lab 17      K 3.3*      CO2 21*   BUN 14   CREATININE 0.7   CALCIUM 8.2*     CBC:   Recent Labs  Lab  03/30/17  0223   WBC 11.36   RBC 3.58*   HGB 11.2*   HCT 31.9*      MCV 89   MCH 31.3*   MCHC 35.1     Lipid Panel: No results for input(s): CHOL, LDLCALC, HDL, TRIG in the last 168 hours.  Coagulation:   Recent Labs  Lab 03/30/17 0223   INR 1.1     Platelet Aggregation Study: No results for input(s): PLTAGG, PLTAGINTERP, PLTAGREGLACO, ADPPLTAGGREG in the last 168 hours.  Hgb A1C: No results for input(s): HGBA1C in the last 168 hours.  TSH: No results for input(s): TSH in the last 168 hours.    Diagnostic Results:  I have personally reviewed: CT Head. Date: 3/26/17  Findings: Evolving left MCA territory infarct with associated left basal ganglia intraparenchymal hemorrhage and intraventricular extension. There is persistent mass effect upon the frontal horn of the left lateral ventricle without significant midline shift or hydrocephalus.    No evidence of new intracranial hemorrhage or large region of abnormal parenchymal hypoattenuation.

## 2017-03-30 NOTE — PROGRESS NOTES
Pt SBP increased to 180's-200's. PRN hydralazine given. No relief from dose and SBP remained in the 180's. NCC notified. Instructed by AC Epstein to give patient another dose of the hydralazine. Will continue to monitor and update as necessary.

## 2017-03-30 NOTE — PLAN OF CARE
Problem: Patient Care Overview  Goal: Plan of Care Review  Outcome: Ongoing (interventions implemented as appropriate)  No acute events occurred throughout the shift. See flowsheets for assessments and VS. Plan of care reviewed with Nahtanael Velez and Nathanael Velez's family. All questions answered in turn. Pt progressing towards goals as noted. Will continue to monitor.

## 2017-03-30 NOTE — PLAN OF CARE
Problem: Occupational Therapy Goal  Goal: Occupational Therapy Goal  Goals with expiration date, 4/12:  Patient will increase functional independence with ADLs by performing:    Visual item location for 3/5 items on R.   Bed Mobility with min(a) and bed-rails as needed.  Supine to sit with CGA.  Stand pivot transfers with Contact Guard Assist.   Grooming while standing with min(a).  UB dressing with min(a) while seated EOB.   LB dressing with min(a) while seated EOB.  Patients family / caregiver will demonstrate independence and safety with assisting patient with self-care skills and functional mobility.   Patients family / caregiver will demonstrate independence with providing ROM and changes in bed positioning.   Patient and/or patients family will verbalize understanding of stroke prevention guidelines, personal risk factors and stroke warning signs via teachback method.   Outcome: Ongoing (interventions implemented as appropriate)  Goals remain appropriate

## 2017-03-30 NOTE — PT/OT/SLP PROGRESS
Physical Therapy  Treatment    Nathanael Velez   MRN: [id number]   Admitting Diagnosis: Embolic stroke involving left middle cerebral artery    PT Received On: 17  PT Start Time: 1344     PT Stop Time: 1424    PT Total Time (min): 40 min       Billable Minutes:  Therapeutic Activity 30 and Therapeutic Exercise 10    Treatment Type: Treatment  PT/PTA: PT     PTA Visit Number: 1       General Precautions: Standard, aspiration, fall, NPO  Orthopedic Precautions: N/A   Braces: N/A    Do you have any cultural, spiritual, Episcopal conflicts, given your current situation?: [place]    Subjective:  Communicated with RN prior to session.  Pt agreeable to treatment.     Pain Ratin/10  Pain Rating Post-Intervention: 0/10    Objective:   Patient found with: blood pressure cuff, bowel management system, pulse ox (continuous), restraints, NG tube, peripheral IV, oxygen, telemetry   Pt found in bed w/ HOB elevated.     Functional Mobility:  Bed Mobility:   Scooting/Bridging: Minimum Assistance  Supine to Sit: Minimum Assistance  Sit to Supine: Minimum Assistance    Transfers:  Sit <> Stand Assistance: Contact Guard Assistance  Sit <> Stand Assistive Device:  (HHA)    Gait:   Gait Distance: Pt ambulated 3 steps fwd/back and 2 SS to HOB w/ HHA and CGA.   Assistance 1: Contact Guard Assistance  Gait Assistive Device: Hand held assist  Gait Pattern: 2-point gait  Gait Deviation(s): decreased hany, increased time in double stance, decreased velocity of limb motion, decreased step length, decreased stride length, decreased weight-shifting ability    Stairs:  Not assessed.     Balance:   Static Sit: FAIR+: Able to take MINIMAL challenges from all directions  Dynamic Sit: FAIR+: Maintains balance through MINIMAL excursions of active trunk motion  Static Stand: FAIR: Maintains without assist but unable to take challenges  Dynamic stand: FAIR: Needs CONTACT GUARD during gait     Therapeutic Activities and Exercises:  Sitting:   Pt  sat EOB ~10 min w/ SBA. Occasional CGA given for minor LOB. Pt performed dynamic reaching with BUE for ~5 min w/ SBA. No LOB during dynamic reaching. Pt given stress ball to prevent him from grabbing at lines.   Pt sat EOB another ~6 min w/ SBA and no LOB.   Pt performed LAQ x15 reps BLE.     Standing:   Pt stood EOB ~7 min w/ HHA and CGA. Pt took 3 steps fwd/back and 2 SS to HOB w/ HHA and CGA. Pt performed standing marches x10 reps w/ HHA and CGA. Pt experienced no LOB during static or dynamic gait.     AM-PAC 6 CLICK MOBILITY  How much help from another person does this patient currently need?   1 = Unable, Total/Dependent Assistance  2 = A lot, Maximum/Moderate Assistance  3 = A little, Minimum/Contact Guard/Supervision  4 = None, Modified [place]/Independent    Turning over in bed (including adjusting bedclothes, sheets and blankets)?: 3  Sitting down on and standing up from a chair with arms (e.g., wheelchair, bedside commode, etc.): 3  Moving from lying on back to sitting on the side of the bed?: 3  Moving to and from a bed to a chair (including a wheelchair)?: 3  Need to walk in hospital room?: 3  Climbing 3-5 steps with a railing?: 2  Total Score: 17    AM-PAC Raw Score CMS G-Code Modifier Level of Impairment Assistance   6 % Total / Unable   7 - 9 CM 80 - 100% Maximal Assist   10 - 14 CL 60 - 80% Moderate Assist   15 - 19 CK 40 - 60% Moderate Assist   20 - 22 CJ 20 - 40% Minimal Assist   23 CI 1-20% SBA / CGA   24 CH 0% Independent/ Mod I     Patient left HOB elevated with all lines intact, call button in reach, chair alarm on, restraints reapplied at end of session and family present.    Assessment:  Nathanael Velez is a 61 y.o. male with a medical diagnosis of Embolic stroke involving left middle cerebral artery and presents with the impairments listed below. Pt tolerated treatment well. Pt is continuing to progress well and is tolerating more EOB and OOB activities. Pt requires close  supervision 2/2 grabbing at lines. Pt aleexi continue to benefit from skilled PT services to address the impairments listed below and increase functional activities.      Rehab identified problem list/impairments: Rehab identified problem list/impairments: weakness, impaired endurance, impaired sensation, impaired self care skills, impaired functional mobilty, gait instability, impaired balance, impaired cognition, impaired coordination, decreased upper extremity function, decreased lower extremity function, decreased safety awareness    Rehab potential is good.    Activity tolerance: Good    Discharge recommendations: Discharge Facility/Level Of Care Needs: rehabilitation facility     Barriers to discharge: Barriers to Discharge: Inaccessible home environment, Decreased caregiver support    Equipment recommendations: Equipment Needed After Discharge:  (TBD at next level of care)     GOALS:   Physical Therapy Goals        Problem: Physical Therapy Goal    Goal Priority Disciplines Outcome Goal Variances Interventions   Physical Therapy Goal     PT/OT, PT Ongoing (interventions implemented as appropriate)     Description:  Active Goals to be met by: 4/7/17    1. Supine to sit with CGA.  2. Sit to supine with Supervision. NOT MET  3. Sit to stand with QCN with CGA.  4. Pt to perform gait 5ft with appropriate AD with max assist.-not met  5. Pt to tolerate sitting on the EOB 20 min  with CGA.-not met  6. Pt to transfer bed to/from bedside chair with max assist.-not met  7. Able to tolerate exercise for 15-20 reps with independence.  8. Dynamic sitting at edge of bed x 8 minutes with Stand-by Assistance.  9. Dynamic standing for 2 minutes with Contact Guard Assistance using Quad Cane.  10. Patient and family able to teachback stroke & positioning education independently.  11. Wheelchair propulsion x 100 feet with Minimal Assistance using bilateral lower extremities    MET Goals:  Pt supine to sit with moderate assist- MET  3/21  Supine to sit to Minimal Assist. MET  Pt sit to supine with moderate assist-MET 3/21  Pt sit to stand with appropriate AD with moderate assist. Met    Pt to perform B LE exs in sitting or supine x 10 reps with AAROM on R LE. MET              PLAN:    Patient to be seen 6 x/week  to address the above listed problems via gait training, therapeutic activities, therapeutic exercises, neuromuscular re-education  Plan of Care expires: 04/18/17  Plan of Care reviewed with: patient, family    Pankaj Wilson, SPT  03/30/2017

## 2017-03-30 NOTE — PLAN OF CARE
Problem: SLP Goal  Goal: SLP Goal  SLP Plan of Care: Speech Pathology will follow pt daily M-F and address the following goals x1 week:  3/27  1. Pt will participate in ongoing swallow assessment to determine least restrictive diet. ONGIONG  2. Pt will answer 3/4 orientation questions with mod assistance to improve cognitive function. NOT MET/DCED  3. Pt will follow simple directives with 60% acc indep provided mod cues to improve receptive language. MET/UPDATED  4. Pt will complete automatic rote speech (counting) with 60% acc indep provided mod cues to improve expressive language. ONGOING  5. When deemed appropriate by MD/SLP pt will participate in MBS to further determine safe swallow function. ONGOING    SLP Plan of Care: Speech Pathology will follow pt 5x/week and address the following goals x1 week:  4/5  1. Pt will participate in ongoing swallow assessment to determine least restrictive diet.   2. Pt will answer simple YN questions with 70% acc provided min cues to improve receptive language.   3. Pt will follow 2-step directives with 60% acc indep provided mod cues to improve receptive language.  4. Pt will complete automatic rote speech (counting) with 60% acc indep provided mod cues to improve expressive language.   5. When deemed appropriate by MD/SLP pt will participate in MBS to further determine safe swallow function.        Pt with increased verbalizations but vocal quality remains wet. Pt still at high risk for aspiration.     PAOLA Smith, CCC-SLP  3/30/2017

## 2017-03-30 NOTE — PROGRESS NOTES
[facility]  Adult Nutrition  Consult Note    SUMMARY     Recommendations    Recommendation/Intervention:   As medically able, restart TF at increased goal rate. Isosource 1.5 @ goal rate 55mL/hr.   -Hold for residuals >500ml.     RD to monitor.       Goals: TF to meet >85% of EEN/EPN  Nutrition Goal Status: progressing towards goal  Communication of RD Recs: reviewed with RN    Continuum of Care Plan    Referral to Outpatient Services: other (see comments) (Nutr D/c Plan: unable to determine @ this time)    Reason for Assessment    Reason for Assessment: RD follow-up  Diagnosis: stroke/CVA  Relevent Medical History: HTN, HLD, CAD         General Information Comments: TF held with fluctuating respiratory status. Pt working with SLP however remains NPO.    Nutrition Prescription Ordered    Current Diet Order: NPO  Nutrition Order Comments: TF held  Current Nutrition Support Formula Ordered: Isosource 1.5  Current Nutrition Support Rate Ordered: 40 (ml)  Current Nutrition Support Frequency Ordered: ml/hr        Evaluation of Received Nutrients/Fluid Intake    Enteral Calories (kcal): 1440  Enteral Protein (gm): 65  Enteral (Free Water) Fluid (mL): 733      Energy Calories Required: not meeting needs  % Kcal Needs: 76      Protein Required: not meeting needs  % Protein Needs: 87     Enteral Fluid: 200ml q 6hrs      Fluid Required: meeting needs  Comments: +I/O, + BM, good UOP  Tolerance: tolerating     Nutrition Risk Screen     Nutrition Risk Screen: no indicators present    Nutrition/Diet History       Typical Food/Fluid Intake: NPO currently  Food Preferences: SAMINA        Factors Affecting Nutritional Intake: difficulty/impaired swallowing, NPO                Labs/Tests/Procedures/Meds       Pertinent Labs Reviewed: reviewed  Pertinent Labs Comments: K 3.3, Ph 2.2  Pertinent Medications Reviewed: reviewed  Pertinent Medications Comments: statin, heparin    Physical Findings    Overall Physical  Appearance: on oxygen therapy, overweight  Tubes: nasogastric tube     Skin: intact    Anthropometrics       Height (inches): 67.01 in  Weight Method: Bed Scale  Weight (kg): 73.8 kg  Ideal Body Weight (IBW), Male: 148.06 lb     % Ideal Body Weight, Male (lb): 109.89 lb     BMI (kg/m2): 25.48  BMI Grade: 25 - 29.9 - overweight  Usual Body Weight (UBW), kg:  (unable to obtain)     Estimated/Assessed Needs    Weight Used For Calorie Calculations: 71.4 kg (157 lb 6.5 oz) (dosing weight)   Height (cm): 170.2 cm     Energy Need Method: Cass-St Jeor (1889 kcal (PAL x1.25))     RMR (Cass-St. Jeor Equation): 1510.81        Weight Used For Protein Calculations: 73.8 kg (162 lb 11.2 oz)  Protein Requirements: 74-89 g (1.0-1.2 g/kg)    Fluid Need Method: RDA Method (1ml/kcal or per MD)                           Monitor and Evaluation    Food and Nutrient Intake: enteral nutrition intake, energy intake  Food and Nutrient Adminstration: enteral and parenteral nutrition administration, diet order        Anthropometric Measurements: weight change, weight  Biochemical Data, Medical Tests and Procedures: electrolyte and renal panel, glucose/endocrine profile  Nutrition-Focused Physical Findings: overall appearance    Nutrition Risk    Level of Risk: other (see comments) (2x weekly)    Nutrition Follow-Up    RD Follow-up?: Yes        Nutrition Diagnosis      Nutr Dx/problem: Inadequate energy intake  Related to/etiology: insufficient TF goal rate  As evidenced by: <85% of EEN being met  Status: continues

## 2017-03-30 NOTE — PT/OT/SLP PROGRESS
Occupational Therapy  Treatment    Nathanael Velez   MRN: [id number]   Admitting Diagnosis: Embolic stroke involving left middle cerebral artery    OT Date of Treatment: 17   OT Start Time: 112  OT Stop Time: 1157  OT Total Time (min): 30 min    Billable Minutes:  Self Care/Home Management 20 and Therapeutic Exercise 10    General Precautions: Standard, aspiration, fall, NPO    Do you have any cultural, spiritual, Restorationism conflicts, given your current situation?: Restorationism    Subjective:  Communicated with RN prior to session.  Pt with mostly difficult to comprehend verbalizations.    Pain Ratin/10  Pain Rating Post-Intervention: 0/10    Objective:  Patient found with: blood pressure cuff, bowel management system, pulse ox (continuous), restraints, NG tube, oxygen, telemetry, peripheral IV (condom catheter)     Functional Mobility:  Bed Mobility:  Rolling/Turning to Left: Maximum assistance  Rolling/Turning Right: Minimum assistance  Scooting/Bridging:  (SBA with scooting to the left spontaneously in bed however Total (A) with scooting up HOB while supine to command)  Supine to Sit: Maximum Assistance  Sit to Supine: Moderate Assistance    Activities of Daily Living:     Grooming Position: Seated, EOB  Grooming Level of Assistance: Total assistance  Toileting Where Assessed: Bed level  Toileting Level of Assistance: Total assistance (due to BMS leakage)     Therapeutic Activities and Exercises:  Pt performed reaching activities in multiple directions while seated EOB to facilitate postural control & visual scanning to the right.  Pt performed visual scanning activities while seated EOB with noted no visual scanning  To the right despite cues (verbal & physical).  Provided RUE weight bearing while seated EOB.  Pt noted to be disoriented therefore provided daily orientation.  Pt with decreased coherency & correctness of responses to questions during session.  Provided PROM to RUE & AAROM to LUE in all available  planes x 10 reps each while supine. Pt had no further questions & when asked whether there were any concerns pt reported none.   Pt's nephew present at start of session however left as session started.    AM-PAC 6 CLICK ADL   How much help from another person does this patient currently need?   1 = Unable, Total/Dependent Assistance  2 = A lot, Maximum/Moderate Assistance  3 = A little, Minimum/Contact Guard/Supervision  4 = None, Modified [place]/Independent    Putting on and taking off regular lower body clothing? : 2  Bathing (including washing, rinsing, drying)?: 1  Toileting, which includes using toilet, bedpan, or urinal? : 1  Putting on and taking off regular upper body clothing?: 2  Taking care of personal grooming such as brushing teeth?: 1  Eating meals?: 1  Total Score: 8     AM-PAC Raw Score CMS G-Code Modifier Level of Impairment Assistance   6 % Total / Unable   7 - 9 CM 80 - 100% Maximal Assist   10 - 14 CL 60 - 80% Moderate Assist   15 - 19 CK 40 - 60% Moderate Assist   20 - 22 CJ 20 - 40% Minimal Assist   23 CI 1-20% SBA / CGA   24 CH 0% Independent/ Mod I     Patient left supine with all lines intact, call button in reach, restraints reapplied at end of session, RN notified and white board updated.    ASSESSMENT:  Nathanael Velez is a 61 y.o. male with a medical diagnosis of Embolic stroke involving left middle cerebral artery and presents with fair participation and motivation.  Pt with decreased coherency of statements in comparison to prior sessions with this OT.  Pt continues to require (A) with all activities & with noted right neglect.    Rehab identified problem list/impairments: Rehab identified problem list/impairments: weakness, impaired endurance, impaired sensation, impaired self care skills, visual deficits, impaired balance, impaired functional mobilty, impaired cognition, decreased safety awareness, decreased coordination, decreased upper extremity function, decreased lower  extremity function    Rehab potential is fair.    Activity tolerance: Fair    Discharge recommendations: Discharge Facility/Level Of Care Needs: rehabilitation facility     GOALS:   Occupational Therapy Goals        Problem: Occupational Therapy Goal    Goal Priority Disciplines Outcome Interventions   Occupational Therapy Goal     OT, PT/OT Ongoing (interventions implemented as appropriate)    Description:  Goals with expiration date, 4/12:  Patient will increase functional independence with ADLs by performing:    Visual item location for 3/5 items on R.   Bed Mobility with min(a) and bed-rails as needed.  Supine to sit with CGA.  Stand pivot transfers with Contact Guard Assist.    Grooming while standing with min(a).  UB dressing with min(a) while seated EOB.   LB dressing with min(a) while seated EOB.  Patient's family / caregiver will demonstrate independence and safety with assisting patient with self-care skills and functional mobility.      Patient's family / caregiver will demonstrate independence with providing ROM and changes in bed positioning.    Patient and/or patient's family will verbalize understanding of stroke prevention guidelines, personal risk factors and stroke warning signs via teachback method.               Plan:  Patient to be seen 6 x/week to address the above listed problems via self-care/home management, neuromuscular re-education, cognitive retraining, sensory integration, therapeutic activities, therapeutic exercises  Plan of Care expires: 04/19/17  Plan of Care reviewed with: patient         AVIS Álvarez  03/30/2017

## 2017-03-30 NOTE — PLAN OF CARE
Problem: Physical Therapy Goal  Goal: Physical Therapy Goal  Active Goals to be met by: 4/7/17    1. Supine to sit with CGA.  2. Sit to supine with Supervision. NOT MET  3. Sit to stand with QCN with CGA.  4. Pt to perform gait 5ft with appropriate AD with max assist.-not met  5. Pt to tolerate sitting on the EOB 20 min with CGA.-not met  6. Pt to transfer bed to/from bedside chair with max assist.-not met  7. Able to tolerate exercise for 15-20 reps with independence.  8. Dynamic sitting at edge of bed x 8 minutes with Stand-by Assistance.  9. Dynamic standing for 2 minutes with Contact Guard Assistance using Quad Cane.  10. Patient and family able to teachback stroke & positioning education independently.  11. Wheelchair propulsion x 100 feet with Minimal Assistance using bilateral lower extremities    MET Goals:  Pt supine to sit with moderate assist- MET 3/21  Supine to sit to Minimal Assist. MET  Pt sit to supine with moderate assist-MET 3/21  Pt sit to stand with appropriate AD with moderate assist. Met   Pt to perform B LE exs in sitting or supine x 10 reps with AAROM on R LE. MET   Outcome: Ongoing (interventions implemented as appropriate)  No goals met today. Goals remain appropriate.

## 2017-03-30 NOTE — PROGRESS NOTES
ICU Progress Note  Neurocritical Care    Admit Date: 3/18/2017  LOS: 12    CC: Embolic stroke involving left middle cerebral artery    Code Status: Full Code     SUBJECTIVE:     Interval History/Significant Events: tachypnea this morning. Chest x-ray clear. Still has significant secretion. Speech is improving.    Review of Symptoms: unable to assess due to mental status    Medications:  Continuous Infusions:     Scheduled Meds:   acetylcysteine 100 mg/ml (10%)  4 mL Nebulization Q6H    albuterol-ipratropium 2.5mg-0.5mg/3mL  3 mL Nebulization Q6H    amlodipine  10 mg Per NG tube Daily    aspirin  81 mg Per NG tube Daily    atorvastatin  80 mg Per NG tube Daily    clopidogrel  75 mg Per NG tube Daily    heparin (porcine)  5,000 Units Subcutaneous Q8H    hydrALAZINE  50 mg Per NG tube Q8H    lidocaine (PF) 10 mg/ml (1%)  5 mL Intradermal Once    losartan  100 mg Per NG tube Daily    sodium chloride 0.9%  10 mL Intravenous Q6H    sodium chloride 0.9%  10 mL Intravenous Q6H    sodium chloride 0.9%  3 mL Intravenous Q8H     PRN Meds:.acetaminophen, hydrALAZINE, magnesium sulfate IVPB, magnesium sulfate IVPB, ondansetron, potassium chloride 10%, potassium chloride 10%, potassium chloride 10%, potassium, sodium phosphates, potassium, sodium phosphates, potassium, sodium phosphates, ramelteon, Flushing PICC Protocol **AND** sodium chloride 0.9% **AND** sodium chloride 0.9%, Flushing PICC Protocol **AND** sodium chloride 0.9% **AND** sodium chloride 0.9%    OBJECTIVE:   Vital Signs (Most Recent):   Temp: 98.7 °F (37.1 °C) (03/30/17 0700)  Pulse: 62 (03/30/17 0800)  Resp: (!) 25 (03/30/17 0800)  BP: (!) 142/67 (03/30/17 0800)  SpO2: 99 % (03/30/17 0800)    Vital Signs (24h Range):   Temp:  [97.2 °F (36.2 °C)-98.7 °F (37.1 °C)] 98.7 °F (37.1 °C)  Pulse:  [57-79] 62  Resp:  [14-28] 25  SpO2:  [86 %-100 %] 99 %  BP: (125-174)/(58-77) 142/67  Arterial Line BP: (116-180)/(46-66) 149/55    ICP/CPP (Last 24h):        I &  O (Last 24h):     Intake/Output Summary (Last 24 hours) at 03/30/17 1115  Last data filed at 03/30/17 1100   Gross per 24 hour   Intake             2500 ml   Output             2527 ml   Net              -27 ml     Physical Exam:  GA: Alert, comfortable, no acute distress. Follows commands, answers questions but inappropriately. Able to say his name  HEENT: No scleral icterus or JVD. Frequent thick secretions  Pulmonary: Coarse breath sounds  Cardiac: RRR S1 & S2 w/o rubs/murmurs/gallops.   Abdominal: Bowel sounds present x 4. No appreciable hepatosplenomegaly.  Skin: No jaundice, rashes, or visible lesions.  Neuro:  --GCS: E4 V3 M6  --Mental Status:  Increased speech. Follows simple commands  --CN II-XII grossly intact, dysphagia  -- PERRL.   --Corneal reflex, gag, weak cough  --LUE strength: 5/5  --RUE strength: 1/5  --LLE strength: 5/5  --RLE strength: 3/5    Vent Data:   Oxygen Concentration (%):  [4] 4    Lines/Drains/Airway:              External Urinary Catheter 03/18/17 1758 Medium (Active)   Collection Container Urimeter 3/21/2017  7:01 AM   Securement Method secured to top of thigh w/ adhesive device 3/21/2017  7:01 AM   Skin no redness;no breakdown;penis/scrotum cleansed w/ soap and water;skin barrier applied 3/21/2017  7:01 AM   Tolerance no signs/symptoms of discomfort 3/21/2017  7:01 AM   Output (mL) 20 mL 3/21/2017  9:00 AM     Nutrition/Tube Feeds (if NPO state why):  TF at goal    Labs:  ABG:     Recent Labs  Lab 03/30/17  0739   PH 7.488*   PO2 53*   PCO2 24.8*   HCO3 18.8*   POCSATURATED 90*   BE -5     BMP:    Recent Labs  Lab 03/30/17  0223      K 3.3*      CO2 21*   BUN 14   CREATININE 0.7      MG 1.8   PHOS 2.2*     LFT:   Lab Results   Component Value Date    AST 76 (H) 03/30/2017     (H) 03/30/2017    ALKPHOS 74 03/30/2017    BILITOT 0.4 03/30/2017    ALBUMIN 2.4 (L) 03/30/2017    PROT 5.9 (L) 03/30/2017     CBC:   Lab Results   Component Value Date    WBC 11.36  03/30/2017    HGB 11.2 (L) 03/30/2017    HCT 31.9 (L) 03/30/2017    MCV 89 03/30/2017     03/30/2017     Microbiology x 7d:   Microbiology Results (last 7 days)     Procedure Component Value Units Date/Time    Blood culture [[id number]] Collected:  03/24/17 1315    Order Status:  Completed Specimen:  Blood from Peripheral, Antecubital, Left Updated:  03/29/17 2012     Blood Culture, Routine No growth after 5 days.    Blood culture [[id number]] Collected:  03/24/17 1314    Order Status:  Completed Specimen:  Blood from Peripheral, Forearm, Left Updated:  03/29/17 1822     Blood Culture, Routine No growth after 5 days.    Blood culture [[id number]] Collected:  03/21/17 0356    Order Status:  Completed Specimen:  Blood from Peripheral, Antecubital, Left Updated:  03/26/17 0812     Blood Culture, Routine No growth after 5 days.    Narrative:       Blood cultures from 2 different sites. 4 bottles total.  Please draw before starting antibiotics.    Blood culture [[id number]] Collected:  03/20/17 1532    Order Status:  Completed Specimen:  Blood from Peripheral, Forearm, Right Updated:  03/25/17 2012     Blood Culture, Routine No growth after 5 days.    Narrative:       Blood cultures from 2 different sites. 4 bottles total.  Please draw before starting antibiotics.    Blood culture [[id number]] Collected:  03/20/17 1533    Order Status:  Completed Specimen:  Blood from Peripheral, Lower Arm, Right Updated:  03/25/17 2012     Blood Culture, Routine No growth after 5 days.    Narrative:       Blood cultures x 2 different sites. 4 bottles total. Please  draw cultures before administering antibiotics.    Blood culture [[id number]] Collected:  03/19/17 1827    Order Status:  Completed Specimen:  Blood from Peripheral, Hand, Left Updated:  03/24/17 2212     Blood Culture, Routine No growth after 5 days.    Narrative:       Blood cultures x 2 different sites. 4 bottles total. Please  draw cultures before administering  antibiotics.    Blood culture [[id number]] Collected:  03/19/17 3600    Order Status:  Completed Specimen:  Blood from Peripheral, Hand, Right Updated:  03/24/17 2212     Blood Culture, Routine No growth after 5 days.    Narrative:       Blood cultures from 2 different sites. 4 bottles total.  Please draw before starting antibiotics.        Imaging:  CT head 3/21/17  No significant interval detrimental change when compared to the CT dated 03/20/2017.    Evolving left MCA distribution infarct with persistent hemorrhagic conversion at the level of the basal ganglia and trace petechial hemorrhages about the superior aspect of the left frontal lobe. There is stable mass effect on the left lateral ventricle with minimal left to right midline shift.  No CT findings to suggest developing   hydrocephalus.    ASSESSMENT/PLAN:     Active Hospital Problems    Diagnosis  POA    *Embolic stroke involving left middle cerebral artery [I63.412]  Unknown    Acute ischemic left MCA stroke [I63.512]  Yes    Carotid artery stenosis, symptomatic [I65.29]  Yes    Cytotoxic cerebral edema [G93.6]  Unknown    Abnormality of aortic valve [Q23.9]  Not Applicable    Right sided weakness [M62.81]  Unknown    Aphasia [R47.01]  Unknown    Essential hypertension [I10]  Unknown      Resolved Hospital Problems    Diagnosis Date Resolved POA   No resolved problems to display.      Neuro:   L stroke with hemorrhagic transofrmation on caudate and putamen s/p TPA, thrombectomy and stent placement  -Asa, plavix, statin  -repeat scan stable  -mental status improving, speech is improving but still has some word finding diffculty  -SLP following, patient NPO. May need PEG if dysphagia continues, discussed with nephew at bedside    Pulmonary:   -improvement in O2 requirements, on 3L NC  -secretions improved. mucormyst q6, cpt, upright in bed  -abg with resp alkalosis, chest xray clear    Recent Labs  Lab 03/30/17  0739   PH 7.488*   PCO2 24.8*   PO2  53*   HCO3 18.8*   POCSATURATED 90*   BE -5       Cardiac:   -SBP goal <140  -losartan 100mg daily, amlodipine 10 mg, hydralazine 50mg q8  -patient with some bradycardia on labetalol 200 q8, which was discontinued  -ekg NSR, with LBBB(old)    Renal:    -cr stable, good uop  -condom catheter    ID:   -no growth on any cultures, leukocytosis improved, afebrile    Hem/Onc:   -partial occlusive thrombus in right common femoral vein. Repeat scan clot is no longer present. No need for ivc filter at this time  -continue subq heparin ppx    Endocrine:    SSI q4    Fluids/Electrolytes/Nutrition/GI:   -hold tf given fluctuant respiratory status. Will keep npo  -d/c ivf  -replete electrolytes prn  -patient with frequent bm. flexiseal placed, bowel regimen held    Ppx: subq heparin      Brian Amaya MD PGY-1

## 2017-03-31 LAB
ALBUMIN SERPL BCP-MCNC: 2.6 G/DL
ALLENS TEST: ABNORMAL
ALLENS TEST: ABNORMAL
ALP SERPL-CCNC: 73 U/L
ALT SERPL W/O P-5'-P-CCNC: 104 U/L
ANION GAP SERPL CALC-SCNC: 10 MMOL/L
AST SERPL-CCNC: 60 U/L
BASOPHILS # BLD AUTO: 0.05 K/UL
BASOPHILS NFR BLD: 0.5 %
BILIRUB SERPL-MCNC: 0.5 MG/DL
BUN SERPL-MCNC: 14 MG/DL
CALCIUM SERPL-MCNC: 8.3 MG/DL
CHLORIDE SERPL-SCNC: 106 MMOL/L
CO2 SERPL-SCNC: 20 MMOL/L
CREAT SERPL-MCNC: 0.7 MG/DL
DELSYS: ABNORMAL
DELSYS: ABNORMAL
DIFFERENTIAL METHOD: ABNORMAL
EOSINOPHIL # BLD AUTO: 0.3 K/UL
EOSINOPHIL NFR BLD: 2.5 %
ERYTHROCYTE [DISTWIDTH] IN BLOOD BY AUTOMATED COUNT: 13.1 %
ERYTHROCYTE [SEDIMENTATION RATE] IN BLOOD BY WESTERGREN METHOD: 23 MM/H
EST. GFR  (AFRICAN AMERICAN): >60 ML/MIN/1.73 M^2
EST. GFR  (NON AFRICAN AMERICAN): >60 ML/MIN/1.73 M^2
FLOW: 2
FLOW: 2
GLUCOSE SERPL-MCNC: 87 MG/DL
HCO3 UR-SCNC: 19.8 MMOL/L (ref 24–28)
HCO3 UR-SCNC: 20 MMOL/L (ref 24–28)
HCT VFR BLD AUTO: 32.9 %
HGB BLD-MCNC: 11.6 G/DL
INR PPP: 1
LYMPHOCYTES # BLD AUTO: 1.8 K/UL
LYMPHOCYTES NFR BLD: 16.4 %
MAGNESIUM SERPL-MCNC: 2.2 MG/DL
MCH RBC QN AUTO: 31.5 PG
MCHC RBC AUTO-ENTMCNC: 35.3 %
MCV RBC AUTO: 89 FL
MODE: ABNORMAL
MODE: ABNORMAL
MONOCYTES # BLD AUTO: 1 K/UL
MONOCYTES NFR BLD: 9.1 %
NEUTROPHILS # BLD AUTO: 7.5 K/UL
NEUTROPHILS NFR BLD: 70.4 %
PCO2 BLDA: 28 MMHG (ref 35–45)
PCO2 BLDA: 29.5 MMHG (ref 35–45)
PH SMN: 7.44 [PH] (ref 7.35–7.45)
PH SMN: 7.46 [PH] (ref 7.35–7.45)
PHOSPHATE SERPL-MCNC: 2.8 MG/DL
PLATELET # BLD AUTO: 318 K/UL
PMV BLD AUTO: 10.1 FL
PO2 BLDA: 102 MMHG (ref 80–100)
PO2 BLDA: 99 MMHG (ref 80–100)
POC BE: -4 MMOL/L
POC BE: -4 MMOL/L
POC SATURATED O2: 98 % (ref 95–100)
POC SATURATED O2: 98 % (ref 95–100)
POC TCO2: 21 MMOL/L (ref 23–27)
POC TCO2: 21 MMOL/L (ref 23–27)
POTASSIUM SERPL-SCNC: 3.5 MMOL/L
PROT SERPL-MCNC: 6.4 G/DL
PROTHROMBIN TIME: 11.1 SEC
RBC # BLD AUTO: 3.68 M/UL
SAMPLE: ABNORMAL
SAMPLE: ABNORMAL
SITE: ABNORMAL
SITE: ABNORMAL
SODIUM SERPL-SCNC: 136 MMOL/L
SP02: 99
SP02: 99
WBC # BLD AUTO: 10.71 K/UL

## 2017-03-31 PROCEDURE — 97530 THERAPEUTIC ACTIVITIES: CPT

## 2017-03-31 PROCEDURE — 92507 TX SP LANG VOICE COMM INDIV: CPT

## 2017-03-31 PROCEDURE — 25000003 PHARM REV CODE 250: Performed by: STUDENT IN AN ORGANIZED HEALTH CARE EDUCATION/TRAINING PROGRAM

## 2017-03-31 PROCEDURE — 85610 PROTHROMBIN TIME: CPT

## 2017-03-31 PROCEDURE — 80053 COMPREHEN METABOLIC PANEL: CPT

## 2017-03-31 PROCEDURE — 25000003 PHARM REV CODE 250: Performed by: PSYCHIATRY & NEUROLOGY

## 2017-03-31 PROCEDURE — 84100 ASSAY OF PHOSPHORUS: CPT

## 2017-03-31 PROCEDURE — 85025 COMPLETE CBC W/AUTO DIFF WBC: CPT

## 2017-03-31 PROCEDURE — 25000003 PHARM REV CODE 250: Performed by: NURSE PRACTITIONER

## 2017-03-31 PROCEDURE — 27000221 HC OXYGEN, UP TO 24 HOURS

## 2017-03-31 PROCEDURE — 94668 MNPJ CHEST WALL SBSQ: CPT

## 2017-03-31 PROCEDURE — 20000000 HC ICU ROOM

## 2017-03-31 PROCEDURE — 82803 BLOOD GASES ANY COMBINATION: CPT

## 2017-03-31 PROCEDURE — 83735 ASSAY OF MAGNESIUM: CPT

## 2017-03-31 PROCEDURE — 92526 ORAL FUNCTION THERAPY: CPT

## 2017-03-31 PROCEDURE — 63600175 PHARM REV CODE 636 W HCPCS: Performed by: PSYCHIATRY & NEUROLOGY

## 2017-03-31 PROCEDURE — 25000242 PHARM REV CODE 250 ALT 637 W/ HCPCS: Performed by: STUDENT IN AN ORGANIZED HEALTH CARE EDUCATION/TRAINING PROGRAM

## 2017-03-31 PROCEDURE — 94640 AIRWAY INHALATION TREATMENT: CPT

## 2017-03-31 PROCEDURE — 25000003 PHARM REV CODE 250: Performed by: PHYSICIAN ASSISTANT

## 2017-03-31 PROCEDURE — 37799 UNLISTED PX VASCULAR SURGERY: CPT

## 2017-03-31 PROCEDURE — 99233 SBSQ HOSP IP/OBS HIGH 50: CPT | Mod: ,,, | Performed by: PSYCHIATRY & NEUROLOGY

## 2017-03-31 PROCEDURE — 97110 THERAPEUTIC EXERCISES: CPT

## 2017-03-31 PROCEDURE — 25000242 PHARM REV CODE 250 ALT 637 W/ HCPCS: Performed by: NURSE PRACTITIONER

## 2017-03-31 RX ORDER — SODIUM CHLORIDE FOR INHALATION 3 %
4 VIAL, NEBULIZER (ML) INHALATION EVERY 4 HOURS
Status: DISPENSED | OUTPATIENT
Start: 2017-03-31 | End: 2017-04-01

## 2017-03-31 RX ORDER — GLYCOPYRROLATE 0.2 MG/ML
0.1 INJECTION INTRAMUSCULAR; INTRAVENOUS ONCE
Status: COMPLETED | OUTPATIENT
Start: 2017-03-31 | End: 2017-03-31

## 2017-03-31 RX ORDER — IPRATROPIUM BROMIDE AND ALBUTEROL SULFATE 2.5; .5 MG/3ML; MG/3ML
3 SOLUTION RESPIRATORY (INHALATION) EVERY 6 HOURS PRN
Status: DISCONTINUED | OUTPATIENT
Start: 2017-03-31 | End: 2017-04-06 | Stop reason: HOSPADM

## 2017-03-31 RX ORDER — IPRATROPIUM BROMIDE AND ALBUTEROL SULFATE 2.5; .5 MG/3ML; MG/3ML
3 SOLUTION RESPIRATORY (INHALATION) EVERY 4 HOURS
Status: DISPENSED | OUTPATIENT
Start: 2017-03-31 | End: 2017-04-01

## 2017-03-31 RX ADMIN — Medication 10 ML: at 12:03

## 2017-03-31 RX ADMIN — ASPIRIN 81 MG CHEWABLE TABLET 81 MG: 81 TABLET CHEWABLE at 09:03

## 2017-03-31 RX ADMIN — Medication 3 ML: at 10:03

## 2017-03-31 RX ADMIN — SODIUM CHLORIDE SOLN NEBU 3% 4 ML: 3 NEBU SOLN at 11:03

## 2017-03-31 RX ADMIN — Medication 10 ML: at 06:03

## 2017-03-31 RX ADMIN — ATORVASTATIN CALCIUM 80 MG: 20 TABLET, FILM COATED ORAL at 09:03

## 2017-03-31 RX ADMIN — ACETYLCYSTEINE 4 ML: 100 SOLUTION ORAL; RESPIRATORY (INHALATION) at 12:03

## 2017-03-31 RX ADMIN — IPRATROPIUM BROMIDE AND ALBUTEROL SULFATE 3 ML: .5; 3 SOLUTION RESPIRATORY (INHALATION) at 12:03

## 2017-03-31 RX ADMIN — HYDRALAZINE HYDROCHLORIDE 50 MG: 50 TABLET ORAL at 02:03

## 2017-03-31 RX ADMIN — HYDRALAZINE HYDROCHLORIDE 50 MG: 50 TABLET ORAL at 07:03

## 2017-03-31 RX ADMIN — POTASSIUM CHLORIDE 40 MEQ: 20 SOLUTION ORAL at 07:03

## 2017-03-31 RX ADMIN — HEPARIN SODIUM 5000 UNITS: 5000 INJECTION, SOLUTION INTRAVENOUS; SUBCUTANEOUS at 02:03

## 2017-03-31 RX ADMIN — Medication 3 ML: at 02:03

## 2017-03-31 RX ADMIN — SODIUM CHLORIDE SOLN NEBU 3% 4 ML: 3 NEBU SOLN at 05:03

## 2017-03-31 RX ADMIN — IPRATROPIUM BROMIDE AND ALBUTEROL SULFATE 3 ML: .5; 3 SOLUTION RESPIRATORY (INHALATION) at 08:03

## 2017-03-31 RX ADMIN — IPRATROPIUM BROMIDE AND ALBUTEROL SULFATE 3 ML: .5; 3 SOLUTION RESPIRATORY (INHALATION) at 05:03

## 2017-03-31 RX ADMIN — HEPARIN SODIUM 5000 UNITS: 5000 INJECTION, SOLUTION INTRAVENOUS; SUBCUTANEOUS at 07:03

## 2017-03-31 RX ADMIN — HEPARIN SODIUM 5000 UNITS: 5000 INJECTION, SOLUTION INTRAVENOUS; SUBCUTANEOUS at 10:03

## 2017-03-31 RX ADMIN — SODIUM CHLORIDE SOLN NEBU 3% 4 ML: 3 NEBU SOLN at 08:03

## 2017-03-31 RX ADMIN — IPRATROPIUM BROMIDE AND ALBUTEROL SULFATE 3 ML: .5; 3 SOLUTION RESPIRATORY (INHALATION) at 11:03

## 2017-03-31 RX ADMIN — AMLODIPINE BESYLATE 10 MG: 10 TABLET ORAL at 09:03

## 2017-03-31 RX ADMIN — CLOPIDOGREL 75 MG: 75 TABLET, FILM COATED ORAL at 09:03

## 2017-03-31 RX ADMIN — LOSARTAN POTASSIUM 100 MG: 50 TABLET, FILM COATED ORAL at 09:03

## 2017-03-31 RX ADMIN — GLYCOPYRROLATE 0.1 MG: 0.2 INJECTION INTRAMUSCULAR; INTRAVENOUS at 02:03

## 2017-03-31 RX ADMIN — HYDRALAZINE HYDROCHLORIDE 50 MG: 50 TABLET ORAL at 10:03

## 2017-03-31 NOTE — PT/OT/SLP PROGRESS
"Occupational Therapy  Treatment    Nathanael Velez   MRN: [id number]   Admitting Diagnosis: Embolic stroke involving left middle cerebral artery    OT Date of Treatment: 17   OT Start Time: 1133  OT Stop Time: 1200  OT Total Time (min): 27 min    Billable Minutes:  Therapeutic Activity 17 and Therapeutic Exercise 10    General Precautions: Standard, aphasia, aspiration, fall, NPO, respiratory  Orthopedic Precautions: N/A  Braces: N/A    Do you have any cultural, spiritual, Latter day conflicts, given your current situation?: Orthodoxy    Subjective:  Communicated with RN prior to session.  Pt reported "I'm not doing that" -when prompted for EOB activity    Pain Ratin/10  Pain Rating Post-Intervention: 0/10    Objective:  Patient found with: blood pressure cuff, bowel management system, NG tube, telemetry, bed alarm, oxygen, peripheral IV, pulse ox (continuous), restraints (condom cath)     Functional Mobility:  Bed Mobility:  Rolling/Turning to Left: Minimum assistance, With side rail (x3 reps)  Rolling/Turning Right: Minimum assistance, With side rail (x3 reps)  Scooting/Bridging: Minimum Assistance (to HOB; see below for bridge exercise)  Supine to Sit:  (Pt with refusal to complete)    Activities of Daily Living:  Supported sitting: Set up and Supervision to wash face and comb hair    Therapeutic Exercises:  AAROM for R UE in all available planes x10 reps  Encouraged R visual attention with exercise    Additional:  -Pt alert upon arrival ; able to verbalize orientation to self only; further daily orientation provided  -Excess auditory input removed: TV off, door shut   -Following 100% 1-3 step verbal commands  -Able to ID # of fingers held at various planes for R attention (5/5 correct)  -Able to verbalize/ID 2/5 ADL objects presented with added time  -Completed supine bridge x10 reps  -Mild edema to R UE; elevation positioning provided with extension and abduction   -Communication board updated; call button " in reach- pt demo understanding of use and able to turn TV on and volume up    AM-PAC 6 CLICK ADL   How much help from another person does this patient currently need?   1 = Unable, Total/Dependent Assistance  2 = A lot, Maximum/Moderate Assistance  3 = A little, Minimum/Contact Guard/Supervision  4 = None, Modified Ray/Independent    Putting on and taking off regular lower body clothing? : 2  Bathing (including washing, rinsing, drying)?: 2  Toileting, which includes using toilet, bedpan, or urinal? : 2  Putting on and taking off regular upper body clothing?: 2  Taking care of personal grooming such as brushing teeth?: 3  Eating meals?: 1  Total Score: 12     AM-PAC Raw Score CMS G-Code Modifier Level of Impairment Assistance   6 % Total / Unable   7 - 9 CM 80 - 100% Maximal Assist   10 - 14 CL 60 - 80% Moderate Assist   15 - 19 CK 40 - 60% Moderate Assist   20 - 22 CJ 20 - 40% Minimal Assist   23 CI 1-20% SBA / CGA   24 CH 0% Independent/ Mod I     Patient left HOB elevated with all lines intact, call button in reach, bed alarm on, restraints reapplied at end of session and RN notified    ASSESSMENT:  Nathanael Velez is a 61 y.o. male with a medical diagnosis of Embolic stroke involving left middle cerebral artery and presents with R side weakness, R inattention, aphasia, impaired postural control, and impaired cognition. Pt demo improved identification and R attention this session. Fair motivation; declined EOB/OOB activity after much encouragement and education on importance. He will cont to benefit from skilled OT services at this time to address the following:    Rehab identified problem list/impairments: Rehab identified problem list/impairments: weakness, impaired endurance, impaired sensation, impaired self care skills, impaired functional mobilty, impaired cognition, visual deficits, impaired balance, gait instability, decreased upper extremity function, decreased lower extremity function,  decreased coordination, decreased safety awareness, pain, edema    Rehab potential is good.    Activity tolerance: Fair+    Discharge recommendations: Discharge Facility/Level Of Care Needs: rehabilitation facility     Barriers to discharge: Barriers to Discharge: Inaccessible home environment, Decreased caregiver support    Equipment recommendations:  (TBD at next level)     GOALS:   Occupational Therapy Goals        Problem: Occupational Therapy Goal    Goal Priority Disciplines Outcome Interventions   Occupational Therapy Goal     OT, PT/OT Ongoing (interventions implemented as appropriate)    Description:  Goals with expiration date, 4/12:  Patient will increase functional independence with ADLs by performing:    Visual item location for 3/5 items on R. Progressing  Bed Mobility with min(a) and bed-rails as needed. MET 3/31 (remain for consistency)  Supine to sit with CGA.  Stand pivot transfers with Contact Guard Assist.    Grooming while standing with min(a).  UB dressing with min(a) while seated EOB.   LB dressing with min(a) while seated EOB.  Patient's family / caregiver will demonstrate independence and safety with assisting patient with self-care skills and functional mobility.      Patient's family / caregiver will demonstrate independence with providing ROM and changes in bed positioning.    Patient and/or patient's family will verbalize understanding of stroke prevention guidelines, personal risk factors and stroke warning signs via teachback method.                Plan:  Patient to be seen 6 x/week to address the above listed problems via therapeutic activities, therapeutic exercises, self-care/home management, neuromuscular re-education, cognitive retraining, sensory integration  Plan of Care expires: 04/19/17  Plan of Care reviewed with: patient    AVIS Correa  03/31/2017

## 2017-03-31 NOTE — PLAN OF CARE
[facility] [phone] left message on voice mail    Annemarie Mullins [phone] aaron [phone] received referral

## 2017-03-31 NOTE — PLAN OF CARE
SSc sent referral to :    [facility] ( p [phone]) ( [phone])      Annemarie Mullins ( p [phone]) ( [phone])    Anel/nelda

## 2017-03-31 NOTE — PLAN OF CARE
Problem: Occupational Therapy Goal  Goal: Occupational Therapy Goal  Goals with expiration date, 4/12:  Patient will increase functional independence with ADLs by performing:    Visual item location for 3/5 items on R. Progressing  Bed Mobility with min(a) and bed-rails as needed. MET 3/31 (remain for consistency)  Supine to sit with CGA.  Stand pivot transfers with Contact Guard Assist.   Grooming while standing with min(a).  UB dressing with min(a) while seated EOB.   LB dressing with min(a) while seated EOB.  Patients family / caregiver will demonstrate independence and safety with assisting patient with self-care skills and functional mobility.   Patients family / caregiver will demonstrate independence with providing ROM and changes in bed positioning.   Patient and/or patients family will verbalize understanding of stroke prevention guidelines, personal risk factors and stroke warning signs via teachback method.   Outcome: Ongoing (interventions implemented as appropriate)  Pt with good participation this session. AVIS Correa 3/31/2017

## 2017-03-31 NOTE — PLAN OF CARE
Problem: SLP Goal  Goal: SLP Goal  SLP Plan of Care: Speech Pathology will follow pt daily M-F and address the following goals x1 week:  3/27  1. Pt will participate in ongoing swallow assessment to determine least restrictive diet. ONGIONG  2. Pt will answer 3/4 orientation questions with mod assistance to improve cognitive function. NOT MET/DCED  3. Pt will follow simple directives with 60% acc indep provided mod cues to improve receptive language. MET/UPDATED  4. Pt will complete automatic rote speech (counting) with 60% acc indep provided mod cues to improve expressive language. ONGOING  5. When deemed appropriate by MD/SLP pt will participate in MBS to further determine safe swallow function. ONGOING    SLP Plan of Care: Speech Pathology will follow pt 5x/week and address the following goals x1 week:  4/5  1. Pt will participate in ongoing swallow assessment to determine least restrictive diet.   2. Pt will answer simple YN questions with 70% acc provided min cues to improve receptive language.   3. Pt will follow 2-step directives with 60% acc indep provided mod cues to improve receptive language.  4. Pt will complete automatic rote speech (counting) with 60% acc indep provided mod cues to improve expressive language.   5. When deemed appropriate by MD/SLP pt will participate in MBS to further determine safe swallow function.     Outcome: Ongoing (interventions implemented as appropriate)  Pt making steady progress, cont POC.  Continued overt s/s aspiration with all po trials.  PAOLA Tomlinson, CCC/SLP  3/31/2017

## 2017-03-31 NOTE — PLAN OF CARE
Problem: Patient Care Overview  Goal: Plan of Care Review  Outcome: Ongoing (interventions implemented as appropriate)  POC reviewed with pt's nephew at 0500. Pt's nephew verbalized understanding. Questions and concerns addressed. No acute events overnight. Pt progressing toward goals. Will continue to monitor. See flowsheets for full assessment and VS info

## 2017-03-31 NOTE — PT/OT/SLP PROGRESS
"Speech Language Pathology  Treatment    Nathanael Velez   MRN: [id number]   Admitting Diagnosis: Embolic stroke involving left middle cerebral artery    Diet recommendations: Solid Diet Level: NPO  Liquid Diet Level: NPO strict aspiration precautions, modified barium swallow study as appropriate to further assess swallow safety    SLP Treatment Date: 17  Speech Start Time: 1103     Speech Stop Time: 1123     Speech Total (min): 20 min       TREATMENT BILLABLE MINUTES:  Speech Therapy Individual 10 and Treatment Swallowing Dysfunction 10    Has the patient been evaluated by SLP for swallowing? : Yes  Keep patient NPO?: Yes   General Precautions: Standard, aphasia, aspiration, fall, NPO  Current Respiratory Status: nasal cannula       Subjective:  Pt asleep when SLP entered, family member at bedside reports "He's up and down, mostly down.  He's very angry today."    Pain Ratin/10              Pain Rating Post-Intervention: 0/10    Objective:   Patient found with: blood pressure cuff, bowel management system, NG tube, oxygen, telemetry, SCD, restraints, pulse ox (continuous), peripheral IV    Pt required max stim to awaken initially, mod cues t/o session to maintain alertness. Wet cough noted on secretions.  Pt resistant to yaunker suction to aid clearance of secretions.  HOB raised upright and pt presented with ice chip x2 and nectar thick liquid via 1/2 tpsn x2.  Increased a-p transit with swallow initiation delay evident.  Immediate cough elicited on 1/2 trials of nectar thick liquids. Delayed cough with all other trials.  No further trials attempted 2/2 high aspiration risk.  Pt required increased time and max cues to initiate dry effortful swallow x2.  Pt's family member educated on simple dysphagia excs including effortful swallow and falsetto /i/ outside of therapy time to improve swallow safety.  Pt followed simple commands with 100% accy in structured task, fluctuating initiation in function.  1/3 2 step " commands followed given repetition and increased time.  Auto phrases completed with 80% accy indptly and 100% given mod cues.  Perseverations noted. White board updated.     Assessment:  Nathanael Velez is a 61 y.o. male with a medical diagnosis of Embolic stroke involving left middle cerebral artery and presents with aphasia, oropharyngeal dysphagia.    Discharge recommendations: Discharge Facility/Level Of Care Needs: rehabilitation facility     Goals:   SLP Goals        Problem: SLP Goal    Goal Priority Disciplines Outcome   SLP Goal     SLP Ongoing (interventions implemented as appropriate)   Description:  SLP Plan of Care: Speech Pathology will follow pt daily M-F and address the following goals x1 week:  3/27  1. Pt will participate in ongoing swallow assessment to determine least restrictive diet. ONGIONG  2. Pt will answer 3/4 orientation questions with mod assistance to improve cognitive function. NOT MET/DC'ED  3. Pt will follow simple directives with 60% acc indep provided mod cues to improve receptive language. MET/UPDATED  4. Pt will complete automatic rote speech (counting) with 60% acc indep provided mod cues to improve expressive language. ONGOING  5. When deemed appropriate by MD/SLP pt will participate in MBS to further determine safe swallow function. ONGOING    SLP Plan of Care: Speech Pathology will follow pt 5x/week and address the following goals x1 week:  4/5  1. Pt will participate in ongoing swallow assessment to determine least restrictive diet.   2. Pt will answer simple YN questions with 70% acc provided min cues to improve receptive language.   3. Pt will follow 2-step directives with 60% acc indep provided mod cues to improve receptive language.  4. Pt will complete automatic rote speech (counting) with 60% acc indep provided mod cues to improve expressive language.   5. When deemed appropriate by MD/SLP pt will participate in MBS to further determine safe swallow function.                        Plan:   Patient to be seen Therapy Frequency: 5 x/week   Plan of Care expires: 04/17/17  Plan of Care reviewed with: patient, family  SLP Follow-up?: Yes  SLP - Next Visit Date: 04/03/17           PAOLA Tomlinson, CCC-SLP  03/31/2017

## 2017-03-31 NOTE — PLAN OF CARE
Problem: Physical Therapy Goal  Goal: Physical Therapy Goal  Active Goals to be met by: 4/7/17    1. Supine to sit with CGA.  2. Sit to supine with Supervision. NOT MET  3. Sit to stand with QCN with CGA.  4. Pt to perform gait 5ft with appropriate AD with max assist.-not met  5. Pt to tolerate sitting on the EOB 20 min with CGA.-not met  6. Pt to transfer bed to/from bedside chair with max assist.-not met  7. Able to tolerate exercise for 15-20 reps with independence.  8. Dynamic sitting at edge of bed x 8 minutes with Stand-by Assistance.  9. Dynamic standing for 2 minutes with Contact Guard Assistance using Quad Cane.  10. Patient and family able to teachback stroke & positioning education independently.  11. Wheelchair propulsion x 100 feet with Minimal Assistance using bilateral lower extremities    MET Goals:  Pt supine to sit with moderate assist- MET 3/21  Supine to sit to Minimal Assist. MET  Pt sit to supine with moderate assist-MET 3/21  Pt sit to stand with appropriate AD with moderate assist. Met   Pt to perform B LE exs in sitting or supine x 10 reps with AAROM on R LE. MET            Pt progressing towards goals. All goals remain appropriate at this time.     Yissel Fay, PT, DPT  3/31/2017

## 2017-03-31 NOTE — PT/OT/SLP PROGRESS
Physical Therapy  Treatment    Nathanael Velez   MRN: [id number]   Admitting Diagnosis: Embolic stroke involving left middle cerebral artery    PT Received On: 17  PT Start Time: 0900     PT Stop Time: 921    PT Total Time (min): 21 min       Billable Minutes:  Therapeutic Activity 21    Treatment Type: Treatment  PT/PTA: PT     PTA Visit Number: 1       General Precautions: Standard, aphasia, aspiration, fall, NPO  Orthopedic Precautions: N/A   Braces: N/A    Do you have any cultural, spiritual, Congregational conflicts, given your current situation?: Latter-day    Subjective:  Communicated with RN prior to session.  Pt sleeping upon PT entering. Pt awakened with tactile stimulation, eyes open, but returned back asleep.     Pain Ratin/10  Pain Rating Post-Intervention: 0/10    Objective:   Patient found with: blood pressure cuff, restraints, pulse ox (continuous), NG tube, telemetry, SCD, oxygen, peripheral IV (condom catheter)    Functional Mobility:    Bed Mobility:  Supine to Sit:  Mod A for trunk elevation; max A for B LE to EOB HOB elevated.   Sit to supine: Moderate Assistance assist to trunk     Pt lethargic/sleeping throughout session. PT attempted multiple times to arouse pt, but pt unwilling to open or keep eyes open to participate. Pt able to lift trunk and obtain sitting position in bed, but eyes remained closed. Pt unwilling to assist PT to scoot forward, then returned to supine. Pt not awake to follow any commands or participate with LE exercises.     Balance:   Static Sit: FAIR-: Maintains without assist but inconsistent   Dynamic Sit: FAIR: Cannot move trunk without losing balance  Static Stand: 0: Needs MAXIMAL assist to maintain   Dynamic stand: 0: N/A     Therapeutic Activities and Exercises:  Attempted supine to sit x2 with max tactile & verbal stimulation. Pt able to open eyes x3, but unwilling to keep open. RN notified and able to arouse pt by tickling pt's feet until he opened eyes. Pt more  alert upon PT leaving.     Education:  Education provided to pt regarding: PT role/POC; importance of mobility. Verbalized understanding.     Whiteboard updated with correct mobility information. RN/PCT notified.  Transfer with therapy only at this time.          AM-PAC 6 CLICK MOBILITY  How much help from another person does this patient currently need?   1 = Unable, Total/Dependent Assistance  2 = A lot, Maximum/Moderate Assistance  3 = A little, Minimum/Contact Guard/Supervision  4 = None, Modified Piatt/Independent    Turning over in bed (including adjusting bedclothes, sheets and blankets)?: 3  Sitting down on and standing up from a chair with arms (e.g., wheelchair, bedside commode, etc.): 3  Moving from lying on back to sitting on the side of the bed?: 3  Moving to and from a bed to a chair (including a wheelchair)?: 3  Need to walk in hospital room?: 3  Climbing 3-5 steps with a railing?: 2  Total Score: 17    AM-PAC Raw Score CMS G-Code Modifier Level of Impairment Assistance   6 % Total / Unable   7 - 9 CM 80 - 100% Maximal Assist   10 - 14 CL 60 - 80% Moderate Assist   15 - 19 CK 40 - 60% Moderate Assist   20 - 22 CJ 20 - 40% Minimal Assist   23 CI 1-20% SBA / CGA   24 CH 0% Independent/ Mod I     Patient left supine with all lines intact and call button in reach.    Assessment:  Nathanael Velez is a 61 y.o. male with a medical diagnosis of Embolic stroke involving left middle cerebral artery and presents with decrease strength, balance, endurance, coordination, and cognition impairing overall safety with mobility. Pt tolerated session well and continues to progress towards goals.  Pt demonstrated limited participation and interest this visit. Progress towards goals limited/impacted by endurance and cognition.  Pt would benefit from continued skilled physical therapy to address listed impairments, improve functional independence, and maximize safety with mobility.  PT recommends dc disposition  to Rehab for further IP therapy. Pt demonstrates good potential to progress and would benefit from 3 h/day to maximize recovery to gain functional independence prior to d/c home.    .    Rehab identified problem list/impairments: Rehab identified problem list/impairments: weakness, impaired endurance, impaired balance, gait instability, impaired functional mobilty, pain, impaired cognition, visual deficits, decreased lower extremity function, decreased upper extremity function, impaired self care skills, decreased safety awareness, decreased coordination    Rehab potential is good.    Activity tolerance: Good    Discharge recommendations: Discharge Facility/Level Of Care Needs: rehabilitation facility     Barriers to discharge: Barriers to Discharge: Decreased caregiver support, Inaccessible home environment    Equipment recommendations: Equipment Needed After Discharge:  (TBD at next level of care)     GOALS:   Physical Therapy Goals        Problem: Physical Therapy Goal    Goal Priority Disciplines Outcome Goal Variances Interventions   Physical Therapy Goal     PT/OT, PT Ongoing (interventions implemented as appropriate)     Description:  Active Goals to be met by: 4/7/17    1. Supine to sit with CGA.  2. Sit to supine with Supervision. NOT MET  3. Sit to stand with QCN with CGA.  4. Pt to perform gait 5ft with appropriate AD with max assist.-not met  5. Pt to tolerate sitting on the EOB 20 min  with CGA.-not met  6. Pt to transfer bed to/from bedside chair with max assist.-not met  7. Able to tolerate exercise for 15-20 reps with independence.  8. Dynamic sitting at edge of bed x 8 minutes with Stand-by Assistance.  9. Dynamic standing for 2 minutes with Contact Guard Assistance using Quad Cane.  10. Patient and family able to teachback stroke & positioning education independently.  11. Wheelchair propulsion x 100 feet with Minimal Assistance using bilateral lower extremities    MET Goals:  Pt supine to sit with  moderate assist- MET 3/21  Supine to sit to Minimal Assist. MET  Pt sit to supine with moderate assist-MET 3/21  Pt sit to stand with appropriate AD with moderate assist. Met    Pt to perform B LE exs in sitting or supine x 10 reps with AAROM on R LE. MET              PLAN:    Patient to be seen 6 x/week  to address the above listed problems via gait training, therapeutic activities, therapeutic exercises, neuromuscular re-education  Plan of Care expires: 04/18/17  Plan of Care reviewed with: patient         Yissel M Sumeet, PT  03/31/2017

## 2017-03-31 NOTE — PROGRESS NOTES
ICU Progress Note  Neurocritical Care    Admit Date: 3/18/2017  LOS: 13    CC: Embolic stroke involving left middle cerebral artery    Code Status: Full Code     SUBJECTIVE:     Interval History/Significant Events: no acute events overnight. Continues to have significant secretions. Speech improved.    Review of Symptoms: unable to assess due to mental status    Medications:  Continuous Infusions:     Scheduled Meds:   albuterol-ipratropium 2.5mg-0.5mg/3mL  3 mL Nebulization Q4H    amlodipine  10 mg Per NG tube Daily    aspirin  81 mg Per NG tube Daily    atorvastatin  80 mg Per NG tube Daily    clopidogrel  75 mg Per NG tube Daily    glycopyrrolate  0.1 mg Intravenous Once    heparin (porcine)  5,000 Units Subcutaneous Q8H    hydrALAZINE  50 mg Per NG tube Q8H    lidocaine (PF) 10 mg/ml (1%)  5 mL Intradermal Once    losartan  100 mg Per NG tube Daily    sodium chloride 0.9%  10 mL Intravenous Q6H    sodium chloride 0.9%  10 mL Intravenous Q6H    sodium chloride 0.9%  3 mL Intravenous Q8H    sodium chloride 3%  4 mL Nebulization Q4H     PRN Meds:.acetaminophen, albuterol-ipratropium 2.5mg-0.5mg/3mL, hydrALAZINE, magnesium sulfate IVPB, magnesium sulfate IVPB, ondansetron, potassium chloride 10%, potassium chloride 10%, potassium chloride 10%, potassium, sodium phosphates, potassium, sodium phosphates, potassium, sodium phosphates, ramelteon, Flushing PICC Protocol **AND** sodium chloride 0.9% **AND** sodium chloride 0.9%, Flushing PICC Protocol **AND** sodium chloride 0.9% **AND** sodium chloride 0.9%    OBJECTIVE:   Vital Signs (Most Recent):   Temp: 98.8 °F (37.1 °C) (03/31/17 0700)  Pulse: 60 (03/31/17 1308)  Resp: (!) 22 (03/31/17 1308)  BP: (!) 160/56 (03/31/17 0305)  SpO2: 99 % (03/31/17 1300)    Vital Signs (24h Range):   Temp:  [98.2 °F (36.8 °C)-98.8 °F (37.1 °C)] 98.8 °F (37.1 °C)  Pulse:  [54-80] 60  Resp:  [12-36] 22  SpO2:  [96 %-100 %] 99 %  BP: (148-160)/(56-70) 160/56  Arterial Line  BP: (124-172)/(47-85) 145/49    ICP/CPP (Last 24h):        I & O (Last 24h):     Intake/Output Summary (Last 24 hours) at 03/31/17 1413  Last data filed at 03/31/17 1300   Gross per 24 hour   Intake              340 ml   Output             1207 ml   Net             -867 ml     Physical Exam:  GA: Alert, comfortable, no acute distress. Follows commands, answers questions but inappropriately. Able to say his name  HEENT: No scleral icterus or JVD. Frequent thick secretions  Pulmonary: Clear breath sounds  Cardiac: RRR S1 & S2 w/o rubs/murmurs/gallops.   Abdominal: Bowel sounds present x 4. No appreciable hepatosplenomegaly.  Skin: No jaundice, rashes, or visible lesions.  Neuro:  --GCS: E4 V3 M6  --Mental Status:  Increased speech. Follows simple commands  --CN II-XII grossly intact, dysphagia  -- PERRL.   --Corneal reflex, gag, weak cough  --LUE strength: 5/5  --RUE strength: 1/5  --LLE strength: 5/5  --RLE strength: 3/5    Vent Data:        Lines/Drains/Airway:              External Urinary Catheter 03/18/17 1758 Medium (Active)   Collection Container Urimeter 3/21/2017  7:01 AM   Securement Method secured to top of thigh w/ adhesive device 3/21/2017  7:01 AM   Skin no redness;no breakdown;penis/scrotum cleansed w/ soap and water;skin barrier applied 3/21/2017  7:01 AM   Tolerance no signs/symptoms of discomfort 3/21/2017  7:01 AM   Output (mL) 20 mL 3/21/2017  9:00 AM     Nutrition/Tube Feeds (if NPO state why):  TF at goal    Labs:  ABG:     Recent Labs  Lab 03/31/17  0911   PH 7.456*   PO2 99   PCO2 28.0*   HCO3 19.8*   POCSATURATED 98   BE -4     BMP:    Recent Labs  Lab 03/31/17  0324      K 3.5      CO2 20*   BUN 14   CREATININE 0.7   GLU 87   MG 2.2   PHOS 2.8     LFT:   Lab Results   Component Value Date    AST 60 (H) 03/31/2017     (H) 03/31/2017    ALKPHOS 73 03/31/2017    BILITOT 0.5 03/31/2017    ALBUMIN 2.6 (L) 03/31/2017    PROT 6.4 03/31/2017     CBC:   Lab Results   Component Value  Date    WBC 10.71 03/31/2017    HGB 11.6 (L) 03/31/2017    HCT 32.9 (L) 03/31/2017    MCV 89 03/31/2017     03/31/2017     Microbiology x 7d:   Microbiology Results (last 7 days)     Procedure Component Value Units Date/Time    Blood culture [[id number]] Collected:  03/24/17 1315    Order Status:  Completed Specimen:  Blood from Peripheral, Antecubital, Left Updated:  03/29/17 2012     Blood Culture, Routine No growth after 5 days.    Blood culture [[id number]] Collected:  03/24/17 1314    Order Status:  Completed Specimen:  Blood from Peripheral, Forearm, Left Updated:  03/29/17 1822     Blood Culture, Routine No growth after 5 days.    Blood culture [[id number]] Collected:  03/21/17 0356    Order Status:  Completed Specimen:  Blood from Peripheral, Antecubital, Left Updated:  03/26/17 0812     Blood Culture, Routine No growth after 5 days.    Narrative:       Blood cultures from 2 different sites. 4 bottles total.  Please draw before starting antibiotics.    Blood culture [[id number]] Collected:  03/20/17 1532    Order Status:  Completed Specimen:  Blood from Peripheral, Forearm, Right Updated:  03/25/17 2012     Blood Culture, Routine No growth after 5 days.    Narrative:       Blood cultures from 2 different sites. 4 bottles total.  Please draw before starting antibiotics.    Blood culture [[id number]] Collected:  03/20/17 1533    Order Status:  Completed Specimen:  Blood from Peripheral, Lower Arm, Right Updated:  03/25/17 2012     Blood Culture, Routine No growth after 5 days.    Narrative:       Blood cultures x 2 different sites. 4 bottles total. Please  draw cultures before administering antibiotics.    Blood culture [[id number]] Collected:  03/19/17 1827    Order Status:  Completed Specimen:  Blood from Peripheral, Hand, Left Updated:  03/24/17 2212     Blood Culture, Routine No growth after 5 days.    Narrative:       Blood cultures x 2 different sites. 4 bottles total. Please  draw cultures before  administering antibiotics.    Blood culture [[id number]] Collected:  03/19/17 6712    Order Status:  Completed Specimen:  Blood from Peripheral, Hand, Right Updated:  03/24/17 2212     Blood Culture, Routine No growth after 5 days.    Narrative:       Blood cultures from 2 different sites. 4 bottles total.  Please draw before starting antibiotics.        Imaging:  CT head 3/21/17  No significant interval detrimental change when compared to the CT dated 03/20/2017.    Evolving left MCA distribution infarct with persistent hemorrhagic conversion at the level of the basal ganglia and trace petechial hemorrhages about the superior aspect of the left frontal lobe. There is stable mass effect on the left lateral ventricle with minimal left to right midline shift.  No CT findings to suggest developing   hydrocephalus.    ASSESSMENT/PLAN:     Active Hospital Problems    Diagnosis  POA    *Embolic stroke involving left middle cerebral artery [I63.412]  Unknown    Acute ischemic left MCA stroke [I63.512]  Yes    Carotid artery stenosis, symptomatic [I65.29]  Yes    Cytotoxic cerebral edema [G93.6]  Unknown    Abnormality of aortic valve [Q23.9]  Not Applicable    Right sided weakness [M62.81]  Unknown    Aphasia [R47.01]  Unknown    Essential hypertension [I10]  Unknown      Resolved Hospital Problems    Diagnosis Date Resolved POA   No resolved problems to display.      Neuro:   L stroke with hemorrhagic transofrmation on caudate and putamen s/p TPA, thrombectomy and stent placement  -Asa, plavix, statin  -repeat scan stable  -mental status improving, speech is improving but still has some word finding diffculty  -SLP following, patient NPO. May need PEG if dysphagia continues, discussed with nephew at bedside    Pulmonary:   -improvement in O2 requirements, on 3L NC  -secretions improved.   -will do 3% saline and duonebs q4 for 24 hours  -cpt prn    Recent Labs  Lab 03/31/17  0911   PH 7.456*   PCO2 28.0*   PO2 99    HCO3 19.8*   POCSATURATED 98   BE -4       Cardiac:   -SBP goal <140  -losartan 100mg daily, amlodipine 10 mg, hydralazine 50mg q8  -patient with some bradycardia on labetalol 200 q8, which was discontinued  -ekg NSR, with LBBB(old)    Renal:    -cr stable, good uop  -condom catheter    ID:   -no growth on any cultures, leukocytosis improved, afebrile    Hem/Onc:   -partial occlusive thrombus in right common femoral vein. Repeat scan clot is no longer present. No need for ivc filter at this time  -continue subq heparin ppx    Endocrine:    SSI q4    Fluids/Electrolytes/Nutrition/GI:   -hold tf given fluctuant respiratory status. Will keep npo  -d/c ivf  -replete electrolytes prn  -patient with frequent bm. flexiseal placed, bowel regimen held    Ppx: subq heparin      Brian Amaya MD PGY-1

## 2017-03-31 NOTE — PLAN OF CARE
Problem: Patient Care Overview  Goal: Plan of Care Review  Outcome: Revised  POC reviewed with pt and family at 1400. Pt nephew at bedside verbalized understanding. Pt verbalized understanding but needs frequent reminders. Speech requests modified barium swallow study in the future when pt is ready. Questions and concerns addressed. No acute events today. Pt progressing toward goals. Will continue to monitor. See flowsheets for full assessment and VS info.

## 2017-04-01 PROBLEM — F32.A DEPRESSION: Status: ACTIVE | Noted: 2017-04-01

## 2017-04-01 LAB
ALBUMIN SERPL BCP-MCNC: 2.6 G/DL
ALLENS TEST: ABNORMAL
ALLENS TEST: ABNORMAL
ALP SERPL-CCNC: 72 U/L
ALT SERPL W/O P-5'-P-CCNC: 86 U/L
ANION GAP SERPL CALC-SCNC: 11 MMOL/L
AST SERPL-CCNC: 38 U/L
BASOPHILS # BLD AUTO: 0.05 K/UL
BASOPHILS NFR BLD: 0.5 %
BILIRUB SERPL-MCNC: 0.5 MG/DL
BUN SERPL-MCNC: 19 MG/DL
CALCIUM SERPL-MCNC: 8.5 MG/DL
CHLORIDE SERPL-SCNC: 108 MMOL/L
CO2 SERPL-SCNC: 20 MMOL/L
CREAT SERPL-MCNC: 0.7 MG/DL
DELSYS: ABNORMAL
DELSYS: ABNORMAL
DIFFERENTIAL METHOD: ABNORMAL
EOSINOPHIL # BLD AUTO: 0.2 K/UL
EOSINOPHIL NFR BLD: 2.2 %
ERYTHROCYTE [DISTWIDTH] IN BLOOD BY AUTOMATED COUNT: 13.6 %
EST. GFR  (AFRICAN AMERICAN): >60 ML/MIN/1.73 M^2
EST. GFR  (NON AFRICAN AMERICAN): >60 ML/MIN/1.73 M^2
FLOW: 2
GLUCOSE SERPL-MCNC: 82 MG/DL
HCO3 UR-SCNC: 16.1 MMOL/L (ref 24–28)
HCO3 UR-SCNC: 20.6 MMOL/L (ref 24–28)
HCT VFR BLD AUTO: 35.9 %
HGB BLD-MCNC: 11.7 G/DL
INR PPP: 1
LYMPHOCYTES # BLD AUTO: 2.2 K/UL
LYMPHOCYTES NFR BLD: 20.3 %
MAGNESIUM SERPL-MCNC: 2.1 MG/DL
MCH RBC QN AUTO: 30.5 PG
MCHC RBC AUTO-ENTMCNC: 32.6 %
MCV RBC AUTO: 94 FL
MODE: ABNORMAL
MONOCYTES # BLD AUTO: 0.9 K/UL
MONOCYTES NFR BLD: 8.5 %
NEUTROPHILS # BLD AUTO: 7.3 K/UL
NEUTROPHILS NFR BLD: 68.5 %
PCO2 BLDA: 26.6 MMHG (ref 35–45)
PCO2 BLDA: 33.1 MMHG (ref 35–45)
PH SMN: 7.39 [PH] (ref 7.35–7.45)
PH SMN: 7.4 [PH] (ref 7.35–7.45)
PHOSPHATE SERPL-MCNC: 3.3 MG/DL
PLATELET # BLD AUTO: 348 K/UL
PLATELET BLD QL SMEAR: ABNORMAL
PMV BLD AUTO: 10 FL
PO2 BLDA: 111 MMHG (ref 80–100)
PO2 BLDA: 75 MMHG (ref 80–100)
POC BE: -4 MMOL/L
POC BE: -9 MMOL/L
POC SATURATED O2: 95 % (ref 95–100)
POC SATURATED O2: 98 % (ref 95–100)
POC TCO2: 17 MMOL/L (ref 23–27)
POC TCO2: 22 MMOL/L (ref 23–27)
POCT GLUCOSE: 75 MG/DL (ref 70–110)
POCT GLUCOSE: 80 MG/DL (ref 70–110)
POCT GLUCOSE: 82 MG/DL (ref 70–110)
POTASSIUM SERPL-SCNC: 3.7 MMOL/L
PROT SERPL-MCNC: 6.5 G/DL
PROTHROMBIN TIME: 11.1 SEC
RBC # BLD AUTO: 3.83 M/UL
SAMPLE: ABNORMAL
SAMPLE: ABNORMAL
SITE: ABNORMAL
SITE: ABNORMAL
SODIUM SERPL-SCNC: 139 MMOL/L
SP02: 100
WBC # BLD AUTO: 10.7 K/UL

## 2017-04-01 PROCEDURE — 99233 SBSQ HOSP IP/OBS HIGH 50: CPT | Mod: ,,, | Performed by: PSYCHIATRY & NEUROLOGY

## 2017-04-01 PROCEDURE — 94640 AIRWAY INHALATION TREATMENT: CPT

## 2017-04-01 PROCEDURE — 80053 COMPREHEN METABOLIC PANEL: CPT

## 2017-04-01 PROCEDURE — 27000221 HC OXYGEN, UP TO 24 HOURS

## 2017-04-01 PROCEDURE — 25000003 PHARM REV CODE 250: Performed by: NURSE PRACTITIONER

## 2017-04-01 PROCEDURE — 20000000 HC ICU ROOM

## 2017-04-01 PROCEDURE — 83735 ASSAY OF MAGNESIUM: CPT

## 2017-04-01 PROCEDURE — 25000242 PHARM REV CODE 250 ALT 637 W/ HCPCS: Performed by: STUDENT IN AN ORGANIZED HEALTH CARE EDUCATION/TRAINING PROGRAM

## 2017-04-01 PROCEDURE — 85025 COMPLETE CBC W/AUTO DIFF WBC: CPT

## 2017-04-01 PROCEDURE — 25000003 PHARM REV CODE 250: Performed by: PSYCHIATRY & NEUROLOGY

## 2017-04-01 PROCEDURE — 94761 N-INVAS EAR/PLS OXIMETRY MLT: CPT

## 2017-04-01 PROCEDURE — 85610 PROTHROMBIN TIME: CPT

## 2017-04-01 PROCEDURE — 37799 UNLISTED PX VASCULAR SURGERY: CPT

## 2017-04-01 PROCEDURE — 97530 THERAPEUTIC ACTIVITIES: CPT

## 2017-04-01 PROCEDURE — 25000003 PHARM REV CODE 250: Performed by: STUDENT IN AN ORGANIZED HEALTH CARE EDUCATION/TRAINING PROGRAM

## 2017-04-01 PROCEDURE — 82803 BLOOD GASES ANY COMBINATION: CPT

## 2017-04-01 PROCEDURE — 84100 ASSAY OF PHOSPHORUS: CPT

## 2017-04-01 RX ORDER — IPRATROPIUM BROMIDE AND ALBUTEROL SULFATE 2.5; .5 MG/3ML; MG/3ML
3 SOLUTION RESPIRATORY (INHALATION) EVERY 8 HOURS
Status: COMPLETED | OUTPATIENT
Start: 2017-04-01 | End: 2017-04-02

## 2017-04-01 RX ORDER — SODIUM CHLORIDE FOR INHALATION 3 %
4 VIAL, NEBULIZER (ML) INHALATION EVERY 8 HOURS
Status: COMPLETED | OUTPATIENT
Start: 2017-04-01 | End: 2017-04-02

## 2017-04-01 RX ADMIN — HEPARIN SODIUM 5000 UNITS: 5000 INJECTION, SOLUTION INTRAVENOUS; SUBCUTANEOUS at 06:04

## 2017-04-01 RX ADMIN — IPRATROPIUM BROMIDE AND ALBUTEROL SULFATE 3 ML: .5; 3 SOLUTION RESPIRATORY (INHALATION) at 11:04

## 2017-04-01 RX ADMIN — SODIUM CHLORIDE SOLN NEBU 3% 4 ML: 3 NEBU SOLN at 03:04

## 2017-04-01 RX ADMIN — SODIUM CHLORIDE SOLN NEBU 3% 4 ML: 3 NEBU SOLN at 11:04

## 2017-04-01 RX ADMIN — ATORVASTATIN CALCIUM 80 MG: 20 TABLET, FILM COATED ORAL at 08:04

## 2017-04-01 RX ADMIN — Medication 10 ML: at 05:04

## 2017-04-01 RX ADMIN — IPRATROPIUM BROMIDE AND ALBUTEROL SULFATE 3 ML: .5; 3 SOLUTION RESPIRATORY (INHALATION) at 03:04

## 2017-04-01 RX ADMIN — SODIUM CHLORIDE SOLN NEBU 3% 4 ML: 3 NEBU SOLN at 09:04

## 2017-04-01 RX ADMIN — LOSARTAN POTASSIUM 100 MG: 50 TABLET, FILM COATED ORAL at 08:04

## 2017-04-01 RX ADMIN — Medication 3 ML: at 10:04

## 2017-04-01 RX ADMIN — ASPIRIN 81 MG CHEWABLE TABLET 81 MG: 81 TABLET CHEWABLE at 08:04

## 2017-04-01 RX ADMIN — Medication 10 ML: at 11:04

## 2017-04-01 RX ADMIN — HYDRALAZINE HYDROCHLORIDE 50 MG: 50 TABLET ORAL at 01:04

## 2017-04-01 RX ADMIN — HYDRALAZINE HYDROCHLORIDE 50 MG: 50 TABLET ORAL at 10:04

## 2017-04-01 RX ADMIN — Medication 3 ML: at 01:04

## 2017-04-01 RX ADMIN — HEPARIN SODIUM 5000 UNITS: 5000 INJECTION, SOLUTION INTRAVENOUS; SUBCUTANEOUS at 10:04

## 2017-04-01 RX ADMIN — CLOPIDOGREL 75 MG: 75 TABLET, FILM COATED ORAL at 08:04

## 2017-04-01 RX ADMIN — HEPARIN SODIUM 5000 UNITS: 5000 INJECTION, SOLUTION INTRAVENOUS; SUBCUTANEOUS at 01:04

## 2017-04-01 RX ADMIN — HYDRALAZINE HYDROCHLORIDE 50 MG: 50 TABLET ORAL at 06:04

## 2017-04-01 RX ADMIN — IPRATROPIUM BROMIDE AND ALBUTEROL SULFATE 3 ML: .5; 3 SOLUTION RESPIRATORY (INHALATION) at 09:04

## 2017-04-01 RX ADMIN — AMLODIPINE BESYLATE 10 MG: 10 TABLET ORAL at 08:04

## 2017-04-01 NOTE — PLAN OF CARE
Problem: Patient Care Overview  Goal: Plan of Care Review  Outcome: Ongoing (interventions implemented as appropriate)  POC reviewed with pt's family at 0500. Pt's family verbalized understanding. Questions and concerns addressed. No acute events overnight. Pt progressing toward goals. Will continue to monitor. See flowsheets for full assessment and VS info

## 2017-04-01 NOTE — PROGRESS NOTES
[facility]  Vascular Neurology  Comprehensive Stroke Center  Progress Note      Neurologic Chief Complaint: L mCA stroke 2/2 carotid stenosis    Subjective:     Interval History: Patient is seen for follow-up neurological assessment and treatment recommendations: NAEON, neurologically stable     HPI, Past Medical, Family, and Social History remains the same as documented in the initial encounter.     Review of Systems   Constitutional: Negative for fever.   Respiratory: Positive for cough. Negative for shortness of breath.    Neurological: Positive for facial asymmetry.     Scheduled Meds:   albuterol-ipratropium 2.5mg-0.5mg/3mL  3 mL Nebulization Q4H    amlodipine  10 mg Per NG tube Daily    aspirin  81 mg Per NG tube Daily    atorvastatin  80 mg Per NG tube Daily    clopidogrel  75 mg Per NG tube Daily    heparin (porcine)  5,000 Units Subcutaneous Q8H    hydrALAZINE  50 mg Per NG tube Q8H    lidocaine (PF) 10 mg/ml (1%)  5 mL Intradermal Once    losartan  100 mg Per NG tube Daily    sodium chloride 0.9%  10 mL Intravenous Q6H    sodium chloride 0.9%  10 mL Intravenous Q6H    sodium chloride 0.9%  3 mL Intravenous Q8H    sodium chloride 3%  4 mL Nebulization Q4H     Continuous Infusions:   PRN Meds:acetaminophen, albuterol-ipratropium 2.5mg-0.5mg/3mL, hydrALAZINE, magnesium sulfate IVPB, magnesium sulfate IVPB, ondansetron, potassium chloride 10%, potassium chloride 10%, potassium chloride 10%, potassium, sodium phosphates, potassium, sodium phosphates, potassium, sodium phosphates, ramelteon, Flushing PICC Protocol **AND** sodium chloride 0.9% **AND** sodium chloride 0.9%, Flushing PICC Protocol **AND** sodium chloride 0.9% **AND** sodium chloride 0.9%    Objective:     Vital Signs (Most Recent):  Temp: 98.8 °F (37.1 °C) (03/31/17 0700)  Pulse: 60 (03/31/17 1800)  Resp: 19 (03/31/17 1800)  BP: (!) 160/56 (03/31/17 0305)  SpO2: 99 % (03/31/17 1800)  BP Location: Right leg    Vital  Signs Range (Last 24H):  Temp:  [98.3 °F (36.8 °C)-98.8 °F (37.1 °C)]   Pulse:  [54-80]   Resp:  [12-36]   BP: (160)/(56-60)   SpO2:  [96 %-100 %]   Arterial Line BP: (123-172)/(46-85)   BP Location: Right leg    Physical Exam   Constitutional: He appears well-developed and well-nourished.   Eyes: Pupils are equal, round, and reactive to light.   Neck: Normal range of motion. Neck supple.   Cardiovascular: Normal rate.    Pulmonary/Chest: Effort normal.   Musculoskeletal: Normal range of motion.   Skin: Skin is warm and dry.       Neurological Exam:   LOC: alert and not following commands today   Language: Global aphasia  Speech: Dysarthria  EOM (CN III, IV, VI): Gaze preference left  Pupils (CN III, IV, VI): PERRL  Facial Movement (CN VII): lower weakness left lower  Motor*: 4/5 in RUE, 5/5 in rest    Sensation: intact to light touch, temperature and vibration  Tone: Arm-Left: normal; Leg-Left: normal; Arm-Right: normal; Leg-Right: normal    NIH Stroke Scale:    Level of Consciousness: 1 - drowsy  LOC Questions: 2 - answers none correctly  LOC Commands: 2 - performs neither correctly  Best Gaze: 1 - partial gaze palsy  Visual: 0 - no visual loss  Facial Palsy: 1 - minor  Motor Left Arm: 0 - no drift  Motor Right Arm: 0 - no drift  Motor Left Le - no drift  Motor Right Le - no drift  Limb Ataxia: 0 - absent  Sensory: 0 - normal  Best Language: 2 - severe aphasia  Dysarthria: 1 - mild to moderate dysarthria  Extinction and Inattention: 0 - no neglect  NIH Stroke Scale Total: 10      Laboratory:  CMP:     Recent Labs  Lab 17   CALCIUM 8.3*   ALBUMIN 2.6*   PROT 6.4      K 3.5   CO2 20*      BUN 14   CREATININE 0.7   ALKPHOS 73   *   AST 60*   BILITOT 0.5     BMP:     Recent Labs  Lab 17      K 3.5      CO2 20*   BUN 14   CREATININE 0.7   CALCIUM 8.3*     CBC:     Recent Labs  Lab 17   WBC 10.71   RBC 3.68*   HGB 11.6*   HCT 32.9*       MCV 89   MCH 31.5*   MCHC 35.3     Lipid Panel: No results for input(s): CHOL, LDLCALC, HDL, TRIG in the last 168 hours.  Coagulation:     Recent Labs  Lab 03/31/17  0324   INR 1.0     Platelet Aggregation Study: No results for input(s): PLTAGG, PLTAGINTERP, PLTAGREGLACO, ADPPLTAGGREG in the last 168 hours.  Hgb A1C: No results for input(s): HGBA1C in the last 168 hours.  TSH: No results for input(s): TSH in the last 168 hours.    Diagnostic Results:  I have personally reviewed: CT Head. Date: 3/26/17  Left MCA territory infarct with associated left basal ganglia intraparenchymal hemorrhage and intraventricular extension.         Assessment/Plan:     61 year old male with HTN, HLD, and CAD and L MCA stroke symptoms post tpa and transferred here for higher level of care and potential thrombectomy. CTA multiphase upon arrival.   Patient found to have severe L carotid artery stenosis.  Angioplasty with stent performed and thrombectomy of L MCA occlusion.  Patient found to have L MCA stroke with reperfusion injury.  Possible aortic vegetation seen on echo but on repeat echo showed sclerotic aorta.      03/22/17  Patient drowsy, follows simple commands, unable to move RUE      03/23/17  More awake today.  Temperature and leukocytosis resolving with vancomycin and cefepime.      3/24/17  NAEON. Still with fever overnight.     3/25/17 Patient much more alert and interactive with exam. Still with labored breathing    3/27/17  Patient has a R lower extremity DVT. Unable to have IVC filter placed d/t respiratory status- on intubation watch.     3/30/17  BP elevated to 180s-200s overnight requiring PRNs for BP. Patient will need PEG. Patients respiratory status now improved and on NC. Will have repeat doppler today on R leg to reevaluate if IVC filter to be placed.     03/31/17 Still has increased secretions and cough.  Neurologically stable          * Embolic stroke involving left middle cerebral artery  Antithrombotics for  secondary stroke prevention: Aspirin 81mg, plavix 75mg qd  Statins for secondary stroke prevention and hyperlipidemia, if present: Atorvastatin- 80 mg oral daily  Aggressive risk factor modification: Hypertension, High Cholesterol and Obesity  VTE Prophylaxis: SCDs, heparin 5k q8 hours  BP Parameters: SBP <150  Nursing Orders: Neuro checks- per tpa protocol, Antiembolic stockings, Swallowing evaluation by Nursing, Seizure precautions, Avoid Rucker catheter, Pneumatic compression device, Stroke Education, Blood glucose target 100-130, Up with assistance   -ECHO initially with sclerotic lesion vs vegetation of the aortic valve. Repeat ECHO suggested valve is just sclerotic.      Rehab teams recommending Rehab facility. Will need PEG tube prior to d/c.    Carotid artery stenosis, symptomatic  S/p IR thrombectomy of distant MCA clot and angioplasty with stent on L ICA  Likely etiology of patient's L MCA stroke   Complication- With hemorrhagic conversion s/p tPA and thrombectomy/stenting      Cytotoxic cerebral edema  Evident on imaging     Aphasia  Given tpa , status post thrombectomy   Due to stroke above  Aggressive PT/OT/ST    Abnormality of aortic valve  Found on echo.   As per NCC  Recommend clarification with cardiology on echo read     Right sided weakness  Due to stroke above  Weakness resolving   Aggressive PT/OT/ speech      Essential hypertension  Stroke RF  <150 (s/p tPA and hemorrhagic conversion)      Depression  Likely due to stroke  Patient uninterested at times in exam  May benefit from SSRI       Edwige Monaco PA-C  Comprehensive Stroke Center  Department of Vascular Neurology   [facility]

## 2017-04-01 NOTE — PROGRESS NOTES
ICU Progress Note  Neurocritical Care    Admit Date: 3/18/2017  LOS: 14    CC: Embolic stroke involving left middle cerebral artery    Code Status: Full Code     SUBJECTIVE:     Interval History/Significant Events: no acute events overnight. Secretions improved with 3% nebs, with duo-nebs. Will continue for another 24 hours, will decrease frequency to q8. Restart trickle feeds    Review of Symptoms: unable to assess due to mental status    Medications:  Continuous Infusions:     Scheduled Meds:   albuterol-ipratropium 2.5mg-0.5mg/3mL  3 mL Nebulization Q8H    amlodipine  10 mg Per NG tube Daily    aspirin  81 mg Per NG tube Daily    atorvastatin  80 mg Per NG tube Daily    clopidogrel  75 mg Per NG tube Daily    heparin (porcine)  5,000 Units Subcutaneous Q8H    hydrALAZINE  50 mg Per NG tube Q8H    lidocaine (PF) 10 mg/ml (1%)  5 mL Intradermal Once    losartan  100 mg Per NG tube Daily    sodium chloride 0.9%  10 mL Intravenous Q6H    sodium chloride 0.9%  10 mL Intravenous Q6H    sodium chloride 0.9%  3 mL Intravenous Q8H    sodium chloride 3%  4 mL Nebulization Q8H     PRN Meds:.acetaminophen, albuterol-ipratropium 2.5mg-0.5mg/3mL, hydrALAZINE, magnesium sulfate IVPB, magnesium sulfate IVPB, ondansetron, potassium chloride 10%, potassium chloride 10%, potassium chloride 10%, potassium, sodium phosphates, potassium, sodium phosphates, potassium, sodium phosphates, ramelteon, Flushing PICC Protocol **AND** sodium chloride 0.9% **AND** sodium chloride 0.9%, Flushing PICC Protocol **AND** sodium chloride 0.9% **AND** sodium chloride 0.9%    OBJECTIVE:   Vital Signs (Most Recent):   Temp: 98.6 °F (37 °C) (04/01/17 1101)  Pulse: 68 (04/01/17 1101)  Resp: (!) 22 (04/01/17 1101)  BP: 128/61 (04/01/17 1101)  SpO2: 99 % (04/01/17 1101)    Vital Signs (24h Range):   Temp:  [98.5 °F (36.9 °C)-98.6 °F (37 °C)] 98.6 °F (37 °C)  Pulse:  [] 68  Resp:  [12-25] 22  SpO2:  [98 %-100 %] 99 %  BP:  (111-132)/(48-67) 128/61  Arterial Line BP: (108-153)/(40-61) 129/58    ICP/CPP (Last 24h):        I & O (Last 24h):     Intake/Output Summary (Last 24 hours) at 04/01/17 1210  Last data filed at 04/01/17 1100   Gross per 24 hour   Intake             1050 ml   Output              720 ml   Net              330 ml     Physical Exam:  GA: Alert, comfortable, no acute distress. Follows commands, answers questions but inappropriately. Able to say his name  HEENT: No scleral icterus or JVD. Frequent thick secretions  Pulmonary: Clear breath sounds  Cardiac: RRR S1 & S2 w/o rubs/murmurs/gallops.   Abdominal: Bowel sounds present x 4. No appreciable hepatosplenomegaly.  Skin: No jaundice, rashes, or visible lesions.  Neuro:  --GCS: E4 V3 M6  --Mental Status:  Increased speech. Follows simple commands  --CN II-XII grossly intact, dysphagia  -- PERRL.   --Corneal reflex, gag, weak cough  --LUE strength: 5/5  --RUE strength: 1/5  --LLE strength: 5/5  --RLE strength: 3/5    Vent Data:        Lines/Drains/Airway:              External Urinary Catheter 03/18/17 1758 Medium (Active)   Collection Container Urimeter 3/21/2017  7:01 AM   Securement Method secured to top of thigh w/ adhesive device 3/21/2017  7:01 AM   Skin no redness;no breakdown;penis/scrotum cleansed w/ soap and water;skin barrier applied 3/21/2017  7:01 AM   Tolerance no signs/symptoms of discomfort 3/21/2017  7:01 AM   Output (mL) 20 mL 3/21/2017  9:00 AM     Nutrition/Tube Feeds (if NPO state why):  TF at goal    Labs:  ABG:     Recent Labs  Lab 04/01/17  0923   PH 7.403   PO2 111*   PCO2 33.1*   HCO3 20.6*   POCSATURATED 98   BE -4     BMP:    Recent Labs  Lab 04/01/17  0422      K 3.7      CO2 20*   BUN 19   CREATININE 0.7   GLU 82   MG 2.1   PHOS 3.3     LFT:   Lab Results   Component Value Date    AST 38 04/01/2017    ALT 86 (H) 04/01/2017    ALKPHOS 72 04/01/2017    BILITOT 0.5 04/01/2017    ALBUMIN 2.6 (L) 04/01/2017    PROT 6.5 04/01/2017      CBC:   Lab Results   Component Value Date    WBC 10.70 04/01/2017    HGB 11.7 (L) 04/01/2017    HCT 35.9 (L) 04/01/2017    MCV 94 04/01/2017     04/01/2017     Microbiology x 7d:   Microbiology Results (last 7 days)     Procedure Component Value Units Date/Time    Blood culture [[id number]] Collected:  03/24/17 1315    Order Status:  Completed Specimen:  Blood from Peripheral, Antecubital, Left Updated:  03/29/17 2012     Blood Culture, Routine No growth after 5 days.    Blood culture [[id number]] Collected:  03/24/17 1314    Order Status:  Completed Specimen:  Blood from Peripheral, Forearm, Left Updated:  03/29/17 1822     Blood Culture, Routine No growth after 5 days.    Blood culture [[id number]] Collected:  03/21/17 0356    Order Status:  Completed Specimen:  Blood from Peripheral, Antecubital, Left Updated:  03/26/17 0812     Blood Culture, Routine No growth after 5 days.    Narrative:       Blood cultures from 2 different sites. 4 bottles total.  Please draw before starting antibiotics.    Blood culture [[id number]] Collected:  03/20/17 1532    Order Status:  Completed Specimen:  Blood from Peripheral, Forearm, Right Updated:  03/25/17 2012     Blood Culture, Routine No growth after 5 days.    Narrative:       Blood cultures from 2 different sites. 4 bottles total.  Please draw before starting antibiotics.    Blood culture [[id number]] Collected:  03/20/17 1533    Order Status:  Completed Specimen:  Blood from Peripheral, Lower Arm, Right Updated:  03/25/17 2012     Blood Culture, Routine No growth after 5 days.    Narrative:       Blood cultures x 2 different sites. 4 bottles total. Please  draw cultures before administering antibiotics.        Imaging:  CT head 3/21/17  No significant interval detrimental change when compared to the CT dated 03/20/2017.    Evolving left MCA distribution infarct with persistent hemorrhagic conversion at the level of the basal ganglia and trace petechial hemorrhages  about the superior aspect of the left frontal lobe. There is stable mass effect on the left lateral ventricle with minimal left to right midline shift.  No CT findings to suggest developing   hydrocephalus.    ASSESSMENT/PLAN:     Active Hospital Problems    Diagnosis  POA    *Embolic stroke involving left middle cerebral artery [I63.412]  Unknown    Acute ischemic left MCA stroke [I63.512]  Yes    Carotid artery stenosis, symptomatic [I65.29]  Yes    Cytotoxic cerebral edema [G93.6]  Unknown    Abnormality of aortic valve [Q23.9]  Not Applicable    Right sided weakness [M62.81]  Unknown    Aphasia [R47.01]  Unknown    Essential hypertension [I10]  Unknown      Resolved Hospital Problems    Diagnosis Date Resolved POA   No resolved problems to display.      Neuro:   L stroke with hemorrhagic transofrmation on caudate and putamen s/p TPA, thrombectomy and stent placement  -Asa, plavix, statin  -repeat scan stable  -mental status improving, speech is improving but still has difficulty with secretions. Improved with   -SLP following, patient NPO. May need PEG if dysphagia continues, discussed with son at bedside     Pulmonary:   -improvement in O2 requirements, on 3L NC  -secretions improved.   -will do 3% saline and duonebs q8 for 24 hours  -cpt prn    Recent Labs  Lab 04/01/17  0923   PH 7.403   PCO2 33.1*   PO2 111*   HCO3 20.6*   POCSATURATED 98   BE -4       Cardiac:   -SBP goal <140  -losartan 100mg daily, amlodipine 10 mg, hydralazine 50mg q8  -patient with some bradycardia on labetalol 200 q8, which was discontinued  -ekg NSR, with LBBB(old)    Renal:    -cr stable, good uop  -condom catheter    ID:   -no growth on any cultures, leukocytosis improved, afebrile    Hem/Onc:   -partial occlusive thrombus in right common femoral vein. Repeat scan clot is no longer present. No need for ivc filter at this time  -continue subq heparin ppx    Endocrine:    SSI q4    Fluids/Electrolytes/Nutrition/GI:   -restart  trickle feeds  -replete electrolytes prn  -patient with frequent bm. flexiseal placed, bowel regimen held    Ppx: subq heparin    Likely stepdown tomorrow or Monday    Brian Amaya MD PGY-1

## 2017-04-01 NOTE — PLAN OF CARE
Problem: Patient Care Overview  Goal: Plan of Care Review  Outcome: Ongoing (interventions implemented as appropriate)  No acute events throughout the day, VS and assessment per flow sheet, patient progressing towards goal as tolerated. Neuro status unchanged. Pt AAOx2, disoriented to time and situation, moving all extremities with right sided hemiparesis, pupils reactive. Pt aphasic with incomprehensible speech, following some commands. Medications given per MD orders. Trickle TF started. On close watch for aspiration precautions. Bilateral soft wrist restraints cont'd.  Plan of care reviewed with Nathanael Velez and son, all concerns addressed. Emotional support provided. Will continue to monitor.

## 2017-04-01 NOTE — SUBJECTIVE & OBJECTIVE
Neurologic Chief Complaint: L mCA stroke 2/2 carotid stenosis    Subjective:     Interval History: Patient is seen for follow-up neurological assessment and treatment recommendations: NAEON, neurologically stable     HPI, Past Medical, Family, and Social History remains the same as documented in the initial encounter.     Review of Systems   Constitutional: Negative for fever.   Respiratory: Positive for cough. Negative for shortness of breath.    Neurological: Positive for facial asymmetry.     Scheduled Meds:   albuterol-ipratropium 2.5mg-0.5mg/3mL  3 mL Nebulization Q4H    amlodipine  10 mg Per NG tube Daily    aspirin  81 mg Per NG tube Daily    atorvastatin  80 mg Per NG tube Daily    clopidogrel  75 mg Per NG tube Daily    heparin (porcine)  5,000 Units Subcutaneous Q8H    hydrALAZINE  50 mg Per NG tube Q8H    lidocaine (PF) 10 mg/ml (1%)  5 mL Intradermal Once    losartan  100 mg Per NG tube Daily    sodium chloride 0.9%  10 mL Intravenous Q6H    sodium chloride 0.9%  10 mL Intravenous Q6H    sodium chloride 0.9%  3 mL Intravenous Q8H    sodium chloride 3%  4 mL Nebulization Q4H     Continuous Infusions:   PRN Meds:acetaminophen, albuterol-ipratropium 2.5mg-0.5mg/3mL, hydrALAZINE, magnesium sulfate IVPB, magnesium sulfate IVPB, ondansetron, potassium chloride 10%, potassium chloride 10%, potassium chloride 10%, potassium, sodium phosphates, potassium, sodium phosphates, potassium, sodium phosphates, ramelteon, Flushing PICC Protocol **AND** sodium chloride 0.9% **AND** sodium chloride 0.9%, Flushing PICC Protocol **AND** sodium chloride 0.9% **AND** sodium chloride 0.9%    Objective:     Vital Signs (Most Recent):  Temp: 98.8 °F (37.1 °C) (03/31/17 0700)  Pulse: 60 (03/31/17 1800)  Resp: 19 (03/31/17 1800)  BP: (!) 160/56 (03/31/17 0305)  SpO2: 99 % (03/31/17 1800)  BP Location: Right leg    Vital Signs Range (Last 24H):  Temp:  [98.3 °F (36.8 °C)-98.8 °F (37.1 °C)]   Pulse:  [54-80]   Resp:   [12-36]   BP: (160)/(56-60)   SpO2:  [96 %-100 %]   Arterial Line BP: (123-172)/(46-85)   BP Location: Right leg    Physical Exam   Constitutional: He appears well-developed and well-nourished.   Eyes: Pupils are equal, round, and reactive to light.   Neck: Normal range of motion. Neck supple.   Cardiovascular: Normal rate.    Pulmonary/Chest: Effort normal.   Musculoskeletal: Normal range of motion.   Skin: Skin is warm and dry.       Neurological Exam:   LOC: alert and not following commands today   Language: Global aphasia  Speech: Dysarthria  EOM (CN III, IV, VI): Gaze preference left  Pupils (CN III, IV, VI): PERRL  Facial Movement (CN VII): lower weakness left lower  Motor*: 4/5 in RUE, 5/5 in rest    Sensation: intact to light touch, temperature and vibration  Tone: Arm-Left: normal; Leg-Left: normal; Arm-Right: normal; Leg-Right: normal    NIH Stroke Scale:    Level of Consciousness: 1 - drowsy  LOC Questions: 2 - answers none correctly  LOC Commands: 2 - performs neither correctly  Best Gaze: 1 - partial gaze palsy  Visual: 0 - no visual loss  Facial Palsy: 1 - minor  Motor Left Arm: 0 - no drift  Motor Right Arm: 0 - no drift  Motor Left Le - no drift  Motor Right Le - no drift  Limb Ataxia: 0 - absent  Sensory: 0 - normal  Best Language: 2 - severe aphasia  Dysarthria: 1 - mild to moderate dysarthria  Extinction and Inattention: 0 - no neglect  NIH Stroke Scale Total: 10      Laboratory:  CMP:     Recent Labs  Lab 17   CALCIUM 8.3*   ALBUMIN 2.6*   PROT 6.4      K 3.5   CO2 20*      BUN 14   CREATININE 0.7   ALKPHOS 73   *   AST 60*   BILITOT 0.5     BMP:     Recent Labs  Lab 17      K 3.5      CO2 20*   BUN 14   CREATININE 0.7   CALCIUM 8.3*     CBC:     Recent Labs  Lab 17   WBC 10.71   RBC 3.68*   HGB 11.6*   HCT 32.9*      MCV 89   MCH 31.5*   MCHC 35.3     Lipid Panel: No results for input(s): CHOL, LDLCALC, HDL, TRIG  in the last 168 hours.  Coagulation:     Recent Labs  Lab 03/31/17  0324   INR 1.0     Platelet Aggregation Study: No results for input(s): PLTAGG, PLTAGINTERP, PLTAGREGLACO, ADPPLTAGGREG in the last 168 hours.  Hgb A1C: No results for input(s): HGBA1C in the last 168 hours.  TSH: No results for input(s): TSH in the last 168 hours.    Diagnostic Results:  I have personally reviewed: CT Head. Date: 3/26/17  Left MCA territory infarct with associated left basal ganglia intraparenchymal hemorrhage and intraventricular extension.

## 2017-04-01 NOTE — PT/OT/SLP PROGRESS
"Physical Therapy  Treatment    Nathanael Velez   MRN: [id number]   Admitting Diagnosis: Embolic stroke involving left middle cerebral artery    PT Received On: 17  PT Start Time: 840     PT Stop Time: 918    PT Total Time (min): 38 min       Billable Minutes:  Therapeutic Activity 38    Treatment Type: Treatment  PT/PTA: PT        General Precautions: Standard, aphasia, aspiration, fall, NPO  Orthopedic Precautions: N/A   Braces: N/A    Subjective:  Communicated with ADINA Singletary prior to session.  "Wait on second." Pt able to state name/birthday. Pt perseverating on "no" with other orientation questions. Pt following all commands.    Pain Ratin/10  Pain Rating Post-Intervention: 0/10    Objective:   Patient found with: blood pressure cuff, arterial line, bowel management system, NG tube, oxygen, pulse ox (continuous), restraints, telemetry, peripheral IV, bed alarm (BUE soft wrist restraints, condom catheter)    Functional Mobility:  Pt found supine, sleeping. Pt easily arousable to name called. No family present at bedside.  External distractions removed: tv turned off and door closed.  Bed Mobility:    Rolling to the right: use of bedrails:  Minimal Assistance   Supine <> Sit: From right sidelying: Contact Guard Assistance   Sit <> Supine: To right sidelying: Contact Guard Assistance   Pt required tactile cues for directional safety    Sitting Balance at Edge of Bed:   Assistance Level Required: Supervision   Time: 8 minutes   Postural deviations noted: Rounded shoulder, Head forward and Posterior pelvic tilt   Encouraged: BUE weight bearing and BLE weight bearing.   Comments: Pt sitting with BLE crossed, attending to therapist and all commands asked.     Transfers:    Sit <> Stand: x 2 trials from EOB, x 2 trials from sofa with Contact Guard Assistance with Rolling Walker    Stand <> Sit: x 2 trials to EOB, x2 trials to sofa with Contact Guard Assistance with Rolling Walker   Comments: Pt requires minimal " cues for directional safety. Pt required tactile cues for R hand  on RW.     Gait:   Distance Ambulated: 10ft x 4 trials with seated rest break    Assistance level: Contact Guard Assistance   Assistive Device used:  Rolling Walker   Gait Pattern: 3-point gait   Gait Deviation(s): decreased step length, decreased stride length and decreased weight-shifting ability   Impairments due to: decreased strength of RLE, decreased attention to right side.   Comments: Pt able to perform ambulation trials with minimal cues. Right sided turns incorporated in trials. Pt with shorter step length of RLE.    Therapeutic Activities and Exercises:  Education:  Patient provided with daily orientation and goals of this PT session. Patient family agreed to participate in session.Patient aware of patient's deficits and therapy progression. They were educated to transfer with RN/PCT or therapy only; min a of 1 person. They were provided and educated on proper positioning in supine and in sitting with support of affected RUE extremities in order to increase awareness of extremity and to decrease the effects of immobility, specifically edema and pain. Time provided for therapeutic counseling and discussion of health disposition. All questions answered to patient's satisfaction, within scope of PT practice; voiced no other concerns. White board updated in patient's room, RN notified of session.     AM-PAC 6 CLICK MOBILITY  How much help from another person does this patient currently need?   1 = Unable, Total/Dependent Assistance  2 = A lot, Maximum/Moderate Assistance  3 = A little, Minimum/Contact Guard/Supervision  4 = None, Modified [place]/Independent    Turning over in bed (including adjusting bedclothes, sheets and blankets)?: 3  Sitting down on and standing up from a chair with arms (e.g., wheelchair, bedside commode, etc.): 3  Moving from lying on back to sitting on the side of the bed?: 3  Moving to and from a bed to a  chair (including a wheelchair)?: 3  Need to walk in hospital room?: 3  Climbing 3-5 steps with a railing?: 2  Total Score: 17    AM-PAC Raw Score CMS G-Code Modifier Level of Impairment Assistance   6 % Total / Unable   7 - 9 CM 80 - 100% Maximal Assist   10 - 14 CL 60 - 80% Moderate Assist   15 - 19 CK 40 - 60% Moderate Assist   20 - 22 CJ 20 - 40% Minimal Assist   23 CI 1-20% SBA / CGA   24 CH 0% Independent/ Mod I     Patient left with bed in chair position with all lines intact, call button in reach and RN notified.    Assessment:  Nathanael Velez is a 61 y.o. male with a medical diagnosis of Embolic stroke involving left middle cerebral artery. Patient is making progress towards goals, participating well in this session. Pt continues to require significant assist with directional safety and attention to right side. Pt with noted improvement with command following, transfers, ambulation with RW and orientation. Pt can tolerate 3+hours of therapy and is motivated to participate in therapy. Mr. Velez's deficits affect their ability to safely and independently participate in self-care tasks and functional mobility. Due to his physical therapy diagnosis of debility and deconditioning, they continue to benefit from acute PT services to address the following limitations: high fall risk, weakness, instability, the need for supervised instructions of exercise, and the decreased ability to perform functional activities which they were able to complete PTA. Education was provided to patient regarding importance of continued participation in therapy, patient's progress and discharge disposition. Pt would benefit from IP rehab upon discharge to improve quality of life and focus on recovery of impairments.     Rehab identified problem list/impairments: Rehab identified problem list/impairments: weakness, impaired self care skills, impaired functional mobilty, gait instability, impaired balance, visual deficits, impaired  cognition, decreased lower extremity function, decreased upper extremity function, decreased safety awareness    Rehab potential is good.    Activity tolerance: Good    Discharge recommendations: Discharge Facility/Level Of Care Needs: rehabilitation facility     Barriers to discharge: Barriers to Discharge: Inaccessible home environment, Decreased caregiver support    Equipment recommendations: Equipment Needed After Discharge: 3-in-1 commode, bath bench, walker, rolling     GOALS:   Physical Therapy Goals        Problem: Physical Therapy Goal    Goal Priority Disciplines Outcome Goal Variances Interventions   Physical Therapy Goal     PT/OT, PT Ongoing (interventions implemented as appropriate)     Description:  Active Goals to be met by: 4/18/17    1. Supine to sit with CGA.  2. Sit to supine with Supervision. NOT MET  3. Sit to stand with QCN with CGA.  4. Pt to perform gait 5ft with appropriate AD with max assist.-met 4/1/17  Revised: Pt to perform gait 75ft with RW with CGA. NOT MET  5. Pt to transfer bed to/from bedside chair with max assist.- met 4/1/17  Revised: pt to transfer to bedside chair via stand pivot with min A with RW. NOT MET  6. Able to tolerate exercise for 15-20 reps with independence.  7. Dynamic sitting at edge of bed x 8 minutes with Stand-by Assistance.  8. Dynamic standing for 2 minutes with Contact Guard Assistance using Quad Cane.  9. Patient and family able to teachback stroke & positioning education independently.  10. Wheelchair propulsion x 100 feet with Minimal Assistance using bilateral lower extremities--Discontinued    MET Goals:  Pt supine to sit with moderate assist- MET 3/21  Supine to sit to Minimal Assist. MET  Pt sit to supine with moderate assist-MET 3/21  Pt sit to stand with appropriate AD with moderate assist. Met    Pt to perform B LE exs in sitting or supine x 10 reps with AAROM on R LE. MET               PLAN:    Patient to be seen 6 x/week  to address the above  listed problems via gait training, therapeutic activities, therapeutic exercises, neuromuscular re-education  Plan of Care expires: 04/18/17  Plan of Care reviewed with: patient     Idania TENZIN Dumas PT, DPT  4/1/2017  Pager: [phone]

## 2017-04-01 NOTE — PLAN OF CARE
Problem: Physical Therapy Goal  Goal: Physical Therapy Goal  Active Goals to be met by: 4/18/17    1. Supine to sit with CGA.  2. Sit to supine with Supervision. NOT MET  3. Sit to stand with QCN with CGA.  4. Pt to perform gait 5ft with appropriate AD with max assist.-met 4/1/17  Revised: Pt to perform gait 75ft with RW with CGA. NOT MET  5. Pt to transfer bed to/from bedside chair with max assist.- met 4/1/17  Revised: pt to transfer to bedside chair via stand pivot with min A with RW. NOT MET  6. Able to tolerate exercise for 15-20 reps with independence.  7. Dynamic sitting at edge of bed x 8 minutes with Stand-by Assistance.  8. Dynamic standing for 2 minutes with Contact Guard Assistance using Quad Cane.  9. Patient and family able to teachback stroke & positioning education independently.  10. Wheelchair propulsion x 100 feet with Minimal Assistance using bilateral lower extremities--Discontinued    MET Goals:  Pt supine to sit with moderate assist- MET 3/21  Supine to sit to Minimal Assist. MET  Pt sit to supine with moderate assist-MET 3/21  Pt sit to stand with appropriate AD with moderate assist. Met   Pt to perform B LE exs in sitting or supine x 10 reps with AAROM on R LE. MET   Outcome: Ongoing (interventions implemented as appropriate)     Pt would benefit from IP Rehab upon discharge; DME required: RW, BSC, bath bench. Pt progressing towards goals.  Appropriate to transfer with: RN/PCT with min A of 1 person.  Idania Dumas PT, DPT  4/1/2017  Pager: [phone]

## 2017-04-01 NOTE — ASSESSMENT & PLAN NOTE
Antithrombotics for secondary stroke prevention: Aspirin 81mg, plavix 75mg qd  Statins for secondary stroke prevention and hyperlipidemia, if present: Atorvastatin- 80 mg oral daily  Aggressive risk factor modification: Hypertension, High Cholesterol and Obesity  VTE Prophylaxis: SCDs, heparin 5k q8 hours  BP Parameters: SBP <150  Nursing Orders: Neuro checks- per tpa protocol, Antiembolic stockings, Swallowing evaluation by Nursing, Seizure precautions, Avoid Rucker catheter, Pneumatic compression device, Stroke Education, Blood glucose target 100-130, Up with assistance   -ECHO initially with sclerotic lesion vs vegetation of the aortic valve. Repeat ECHO suggested valve is just sclerotic.      Rehab teams recommending Rehab facility. Will need PEG tube prior to d/c.

## 2017-04-02 PROBLEM — I63.312 THROMBOTIC STROKE INVOLVING LEFT MIDDLE CEREBRAL ARTERY: Status: ACTIVE | Noted: 2017-04-02

## 2017-04-02 PROBLEM — F32.A DEPRESSION: Status: ACTIVE | Noted: 2017-04-02

## 2017-04-02 LAB
ALBUMIN SERPL BCP-MCNC: 2.6 G/DL
ALLENS TEST: ABNORMAL
ALP SERPL-CCNC: 78 U/L
ALT SERPL W/O P-5'-P-CCNC: 86 U/L
ANION GAP SERPL CALC-SCNC: 12 MMOL/L
AST SERPL-CCNC: 50 U/L
BASOPHILS # BLD AUTO: 0.03 K/UL
BASOPHILS NFR BLD: 0.2 %
BILIRUB SERPL-MCNC: 0.5 MG/DL
BUN SERPL-MCNC: 14 MG/DL
CALCIUM SERPL-MCNC: 8.4 MG/DL
CHLORIDE SERPL-SCNC: 103 MMOL/L
CO2 SERPL-SCNC: 21 MMOL/L
CREAT SERPL-MCNC: 0.7 MG/DL
DIFFERENTIAL METHOD: ABNORMAL
EOSINOPHIL # BLD AUTO: 0.2 K/UL
EOSINOPHIL NFR BLD: 1.5 %
ERYTHROCYTE [DISTWIDTH] IN BLOOD BY AUTOMATED COUNT: 13.2 %
EST. GFR  (AFRICAN AMERICAN): >60 ML/MIN/1.73 M^2
EST. GFR  (NON AFRICAN AMERICAN): >60 ML/MIN/1.73 M^2
GLUCOSE SERPL-MCNC: 87 MG/DL
HCO3 UR-SCNC: 20.7 MMOL/L (ref 24–28)
HCT VFR BLD AUTO: 34.5 %
HGB BLD-MCNC: 11.9 G/DL
INR PPP: 1.1
LYMPHOCYTES # BLD AUTO: 1.8 K/UL
LYMPHOCYTES NFR BLD: 14.3 %
MAGNESIUM SERPL-MCNC: 2.1 MG/DL
MCH RBC QN AUTO: 31.2 PG
MCHC RBC AUTO-ENTMCNC: 34.5 %
MCV RBC AUTO: 90 FL
MONOCYTES # BLD AUTO: 1 K/UL
MONOCYTES NFR BLD: 7.6 %
NEUTROPHILS # BLD AUTO: 9.7 K/UL
NEUTROPHILS NFR BLD: 75.8 %
PCO2 BLDA: 28 MMHG (ref 35–45)
PH SMN: 7.48 [PH] (ref 7.35–7.45)
PHOSPHATE SERPL-MCNC: 2.6 MG/DL
PLATELET # BLD AUTO: 379 K/UL
PMV BLD AUTO: 9.9 FL
PO2 BLDA: 69 MMHG (ref 80–100)
POC BE: -3 MMOL/L
POC SATURATED O2: 95 % (ref 95–100)
POC TCO2: 22 MMOL/L (ref 23–27)
POCT GLUCOSE: 101 MG/DL (ref 70–110)
POTASSIUM SERPL-SCNC: 3.7 MMOL/L
PROT SERPL-MCNC: 6.6 G/DL
PROTHROMBIN TIME: 11.3 SEC
PROVIDER CREDENTIALS: ABNORMAL
PROVIDER NOTIFIED: ABNORMAL
RBC # BLD AUTO: 3.82 M/UL
SAMPLE: ABNORMAL
SITE: ABNORMAL
SODIUM SERPL-SCNC: 136 MMOL/L
TIME NOTIFIED: 800
VERBAL RESULT READBACK PERFORMED: YES
WBC # BLD AUTO: 12.76 K/UL

## 2017-04-02 PROCEDURE — 85610 PROTHROMBIN TIME: CPT

## 2017-04-02 PROCEDURE — 25000003 PHARM REV CODE 250: Performed by: PSYCHIATRY & NEUROLOGY

## 2017-04-02 PROCEDURE — 25000003 PHARM REV CODE 250: Performed by: STUDENT IN AN ORGANIZED HEALTH CARE EDUCATION/TRAINING PROGRAM

## 2017-04-02 PROCEDURE — 94761 N-INVAS EAR/PLS OXIMETRY MLT: CPT

## 2017-04-02 PROCEDURE — 99233 SBSQ HOSP IP/OBS HIGH 50: CPT | Mod: ,,, | Performed by: PSYCHIATRY & NEUROLOGY

## 2017-04-02 PROCEDURE — 83735 ASSAY OF MAGNESIUM: CPT

## 2017-04-02 PROCEDURE — 82803 BLOOD GASES ANY COMBINATION: CPT

## 2017-04-02 PROCEDURE — 94640 AIRWAY INHALATION TREATMENT: CPT

## 2017-04-02 PROCEDURE — 25000003 PHARM REV CODE 250: Performed by: NURSE PRACTITIONER

## 2017-04-02 PROCEDURE — 20000000 HC ICU ROOM

## 2017-04-02 PROCEDURE — 97535 SELF CARE MNGMENT TRAINING: CPT

## 2017-04-02 PROCEDURE — 37799 UNLISTED PX VASCULAR SURGERY: CPT

## 2017-04-02 PROCEDURE — 85025 COMPLETE CBC W/AUTO DIFF WBC: CPT

## 2017-04-02 PROCEDURE — 97110 THERAPEUTIC EXERCISES: CPT

## 2017-04-02 PROCEDURE — 80053 COMPREHEN METABOLIC PANEL: CPT

## 2017-04-02 PROCEDURE — 25000242 PHARM REV CODE 250 ALT 637 W/ HCPCS: Performed by: STUDENT IN AN ORGANIZED HEALTH CARE EDUCATION/TRAINING PROGRAM

## 2017-04-02 PROCEDURE — 84100 ASSAY OF PHOSPHORUS: CPT

## 2017-04-02 PROCEDURE — 25000003 PHARM REV CODE 250: Performed by: PHYSICIAN ASSISTANT

## 2017-04-02 RX ADMIN — LOSARTAN POTASSIUM 100 MG: 50 TABLET, FILM COATED ORAL at 08:04

## 2017-04-02 RX ADMIN — HYDRALAZINE HYDROCHLORIDE 50 MG: 50 TABLET ORAL at 06:04

## 2017-04-02 RX ADMIN — IPRATROPIUM BROMIDE AND ALBUTEROL SULFATE 3 ML: .5; 3 SOLUTION RESPIRATORY (INHALATION) at 07:04

## 2017-04-02 RX ADMIN — SODIUM CHLORIDE SOLN NEBU 3% 4 ML: 3 NEBU SOLN at 07:04

## 2017-04-02 RX ADMIN — Medication 10 ML: at 12:04

## 2017-04-02 RX ADMIN — Medication 10 ML: at 06:04

## 2017-04-02 RX ADMIN — HYDRALAZINE HYDROCHLORIDE 50 MG: 50 TABLET ORAL at 10:04

## 2017-04-02 RX ADMIN — HYDRALAZINE HYDROCHLORIDE 50 MG: 50 TABLET ORAL at 01:04

## 2017-04-02 RX ADMIN — Medication 3 ML: at 10:04

## 2017-04-02 RX ADMIN — ATORVASTATIN CALCIUM 80 MG: 20 TABLET, FILM COATED ORAL at 08:04

## 2017-04-02 RX ADMIN — POTASSIUM & SODIUM PHOSPHATES POWDER PACK 280-160-250 MG 2 PACKET: 280-160-250 PACK at 12:04

## 2017-04-02 RX ADMIN — HEPARIN SODIUM 5000 UNITS: 5000 INJECTION, SOLUTION INTRAVENOUS; SUBCUTANEOUS at 06:04

## 2017-04-02 RX ADMIN — HEPARIN SODIUM 5000 UNITS: 5000 INJECTION, SOLUTION INTRAVENOUS; SUBCUTANEOUS at 01:04

## 2017-04-02 RX ADMIN — POTASSIUM & SODIUM PHOSPHATES POWDER PACK 280-160-250 MG 2 PACKET: 280-160-250 PACK at 06:04

## 2017-04-02 RX ADMIN — HEPARIN SODIUM 5000 UNITS: 5000 INJECTION, SOLUTION INTRAVENOUS; SUBCUTANEOUS at 10:04

## 2017-04-02 RX ADMIN — Medication 3 ML: at 01:04

## 2017-04-02 RX ADMIN — AMLODIPINE BESYLATE 10 MG: 10 TABLET ORAL at 08:04

## 2017-04-02 RX ADMIN — CLOPIDOGREL 75 MG: 75 TABLET, FILM COATED ORAL at 08:04

## 2017-04-02 RX ADMIN — ASPIRIN 81 MG CHEWABLE TABLET 81 MG: 81 TABLET CHEWABLE at 08:04

## 2017-04-02 NOTE — PLAN OF CARE
Problem: Patient Care Overview  Goal: Plan of Care Review  Outcome: Ongoing (interventions implemented as appropriate)  No acute events throughout the day, VS and assessment per flow sheet, patient progressing towards goal as tolerated. Neuro status unchanged and improving, pt expressively aphasic with some incomprehensible speech, but follows commands, pupils reactive. Medications given per MD orders. TF cont'd. A line d/c. Plan of care reviewed with Nathanael Velez and silvano, all concerns addressed. Emotional support provided. Will continue to monitor.

## 2017-04-02 NOTE — PROGRESS NOTES
[facility]  Vascular Neurology  Comprehensive Stroke Center  Progress Note      Neurologic Chief Complaint: L mCA stroke 2/2 carotid stenosis    Subjective:     Interval History: Patient is seen for follow-up neurological assessment and treatment recommendations:  Patient will need modified barium swallow.  Off of oxygen.  Neurologically unchanged     HPI, Past Medical, Family, and Social History remains the same as documented in the initial encounter.     Review of Systems   Constitutional: Negative for fever.   Respiratory: Positive for cough. Negative for shortness of breath.    Neurological: Positive for facial asymmetry.     Scheduled Meds:   amlodipine  10 mg Per NG tube Daily    aspirin  81 mg Per NG tube Daily    atorvastatin  80 mg Per NG tube Daily    clopidogrel  75 mg Per NG tube Daily    heparin (porcine)  5,000 Units Subcutaneous Q8H    hydrALAZINE  50 mg Per NG tube Q8H    lidocaine (PF) 10 mg/ml (1%)  5 mL Intradermal Once    losartan  100 mg Per NG tube Daily    sodium chloride 0.9%  10 mL Intravenous Q6H    sodium chloride 0.9%  10 mL Intravenous Q6H    sodium chloride 0.9%  3 mL Intravenous Q8H     Continuous Infusions:   PRN Meds:acetaminophen, albuterol-ipratropium 2.5mg-0.5mg/3mL, hydrALAZINE, magnesium sulfate IVPB, magnesium sulfate IVPB, ondansetron, potassium chloride 10%, potassium chloride 10%, potassium chloride 10%, potassium, sodium phosphates, potassium, sodium phosphates, potassium, sodium phosphates, ramelteon, Flushing PICC Protocol **AND** sodium chloride 0.9% **AND** sodium chloride 0.9%, Flushing PICC Protocol **AND** sodium chloride 0.9% **AND** sodium chloride 0.9%    Objective:     Vital Signs (Most Recent):  Temp: 97.6 °F (36.4 °C) (04/02/17 1101)  Pulse: 84 (04/02/17 1200)  Resp: (!) 21 (04/02/17 1200)  BP: 121/80 (04/02/17 1200)  SpO2: 97 % (04/02/17 1200)  BP Location: Right arm    Vital Signs Range (Last 24H):  Temp:  [97.6 °F (36.4  °C)-98.6 °F (37 °C)]   Pulse:  [58-84]   Resp:  [13-37]   BP: (120-169)/(56-80)   SpO2:  [95 %-100 %]   Arterial Line BP: ()/()   BP Location: Right arm    Physical Exam   Constitutional: He appears well-developed and well-nourished.   Eyes: Pupils are equal, round, and reactive to light.   Neck: Normal range of motion. Neck supple.   Cardiovascular: Normal rate.    Pulmonary/Chest: Effort normal.   Musculoskeletal: Normal range of motion.   Skin: Skin is warm and dry.       Neurological Exam:   LOC: drowsy and not following some commands   Language: Global aphasia  Speech: Dysarthria  EOM (CN III, IV, VI): Gaze preference left  Pupils (CN III, IV, VI): PERRL  Facial Movement (CN VII): lower weakness left lower  Motor*: 4/5 in RUE, 5/5 in rest    Sensation: intact to light touch, temperature and vibration  Tone: Arm-Left: normal; Leg-Left: normal; Arm-Right: normal; Leg-Right: normal    NIH Stroke Scale:    Level of Consciousness: 1 - drowsy  LOC Questions: 2 - answers none correctly  LOC Commands: 2 - performs neither correctly  Best Gaze: 1 - partial gaze palsy  Visual: 0 - no visual loss  Facial Palsy: 1 - minor  Motor Left Arm: 0 - no drift  Motor Right Arm: 0 - no drift  Motor Left Le - no drift  Motor Right Le - no drift  Limb Ataxia: 0 - absent  Sensory: 0 - normal  Best Language: 2 - severe aphasia  Dysarthria: 1 - mild to moderate dysarthria  Extinction and Inattention: 0 - no neglect  NIH Stroke Scale Total: 10      Laboratory:  CMP:     Recent Labs  Lab 17   CALCIUM 8.4*   ALBUMIN 2.6*   PROT 6.6      K 3.7   CO2 21*      BUN 14   CREATININE 0.7   ALKPHOS 78   ALT 86*   AST 50*   BILITOT 0.5     BMP:     Recent Labs  Lab 17      K 3.7      CO2 21*   BUN 14   CREATININE 0.7   CALCIUM 8.4*     CBC:     Recent Labs  Lab 17   WBC 12.76*   RBC 3.82*   HGB 11.9*   HCT 34.5*   *   MCV 90   MCH 31.2*   MCHC 34.5     Lipid  Panel: No results for input(s): CHOL, LDLCALC, HDL, TRIG in the last 168 hours.  Coagulation:     Recent Labs  Lab 04/02/17  0412   INR 1.1     Platelet Aggregation Study: No results for input(s): PLTAGG, PLTAGINTERP, PLTAGREGLACO, ADPPLTAGGREG in the last 168 hours.  Hgb A1C: No results for input(s): HGBA1C in the last 168 hours.  TSH: No results for input(s): TSH in the last 168 hours.    Diagnostic Results:  I have personally reviewed: CT Head. Date: 3/26/17  Left MCA territory infarct with associated left basal ganglia intraparenchymal hemorrhage and intraventricular extension.         Assessment/Plan:     61 year old male with HTN, HLD, and CAD and L MCA stroke symptoms post tpa and transferred here for higher level of care and potential thrombectomy. CTA multiphase upon arrival.   Patient found to have severe L carotid artery stenosis.  Angioplasty with stent performed and thrombectomy of L MCA occlusion.  Patient found to have L MCA stroke with reperfusion injury.  Possible aortic vegetation seen on echo but on repeat echo showed sclerotic aorta.      03/22/17  Patient drowsy, follows simple commands, unable to move RUE      03/23/17  More awake today.  Temperature and leukocytosis resolving with vancomycin and cefepime.      3/24/17  NAEON. Still with fever overnight.     3/25/17 Patient much more alert and interactive with exam. Still with labored breathing    3/27/17  Patient has a R lower extremity DVT. Unable to have IVC filter placed d/t respiratory status- on intubation watch.     3/30/17  BP elevated to 180s-200s overnight requiring PRNs for BP. Patient will need PEG. Patients respiratory status now improved and on NC. Will have repeat doppler today on R leg to reevaluate if IVC filter to be placed.     03/31/17 Still has increased secretions and cough.  Neurologically stable      04/02/17  Off of oxygen.  Now will leukocytosis.  Neurologically unchanged.   Patient will need modified barium  swallow        * Thrombotic stroke involving left middle cerebral artery  Antithrombotics for secondary stroke prevention: Aspirin 81mg, plavix 75mg qd  Statins for secondary stroke prevention and hyperlipidemia, if present: Atorvastatin- 80 mg oral daily  Aggressive risk factor modification: Hypertension, High Cholesterol and Obesity  VTE Prophylaxis: SCDs, heparin 5k q8 hours  BP Parameters: SBP <150  Nursing Orders: Neuro checks- per tpa protocol, Antiembolic stockings, Swallowing evaluation by Nursing, Seizure precautions, Avoid Rucker catheter, Pneumatic compression device, Stroke Education, Blood glucose target 100-130, Up with assistance   -ECHO initially with sclerotic lesion vs vegetation of the aortic valve. Repeat ECHO suggested valve is just sclerotic.      Rehab teams recommending Rehab facility.  Therapies recommending MBS    Carotid artery stenosis, symptomatic  S/p IR thrombectomy of distant MCA clot and angioplasty with stent on L ICA  Likely etiology of patient's L MCA stroke   Complication- With hemorrhagic conversion s/p tPA and thrombectomy/stenting      Cytotoxic cerebral edema  Evident on imaging     Aphasia  Given tpa , status post thrombectomy   Due to stroke above  Aggressive PT/OT/ST    Abnormality of aortic valve  Found on echo.   As per NCC  Recommend clarification with cardiology on echo read     Depression  Likely due to stroke  Patient uninterested at times in exam  May benefit from SSRI       Edwige Monaco PA-C  Comprehensive Stroke Center  Department of Vascular Neurology   [facility]

## 2017-04-02 NOTE — PLAN OF CARE
Problem: Patient Care Overview  Goal: Plan of Care Review  Outcome: Ongoing (interventions implemented as appropriate)  POC reviewed with pt's family at 2200. Pt's son verbalized understanding. Questions and concerns addressed. No acute events overnight. Pt progressing toward goals. Will continue to monitor. See flowsheets for full assessment and VS info

## 2017-04-02 NOTE — ASSESSMENT & PLAN NOTE
Antithrombotics for secondary stroke prevention: Aspirin 81mg, plavix 75mg qd  Statins for secondary stroke prevention and hyperlipidemia, if present: Atorvastatin- 80 mg oral daily  Aggressive risk factor modification: Hypertension, High Cholesterol and Obesity  VTE Prophylaxis: SCDs, heparin 5k q8 hours  BP Parameters: SBP <150  Nursing Orders: Neuro checks- per tpa protocol, Antiembolic stockings, Swallowing evaluation by Nursing, Seizure precautions, Avoid Rucker catheter, Pneumatic compression device, Stroke Education, Blood glucose target 100-130, Up with assistance   -ECHO initially with sclerotic lesion vs vegetation of the aortic valve. Repeat ECHO suggested valve is just sclerotic.      Rehab teams recommending Rehab facility.  Therapies recommending MBS

## 2017-04-02 NOTE — PROGRESS NOTES
ICU Progress Note  Neurocritical Care    Admit Date: 3/18/2017  LOS: 15    CC: Embolic stroke involving left middle cerebral artery    Code Status: Full Code     SUBJECTIVE:     Interval History/Significant Events: no acute events overnight. Secretions improved with 3% nebs and duo-nebs.  Pull a line. Patient aggravated by NGT. Pull a line today.    Review of Symptoms: unable to assess due to mental status    Medications:  Continuous Infusions:     Scheduled Meds:   amlodipine  10 mg Per NG tube Daily    aspirin  81 mg Per NG tube Daily    atorvastatin  80 mg Per NG tube Daily    clopidogrel  75 mg Per NG tube Daily    heparin (porcine)  5,000 Units Subcutaneous Q8H    hydrALAZINE  50 mg Per NG tube Q8H    lidocaine (PF) 10 mg/ml (1%)  5 mL Intradermal Once    losartan  100 mg Per NG tube Daily    sodium chloride 0.9%  10 mL Intravenous Q6H    sodium chloride 0.9%  10 mL Intravenous Q6H    sodium chloride 0.9%  3 mL Intravenous Q8H     PRN Meds:.acetaminophen, albuterol-ipratropium 2.5mg-0.5mg/3mL, hydrALAZINE, magnesium sulfate IVPB, magnesium sulfate IVPB, ondansetron, potassium chloride 10%, potassium chloride 10%, potassium chloride 10%, potassium, sodium phosphates, potassium, sodium phosphates, potassium, sodium phosphates, ramelteon, Flushing PICC Protocol **AND** sodium chloride 0.9% **AND** sodium chloride 0.9%, Flushing PICC Protocol **AND** sodium chloride 0.9% **AND** sodium chloride 0.9%    OBJECTIVE:   Vital Signs (Most Recent):   Temp: 97.6 °F (36.4 °C) (04/02/17 1101)  Pulse: 75 (04/02/17 1101)  Resp: 15 (04/02/17 1101)  BP: 126/62 (04/02/17 1101)  SpO2: 96 % (04/02/17 1101)    Vital Signs (24h Range):   Temp:  [97.6 °F (36.4 °C)-98.6 °F (37 °C)] 97.6 °F (36.4 °C)  Pulse:  [58-83] 75  Resp:  [13-37] 15  SpO2:  [95 %-100 %] 96 %  BP: (120-169)/(56-70) 126/62  Arterial Line BP: ()/() 134/57    ICP/CPP (Last 24h):        I & O (Last 24h):     Intake/Output Summary (Last 24 hours)  at 04/02/17 1153  Last data filed at 04/02/17 1101   Gross per 24 hour   Intake             1300 ml   Output             2020 ml   Net             -720 ml     Physical Exam:  GA: Alert, comfortable, no acute distress. Follows commands, answers questions but inappropriately. Able to say his name  HEENT: No scleral icterus or JVD. Frequent thick secretions  Pulmonary: Clear breath sounds  Cardiac: RRR S1 & S2 w/o rubs/murmurs/gallops.   Abdominal: Bowel sounds present x 4. No appreciable hepatosplenomegaly.  Skin: No jaundice, rashes, or visible lesions.  Neuro:  --GCS: E4 V3 M6  --Mental Status:  Increased speech. Follows simple commands  --CN II-XII grossly intact, dysphagia  -- PERRL.   --Corneal reflex, gag, weak cough  --LUE strength: 5/5  --RUE strength: 1/5  --LLE strength: 5/5  --RLE strength: 3/5    Vent Data:        Lines/Drains/Airway:              External Urinary Catheter 03/18/17 1758 Medium (Active)   Collection Container Urimeter 3/21/2017  7:01 AM   Securement Method secured to top of thigh w/ adhesive device 3/21/2017  7:01 AM   Skin no redness;no breakdown;penis/scrotum cleansed w/ soap and water;skin barrier applied 3/21/2017  7:01 AM   Tolerance no signs/symptoms of discomfort 3/21/2017  7:01 AM   Output (mL) 20 mL 3/21/2017  9:00 AM     Nutrition/Tube Feeds (if NPO state why):  TF at goal    Labs:  ABG:     Recent Labs  Lab 04/02/17  0756   PH 7.476*   PO2 69*   PCO2 28.0*   HCO3 20.7*   POCSATURATED 95   BE -3     BMP:    Recent Labs  Lab 04/02/17  0412      K 3.7      CO2 21*   BUN 14   CREATININE 0.7   GLU 87   MG 2.1   PHOS 2.6*     LFT:   Lab Results   Component Value Date    AST 50 (H) 04/02/2017    ALT 86 (H) 04/02/2017    ALKPHOS 78 04/02/2017    BILITOT 0.5 04/02/2017    ALBUMIN 2.6 (L) 04/02/2017    PROT 6.6 04/02/2017     CBC:   Lab Results   Component Value Date    WBC 12.76 (H) 04/02/2017    HGB 11.9 (L) 04/02/2017    HCT 34.5 (L) 04/02/2017    MCV 90 04/02/2017    PLT  379 (H) 04/02/2017     Microbiology x 7d:   Microbiology Results (last 7 days)     Procedure Component Value Units Date/Time    Blood culture [[id number]] Collected:  03/24/17 1315    Order Status:  Completed Specimen:  Blood from Peripheral, Antecubital, Left Updated:  03/29/17 2012     Blood Culture, Routine No growth after 5 days.    Blood culture [[id number]] Collected:  03/24/17 1314    Order Status:  Completed Specimen:  Blood from Peripheral, Forearm, Left Updated:  03/29/17 1822     Blood Culture, Routine No growth after 5 days.        Imaging:  CT head 3/21/17  No significant interval detrimental change when compared to the CT dated 03/20/2017.    Evolving left MCA distribution infarct with persistent hemorrhagic conversion at the level of the basal ganglia and trace petechial hemorrhages about the superior aspect of the left frontal lobe. There is stable mass effect on the left lateral ventricle with minimal left to right midline shift.  No CT findings to suggest developing   hydrocephalus.    ASSESSMENT/PLAN:     Active Hospital Problems    Diagnosis  POA    *Embolic stroke involving left middle cerebral artery [I63.412]  Unknown    Depression [F32.9]  Unknown    Acute ischemic left MCA stroke [I63.512]  Yes    Carotid artery stenosis, symptomatic [I65.29]  Yes    Cytotoxic cerebral edema [G93.6]  Unknown    Abnormality of aortic valve [Q23.9]  Not Applicable    Right sided weakness [R53.1]  Unknown    Aphasia [R47.01]  Unknown    Essential hypertension [I10]  Unknown      Resolved Hospital Problems    Diagnosis Date Resolved POA   No resolved problems to display.      Neuro:   L stroke with hemorrhagic transofrmation on caudate and putamen s/p TPA, thrombectomy and stent placement  -Asa, plavix, statin  -repeat scan stable  -mental status improving, speech is improving but still has difficulty with secretions  -SLP following, patient NPO. May need PEG if dysphagia continues, discussed with son at  bedside     Pulmonary:   -improvement in O2 requirements, on 3L NC  -secretions improved.   -hold 3% nebs today  -cpt prn  -abg prn    Recent Labs  Lab 04/02/17  0756   PH 7.476*   PCO2 28.0*   PO2 69*   HCO3 20.7*   POCSATURATED 95   BE -3       Cardiac:   -SBP goal <140  -losartan 100mg daily, amlodipine 10 mg, hydralazine 50mg q8  -patient with some bradycardia on labetalol 200 q8, which was discontinued  -ekg NSR, with LBBB(old)    Renal:    -cr stable, good uop  -condom catheter    ID:   -no growth on any cultures, leukocytosis improved, afebrile    Hem/Onc:   -partial occlusive thrombus in right common femoral vein. Repeat scan clot is no longer present. No need for ivc filter.  -continue subq heparin ppx    Endocrine:    SSI q4    Fluids/Electrolytes/Nutrition/GI:   -restart trickle feeds  -replete electrolytes prn  -patient with frequent bm. flexiseal placed, bowel regimen held    Ppx: subq heparin    Likely stepdown tomorrow or Tuesday    Brian Amaya MD PGY-1

## 2017-04-02 NOTE — PT/OT/SLP PROGRESS
Occupational Therapy  Treatment    Nathanael Velez   MRN: [id number]   Admitting Diagnosis: Thrombotic stroke involving left middle cerebral artery    OT Date of Treatment: 17   OT Start Time: 1138  OT Stop Time: 1213  OT Total Time (min): 35 min    Billable Minutes:  Self Care/Home Management 20 and Therapeutic Exercise 15    General Precautions: Standard, aspiration, fall, NPO    Do you have any cultural, spiritual, Evangelical conflicts, given your current situation?: Worship    Subjective:  Communicated with RN prior to session.  Pt with no complaints.    Pain Ratin/10  Pain Rating Post-Intervention: 0/10    Objective:  Patient found with: blood pressure cuff, pulse ox (continuous), restraints, telemetry, peripheral IV, NG tube (condom catheter)     Functional Mobility:  Bed Mobility:  Rolling/Turning to Left: Stand by assistance  Rolling/Turning Right: Stand by assistance  Supine to Sit: Minimum Assistance  Sit to Supine: Contact Guard Assistance    Transfers:   Sit <> Stand Assistance: Contact Guard Assistance (including taking ~1-2 steps forward from EOB & back to EOB)  Sit <> Stand Assistive Device: No Assistive Device    Activities of Daily Living:     LE Dressing Level of Assistance: Minimum assistance (donning socks while seated EOB with (A) to initiate sock over toes of RLE)  Grooming Position: Seated, EOB  Grooming Level of Assistance:  (SBA when self initiated however Total (A) upon command)     Therapeutic Activities and Exercises:  Pt required CGA with postural control while standing & SBA-CGA with postural control while seated EOB.  Pt with noted disorientation therefore provided daily orientation.  Pt with 70% of appropriateness of responses during session to questions.  Pt performed AAROM with LUE & AA/PROM with RUE in all available planes x 10 reps each while supine.  Provided education to pt & pt's son regarding risks for shoulder subluxation and edema to RUE & techniques for bed mobility to  limit risk for subluxation with pt's son verbalizing understanding.  Pt & son had no further questions & when asked whether there were any concerns pt & son reported none.      AM-PAC 6 CLICK ADL   How much help from another person does this patient currently need?   1 = Unable, Total/Dependent Assistance  2 = A lot, Maximum/Moderate Assistance  3 = A little, Minimum/Contact Guard/Supervision  4 = None, Modified [place]/Independent    Putting on and taking off regular lower body clothing? : 3  Bathing (including washing, rinsing, drying)?: 2  Toileting, which includes using toilet, bedpan, or urinal? : 2  Putting on and taking off regular upper body clothing?: 2  Taking care of personal grooming such as brushing teeth?: 3  Eating meals?: 1  Total Score: 13     AM-PAC Raw Score CMS G-Code Modifier Level of Impairment Assistance   6 % Total / Unable   7 - 9 CM 80 - 100% Maximal Assist   10 - 14 CL 60 - 80% Moderate Assist   15 - 19 CK 40 - 60% Moderate Assist   20 - 22 CJ 20 - 40% Minimal Assist   23 CI 1-20% SBA / CGA   24 CH 0% Independent/ Mod I     Patient left supine with all lines intact, call button in reach, restraints reapplied at end of session, RN notified, son present and white board updated.    ASSESSMENT:  Nathanael Velez is a 61 y.o. male with a medical diagnosis of Thrombotic stroke involving left middle cerebral artery and presents with good participation and motivation.  Pt with improved quality of responses to questions on this date.  Pt continues to require (A) with all activities.    Rehab identified problem list/impairments: Rehab identified problem list/impairments: weakness, impaired endurance, impaired sensation, impaired self care skills, visual deficits, impaired balance, impaired functional mobilty, impaired cognition, decreased safety awareness, decreased coordination, decreased upper extremity function, decreased lower extremity function    Rehab potential is fair.    Activity  tolerance: Fair    Discharge recommendations: Discharge Facility/Level Of Care Needs: rehabilitation facility     GOALS:   Occupational Therapy Goals        Problem: Occupational Therapy Goal    Goal Priority Disciplines Outcome Interventions   Occupational Therapy Goal     OT, PT/OT Ongoing (interventions implemented as appropriate)    Description:  Goals with expiration date, 4/12:  Patient will increase functional independence with ADLs by performing:    Visual item location for 3/5 items on R. Progressing  Bed Mobility with min(a) and bed-rails as needed. MET 3/31 (remain for consistency)  Supine to sit with stand by assistance.  Stand pivot transfers with stand by Assist.    Grooming while standing with stand by (a).  UB dressing with min(a) while seated EOB.   LB dressing with stand by (a) while seated EOB.  Patient's family / caregiver will demonstrate independence and safety with assisting patient with self-care skills and functional mobility.      Patient's family / caregiver will demonstrate independence with providing ROM and changes in bed positioning.    Patient and/or patient's family will verbalize understanding of stroke prevention guidelines, personal risk factors and stroke warning signs via teachback method.                 Plan:  Patient to be seen 6 x/week to address the above listed problems via self-care/home management, neuromuscular re-education, cognitive retraining, sensory integration, therapeutic activities, therapeutic exercises  Plan of Care expires: 04/19/17  Plan of Care reviewed with: patient, dewey         AVIS Álvarez  04/02/2017

## 2017-04-02 NOTE — PLAN OF CARE
Problem: Occupational Therapy Goal  Goal: Occupational Therapy Goal  Goals with expiration date, 4/12:  Patient will increase functional independence with ADLs by performing:    Visual item location for 3/5 items on R. Progressing  Bed Mobility with min(a) and bed-rails as needed. MET 3/31 (remain for consistency)  Supine to sit with stand by assistance.  Stand pivot transfers with stand by Assist.   Grooming while standing with stand by (a).  UB dressing with min(a) while seated EOB.   LB dressing with stand by (a) while seated EOB.  Patients family / caregiver will demonstrate independence and safety with assisting patient with self-care skills and functional mobility.   Patients family / caregiver will demonstrate independence with providing ROM and changes in bed positioning.   Patient and/or patients family will verbalize understanding of stroke prevention guidelines, personal risk factors and stroke warning signs via teachback method.   Outcome: Ongoing (interventions implemented as appropriate)  Goals updated.

## 2017-04-03 LAB
ALBUMIN SERPL BCP-MCNC: 2.7 G/DL
ALP SERPL-CCNC: 90 U/L
ALT SERPL W/O P-5'-P-CCNC: 85 U/L
ANION GAP SERPL CALC-SCNC: 7 MMOL/L
AST SERPL-CCNC: 46 U/L
BASOPHILS # BLD AUTO: 0.04 K/UL
BASOPHILS NFR BLD: 0.3 %
BILIRUB SERPL-MCNC: 0.4 MG/DL
BUN SERPL-MCNC: 14 MG/DL
CALCIUM SERPL-MCNC: 8.8 MG/DL
CHLORIDE SERPL-SCNC: 104 MMOL/L
CO2 SERPL-SCNC: 26 MMOL/L
CREAT SERPL-MCNC: 0.7 MG/DL
DIFFERENTIAL METHOD: ABNORMAL
EOSINOPHIL # BLD AUTO: 0.2 K/UL
EOSINOPHIL NFR BLD: 1.5 %
ERYTHROCYTE [DISTWIDTH] IN BLOOD BY AUTOMATED COUNT: 13 %
EST. GFR  (AFRICAN AMERICAN): >60 ML/MIN/1.73 M^2
EST. GFR  (NON AFRICAN AMERICAN): >60 ML/MIN/1.73 M^2
GLUCOSE SERPL-MCNC: 109 MG/DL
HCT VFR BLD AUTO: 34.5 %
HGB BLD-MCNC: 12 G/DL
INR PPP: 1
LYMPHOCYTES # BLD AUTO: 1.8 K/UL
LYMPHOCYTES NFR BLD: 13.9 %
MAGNESIUM SERPL-MCNC: 1.7 MG/DL
MCH RBC QN AUTO: 31 PG
MCHC RBC AUTO-ENTMCNC: 34.8 %
MCV RBC AUTO: 89 FL
MONOCYTES # BLD AUTO: 1.3 K/UL
MONOCYTES NFR BLD: 9.7 %
NEUTROPHILS # BLD AUTO: 9.6 K/UL
NEUTROPHILS NFR BLD: 74.1 %
PHOSPHATE SERPL-MCNC: 3.1 MG/DL
PLATELET # BLD AUTO: 395 K/UL
PMV BLD AUTO: 9.5 FL
POCT GLUCOSE: 123 MG/DL (ref 70–110)
POCT GLUCOSE: <20 MG/DL (ref 70–110)
POTASSIUM SERPL-SCNC: 3.8 MMOL/L
PROT SERPL-MCNC: 6.8 G/DL
PROTHROMBIN TIME: 10.9 SEC
RBC # BLD AUTO: 3.87 M/UL
SODIUM SERPL-SCNC: 137 MMOL/L
WBC # BLD AUTO: 12.97 K/UL

## 2017-04-03 PROCEDURE — 80053 COMPREHEN METABOLIC PANEL: CPT

## 2017-04-03 PROCEDURE — 83735 ASSAY OF MAGNESIUM: CPT

## 2017-04-03 PROCEDURE — 25000003 PHARM REV CODE 250: Performed by: PSYCHIATRY & NEUROLOGY

## 2017-04-03 PROCEDURE — 94761 N-INVAS EAR/PLS OXIMETRY MLT: CPT

## 2017-04-03 PROCEDURE — 94799 UNLISTED PULMONARY SVC/PX: CPT

## 2017-04-03 PROCEDURE — 25000003 PHARM REV CODE 250: Performed by: NURSE PRACTITIONER

## 2017-04-03 PROCEDURE — 97530 THERAPEUTIC ACTIVITIES: CPT

## 2017-04-03 PROCEDURE — 20600001 HC STEP DOWN PRIVATE ROOM

## 2017-04-03 PROCEDURE — 84100 ASSAY OF PHOSPHORUS: CPT

## 2017-04-03 PROCEDURE — 25000003 PHARM REV CODE 250: Performed by: STUDENT IN AN ORGANIZED HEALTH CARE EDUCATION/TRAINING PROGRAM

## 2017-04-03 PROCEDURE — 97535 SELF CARE MNGMENT TRAINING: CPT

## 2017-04-03 PROCEDURE — 85610 PROTHROMBIN TIME: CPT

## 2017-04-03 PROCEDURE — 97116 GAIT TRAINING THERAPY: CPT

## 2017-04-03 PROCEDURE — 99232 SBSQ HOSP IP/OBS MODERATE 35: CPT | Mod: ,,, | Performed by: PHYSICIAN ASSISTANT

## 2017-04-03 PROCEDURE — 92526 ORAL FUNCTION THERAPY: CPT

## 2017-04-03 PROCEDURE — 85025 COMPLETE CBC W/AUTO DIFF WBC: CPT

## 2017-04-03 PROCEDURE — 63600175 PHARM REV CODE 636 W HCPCS: Performed by: PHYSICIAN ASSISTANT

## 2017-04-03 PROCEDURE — 99233 SBSQ HOSP IP/OBS HIGH 50: CPT | Mod: ,,, | Performed by: PSYCHIATRY & NEUROLOGY

## 2017-04-03 RX ADMIN — Medication 3 ML: at 06:04

## 2017-04-03 RX ADMIN — HEPARIN SODIUM 5000 UNITS: 5000 INJECTION, SOLUTION INTRAVENOUS; SUBCUTANEOUS at 09:04

## 2017-04-03 RX ADMIN — LOSARTAN POTASSIUM 100 MG: 50 TABLET, FILM COATED ORAL at 02:04

## 2017-04-03 RX ADMIN — CLOPIDOGREL 75 MG: 75 TABLET, FILM COATED ORAL at 02:04

## 2017-04-03 RX ADMIN — Medication 3 ML: at 02:04

## 2017-04-03 RX ADMIN — AMLODIPINE BESYLATE 10 MG: 10 TABLET ORAL at 02:04

## 2017-04-03 RX ADMIN — HYDRALAZINE HYDROCHLORIDE 50 MG: 50 TABLET ORAL at 02:04

## 2017-04-03 RX ADMIN — HYDRALAZINE HYDROCHLORIDE 50 MG: 50 TABLET ORAL at 06:04

## 2017-04-03 RX ADMIN — ATORVASTATIN CALCIUM 80 MG: 20 TABLET, FILM COATED ORAL at 02:04

## 2017-04-03 RX ADMIN — HEPARIN SODIUM 5000 UNITS: 5000 INJECTION, SOLUTION INTRAVENOUS; SUBCUTANEOUS at 06:04

## 2017-04-03 RX ADMIN — Medication 10 ML: at 12:04

## 2017-04-03 RX ADMIN — ASPIRIN 81 MG CHEWABLE TABLET 81 MG: 81 TABLET CHEWABLE at 02:04

## 2017-04-03 RX ADMIN — Medication 3 ML: at 09:04

## 2017-04-03 RX ADMIN — HEPARIN SODIUM 5000 UNITS: 5000 INJECTION, SOLUTION INTRAVENOUS; SUBCUTANEOUS at 03:04

## 2017-04-03 RX ADMIN — HYDRALAZINE HYDROCHLORIDE 50 MG: 50 TABLET ORAL at 09:04

## 2017-04-03 RX ADMIN — MAGNESIUM SULFATE IN WATER 2 G: 40 INJECTION, SOLUTION INTRAVENOUS at 06:04

## 2017-04-03 NOTE — PROGRESS NOTES
Attempt x1 to give report to Ludy HOLDEN from [facility]. RN busy at present time. She will call me back on my spectralink when ready to receive report.

## 2017-04-03 NOTE — PROGRESS NOTES
Arlet Belcher with stroke service notified and aware of pt bladder scanned volume of 289 mL and pt having no urine via condom catheter since 1400. She is aware that pt is assigned to a bed on the stroke stepdown floor. No orders received at present time via phone. I will make RN aware who takes pt report.

## 2017-04-03 NOTE — PLAN OF CARE
Covering LIZANDRO was asked by STONE to call to follow-up on referrals that were initiated last week. LIZANDRO placed call to Austyn at [facility] ([phone]) and confirmed they were in receipt of referral. Austyn requested updated information be sent for review. Austyn stated they do not take NG tubes, which it appears Pt still has at this time. LIZANDRO to sent updated PT/OT/ST and MD progress notes via [facility], per Austyn's request. LIZANDRO then placed call to Estrella with [facility] ([phone]) who also stated Pt wouldn't be able to come to them with an NG tube. Estrella stated she had started to work with Pt's insurance, [employer], and was in the midst of sending information to them, etc.  Per Fifi RITTER, Pt will likely be stepped down to the stroke service. Plan for possible PEG pending swallow study results or advance in diet will determine discharge timing.   SW to continue to follow.     Quynh De Souza, KAYAW

## 2017-04-03 NOTE — SUBJECTIVE & OBJECTIVE
Neurologic Chief Complaint: L mCA stroke 2/2 carotid stenosis    Subjective:     Interval History: Patient is seen for follow-up neurological assessment and treatment recommendations:  Patient will need modified barium swallow.  Off of oxygen.  Neurologically unchanged     HPI, Past Medical, Family, and Social History remains the same as documented in the initial encounter.     Review of Systems   Constitutional: Negative for fever.   Respiratory: Positive for cough. Negative for shortness of breath.    Neurological: Positive for facial asymmetry.     Scheduled Meds:   amlodipine  10 mg Per NG tube Daily    aspirin  81 mg Per NG tube Daily    atorvastatin  80 mg Per NG tube Daily    clopidogrel  75 mg Per NG tube Daily    heparin (porcine)  5,000 Units Subcutaneous Q8H    hydrALAZINE  50 mg Per NG tube Q8H    lidocaine (PF) 10 mg/ml (1%)  5 mL Intradermal Once    losartan  100 mg Per NG tube Daily    sodium chloride 0.9%  10 mL Intravenous Q6H    sodium chloride 0.9%  10 mL Intravenous Q6H    sodium chloride 0.9%  3 mL Intravenous Q8H     Continuous Infusions:   PRN Meds:acetaminophen, albuterol-ipratropium 2.5mg-0.5mg/3mL, hydrALAZINE, magnesium sulfate IVPB, magnesium sulfate IVPB, ondansetron, potassium chloride 10%, potassium chloride 10%, potassium chloride 10%, potassium, sodium phosphates, potassium, sodium phosphates, potassium, sodium phosphates, ramelteon, Flushing PICC Protocol **AND** sodium chloride 0.9% **AND** sodium chloride 0.9%, Flushing PICC Protocol **AND** sodium chloride 0.9% **AND** sodium chloride 0.9%    Objective:     Vital Signs (Most Recent):  Temp: 98.7 °F (37.1 °C) (04/03/17 1500)  Pulse: 90 (04/03/17 1500)  Resp: 20 (04/03/17 1500)  BP: 135/62 (04/03/17 1555)  SpO2: 97 % (04/03/17 1500)  BP Location: Right arm    Vital Signs Range (Last 24H):  Temp:  [98 °F (36.7 °C)-98.7 °F (37.1 °C)]   Pulse:  [68-92]   Resp:  [16-31]   BP: (105-162)/(59-89)   SpO2:  [94 %-98 %]   BP  Location: Right arm    Physical Exam   Constitutional: He appears well-developed and well-nourished.   Eyes: Pupils are equal, round, and reactive to light.   Neck: Normal range of motion. Neck supple.   Cardiovascular: Normal rate.    Pulmonary/Chest: Effort normal.   Musculoskeletal: Normal range of motion.   Skin: Skin is warm and dry.       Neurological Exam:   LOC: alert but not following commands today   Language: Global aphasia  Speech: Dysarthria  EOM (CN III, IV, VI): Gaze preference left  Pupils (CN III, IV, VI): PERRL  Facial Movement (CN VII):5/5 strength in all extremities   Sensation: intact to light touch, temperature and vibration  Tone: Arm-Left: normal; Leg-Left: normal; Arm-Right: normal; Leg-Right: normal    NIH Stroke Scale:    Level of Consciousness: 0 - alert  LOC Questions: 2 - answers none correctly  LOC Commands: 2 - performs neither correctly  Best Gaze: 1 - partial gaze palsy  Visual: 0 - no visual loss  Facial Palsy: 1 - minor  Motor Left Arm: 0 - no drift  Motor Right Arm: 0 - no drift  Motor Left Le - no drift  Motor Right Le - no drift  Limb Ataxia: 0 - absent  Sensory: 0 - normal  Best Language: 2 - severe aphasia  Dysarthria: 1 - mild to moderate dysarthria  Extinction and Inattention: 0 - no neglect  NIH Stroke Scale Total: 9      Laboratory:  CMP:     Recent Labs  Lab 17  040   CALCIUM 8.8   ALBUMIN 2.7*   PROT 6.8      K 3.8   CO2 26      BUN 14   CREATININE 0.7   ALKPHOS 90   ALT 85*   AST 46*   BILITOT 0.4     BMP:     Recent Labs  Lab 17  040      K 3.8      CO2 26   BUN 14   CREATININE 0.7   CALCIUM 8.8     CBC:     Recent Labs  Lab 17  040   WBC 12.97*   RBC 3.87*   HGB 12.0*   HCT 34.5*   *   MCV 89   MCH 31.0   MCHC 34.8     Lipid Panel: No results for input(s): CHOL, LDLCALC, HDL, TRIG in the last 168 hours.  Coagulation:     Recent Labs  Lab 17  040   INR 1.0     Platelet Aggregation Study: No results for  input(s): PLTAGG, PLTAGINTERP, PLTAGREGLACO, ADPPLTAGGREG in the last 168 hours.  Hgb A1C: No results for input(s): HGBA1C in the last 168 hours.  TSH: No results for input(s): TSH in the last 168 hours.    Diagnostic Results:  I have personally reviewed: CT Head. Date: 3/26/17  Left MCA territory infarct with associated left basal ganglia intraparenchymal hemorrhage and intraventricular extension.

## 2017-04-03 NOTE — PROGRESS NOTES
ICU Progress Note  Neurocritical Care    Admit Date: 3/18/2017  LOS: 16    CC: Thrombotic stroke involving left middle cerebral artery    Code Status: Full Code     SUBJECTIVE:     Interval History/Significant Events: NAEON.  Plan to transfer to vascular neurology today.     Review of Symptoms: unable to assess due to mental status    Medications:  Continuous Infusions:     Scheduled Meds:   amlodipine  10 mg Per NG tube Daily    aspirin  81 mg Per NG tube Daily    atorvastatin  80 mg Per NG tube Daily    clopidogrel  75 mg Per NG tube Daily    heparin (porcine)  5,000 Units Subcutaneous Q8H    hydrALAZINE  50 mg Per NG tube Q8H    lidocaine (PF) 10 mg/ml (1%)  5 mL Intradermal Once    losartan  100 mg Per NG tube Daily    sodium chloride 0.9%  10 mL Intravenous Q6H    sodium chloride 0.9%  10 mL Intravenous Q6H    sodium chloride 0.9%  3 mL Intravenous Q8H     PRN Meds:.acetaminophen, albuterol-ipratropium 2.5mg-0.5mg/3mL, hydrALAZINE, magnesium sulfate IVPB, magnesium sulfate IVPB, ondansetron, potassium chloride 10%, potassium chloride 10%, potassium chloride 10%, potassium, sodium phosphates, potassium, sodium phosphates, potassium, sodium phosphates, ramelteon, Flushing PICC Protocol **AND** sodium chloride 0.9% **AND** sodium chloride 0.9%, Flushing PICC Protocol **AND** sodium chloride 0.9% **AND** sodium chloride 0.9%    OBJECTIVE:   Vital Signs (Most Recent):   Temp: 98 °F (36.7 °C) (04/03/17 0700)  Pulse: 92 (04/03/17 1000)  Resp: (!) 21 (04/03/17 1000)  BP: 120/68 (04/03/17 1000)  SpO2: 96 % (04/03/17 1000)    Vital Signs (24h Range):   Temp:  [98 °F (36.7 °C)-98.6 °F (37 °C)] 98 °F (36.7 °C)  Pulse:  [68-92] 92  Resp:  [16-31] 21  SpO2:  [94 %-98 %] 96 %  BP: (105-154)/(61-81) 120/68    ICP/CPP (Last 24h):        I & O (Last 24h):     Intake/Output Summary (Last 24 hours) at 04/03/17 1111  Last data filed at 04/03/17 1000   Gross per 24 hour   Intake             1475 ml   Output              1261 ml   Net              214 ml     Physical Exam:  GA: Alert, comfortable, no acute distress  HEENT: No scleral icterus or JVD.  Pulmonary: Clear breath sounds  Cardiac: RRR S1 & S2 w/o rubs/murmurs/gallops.   Abdominal: Bowel sounds present x 4. No appreciable hepatosplenomegaly.  Skin: No jaundice, rashes, or visible lesions.  Neuro:  --GCS: E4 V3 M6  --Mental Status: Follows simple commands.  --CN II-XII grossly intact, dysphagia  -- PERRL  --Corneal reflex, gag, weak cough  --LUE strength: 5/5  --RUE strength: 1/5  --LLE strength: 5/5  --RLE strength: 3/5    Vent Data:        Lines/Drains/Airway:              External Urinary Catheter 03/18/17 1758 Medium (Active)   Collection Container Urimeter 3/21/2017  7:01 AM   Securement Method secured to top of thigh w/ adhesive device 3/21/2017  7:01 AM   Skin no redness;no breakdown;penis/scrotum cleansed w/ soap and water;skin barrier applied 3/21/2017  7:01 AM   Tolerance no signs/symptoms of discomfort 3/21/2017  7:01 AM   Output (mL) 20 mL 3/21/2017  9:00 AM     Nutrition/Tube Feeds (if NPO state why):  TF at goal    Labs:  ABG:   No results for input(s): PH, PO2, PCO2, HCO3, POCSATURATED, BE in the last 24 hours.  BMP:    Recent Labs  Lab 04/03/17  0404      K 3.8      CO2 26   BUN 14   CREATININE 0.7      MG 1.7   PHOS 3.1     LFT:   Lab Results   Component Value Date    AST 46 (H) 04/03/2017    ALT 85 (H) 04/03/2017    ALKPHOS 90 04/03/2017    BILITOT 0.4 04/03/2017    ALBUMIN 2.7 (L) 04/03/2017    PROT 6.8 04/03/2017     CBC:   Lab Results   Component Value Date    WBC 12.97 (H) 04/03/2017    HGB 12.0 (L) 04/03/2017    HCT 34.5 (L) 04/03/2017    MCV 89 04/03/2017     (H) 04/03/2017     Microbiology x 7d:   Microbiology Results (last 7 days)     Procedure Component Value Units Date/Time    Blood culture [[id number]] Collected:  03/24/17 1315    Order Status:  Completed Specimen:  Blood from Peripheral, Antecubital, Left Updated:   03/29/17 2012     Blood Culture, Routine No growth after 5 days.    Blood culture [[id number]] Collected:  03/24/17 1314    Order Status:  Completed Specimen:  Blood from Peripheral, Forearm, Left Updated:  03/29/17 1822     Blood Culture, Routine No growth after 5 days.        Imaging:  CT head 3/21/17  No significant interval detrimental change when compared to the CT dated 03/20/2017.    Evolving left MCA distribution infarct with persistent hemorrhagic conversion at the level of the basal ganglia and trace petechial hemorrhages about the superior aspect of the left frontal lobe. There is stable mass effect on the left lateral ventricle with minimal left to right midline shift.  No CT findings to suggest developing   hydrocephalus.    ASSESSMENT/PLAN:     Active Hospital Problems    Diagnosis  POA    *Thrombotic stroke involving left middle cerebral artery [I63.312]  Yes    Depression [F32.9]  No    Acute ischemic left MCA stroke [I63.512]  Yes    Carotid artery stenosis, symptomatic [I65.29]  Yes    Cytotoxic cerebral edema [G93.6]  Unknown    Right sided weakness [R53.1]  Unknown    Aphasia [R47.01]  Unknown    Essential hypertension [I10]  Unknown      Resolved Hospital Problems    Diagnosis Date Resolved POA   No resolved problems to display.      Neuro:   L stroke with hemorrhagic transofrmation on caudate and putamen s/p TPA, thrombectomy and stent placement  -Asa, plavix, statin  -repeat scan stable  -mental status improving, speech is improving but still has difficulty with secretions  -SLP following, patient NPO. May need PEG if dysphagia continues, discussed with son at bedside     Pulmonary:   -improvement in O2 requirements, on 3L NC  -secretions improved  -hold 3% nebs today  -cpt prn  -abg prn  No results for input(s): PH, PCO2, PO2, HCO3, POCSATURATED, BE in the last 24 hours.    Cardiac:   -SBP goal <160  -losartan 100mg daily, amlodipine 10 mg, hydralazine 50mg q8    Renal:    -cr stable,  good uop    ID:   -no growth on any cultures, leukocytosis improved, afebrile    Hem/Onc:   -partial occlusive thrombus in right common femoral vein. Repeat scan clot is no longer present. No need for ivc filter.  -continue SQH    Endocrine:    SSI q4    Fluids/Electrolytes/Nutrition/GI:   -TF  -MBSS  -replete electrolytes prn    Ppx: SQH    Dispo: Transfer to vascular neurology today.  Discussed with team.    Jolanta Ward PA-C  Neuro Critical Care  [phone]

## 2017-04-03 NOTE — PROGRESS NOTES
Pt transferred to room 702 on NSU with RN on assigned bed. On tele monitor. Pt chart accompanies pt. Pt vs stable. Pt handoff to charge Bambi HOLDEN. Pt son accompanies pt for transfer. Urine noted in collection container therefore Ludy HOLDEN notified and made aware. Pt transferred to unit in restraints and with tube feeds accompanying pt.

## 2017-04-03 NOTE — PROGRESS NOTES
Report given to Ludy HOLDEN from NSU via phone. All questions addressed. Will transfer pt to floor when bed is on the unit.

## 2017-04-03 NOTE — PLAN OF CARE
Problem: Occupational Therapy Goal  Goal: Occupational Therapy Goal  Goals with expiration date, 4/12:  Patient will increase functional independence with ADLs by performing:    Visual item location for 3/5 items on R. Met 4/3  Bed Mobility with min(a) and bed-rails as needed. MET 3/31, 4/3  -Revised: with SBA.  Supine to sit with SBA.  Stand pivot transfers with SBA.   Grooming while standing with stand by (a).  UB dressing with min(a) while seated EOB.   LB dressing with stand by (a) while seated EOB.  Patients family / caregiver will demonstrate independence and safety with assisting patient with self-care skills and functional mobility.   Patients family / caregiver will demonstrate independence with providing ROM and changes in bed positioning.   Patient and/or patients family will verbalize understanding of stroke prevention guidelines, personal risk factors and stroke warning signs via teachback method.   Outcome: Ongoing (interventions implemented as appropriate)  Goals remain appropriate. AVIS Correa 4/3/2017

## 2017-04-03 NOTE — PT/OT/SLP PROGRESS
Occupational Therapy  Treatment    Nathanael Velez   MRN: [id number]   Admitting Diagnosis: Thrombotic stroke involving left middle cerebral artery    OT Date of Treatment: 17   OT Start Time: 932  OT Stop Time: 958  OT Total Time (min): 26 min    Billable Minutes:  Therapeutic Activity 26    General Precautions: Standard, aphasia, aspiration, fall, NPO  Orthopedic Precautions: N/A  Braces: N/A    Do you have any cultural, spiritual, L[employer] day conflicts, given your current situation?: Church    Subjective:  Communicated with RN prior to session.  Pt's son, Toñito, present in session. Pt less verbal on this session.     Pain Ratin/10  Pain Rating Post-Intervention: 0/10    Objective:  Patient found with: blood pressure cuff, bowel management system, oxygen, peripheral IV, telemetry, pulse ox (continuous), NG tube (condom catheter)     Functional Mobility:  Bed Mobility:  Rolling/Turning to Left: Stand by assistance  Rolling/Turning Right: Stand by assistance  Scooting/Bridging: Contact Guard Assistance (to weight shift to EOB)  Supine to Sit: Minimum Assistance    Transfers:   Sit <> Stand Assistance: Contact Guard Assistance  Sit <> Stand Assistive Device: No Assistive Device  Bed <> Chair Technique: Stand Pivot  Bed <> Chair Transfer Assistance: Minimum Assistance (2/2 multiple lines and impaired cognition)  Bed <> Chair Assistive Device: No Assistive Device    Functional Ambulation: Min(A) for 5 steps to chair 2/2 impaired divided attention     Activities of Daily Living:  UE Dressing Level of Assistance: Moderate assistance (sitting EOB to don gown as jacket; re-edu on siddharth-technique)  LE Dressing Level of Assistance: Minimum assistance  Grooming Position: Seated, EOB  Grooming Level of Assistance: Minimum assistance (for initiation)    Additional:  -Pt alert upon arrival ; able to verbalize orientation to self only; further daily orientation provided  -Excess auditory input removed: TV off, door shut    -Completed EOB activity for R UE weightbearing and weight shifts with trunk rotation   -Encouraged bilateral coordination/integration with task with hand over hand for R initiation   -Following 100% 1-3 step verbal commands; increased difficulty with directionality  -Able to verbalize/ID 3/5 ADL objects presented with added time  -Pt able to read OT ID badge and [facility] on scrubs; presented to R of midline  -Mild edema to R UE; elevation positioning provided with extension and abduction while up in chair  -Communication board updated; call button in reach- pt's son present in room    AM-PAC 6 CLICK ADL   How much help from another person does this patient currently need?   1 = Unable, Total/Dependent Assistance  2 = A lot, Maximum/Moderate Assistance  3 = A little, Minimum/Contact Guard/Supervision  4 = None, Modified [place]/Independent    Putting on and taking off regular lower body clothing? : 3  Bathing (including washing, rinsing, drying)?: 2  Toileting, which includes using toilet, bedpan, or urinal? : 2  Putting on and taking off regular upper body clothing?: 2  Taking care of personal grooming such as brushing teeth?: 3  Eating meals?: 1  Total Score: 13     AM-PAC Raw Score CMS G-Code Modifier Level of Impairment Assistance   6 % Total / Unable   7 - 9 CM 80 - 100% Maximal Assist   10 - 14 CL 60 - 80% Moderate Assist   15 - 19 CK 40 - 60% Moderate Assist   20 - 22 CJ 20 - 40% Minimal Assist   23 CI 1-20% SBA / CGA   24 CH 0% Independent/ Mod I     Patient left up in chair with all lines intact, call button in reach, RN notified and Son present    ASSESSMENT:  Nathanael Velez is a 61 y.o. male with a medical diagnosis of Thrombotic stroke involving left middle cerebral artery and presents with R weakness, aphasia, dysphasia, impaired safety awareness and overall decline in functional indep with all daily tasks and activities. He demo fair+ participation and motivation this session. He will cont to  benefit from OT services at this time to address the following areas of concern:    Rehab identified problem list/impairments: Rehab identified problem list/impairments: impaired endurance, weakness, impaired sensation, impaired self care skills, impaired functional mobilty, gait instability, visual deficits, decreased upper extremity function, decreased lower extremity function, decreased safety awareness, edema    Rehab potential is good.    Activity tolerance: Fair+    Discharge recommendations: Discharge Facility/Level Of Care Needs: rehabilitation facility     Barriers to discharge: Barriers to Discharge: Inaccessible home environment, Decreased caregiver support    Equipment recommendations: 3-in-1 commode, bath bench, walker, rolling     GOALS:   Occupational Therapy Goals        Problem: Occupational Therapy Goal    Goal Priority Disciplines Outcome Interventions   Occupational Therapy Goal     OT, PT/OT Ongoing (interventions implemented as appropriate)    Description:  Goals with expiration date, 4/12:  Patient will increase functional independence with ADLs by performing:    Visual item location for 3/5 items on R. Met 4/3  Bed Mobility with min(a) and bed-rails as needed. MET 3/31, 4/3  -Revised: with SBA.  Supine to sit with SBA.  Stand pivot transfers with SBA.    Grooming while standing with stand by (a).  UB dressing with min(a) while seated EOB.   LB dressing with stand by (a) while seated EOB.  Patient's family / caregiver will demonstrate independence and safety with assisting patient with self-care skills and functional mobility.      Patient's family / caregiver will demonstrate independence with providing ROM and changes in bed positioning.    Patient and/or patient's family will verbalize understanding of stroke prevention guidelines, personal risk factors and stroke warning signs via teachback method.                  Plan:  Patient to be seen 6 x/week to address the above listed problems via  self-care/home management, therapeutic activities, therapeutic exercises, neuromuscular re-education, cognitive retraining, sensory integration  Plan of Care expires: 04/19/17  Plan of Care reviewed with: patient, son    Yulia PAYNE AVIS Dahl  04/03/2017

## 2017-04-03 NOTE — PROGRESS NOTES
[facility]  Vascular Neurology  Comprehensive Stroke Center  Progress Note      Neurologic Chief Complaint: L mCA stroke 2/2 carotid stenosis    Subjective:     Interval History: Patient is seen for follow-up neurological assessment and treatment recommendations:  Off of oxygen.  Now with leukocytosis but vitals stable.  Neurologically unchanged.   Patient will need modified barium swallow    HPI, Past Medical, Family, and Social History remains the same as documented in the initial encounter.     Review of Systems   Constitutional: Negative for fever.   Respiratory: Positive for cough. Negative for shortness of breath.    Neurological: Positive for facial asymmetry.     Scheduled Meds:   amlodipine  10 mg Per NG tube Daily    aspirin  81 mg Per NG tube Daily    atorvastatin  80 mg Per NG tube Daily    clopidogrel  75 mg Per NG tube Daily    heparin (porcine)  5,000 Units Subcutaneous Q8H    hydrALAZINE  50 mg Per NG tube Q8H    lidocaine (PF) 10 mg/ml (1%)  5 mL Intradermal Once    losartan  100 mg Per NG tube Daily    sodium chloride 0.9%  10 mL Intravenous Q6H    sodium chloride 0.9%  10 mL Intravenous Q6H    sodium chloride 0.9%  3 mL Intravenous Q8H     Continuous Infusions:   PRN Meds:acetaminophen, albuterol-ipratropium 2.5mg-0.5mg/3mL, hydrALAZINE, magnesium sulfate IVPB, magnesium sulfate IVPB, ondansetron, potassium chloride 10%, potassium chloride 10%, potassium chloride 10%, potassium, sodium phosphates, potassium, sodium phosphates, potassium, sodium phosphates, ramelteon, Flushing PICC Protocol **AND** sodium chloride 0.9% **AND** sodium chloride 0.9%, Flushing PICC Protocol **AND** sodium chloride 0.9% **AND** sodium chloride 0.9%    Objective:     Vital Signs (Most Recent):  Temp: 98.7 °F (37.1 °C) (04/03/17 1500)  Pulse: 90 (04/03/17 1500)  Resp: 20 (04/03/17 1500)  BP: 135/62 (04/03/17 1555)  SpO2: 97 % (04/03/17 1500)  BP Location: Right arm    Vital Signs Range  (Last 24H):  Temp:  [98 °F (36.7 °C)-98.7 °F (37.1 °C)]   Pulse:  [68-92]   Resp:  [16-31]   BP: (105-162)/(59-89)   SpO2:  [94 %-98 %]   BP Location: Right arm    Physical Exam   Constitutional: He appears well-developed and well-nourished.   Eyes: Pupils are equal, round, and reactive to light.   Neck: Normal range of motion. Neck supple.   Cardiovascular: Normal rate.    Pulmonary/Chest: Effort normal.   Musculoskeletal: Normal range of motion.   Skin: Skin is warm and dry.       Neurological Exam:   LOC: alert but not following commands today   Language: Global aphasia  Speech: Dysarthria  EOM (CN III, IV, VI): Gaze preference left  Pupils (CN III, IV, VI): PERRL  Facial Movement (CN VII):5/5 strength in all extremities   Sensation: intact to light touch, temperature and vibration  Tone: Arm-Left: normal; Leg-Left: normal; Arm-Right: normal; Leg-Right: normal    NIH Stroke Scale:    Level of Consciousness: 0 - alert  LOC Questions: 2 - answers none correctly  LOC Commands: 2 - performs neither correctly  Best Gaze: 1 - partial gaze palsy  Visual: 0 - no visual loss  Facial Palsy: 1 - minor  Motor Left Arm: 0 - no drift  Motor Right Arm: 0 - no drift  Motor Left Le - no drift  Motor Right Le - no drift  Limb Ataxia: 0 - absent  Sensory: 0 - normal  Best Language: 2 - severe aphasia  Dysarthria: 1 - mild to moderate dysarthria  Extinction and Inattention: 0 - no neglect  NIH Stroke Scale Total: 9      Laboratory:  CMP:     Recent Labs  Lab 17  0404   CALCIUM 8.8   ALBUMIN 2.7*   PROT 6.8      K 3.8   CO2 26      BUN 14   CREATININE 0.7   ALKPHOS 90   ALT 85*   AST 46*   BILITOT 0.4     BMP:     Recent Labs  Lab 17  0404      K 3.8      CO2 26   BUN 14   CREATININE 0.7   CALCIUM 8.8     CBC:     Recent Labs  Lab 17  0404   WBC 12.97*   RBC 3.87*   HGB 12.0*   HCT 34.5*   *   MCV 89   MCH 31.0   MCHC 34.8     Lipid Panel: No results for input(s): CHOL,  LDLCALC, HDL, TRIG in the last 168 hours.  Coagulation:     Recent Labs  Lab 04/03/17  0404   INR 1.0     Platelet Aggregation Study: No results for input(s): PLTAGG, PLTAGINTERP, PLTAGREGLACO, ADPPLTAGGREG in the last 168 hours.  Hgb A1C: No results for input(s): HGBA1C in the last 168 hours.  TSH: No results for input(s): TSH in the last 168 hours.    Diagnostic Results:  I have personally reviewed: CT Head. Date: 3/26/17  Left MCA territory infarct with associated left basal ganglia intraparenchymal hemorrhage and intraventricular extension.         Assessment/Plan:     61 year old male with HTN, HLD, and CAD and L MCA stroke symptoms post tpa and transferred here for higher level of care and potential thrombectomy. CTA multiphase upon arrival.   Patient found to have severe L carotid artery stenosis.  Angioplasty with stent performed and thrombectomy of L MCA occlusion.  Patient found to have L MCA stroke with reperfusion injury.  Possible aortic vegetation seen on echo but on repeat echo showed sclerotic aorta.      03/22/17  Patient drowsy, follows simple commands, unable to move RUE      03/23/17  More awake today.  Temperature and leukocytosis resolving with vancomycin and cefepime.      3/24/17  NAEON. Still with fever overnight.     3/25/17 Patient much more alert and interactive with exam. Still with labored breathing    3/27/17  Patient has a R lower extremity DVT. Unable to have IVC filter placed d/t respiratory status- on intubation watch.     3/30/17  BP elevated to 180s-200s overnight requiring PRNs for BP. Patient will need PEG. Patients respiratory status now improved and on NC. Will have repeat doppler today on R leg to reevaluate if IVC filter to be placed.     03/31/17 Still has increased secretions and cough.  Neurologically stable      04/02/17  Off of oxygen.  Now will leukocytosis.  Neurologically unchanged.   Patient will need modified barium swallow          * Thrombotic stroke involving  left middle cerebral artery  Antithrombotics for secondary stroke prevention: Aspirin 81mg, plavix 75mg qd  Statins for secondary stroke prevention and hyperlipidemia, if present: Atorvastatin- 80 mg oral daily  Aggressive risk factor modification: Hypertension, High Cholesterol and Obesity  VTE Prophylaxis: SCDs, heparin 5k q8 hours  BP Parameters: SBP <150  Nursing Orders: Neuro checks- per tpa protocol, Antiembolic stockings, Swallowing evaluation by Nursing, Seizure precautions, Avoid Rucker catheter, Pneumatic compression device, Stroke Education, Blood glucose target 100-130, Up with assistance   -ECHO initially with sclerotic lesion vs vegetation of the aortic valve. Repeat ECHO suggested valve is just sclerotic.      Rehab teams recommending Rehab facility.  Therapies recommending MBS    Carotid artery stenosis, symptomatic  S/p IR thrombectomy of distant MCA clot and angioplasty with stent on L ICA  Likely etiology of patient's L MCA stroke   Complication- With hemorrhagic conversion s/p tPA and thrombectomy/stenting      Cytotoxic cerebral edema  Evident on imaging     Aphasia  Given tpa , status post thrombectomy   Due to stroke above  Aggressive PT/OT/ST    Right sided weakness  Due to stroke above  Weakness resolving   Aggressive PT/OT/ speech      Essential hypertension  Stroke RF  <150       Depression  Likely due to stroke  Patient uninterested at times in exam  May benefit from SSRI       Edwige Monaco PA-C  Comprehensive Stroke Center  Department of Vascular Neurology   [facility]

## 2017-04-03 NOTE — PROCEDURES
DATE OF SERVICE:  03/28/2017.    ICU EEG/VIDEO MONITORING REPORT    METHODOLOGY:  Electroencephalographic (EEG) is recorded with electrodes placed   according to the International 10-20 placement system.  Thirty two (32) channels   of digital signal (sampling rate of 512/sec), including T1 and T2, were   simultaneously recorded from the scalp and may include EKG, EMG, and/or eye   monitors.  Recording band pass was 0.1 to 512 Hz.  Digital video recording of   the patient is simultaneously recorded with the EEG.  The patient is instructed   to report clinical symptoms which may occur during the recording session.  EEG   and video recording are stored and archived in digital format.  Activation   procedures, which include photic stimulation, hyperventilation and instructing   patients to perform simple tasks, are done in selected patients.    The EEG is displayed on a monitor screen and can be reviewed using different   montages.  Computer-assisted analysis is employed to detect spike and   electrographic seizure activity.   The entire record is submitted for computer   analysis.  The entire recording is visually reviewed, and the times identified   by computer analysis as being spikes or seizures are reviewed again.    Compressed spectral analysis (CSA) is also performed on the activity recorded   from each individual channel.  This is displayed as a power display of   frequencies from 0 to 30 Hz over time.   The CSA is reviewed looking for   asymmetries in power between homologous areas of the scalp, then compared with   the original EEG recording.    [employer] software was also utilized in the review of this study.  This software   suite analyzes the EEG recording in multiple domains.  Coherence and rhythmicity   are computed to identify EEG sections which may contain organized seizures.    Each channel undergoes analysis to detect the presence of spike and sharp waves   which have special and morphological  characteristics of epileptic activity.  The   routine EEG recording is converted from special into frequency domain.  This is   then displayed comparing homologous areas to identify areas of significant   asymmetry.  Algorithm to identify non-cortically generated artifact is used to   separate artifact from the EEG.    EEG NUMBER:  [id number].    REFERRING PHYSICIAN:  Brian Amaya M.D.(RES)    This EEG was performed to assess for seizures.    RECORDING TIMES:  Start on 03/28/2017, hour 16 minute 11, second 29.  End on 03/29/2017 hour 3 minute 3, second 32.  Total time of video EEG recording was 10 hours and 52 minutes.    EEG FINDINGS:  The recording was obtained with a number of standard bipolar and   referential montages during wakefulness, drowsiness and sleep.  In the alert   state, the posterior background rhythm was a symmetric, well-modulated 12 Hz   alpha rhythm, which reacted symmetrically to eye opening.  Activation procedures   were not performed.  During drowsiness, the background rhythm waxed and waned,   and there were periods of slowing.  During stage II sleep, symmetric V waves and   sleep spindles were noted.  There were no focal abnormalities.  There were no   interictal epileptiform abnormalities, and no clinical or electrographic   seizures were recorded.    The EKG channel revealed sinus rhythm.    Portions of the record were obscured by technical artifacts related to electrode   T4.  The record was unsuitable for interpretation after hour 3 and minute 3 on   03/09/2017.    IMPRESSION:  This is a normal EEG during wakefulness, drowsiness and sleep.    CLINICAL CORRELATION:  The patient is a 61-year-old male with a history of   stroke who is currently not maintained on any anti-seizure medications.  This is   a normal EEG during wakefulness, drowsiness and sleep.  There is no evidence   for either cortical dysfunction nor an epileptic process on this recording.  No   seizures were recorded  during this study.      FAK/HN  dd: 04/02/2017 12:40:52 (CDT)  td: 04/02/2017 12:55:48 (CDT)  Doc ID   #[id number]  Job ID #[id number]    CC:

## 2017-04-03 NOTE — PT/OT/SLP PROGRESS
"Physical Therapy  Treatment    Nathanael Velez   MRN: [id number]   Admitting Diagnosis: Thrombotic stroke involving left middle cerebral artery    PT Received On: 17  PT Start Time: 1435     PT Stop Time: 1458    PT Total Time (min): 23 min       Billable Minutes:  Gait [id number]    Treatment Type: Treatment  PT/PTA: PT         General Precautions: Standard, aphasia, aspiration, fall, NPO  Orthopedic Precautions: N/A   Braces: N/A    Do you have any cultural, spiritual, Evangelical conflicts, given your current situation?: Jainism    Subjective:  Communicated with RN prior to session.  "Okay." Pt minimally conversive this session.  Pt's family member sleeping on sofa during session.    Pain Ratin/10  Pain Rating Post-Intervention: 0/10    Objective:   Patient found with: blood pressure cuff, telemetry, pulse ox (continuous), bowel management system (condom catheter, BUE wrist restraints)    Functional Mobility:  Pt found supine, watching tv.  Bed Mobility:    Supine <> Sit: From longsitting to short sitting: Contact Guard Assistance   Sit <> Supine: Contact Guard Assistance   Comments: pt requires tactile cues for command following    Transfers:    Sit <> Stand: x 3 trials from EOB with Contact Guard Assistance with No Assistive Device and Rolling Walker    Stand <> Sit: x 3 trials to EOB with Contact Guard Assistance with No Assistive Device and Rolling Walker   Comments: Pt requires tactile support of LUE.    Gait:   Distance Ambulated: 10ft x 4 trials with turns incorporated.    Assistance level: Contact Guard Assistance   Assistive Device used:  No Assistive Device and Rolling Walker   Gait Pattern: swing-through gait   Gait Deviation(s): decreased hany   Impairments due to: decreased RLE strength and awareness   Comments: first 2 trials, pt with RW, he did not maintain contact with RUE on RW. Last 2 trials, PT supported RUE, pt walking with more fluid gait pattern.    Therapeutic Activities and " Exercises:  Education:  Patient provided with daily orientation and goals of this PT session. Patient agreed to participate in session.Patient aware of patient's deficits and therapy progression. They were educated to transfer with RN/PCT only; min A of 1 person. They were provided and educated on proper positioning in supine and in sitting with support of affected RUE extremities in order to increase awareness of extremity and to decrease the effects of immobility, specifically edema and pain. Time provided for therapeutic counseling and discussion of health disposition. All questions answered to patient's satisfaction, within scope of PT practice; voiced no other concerns. White board updated in patient's room, RN notified of session.    AM-PAC 6 CLICK MOBILITY  How much help from another person does this patient currently need?   1 = Unable, Total/Dependent Assistance  2 = A lot, Maximum/Moderate Assistance  3 = A little, Minimum/Contact Guard/Supervision  4 = None, Modified [place]/Independent    Turning over in bed (including adjusting bedclothes, sheets and blankets)?: 3  Sitting down on and standing up from a chair with arms (e.g., wheelchair, bedside commode, etc.): 3  Moving from lying on back to sitting on the side of the bed?: 3  Moving to and from a bed to a chair (including a wheelchair)?: 3  Need to walk in hospital room?: 3  Climbing 3-5 steps with a railing?: 2  Total Score: 17    AM-PAC Raw Score CMS G-Code Modifier Level of Impairment Assistance   6 % Total / Unable   7 - 9 CM 80 - 100% Maximal Assist   10 - 14 CL 60 - 80% Moderate Assist   15 - 19 CK 40 - 60% Moderate Assist   20 - 22 CJ 20 - 40% Minimal Assist   23 CI 1-20% SBA / CGA   24 CH 0% Independent/ Mod I     Patient left with bed in chair position with all lines intact, call button in reach, bed alarm on, restraints reapplied at end of session and RN notified.    Assessment:  Nathanael Velez is a 61 y.o. male with a medical  diagnosis of Thrombotic stroke involving left middle cerebral artery. Patient is making progress towards goals, participating well in this session. Pt with noted improvement with transfers and ambulation. Mr. Velez's deficits affect their ability to safely and independently participate in self-care tasks and functional mobility. Due to his physical therapy diagnosis of debility and deconditioning, they continue to benefit from acute PT services to address the following limitations: high fall risk, weakness, instability, the need for supervised instructions of exercise, and the decreased ability to perform functional activities which they were able to complete PTA. Education was provided to patient regarding importance of continued participation in therapy, patient's progress and discharge disposition. Pt would benefit from IP Rehab upon discharge to improve quality of life and focus on recovery of impairments.     Rehab identified problem list/impairments: Rehab identified problem list/impairments: weakness, impaired sensation, impaired self care skills, impaired functional mobilty, gait instability, impaired balance, decreased upper extremity function, decreased lower extremity function, decreased safety awareness    Rehab potential is good.    Activity tolerance: Good    Discharge recommendations: Discharge Facility/Level Of Care Needs: rehabilitation facility     Barriers to discharge: Barriers to Discharge: Inaccessible home environment, Decreased caregiver support    Equipment recommendations: Equipment Needed After Discharge: 3-in-1 commode, bath bench, walker, rolling     GOALS:   Physical Therapy Goals        Problem: Physical Therapy Goal    Goal Priority Disciplines Outcome Goal Variances Interventions   Physical Therapy Goal     PT/OT, PT Ongoing (interventions implemented as appropriate)     Description:  Active Goals to be met by: 4/18/17    1. Supine to sit with CGA.  2. Sit to supine with Supervision.  NOT MET  3. Sit to stand with QCN with CGA.  4. Pt to perform gait 5ft with appropriate AD with max assist.-met 4/1/17  Revised: Pt to perform gait 75ft with RW with CGA. NOT MET  5. Pt to transfer bed to/from bedside chair with max assist.- met 4/1/17  Revised: pt to transfer to bedside chair via stand pivot with min A with RW. NOT MET  6. Able to tolerate exercise for 15-20 reps with independence.  7. Dynamic sitting at edge of bed x 8 minutes with Stand-by Assistance.  8. Dynamic standing for 2 minutes with Contact Guard Assistance using Quad Cane.  9. Patient and family able to teachback stroke & positioning education independently.  10. Wheelchair propulsion x 100 feet with Minimal Assistance using bilateral lower extremities--Discontinued    MET Goals:  Pt supine to sit with moderate assist- MET 3/21  Supine to sit to Minimal Assist. MET  Pt sit to supine with moderate assist-MET 3/21  Pt sit to stand with appropriate AD with moderate assist. Met    Pt to perform B LE exs in sitting or supine x 10 reps with AAROM on R LE. MET               PLAN:    Patient to be seen 6 x/week  to address the above listed problems via gait training, therapeutic activities, therapeutic exercises, neuromuscular re-education, wheelchair management/training  Plan of Care expires: 04/18/17  Plan of Care reviewed with: patient     Idania TENZIN Dumas PT, DPT  4/4/2017  Pager: [phone]

## 2017-04-03 NOTE — PROGRESS NOTES
Attempt x2 to give phone report to Ludy HOLDEN on [facility], RN states she is very busy with current pt and will call me when ready to take report. Brandon, ravi on [facility], notified and made aware.

## 2017-04-03 NOTE — PLAN OF CARE
04/03/17 0935   Right Care Assessment   Can the patient answer the patient profile reliably? No, cognitively impaired   How often would a person be available to care for the patient? Often   Describe the patient's ability to walk at the present time. Major restrictions/daily assistance from another person   How does the patient rate their overall health at the present time? (xiang)   Number of comorbid conditions (as recorded on the chart) One   During the past month, has the patient often been bothered by feeling down, depressed or hopeless? (xiang)   During the past month, has the patient often been bothered by little interest or pleasure in doing things? (xiang)   Have you felt little interest or pleasure in doing things? Unable to assess     Discharge Facility/Level Of Care Needs: rehabilitation facility     [facility]  [phone]

## 2017-04-03 NOTE — PLAN OF CARE
Problem: SLP Goal  Goal: SLP Goal  SLP Plan of Care: Speech Pathology will follow pt daily M-F and address the following goals x1 week:  3/27  1. Pt will participate in ongoing swallow assessment to determine least restrictive diet. ONGIONG  2. Pt will answer 3/4 orientation questions with mod assistance to improve cognitive function. NOT MET/DCED  3. Pt will follow simple directives with 60% acc indep provided mod cues to improve receptive language. MET/UPDATED  4. Pt will complete automatic rote speech (counting) with 60% acc indep provided mod cues to improve expressive language. ONGOING  5. When deemed appropriate by MD/SLP pt will participate in MBS to further determine safe swallow function. ONGOING    SLP Plan of Care: Speech Pathology will follow pt 5x/week and address the following goals x1 week:  4/5  1. Pt will participate in ongoing swallow assessment to determine least restrictive diet.  ONGOING  2. Pt will answer simple YN questions with 70% acc provided min cues to improve receptive language. ONGOING  3. Pt will follow 2-step directives with 60% acc indep provided mod cues to improve receptive language. MET/MODIFIED  4. Pt will complete automatic rote speech (counting) with 60% acc indep provided mod cues to improve expressive language. MET/MODIFIED  5. When deemed appropriate by MD/SLP pt will participate in MBS to further determine safe swallow function. ONGOING    SLP Plan of Care: Speech Pathology will follow pt 5x/week and address the following goals x1 week:  4/10  1. Pt will participate in ongoing swallow assessment to determine least restrictive diet.   2. Pt will answer simple YN questions with 70% acc provided min cues to improve receptive language.   3. Pt will follow 2-step directives with 80% acc indep provided mod cues to improve receptive language.  4. Pt will complete automatic rote speech/song with 90% acc indep provided mod cues to improve expressive language.   5. When deemed  appropriate by MD/SLP pt will participate in MBS to further determine safe swallow function.          Pt with continued progress towards goals. Updated POC.     Brenda Card M.A. CCC-SLP  Speech Language Pathologist  [phone]  4/3/2017

## 2017-04-03 NOTE — PHYSICIAN QUERY
PT Name: Nathanael Velez  MR #: [id number]    Physician Query Form - Vascular Condition Clarification     CDS/: Zari Rey               Contact information: [email or web address]    This form is a permanent document in the medical record.     Query Date: April 3, 2017    DC summary 4/6 DVT POA - yes  By submitting this query, we are merely seeking further clarification of documentation. Please utilize your independent clinical judgment when addressing the question(s) below.    The medical record contains the following:  Indicators Supporting Clinical Findings Location in Medical Record   x Transfer from outside facility: [facility] H&P 3/18   x Surgery or Procedure performed: presents after receiving tPA at [facility] for CTA multiphase /sp L MCA thrombectomy H&P 3/18   x DVT documented Right lower extremity DVT PN 3/27-4/2    Pulmonary Embolism documented      [place] filter documented      CT, V/Q scan results:     x Ultrasound results: Partially occlusive thrombus within the right common femoral vein US 3/26   x Medications/Treatment Heparin, ASA,Plavix PN 3/27-4/2   x Other Thrombotic stroke involving left middle cerebral artery / Embolic stroke involving left middle cerebral artery H&P 3/18          Please clarify if the ____right lower extremity DVT_______________________ is                 [   ]   Present on Admission               [   ]   Not Present on Admission               [   ]   Iatrogenic or Complication of procedure               [   ]   Other: ____________________________               [   ]   Clinically insignificant                       Please document in your progress notes daily for the duration of treatment, until resolved, and include in your discharge summary.

## 2017-04-03 NOTE — PT/OT/SLP PROGRESS
Speech Language Pathology  Treatment    Nathanael Velez   MRN: [id number]   Admitting Diagnosis: Thrombotic stroke involving left middle cerebral artery    Diet recommendations: Solid Diet Level: NPO  Liquid Diet Level: NPO   Recommending MBS /    SLP Treatment Date: 17  Speech Start Time: 1119     Speech Stop Time: 1135     Speech Total (min): 16 min       TREATMENT BILLABLE MINUTES:  Treatment Swallowing Dysfunction 8 and Self Care 8    Has the patient been evaluated by SLP for swallowing? : Yes  Keep patient NPO?: Yes   General Precautions: Standard, aphasia, aspiration, fall, NPO        Subjective:  Pt awake; pleasant    Pain Ratin/10              Pain Rating Post-Intervention: 0/10    Objective:     Pt pulled NG prior to entry to room. Pt with mild wet phonation upon entry to room suggesting decreased tolerance of saliva; inconsistent throat clears. Pt tolerated single ice chip X2 with immediate cough X1 and delayed, weak cough X1. Pt tolerated 1/4 spoonful puree X2 without cough; single swallows. Oral phase cb mild decreased seal over spoon with mild impairment in manipulation; slow AP transit. No further PO trials attempted 2/2 aspiration risk. Skilled education provided on aspiration precautions and diet recommendations to pt, son , and RN. Whiteboard updated with diet recommendations/aspiration precautions. No further questions.          Per chart, it is anticipated pt to step down to Stroke service today. Called Arlet from Stroke and discussed recs for MBS once pt is transferred.                      Assessment:  Nathanael Velez is a 61 y.o. male with a medical diagnosis of Thrombotic stroke involving left middle cerebral artery and presents with dysphagia, aphasia, apraxia, cognitive impairment, dysarthria. continued progress towards goals.      Discharge recommendations: Discharge Facility/Level Of Care Needs: rehabilitation facility (pending PTOT recs)     Goals:   SLP Goals        Problem: SLP Goal     Goal Priority Disciplines Outcome   SLP Goal     SLP Ongoing (interventions implemented as appropriate)   Description:  SLP Plan of Care: Speech Pathology will follow pt daily M-F and address the following goals x1 week:  3/27  1. Pt will participate in ongoing swallow assessment to determine least restrictive diet. ONGIONG  2. Pt will answer 3/4 orientation questions with mod assistance to improve cognitive function. NOT MET/DC'ED  3. Pt will follow simple directives with 60% acc indep provided mod cues to improve receptive language. MET/UPDATED  4. Pt will complete automatic rote speech (counting) with 60% acc indep provided mod cues to improve expressive language. ONGOING  5. When deemed appropriate by MD/SLP pt will participate in MBS to further determine safe swallow function. ONGOING    SLP Plan of Care: Speech Pathology will follow pt 5x/week and address the following goals x1 week:  4/5  1. Pt will participate in ongoing swallow assessment to determine least restrictive diet.   2. Pt will answer simple YN questions with 70% acc provided min cues to improve receptive language.   3. Pt will follow 2-step directives with 60% acc indep provided mod cues to improve receptive language.  4. Pt will complete automatic rote speech (counting) with 60% acc indep provided mod cues to improve expressive language.   5. When deemed appropriate by MD/SLP pt will participate in MBS to further determine safe swallow function.     SLP Plan of Care: Speech Pathology will follow pt 5x/week and address the following goals x1 week:  4/10  1. Pt will participate in ongoing swallow assessment to determine least restrictive diet.   2. Pt will answer simple YN questions with 70% acc provided min cues to improve receptive language.   3. Pt will follow 2-step directives with 80% acc indep provided mod cues to improve receptive language.  4. Pt will complete automatic rote speech/song with 90% acc indep provided mod cues to improve  expressive language.   5. When deemed appropriate by MD/SLP pt will participate in MBS to further determine safe swallow function.                    Plan:   Patient to be seen Therapy Frequency: 5 x/week   Plan of Care expires: 04/17/17  Plan of Care reviewed with: patient, son  SLP Follow-up?: Yes  SLP - Next Visit Date: 04/04/17          Brenda Card M.A. CCC-SLP  Speech Language Pathologist  [phone]  4/3/2017

## 2017-04-04 ENCOUNTER — ANESTHESIA EVENT (OUTPATIENT)
Dept: SURGERY | Facility: HOSPITAL | Age: 62
DRG: 023 | End: 2017-04-04
Payer: COMMERCIAL

## 2017-04-04 LAB
ALBUMIN SERPL BCP-MCNC: 2.9 G/DL
ALP SERPL-CCNC: 98 U/L
ALT SERPL W/O P-5'-P-CCNC: 70 U/L
ANION GAP SERPL CALC-SCNC: 10 MMOL/L
AST SERPL-CCNC: 47 U/L
BASOPHILS # BLD AUTO: 0.04 K/UL
BASOPHILS NFR BLD: 0.3 %
BILIRUB SERPL-MCNC: 0.5 MG/DL
BILIRUB UR QL STRIP: NEGATIVE
BUN SERPL-MCNC: 18 MG/DL
CALCIUM SERPL-MCNC: 9 MG/DL
CHLORIDE SERPL-SCNC: 103 MMOL/L
CLARITY UR REFRACT.AUTO: ABNORMAL
CO2 SERPL-SCNC: 23 MMOL/L
COLOR UR AUTO: YELLOW
CREAT SERPL-MCNC: 0.8 MG/DL
DIFFERENTIAL METHOD: ABNORMAL
EOSINOPHIL # BLD AUTO: 0.2 K/UL
EOSINOPHIL NFR BLD: 1.4 %
ERYTHROCYTE [DISTWIDTH] IN BLOOD BY AUTOMATED COUNT: 13.4 %
EST. GFR  (AFRICAN AMERICAN): >60 ML/MIN/1.73 M^2
EST. GFR  (NON AFRICAN AMERICAN): >60 ML/MIN/1.73 M^2
GLUCOSE SERPL-MCNC: 106 MG/DL
GLUCOSE UR QL STRIP: NEGATIVE
HCT VFR BLD AUTO: 38.6 %
HGB BLD-MCNC: 12.9 G/DL
HGB UR QL STRIP: NEGATIVE
INR PPP: 1
KETONES UR QL STRIP: NEGATIVE
LEUKOCYTE ESTERASE UR QL STRIP: NEGATIVE
LYMPHOCYTES # BLD AUTO: 2.1 K/UL
LYMPHOCYTES NFR BLD: 13.8 %
MAGNESIUM SERPL-MCNC: 2.6 MG/DL
MCH RBC QN AUTO: 31.1 PG
MCHC RBC AUTO-ENTMCNC: 33.4 %
MCV RBC AUTO: 93 FL
MICROSCOPIC COMMENT: NORMAL
MONOCYTES # BLD AUTO: 1.1 K/UL
MONOCYTES NFR BLD: 7.7 %
NEUTROPHILS # BLD AUTO: 11.3 K/UL
NEUTROPHILS NFR BLD: 76.8 %
NITRITE UR QL STRIP: NEGATIVE
PH UR STRIP: 6 [PH] (ref 5–8)
PHOSPHATE SERPL-MCNC: 3.2 MG/DL
PLATELET # BLD AUTO: 459 K/UL
PLATELET BLD QL SMEAR: ABNORMAL
PMV BLD AUTO: 10.3 FL
POCT GLUCOSE: 129 MG/DL (ref 70–110)
POCT GLUCOSE: 137 MG/DL (ref 70–110)
POCT GLUCOSE: 275 MG/DL (ref 70–110)
POCT GLUCOSE: 92 MG/DL (ref 70–110)
POTASSIUM SERPL-SCNC: 4.2 MMOL/L
PROT SERPL-MCNC: 7.6 G/DL
PROT UR QL STRIP: NEGATIVE
PROTHROMBIN TIME: 10.7 SEC
RBC # BLD AUTO: 4.15 M/UL
RBC #/AREA URNS AUTO: 1 /HPF (ref 0–4)
SODIUM SERPL-SCNC: 136 MMOL/L
SP GR UR STRIP: 1.02 (ref 1–1.03)
URN SPEC COLLECT METH UR: ABNORMAL
UROBILINOGEN UR STRIP-ACNC: ABNORMAL EU/DL
WBC # BLD AUTO: 14.81 K/UL
WBC #/AREA URNS AUTO: 3 /HPF (ref 0–5)

## 2017-04-04 PROCEDURE — 85025 COMPLETE CBC W/AUTO DIFF WBC: CPT

## 2017-04-04 PROCEDURE — 25000003 PHARM REV CODE 250: Performed by: NURSE PRACTITIONER

## 2017-04-04 PROCEDURE — 25000003 PHARM REV CODE 250: Performed by: PSYCHIATRY & NEUROLOGY

## 2017-04-04 PROCEDURE — 25000003 PHARM REV CODE 250: Performed by: STUDENT IN AN ORGANIZED HEALTH CARE EDUCATION/TRAINING PROGRAM

## 2017-04-04 PROCEDURE — 25000003 PHARM REV CODE 250

## 2017-04-04 PROCEDURE — G8997 SWALLOW GOAL STATUS: HCPCS | Mod: CL

## 2017-04-04 PROCEDURE — 80053 COMPREHEN METABOLIC PANEL: CPT

## 2017-04-04 PROCEDURE — 83735 ASSAY OF MAGNESIUM: CPT

## 2017-04-04 PROCEDURE — 87086 URINE CULTURE/COLONY COUNT: CPT

## 2017-04-04 PROCEDURE — 84100 ASSAY OF PHOSPHORUS: CPT

## 2017-04-04 PROCEDURE — 63600175 PHARM REV CODE 636 W HCPCS: Performed by: PHYSICIAN ASSISTANT

## 2017-04-04 PROCEDURE — 20600001 HC STEP DOWN PRIVATE ROOM

## 2017-04-04 PROCEDURE — 36415 COLL VENOUS BLD VENIPUNCTURE: CPT

## 2017-04-04 PROCEDURE — 85610 PROTHROMBIN TIME: CPT

## 2017-04-04 PROCEDURE — 81001 URINALYSIS AUTO W/SCOPE: CPT

## 2017-04-04 PROCEDURE — 92611 MOTION FLUOROSCOPY/SWALLOW: CPT

## 2017-04-04 PROCEDURE — 99233 SBSQ HOSP IP/OBS HIGH 50: CPT | Mod: ,,, | Performed by: PHYSICIAN ASSISTANT

## 2017-04-04 PROCEDURE — 87040 BLOOD CULTURE FOR BACTERIA: CPT | Mod: 59

## 2017-04-04 PROCEDURE — G8996 SWALLOW CURRENT STATUS: HCPCS | Mod: CM

## 2017-04-04 PROCEDURE — 25000003 PHARM REV CODE 250: Performed by: PHYSICIAN ASSISTANT

## 2017-04-04 PROCEDURE — 97116 GAIT TRAINING THERAPY: CPT

## 2017-04-04 RX ORDER — INSULIN ASPART 100 [IU]/ML
1-10 INJECTION, SOLUTION INTRAVENOUS; SUBCUTANEOUS EVERY 6 HOURS PRN
Status: DISCONTINUED | OUTPATIENT
Start: 2017-04-04 | End: 2017-04-06 | Stop reason: HOSPADM

## 2017-04-04 RX ORDER — FLUOXETINE HYDROCHLORIDE 20 MG/1
20 CAPSULE ORAL DAILY
Status: DISCONTINUED | OUTPATIENT
Start: 2017-04-05 | End: 2017-04-05

## 2017-04-04 RX ORDER — GLUCAGON 1 MG
1 KIT INJECTION
Status: DISCONTINUED | OUTPATIENT
Start: 2017-04-04 | End: 2017-04-06 | Stop reason: HOSPADM

## 2017-04-04 RX ADMIN — Medication 3 ML: at 05:04

## 2017-04-04 RX ADMIN — HYDRALAZINE HYDROCHLORIDE 50 MG: 50 TABLET ORAL at 10:04

## 2017-04-04 RX ADMIN — ASPIRIN 81 MG CHEWABLE TABLET 81 MG: 81 TABLET CHEWABLE at 09:04

## 2017-04-04 RX ADMIN — INSULIN ASPART 6 UNITS: 100 INJECTION, SOLUTION INTRAVENOUS; SUBCUTANEOUS at 12:04

## 2017-04-04 RX ADMIN — Medication 10 ML: at 05:04

## 2017-04-04 RX ADMIN — HYDRALAZINE HYDROCHLORIDE 50 MG: 50 TABLET ORAL at 03:04

## 2017-04-04 RX ADMIN — Medication 10 ML: at 11:04

## 2017-04-04 RX ADMIN — HEPARIN SODIUM 5000 UNITS: 5000 INJECTION, SOLUTION INTRAVENOUS; SUBCUTANEOUS at 05:04

## 2017-04-04 RX ADMIN — LOSARTAN POTASSIUM 100 MG: 50 TABLET, FILM COATED ORAL at 09:04

## 2017-04-04 RX ADMIN — CLOPIDOGREL 75 MG: 75 TABLET, FILM COATED ORAL at 09:04

## 2017-04-04 RX ADMIN — Medication 10 ML: at 06:04

## 2017-04-04 RX ADMIN — HEPARIN SODIUM 5000 UNITS: 5000 INJECTION, SOLUTION INTRAVENOUS; SUBCUTANEOUS at 03:04

## 2017-04-04 RX ADMIN — ATORVASTATIN CALCIUM 80 MG: 20 TABLET, FILM COATED ORAL at 09:04

## 2017-04-04 RX ADMIN — HYDRALAZINE HYDROCHLORIDE 50 MG: 50 TABLET ORAL at 05:04

## 2017-04-04 RX ADMIN — HEPARIN SODIUM 5000 UNITS: 5000 INJECTION, SOLUTION INTRAVENOUS; SUBCUTANEOUS at 10:04

## 2017-04-04 RX ADMIN — Medication 3 ML: at 10:04

## 2017-04-04 RX ADMIN — AMLODIPINE BESYLATE 10 MG: 10 TABLET ORAL at 09:04

## 2017-04-04 RX ADMIN — Medication 10 ML: at 12:04

## 2017-04-04 NOTE — SUBJECTIVE & OBJECTIVE
Neurologic Chief Complaint: L MCA      Subjective:     Interval History: Patient is seen for follow-up neurological assessment and treatment recommendations: failed mbss but verbalizing well. Patient agreed to peg after discussion regarding need for placement post mbss     HPI, Past Medical, Family, and Social History remains the same as documented in the initial encounter.     Review of Systems   Constitutional: Negative for chills and fever.   Neurological: Positive for facial asymmetry, speech difficulty and weakness.   Psychiatric/Behavioral: Positive for dysphoric mood.     Scheduled Meds:   amlodipine  10 mg Per NG tube Daily    aspirin  81 mg Per NG tube Daily    atorvastatin  80 mg Per NG tube Daily    clopidogrel  75 mg Per NG tube Daily    heparin (porcine)  5,000 Units Subcutaneous Q8H    hydrALAZINE  50 mg Per NG tube Q8H    losartan  100 mg Per NG tube Daily    sodium chloride 0.9%  10 mL Intravenous Q6H    sodium chloride 0.9%  10 mL Intravenous Q6H    sodium chloride 0.9%  3 mL Intravenous Q8H     Continuous Infusions:   PRN Meds:acetaminophen, albuterol-ipratropium 2.5mg-0.5mg/3mL, dextrose 50%, glucagon (human recombinant), insulin aspart, ondansetron, ramelteon, Flushing PICC Protocol **AND** sodium chloride 0.9% **AND** sodium chloride 0.9%, Flushing PICC Protocol **AND** sodium chloride 0.9% **AND** sodium chloride 0.9%    Objective:     Vital Signs (Most Recent):  Temp: 98.2 °F (36.8 °C) (04/04/17 1130)  Pulse: 72 (04/04/17 1130)  Resp: 20 (04/04/17 1130)  BP: (!) 141/65 (04/04/17 1130)  SpO2: 98 % (04/04/17 1130)  BP Location: Right arm    Vital Signs Range (Last 24H):  Temp:  [97.2 °F (36.2 °C)-98.2 °F (36.8 °C)]   Pulse:  [68-90]   Resp:  [14-24]   BP: (115-144)/(59-70)   SpO2:  [94 %-98 %]   BP Location: Right arm    Physical Exam   Constitutional: He appears well-developed and well-nourished.   HENT:   Head: Normocephalic and atraumatic.   Cardiovascular: Normal rate.     Pulmonary/Chest: Effort normal.   Neurological: He is alert.   Skin: Skin is warm.       Neurological Exam:   LOC: alert and follows requests  Language: Expressive aphasia  Speech: Dysarthria  Orientation: Person, Place, Time  Facial Movement (CN VII): lower weakness right lower  Motor*: Arm Left:  Normal (5/5), Leg Left:   Normal (5/5), Arm Right:   Paretic:  4/5, Leg Right:   Paretic:  4/5    NIH Stroke Scale:    Level of Consciousness: 0 - alert  LOC Questions: 1 - answers one correctly  LOC Commands: 0 - performs both correctly  Best Gaze: 0 - normal  Visual: 0 - no visual loss  Facial Palsy: 1 - minor  Motor Left Arm: 0 - no drift  Motor Right Arm: 1 - drift  Motor Left Le - no drift  Motor Right Le - drift  Limb Ataxia: 0 - absent  Sensory: 0 - normal  Best Language: 1 - mild to moderate aphasia  Dysarthria: 1 - mild to moderate dysarthria  Extinction and Inattention: 0 - no neglect  NIH Stroke Scale Total: 6      Laboratory:  CMP:   Recent Labs  Lab 17  035   CALCIUM 9.0   ALBUMIN 2.9*   PROT 7.6      K 4.2   CO2 23      BUN 18   CREATININE 0.8   ALKPHOS 98   ALT 70*   AST 47*   BILITOT 0.5     CBC:   Recent Labs  Lab 17  035   WBC 14.81*   RBC 4.15*   HGB 12.9*   HCT 38.6*   *   MCV 93   MCH 31.1*   MCHC 33.4     Lipid Panel: No results for input(s): CHOL, LDLCALC, HDL, TRIG in the last 168 hours.  Coagulation:   Recent Labs  Lab 17  0355   INR 1.0     Platelet Aggregation Study: No results for input(s): PLTAGG, PLTAGINTERP, PLTAGREGLACO, ADPPLTAGGREG in the last 168 hours.  Hgb A1C: No results for input(s): HGBA1C in the last 168 hours.  TSH: No results for input(s): TSH in the last 168 hours.    Diagnostic Results:  I have personally reviewed:  No new imaging for review

## 2017-04-04 NOTE — ANESTHESIA PREPROCEDURE EVALUATION
04/04/2017    Pre-operative evaluation for PEG TUBE PLACEMENT/REPLACEMENT (N/A)     Nathanael Velez is a 61 y.o. male with a pmhx of HTN, HLD and CAD who presented with L MCA stroke s/p tPA. Angioplasty with stent performed and thrombectomy of L MCA occlusion. Patient found to have L MCA stroke with reperfusion injury. Pt also found to have severe L carotid artery stenosis. Now presenting for procedrue above after failing MBSS    No airway on file    LDA: L basilic PICC, NG tube    Vital Signs Range (Last 24H):  Temp:  [36.2 °C (97.2 °F)-36.8 °C (98.2 °F)]   Pulse:  [68-90]   Resp:  [14-24]   BP: (115-144)/(59-70)   SpO2:  [94 %-98 %]       CBC:     Recent Labs  Lab 03/18/17  1609 03/19/17  0220 03/20/17  0434 03/21/17  0122 03/22/17  0146 03/23/17  0308 03/24/17  0208 03/25/17  0248 03/26/17  0338 03/27/17  0237 03/28/17  0305 03/29/17  0208 03/30/17  0223 03/31/17  0324 04/01/17  0422 04/02/17  0412 04/03/17  0404 04/04/17  0354   WBC 11.34 14.81* 15.53* 14.09* 14.80* 12.04 14.70* 14.40* 19.28* 12.92* 13.70* 12.15 11.36 10.71 10.70 12.76* 12.97* 14.81*   RBC 4.91 5.14 4.73 4.28* 3.96* 3.83* 3.78* 3.77* 4.09* 3.59* 3.54* 3.36* 3.58* 3.68* 3.83* 3.82* 3.87* 4.15*   HGB 15.6 16.4 14.8 13.5* 12.4* 12.0* 11.8* 11.9* 12.7* 11.3* 11.1* 10.7* 11.2* 11.6* 11.7* 11.9* 12.0* 12.9*   HCT 44.7 49.2 43.9 38.5* 37.3* 35.6* 35.6* 35.2* 38.7* 33.2* 33.2* 30.8* 31.9* 32.9* 35.9* 34.5* 34.5* 38.6*    248 226 193 177 164 159 189 227 226 256 252 289 318 348 379* 395* 459*   MCV 91 96 93 90 94 93 94 93 95 93 94 92 89 89 94 90 89 93   MCH 31.8* 31.9* 31.3* 31.5* 31.3* 31.3* 31.2* 31.6* 31.1* 31.5* 31.4* 31.8* 31.3* 31.5* 30.5 31.2* 31.0 31.1*   MCHC 34.9 33.3 33.7 35.1 33.2 33.7 33.1 33.8 32.8 34.0 33.4 34.7 35.1 35.3 32.6 34.5 34.8 33.4       CMP:   Recent Labs  Lab 03/18/17  1609 03/19/17  0524 03/19/17  0555 03/19/17  1140  03/19/17  1808 03/20/17  0434 03/20/17  2048 03/21/17  0122 03/21/17  0557 03/21/17  1206 03/21/17  1821 03/21/17  2339 03/22/17  0146 03/22/17  0547 03/22/17  1146 03/22/17  1745 03/22/17  2336 03/23/17  0308 03/23/17  0518 03/23/17  1116 03/23/17  1721 03/23/17  2323 03/24/17  0208 03/24/17  0538 03/24/17  1238 03/25/17  0003 03/25/17  0248 03/25/17  0535 03/25/17  1155 03/25/17  1755 03/25/17  2336 03/26/17  0338 03/26/17  0600 03/26/17  1145 03/26/17  1743 03/26/17  1912 03/27/17  0038 03/27/17  0237 03/27/17  0602 03/27/17  1126 03/27/17  1507 03/27/17  1725 03/27/17  2123 03/27/17  2338 03/28/17  0305 03/28/17  0530 03/28/17  0930 03/28/17  1211 03/28/17  1742 03/28/17  2357 03/29/17  0208 03/29/17  0526 03/29/17  1410 03/30/17  0223 03/31/17  0324 04/01/17  0422 04/02/17  0412 04/03/17  0404 04/04/17  0355    144 143 142 141 142 145 143  143 142 142 147* 145 148* 149* 150* 149* 149* 149* 148* 148* 150* 147* 150* 151* 152* 150* 151* 150* 149* 148* 151* 152* 148* 148* 130* 150* 147* 148* 147* 148*  --  146*  --  144 146* 143  --  144 143 142 141 140 138 138 136 139 136 137 136   K 4.0 3.7  --   --   --  3.6  --  3.2*  --  4.0  --   --  3.6  --  3.8 4.0  --  3.7  --  3.8 3.8  --  3.4*  --   --   --  3.5  --   --   --   --  3.3*  --  3.7  --   --   --  3.3*  --   --  3.8  --  3.8  --  3.6  --  4.2  --   --   --  3.3*  --  3.7 3.3* 3.5 3.7 3.7 3.8 4.2    114*  --   --   --  109  --  111*  --   --   --   --  115*  --   --   --   --  120*  --   --   --   --  119*  --   --   --  118*  --   --   --   --  118*  --   --   --   --   --  118*  --   --   --   --   --   --  116*  --   --   --   --   --  112*  --   --  108 106 108 103 104 103   CO2 22* 20*  --   --   --  23  --  23  --   --   --   --  24  --   --   --   --  19*  --   --   --   --  20*  --   --   --  21*  --   --   --   --  24  --   --   --   --   --  21*  --   --   --   --   --   --  21*  --   --   --   --   --  20*  --   --  21* 20* 20* 21* 26  23   BUN 16 17  --   --   --  21*  --  16  --   --   --   --  15  --   --   --   --  21*  --   --   --   --  27*  --   --   --  34*  --   --   --   --  31*  --   --   --   --   --  36*  --   --   --   --   --   --  28*  --   --   --   --   --  22*  --   --  14 14 19 14 14 18   CREATININE 1.0 0.8  --   --   --  0.9  --  0.8  --   --   --   --  0.8  --   --   --   --  0.8  --   --   --   --  0.8  --   --   --  0.8  --   --   --   --  0.9  --   --   --   --   --  0.8  --   --   --   --   --   --  0.8  --   --   --   --   --  0.7  --   --  0.7 0.7 0.7 0.7 0.7 0.8   GLU 83 86  --   --   --  95  --  119*  --   --   --   --  107  --   --   --   --  132*  --   --   --   --  129*  --   --   --  131*  --   --   --   --  107  --   --   --   --   --  104  --   --   --   --   --   --  97  --   --   --   --   --  88  --   --  102 87 82 87 109 106   MG  --  1.7  --   --   --  2.1  --  1.7  --  2.3  --   --  1.9  --   --   --   --  2.1  --   --   --   --  2.0  --   --   --  1.9  --   --   --   --  1.8  --   --   --   --   --  2.0  --   --   --   --   --   --  2.0  --   --   --   --   --  1.9  --   --  1.8 2.2 2.1 2.1 1.7 2.6   PHOS  --  2.9  --   --   --  2.0*  --  2.6*  --  2.4*  --   --  3.5  --   --   --   --  3.3  --   --   --   --  3.6  --   --   --  4.1  --   --   --   --  3.3  --   --   --   --   --  3.2  --   --   --   --   --   --  2.7  --   --   --  2.6*  --  2.4*  --   --  2.2* 2.8 3.3 2.6* 3.1 3.2   CALCIUM 8.8 7.9*  --   --   --  8.7  --  8.1*  --   --   --   --  8.0*  --   --   --   --  8.6*  --   --   --   --  8.2*  --   --   --  9.0  --   --   --   --  9.0  --   --   --   --   --  8.4*  --   --   --   --   --   --  8.2*  --   --   --   --   --  8.0*  --   --  8.2* 8.3* 8.5* 8.4* 8.8 9.0   ALBUMIN 3.4* 3.1*  --   --   --  3.3*  --  2.8*  --   --   --   --  2.7*  --   --   --   --  2.8*  --   --   --   --  2.7*  --   --   --  2.7*  --   --   --   --  2.9*  --   --   --   --   --  2.4*  --   --   --   --   --   --   2.3*  --   --   --   --   --  2.3*  --   --  2.4* 2.6* 2.6* 2.6* 2.7* 2.9*   PROT 6.5 6.0  --   --   --  6.6  --  5.7*  --   --   --   --  5.5*  --   --   --   --  6.0  --   --   --   --  6.0  --   --   --  6.1  --   --   --   --  6.5  --   --   --   --   --  5.7*  --   --   --   --   --   --  5.8*  --   --   --   --   --  5.6*  --   --  5.9* 6.4 6.5 6.6 6.8 7.6   ALKPHOS 77 59  --   --   --  65  --  54*  --   --   --   --  53*  --   --   --   --  63  --   --   --   --  66  --   --   --  66  --   --   --   --  71  --   --   --   --   --  55  --   --   --   --   --   --  58  --   --   --   --   --  62  --   --  74 73 72 78 90 98   ALT 14 14  --   --   --  14  --  13  --   --   --   --  13  --   --   --   --  14  --   --   --   --  15  --   --   --  24  --   --   --   --  35  --   --   --   --   --  28  --   --   --   --   --   --  38  --   --   --   --   --  70*  --   --  105* 104* 86* 86* 85* 70*   AST 20 18  --   --   --  26  --  18  --   --   --   --  16  --   --   --   --  16  --   --   --   --  15  --   --   --  23  --   --   --   --  30  --   --   --   --   --  26  --   --   --   --   --   --  31  --   --   --   --   --  67*  --   --  76* 60* 38 50* 46* 47*   BILITOT 0.7 0.8  --   --   --  0.9  --  1.0  --   --   --   --  1.1*  --   --   --   --  0.7  --   --   --   --  0.8  --   --   --  0.9  --   --   --   --  0.9  --   --   --   --   --  0.7  --   --   --   --   --   --  0.6  --   --   --   --   --  0.7  --   --  0.4 0.5 0.5 0.5 0.4 0.5       INR:    Recent Labs  Lab 03/18/17  1609 03/18/17  1752 03/19/17  0220 03/20/17  0434 03/21/17  0122 03/22/17  0146 03/23/17  0308 03/24/17  0208 03/25/17  0248 03/26/17  0338 03/27/17  0237 03/28/17  0305 03/29/17  0208 03/30/17  0223 03/31/17  0324 04/01/17  0422 04/02/17  0412 04/03/17  0404 04/04/17  0355   INR 1.1  --  1.1 1.1 1.1 1.1 1.0 1.0 1.1 1.1 1.1 1.1 1.1 1.1 1.0 1.0 1.1 1.0 1.0   APTT  --  28.5  --   --   --   --   --   --   --   --   --   --   --   --   --    --   --   --   --          Diagnostic Studies:      EKG:  Normal sinus rhythm  Left axis deviation  Left bundle branch block  Abnormal ECG  When compared with ECG of 18-MAR-2017 20:44,  Premature ventricular complexes are no longer Present  Confirmed by Brian Srinivasan MD ([id number]) on 3/30/2017 10:52:10 PM    2D Echo:  CONCLUSIONS     1 - Low normal left ventricular systolic function (EF 50-55%).     2 - Normal left ventricular diastolic function.     3 - Normal right ventricular systolic function .     4 - Aortic valve sclerosis.   This document has been electronically    SIGNED BY: Mary Cain MD On: 03/21/2017 13:11    [facility] Anesthesia Evaluation    I have reviewed the Patient Summary Reports.    I have reviewed the Nursing Notes.   I have reviewed the Medications.     Review of Systems  Anesthesia Hx:  History of prior surgery of interest to airway management or planning: Denies Family Hx of Anesthesia complications.   Denies Personal Hx of Anesthesia complications.   Cardiovascular:   Hypertension CAD   hyperlipidemia    Pulmonary:  Pulmonary Normal    Renal/:  Renal/ Normal     Hepatic/GI:  Hepatic/GI Normal    Neurological:   CVA, residual symptoms    Endocrine:  Endocrine Normal    Psych:   depression          Physical Exam  General:  Well nourished    Airway/Jaw/Neck:  Airway Findings: (Did not wish to participate in airway exam.)           Mental Status:  Mental Status Findings: (Alert, does not wish to participate in exam at all.)         Anesthesia Plan  Type of Anesthesia, risks & benefits discussed:  Anesthesia Type:  general, MAC  Patient's Preference:   Intra-op Monitoring Plan:   Intra-op Monitoring Plan Comments:   Post Op Pain Control Plan:   Post Op Pain Control Plan Comments:   Induction:   IV  Beta Blocker:  Patient is not currently on a Beta-Blocker (No further documentation required).       Informed Consent: Patient representative understands risks and agrees with Anesthesia plan.   Questions answered. Anesthesia consent signed with patient representative.  ASA Score: 4     Day of Surgery Review of History & Physical:            Ready For Surgery From Anesthesia Perspective.

## 2017-04-04 NOTE — PLAN OF CARE
Transition of care note    Transfer to room 702    DX:Acute ischemic left MCA stroke [I63.512]  Embolic stroke involving left middle cerebral artery [I63.412]  Acute ischemic left MCA stroke [I63.512]  DVT of deep femoral vein, right [I82.411]  Acute ischemic left MCA stroke [I63.512]     PT/OT: Discharge Facility/Level Of Care Needs: rehabilitation facility (refferral sent)       Insurance: Payor: [employer] / Plan: BCBS ALL OUT OF STATE / Product Type: PPO /      PCP: Primary Doctor No     Pharmacy:   [id number] - [facility] [address]  [address] [address]  Phone: [phone] Fax: [phone]      No future appointments.     Fifi Alcocer [facility]  [id number]

## 2017-04-04 NOTE — PROCEDURES
Modifed Barium Swallow Study  Speech Start Time: 0814  Speech Stop Time: 0852  Speech Total (min): 38 min    SLP Treatment Date: 04/04/17    Reason for Referral  Patient was referred for a Modified Barium Swallow Study to assess the efficiency of his/her swallow function, rule out aspiration and make recommendations regarding safe dietary consistencies, effective compensatory strategies, and safe eating environment.     Diagnosis   Thrombotic stroke involving left middle cerebral artery    History reviewed. No pertinent past medical history.  History reviewed. No pertinent surgical history.     General Precautions: aphasia, aspiration, fall, NPO  Current Respiratory Status: nasal cannula      Subjective:  Pt solemnly  No co pain    Recommendations  NPO  Medications crushed in puree okay when pt is alert and awake  Small bites, pt upright at 90 degrees  Ongoing dysphagia therapy with SLP  Recommend repeat MBS in 4 weeks    Oral Peripheral Examination  Oral Musculature Evaluation  Oral Musculature: unable to assess due to poor participation/comprehension  Dentition: present and adequate  Voice Prior to PO Intake: clear    Consistencies Assessed  Thin liquids 1 full spoonful (5 cc),   Nectar thick liquids 2 full spoonfuls (10 cc),   Thin Honey thick liquids 1 full spoonful (5 cc) and   Puree 1 full spoonful and 1/2 spoonful  Solid consistency deferred 2/2 oral phase    Oral Preparation / Oral Phase  Impaired labial seal over spoon  Delayed AP transit  Mild R oral holding  Anterior loss that increased as viscosity decreased  Moderate impairment in BOT retraction    Pharyngeal Phase  Moderate impairment in BOT retraction, hyolaryngeal elevation, hyolaryngeal excursion, and posterior pharyngeal wall contraction   Mod vallecular stasis   Incomplete epiglottic inversion   Mod pyriform sinus stasis   Mod posterior pharyngeal wall stasis immediately above pyriforms    Stasis increased with viscosity; benefited from  spontaneous multiple swallows    Aspiration of thin liquid with immediate cough response   No ejection from airway    Penetration to the VF on thin honey after the swallow on pharyngeal stasis   No cough or clear from airway    No penetration or aspiration of puree or nectar thick liquid    Velar function WFL   No nasal regurgitation    Compensatory strategies not attempted as aphasia limited pt's ability to successful perform    Cervical Esophageal Phase  Mild impairment  Decreased flow of bolus through UES    Impressions  Pt presents with moderate oropharyngeal dysphagia.     Prognosis/Plan/Education  Prognosis is good.  SLP to fu at BS 5x/week to provide skilled education and intervention, also to check diet tolerance. Pt to trial full spoonfuls nectar thick liquid with SLP only at BS.  Skilled education provided on aspiration precautions and diet recommendations to pt post assessment, though comprehension of education is questioned due to pt's aphasia.      Care Plan   SLP Goals        Problem: SLP Goal    Goal Priority Disciplines Outcome   SLP Goal     SLP Ongoing (interventions implemented as appropriate)   Description:  SLP Plan of Care: Speech Pathology will follow pt 5x/week and address the following goals x1 week:  4/5  1. Pt will participate in ongoing swallow assessment to determine least restrictive diet. ONGOING  2. Pt will answer simple YN questions with 70% acc provided min cues to improve receptive language. ONGOING  3. Pt will follow 2-step directives with 60% acc indep provided mod cues to improve receptive language. MET/MODIFIED  4. Pt will complete automatic rote speech (counting) with 60% acc indep provided mod cues to improve expressive language. MET/MODIFIED  5. When deemed appropriate by MD/SLP pt will participate in MBS to further determine safe swallow function. ONGOING    SLP Plan of Care: Speech Pathology will follow pt 5x/week and address the following goals x1 week:  4/10  1. Pt will  participate in ongoing swallow assessment to determine least restrictive diet.   2. Pt will answer simple YN questions with 70% acc provided min cues to improve receptive language.   3. Pt will follow 2-step directives with 80% acc indep provided mod cues to improve receptive language.  4. Pt will complete automatic rote speech/song with 90% acc indep provided mod cues to improve expressive language.   5. When deemed appropriate by MD/SLP pt will participate in MBS to further determine safe swallow function.                          G-Codes  Functional Assessment Tool Used: NOMS  Score: 2  Functional Limitations: Swallowing  Swallow Current Status (): CM  Swallow Goal Status (): CL     Brenda Card M.A. CCC-SLP  Speech Language Pathologist  [phone]  4/4/2017

## 2017-04-04 NOTE — ASSESSMENT & PLAN NOTE
Given tpa , status post thrombectomy   Due to stroke above  Aggressive PT/OT/ST    Speech improving, but per speech therapy, not able to make his own decisions, PEG consent to be obtained from son, surgery aware

## 2017-04-04 NOTE — PT/OT/SLP PROGRESS
"Physical Therapy  Treatment    Nathanael Velez   MRN: [id number]   Admitting Diagnosis: Thrombotic stroke involving left middle cerebral artery    PT Received On: 17  PT Start Time: 1340     PT Stop Time: 1357    PT Total Time (min): 17 min       Billable Minutes:  Gait [id number]    Treatment Type: Treatment  PT/PTA: PT         General Precautions: Standard, aphasia, aspiration, fall, NPO  Orthopedic Precautions: N/A   Braces: N/A    Subjective:  Communicated with ADINA Flores prior to session.  "what are we doing?"    Pain Ratin/10  Pain Rating Post-Intervention: 0/10    Objective:   Patient found with: telemetry, NG tube, restraints (condom catheter, BUE wrist restraints)    Functional Mobility:  Pt found supine. RN present stopped tube feeds for tx session  Bed Mobility:    Rolling to the right: use of PT's hand: Contact Guard Assistance   Supine <> Sit: From right sidelying: Contact Guard Assistance   Sit <> Supine: To right sidelying: Contact Guard Assistance     Sitting Balance at Edge of Bed:   Assistance Level Required: Supervision   Time: 5 minutes   Postural deviations noted: Rounded shoulder, Head forward and Posterior pelvic tilt   Encouraged: RUE weight bearing   Comments: Pt likes to put LLE on bedrail.    Transfers:    Sit <> Stand: x 2  trials from EOB with Contact Guard Assistance with No Assistive Device    Stand <> Sit: x 2 trials to EOB with Contact Guard Assistance with No Assistive Device   Comments: PT supporting RUE due to decreased attention.     Gait:   Distance Ambulated: 65ft    Assistance level: Minimal Assistance   Assistive Device used:  No Assistive Device   Gait Pattern: swing-through gait   Gait Deviation(s): decreased hany, decreased step length and decreased stride length   Impairments due to: decreased strength of RLE and decreased awareness of RLE   Comments: PT provided RUE HHA support, pt with slight posterior lean and wide TAI. Pt with wide placement of " RLE.    Therapeutic Activities and Exercises:  Education:  Patient provided with daily orientation and goals of this PT session. Patient agreed to participate in session. They were educated to transfer with RN/PCT only; min a of 1 person. They were provided and educated on proper positioning in supine and in sitting with support of affected RUE extremities in order to increase awareness of extremity and to decrease the effects of immobility, specifically edema and pain. Time provided for therapeutic counseling and discussion of health disposition. All questions answered to patient's satisfaction, within scope of PT practice; voiced no other concerns. White board updated in patient's room, RN notified of session.     AM-PAC 6 CLICK MOBILITY  How much help from another person does this patient currently need?   1 = Unable, Total/Dependent Assistance  2 = A lot, Maximum/Moderate Assistance  3 = A little, Minimum/Contact Guard/Supervision  4 = None, Modified [place]/Independent    Turning over in bed (including adjusting bedclothes, sheets and blankets)?: 3  Sitting down on and standing up from a chair with arms (e.g., wheelchair, bedside commode, etc.): 3  Moving from lying on back to sitting on the side of the bed?: 3  Moving to and from a bed to a chair (including a wheelchair)?: 3  Need to walk in hospital room?: 3  Climbing 3-5 steps with a railing?: 2  Total Score: 17    AM-PAC Raw Score CMS G-Code Modifier Level of Impairment Assistance   6 % Total / Unable   7 - 9 CM 80 - 100% Maximal Assist   10 - 14 CL 60 - 80% Moderate Assist   15 - 19 CK 40 - 60% Moderate Assist   20 - 22 CJ 20 - 40% Minimal Assist   23 CI 1-20% SBA / CGA   24 CH 0% Independent/ Mod I     Patient left supine with all lines intact, call button in reach, bed alarm on, restraints reapplied at end of session and RN notified.    Assessment:  Nathanael Velez is a 61 y.o. male with a medical diagnosis of Thrombotic stroke involving left  middle cerebral artery. Patient is making progress towards goals, participating well in this session. Pt continues to require significant assist with attention to right side. Pt with noted improvement with transfers and ambulation.  Mr. Velez's deficits affect their ability to safely and independently participate in self-care tasks and functional mobility. Due to his physical therapy diagnosis of debility and deconditioning, they continue to benefit from acute PT services to address the following limitations: high fall risk, weakness, instability, the need for supervised instructions of exercise, and the decreased ability to perform functional activities which they were able to complete PTA. Education was provided to patient regarding importance of continued participation in therapy, patient's progress and discharge disposition. Pt would benefit from Rehab upon discharge to improve quality of life and focus on recovery of impairments.     Rehab identified problem list/impairments: Rehab identified problem list/impairments: impaired sensation, impaired self care skills, impaired functional mobilty, gait instability, decreased upper extremity function, decreased lower extremity function, decreased safety awareness, impaired cognition    Rehab potential is good.    Activity tolerance: Good    Discharge recommendations: Discharge Facility/Level Of Care Needs: rehabilitation facility     Barriers to discharge: Barriers to Discharge: Decreased caregiver support, Inaccessible home environment    Equipment recommendations: Equipment Needed After Discharge: 3-in-1 commode, bath bench, wheelchair, walker, rolling     GOALS:   Physical Therapy Goals        Problem: Physical Therapy Goal    Goal Priority Disciplines Outcome Goal Variances Interventions   Physical Therapy Goal     PT/OT, PT Ongoing (interventions implemented as appropriate)     Description:  Active Goals to be met by: 4/18/17    1. Supine to sit with CGA.  2. Sit  to supine with Supervision. NOT MET  3. Sit to stand with QCN with CGA.  4. Pt to perform gait 5ft with appropriate AD with max assist.-met 4/1/17  Revised: Pt to perform gait 75ft with RW with CGA. NOT MET  5. Pt to transfer bed to/from bedside chair with max assist.- met 4/1/17  Revised: pt to transfer to bedside chair via stand pivot with min A with RW. NOT MET  6. Able to tolerate exercise for 15-20 reps with independence.  7. Dynamic sitting at edge of bed x 8 minutes with Stand-by Assistance.  8. Dynamic standing for 2 minutes with Contact Guard Assistance using Quad Cane.  9. Patient and family able to teachback stroke & positioning education independently.  10. Wheelchair propulsion x 100 feet with Minimal Assistance using bilateral lower extremities--Discontinued                PLAN:    Patient to be seen 6 x/week  to address the above listed problems via gait training, therapeutic activities, therapeutic exercises, neuromuscular re-education  Plan of Care expires: 04/18/17  Plan of Care reviewed with: patient     Idania R Piter PT, DPT  4/4/2017  Pager: [phone]

## 2017-04-04 NOTE — PT/OT/SLP PROGRESS
Occupational Therapy      Nathanael Velez  MRN: [id number]    Patient not seen today secondary to Patient unwilling to participate (attempted at 10:39.  Pt unwilling to participate. He appeared frustrated but denied feeling frustrated when asked.  Encouraged to participate, discussed plan pt continued to decline. His wishes were respected.). Will follow-up tomorrow.    AVIS Rutledge  4/4/2017

## 2017-04-04 NOTE — PLAN OF CARE
SW left return voicemail for Estrella ([phone]) regarding Pt having stepped down. Gave her contact information for the new CM and SW. Advised new CM of this occurrence.    Argentina Juarez LMSW  Neurocritical Care   [facility]  [id number]

## 2017-04-04 NOTE — PLAN OF CARE
Problem: SLP Goal  Goal: SLP Goal  SLP Plan of Care: Speech Pathology will follow pt 5x/week and address the following goals x1 week:  4/5  1. Pt will participate in ongoing swallow assessment to determine least restrictive diet. ONGOING  2. Pt will answer simple YN questions with 70% acc provided min cues to improve receptive language. ONGOING  3. Pt will follow 2-step directives with 60% acc indep provided mod cues to improve receptive language. MET/MODIFIED  4. Pt will complete automatic rote speech (counting) with 60% acc indep provided mod cues to improve expressive language. MET/MODIFIED  5. When deemed appropriate by MD/SLP pt will participate in MBS to further determine safe swallow function. ONGOING    SLP Plan of Care: Speech Pathology will follow pt 5x/week and address the following goals x1 week:  4/10  1. Pt will participate in ongoing swallow assessment to determine least restrictive diet.   2. Pt will answer simple YN questions with 70% acc provided min cues to improve receptive language.   3. Pt will follow 2-step directives with 80% acc indep provided mod cues to improve receptive language.  4. Pt will complete automatic rote speech/song with 90% acc indep provided mod cues to improve expressive language.   5. When deemed appropriate by MD/SLP pt will participate in MBS to further determine safe swallow function.        MBS completed. Recommending NPO. Formal report with additional recommendations to follow.     Brenda Card M.A. CCC-SLP  Speech Language Pathologist  [phone]  4/4/2017

## 2017-04-04 NOTE — PLAN OF CARE
Call to [facility] ([phone] Austyn) and Estrella at [facility] ([phone]), both aware of updated clinicals faxed to each facility.  Both are following and reviewing for admission.  Austyn at [facility] states her physician did express concerns regarding leukocytosis.  Primary team to address in progress note.  Facility does not admit new out of town pt's on Fridays as a rule.  Will cont to follow.  D/C will be dependent on PEG placement, initiation of tube feeding, and insurance approval.      Norma Gutierrez, RN  Case Management  [id number]

## 2017-04-04 NOTE — ASSESSMENT & PLAN NOTE
Antithrombotics for secondary stroke prevention: Aspirin 81mg, plavix 75mg qd  Statins for secondary stroke prevention and hyperlipidemia, if present: Atorvastatin- 80 mg oral daily  Aggressive risk factor modification: Hypertension, High Cholesterol and Obesity  VTE Prophylaxis: SCDs, heparin 5k q8 hours  BP Parameters: SBP <150  Nursing Orders: Neuro checks- per tpa protocol, Antiembolic stockings, Swallowing evaluation by Nursing, Seizure precautions, Avoid Rucker catheter, Pneumatic compression device, Stroke Education, Blood glucose target 100-130, Up with assistance     -ECHO initially with sclerotic lesion vs vegetation of the aortic valve. Repeat ECHO suggested valve is just sclerotic.      Rehab teams recommending Rehab facility.

## 2017-04-04 NOTE — PROGRESS NOTES
[facility]  Vascular Neurology  Comprehensive Stroke Center  Progress Note      Neurologic Chief Complaint: L MCA      Subjective:     Interval History: Patient is seen for follow-up neurological assessment and treatment recommendations: failed mbss but verbalizing well. Patient agreed to peg after discussion regarding need for placement post mbss     HPI, Past Medical, Family, and Social History remains the same as documented in the initial encounter.     Review of Systems   Constitutional: Negative for chills and fever.   Neurological: Positive for facial asymmetry, speech difficulty and weakness.   Psychiatric/Behavioral: Positive for dysphoric mood.     Scheduled Meds:   amlodipine  10 mg Per NG tube Daily    aspirin  81 mg Per NG tube Daily    atorvastatin  80 mg Per NG tube Daily    clopidogrel  75 mg Per NG tube Daily    heparin (porcine)  5,000 Units Subcutaneous Q8H    hydrALAZINE  50 mg Per NG tube Q8H    losartan  100 mg Per NG tube Daily    sodium chloride 0.9%  10 mL Intravenous Q6H    sodium chloride 0.9%  10 mL Intravenous Q6H    sodium chloride 0.9%  3 mL Intravenous Q8H     Continuous Infusions:   PRN Meds:acetaminophen, albuterol-ipratropium 2.5mg-0.5mg/3mL, dextrose 50%, glucagon (human recombinant), insulin aspart, ondansetron, ramelteon, Flushing PICC Protocol **AND** sodium chloride 0.9% **AND** sodium chloride 0.9%, Flushing PICC Protocol **AND** sodium chloride 0.9% **AND** sodium chloride 0.9%    Objective:     Vital Signs (Most Recent):  Temp: 98.2 °F (36.8 °C) (04/04/17 1130)  Pulse: 72 (04/04/17 1130)  Resp: 20 (04/04/17 1130)  BP: (!) 141/65 (04/04/17 1130)  SpO2: 98 % (04/04/17 1130)  BP Location: Right arm    Vital Signs Range (Last 24H):  Temp:  [97.2 °F (36.2 °C)-98.2 °F (36.8 °C)]   Pulse:  [68-90]   Resp:  [14-24]   BP: (115-144)/(59-70)   SpO2:  [94 %-98 %]   BP Location: Right arm    Physical Exam   Constitutional: He appears well-developed and  well-nourished.   HENT:   Head: Normocephalic and atraumatic.   Cardiovascular: Normal rate.    Pulmonary/Chest: Effort normal.   Neurological: He is alert.   Skin: Skin is warm.       Neurological Exam:   LOC: alert and follows requests  Language: Expressive aphasia  Speech: Dysarthria  Orientation: Person, Place, Time  Facial Movement (CN VII): lower weakness right lower  Motor*: Arm Left:  Normal (5/5), Leg Left:   Normal (5/5), Arm Right:   Paretic:  4/5, Leg Right:   Paretic:  4/5    NIH Stroke Scale:    Level of Consciousness: 0 - alert  LOC Questions: 1 - answers one correctly  LOC Commands: 0 - performs both correctly  Best Gaze: 0 - normal  Visual: 0 - no visual loss  Facial Palsy: 1 - minor  Motor Left Arm: 0 - no drift  Motor Right Arm: 1 - drift  Motor Left Le - no drift  Motor Right Le - drift  Limb Ataxia: 0 - absent  Sensory: 0 - normal  Best Language: 1 - mild to moderate aphasia  Dysarthria: 1 - mild to moderate dysarthria  Extinction and Inattention: 0 - no neglect  NIH Stroke Scale Total: 6      Laboratory:  CMP:   Recent Labs  Lab 17  0355   CALCIUM 9.0   ALBUMIN 2.9*   PROT 7.6      K 4.2   CO2 23      BUN 18   CREATININE 0.8   ALKPHOS 98   ALT 70*   AST 47*   BILITOT 0.5     CBC:   Recent Labs  Lab 17  0354   WBC 14.81*   RBC 4.15*   HGB 12.9*   HCT 38.6*   *   MCV 93   MCH 31.1*   MCHC 33.4     Lipid Panel: No results for input(s): CHOL, LDLCALC, HDL, TRIG in the last 168 hours.  Coagulation:   Recent Labs  Lab 17  0355   INR 1.0     Platelet Aggregation Study: No results for input(s): PLTAGG, PLTAGINTERP, PLTAGREGLACO, ADPPLTAGGREG in the last 168 hours.  Hgb A1C: No results for input(s): HGBA1C in the last 168 hours.  TSH: No results for input(s): TSH in the last 168 hours.    Diagnostic Results:  I have personally reviewed:  No new imaging for review        Assessment/Plan:     61 year old male with HTN, HLD, and CAD and L MCA stroke symptoms  post tpa and transferred here for higher level of care and potential thrombectomy. CTA multiphase upon arrival.   Patient found to have severe L carotid artery stenosis.  Angioplasty with stent performed and thrombectomy of L MCA occlusion.  Patient found to have L MCA stroke with reperfusion injury.  Possible aortic vegetation seen on echo but on repeat echo showed sclerotic aorta.      03/22/17  Patient drowsy, follows simple commands, unable to move RUE      03/23/17  More awake today.  Temperature and leukocytosis resolving with vancomycin and cefepime.      3/24/17  NAEON. Still with fever overnight.     3/25/17 Patient much more alert and interactive with exam. Still with labored breathing    3/27/17  Patient has a R lower extremity DVT. Unable to have IVC filter placed d/t respiratory status- on intubation watch.     3/30/17  BP elevated to 180s-200s overnight requiring PRNs for BP. Patient will need PEG. Patients respiratory status now improved and on NC. Will have repeat doppler today on R leg to reevaluate if IVC filter to be placed.     03/31/17 Still has increased secretions and cough.  Neurologically stable      04/02/17  Off of oxygen.  Now will leukocytosis.  Neurologically unchanged.   Patient will need modified barium swallow    4/4/17 - failed MBSS, peg discussed with son, nephew and patient. All amenable to placement, surgery consulted.     ** leukocytosis noted to be rising- will order blood cultures, UA, CXR and follow - has been afebrile.   ** US without evidence of clot now, no need for ivc filter     * Thrombotic stroke involving left middle cerebral artery  Antithrombotics for secondary stroke prevention: Aspirin 81mg, plavix 75mg qd  Statins for secondary stroke prevention and hyperlipidemia, if present: Atorvastatin- 80 mg oral daily  Aggressive risk factor modification: Hypertension, High Cholesterol and Obesity  VTE Prophylaxis: SCDs, heparin 5k q8 hours  BP Parameters: SBP <150  Nursing  Orders: Neuro checks- per tpa protocol, Antiembolic stockings, Swallowing evaluation by Nursing, Seizure precautions, Avoid Rucker catheter, Pneumatic compression device, Stroke Education, Blood glucose target 100-130, Up with assistance     -ECHO initially with sclerotic lesion vs vegetation of the aortic valve. Repeat ECHO suggested valve is just sclerotic.      Rehab teams recommending Rehab facility.      Right sided weakness  Due to stroke above  Weakness resolving   Aggressive PT/OT/ speech  Teams recommending rehab inpatient     Aphasia  Given tpa , status post thrombectomy   Due to stroke above  Aggressive PT/OT/ST    Speech improving, but per speech therapy, not able to make his own decisions, PEG consent to be obtained from son, surgery aware    Essential hypertension  Stroke RF  <150       Cytotoxic cerebral edema  Evident on imaging     Carotid artery stenosis, symptomatic  S/p IR thrombectomy of distant MCA clot and angioplasty with stent on L ICA  Likely etiology of patient's L MCA stroke   Complication- With hemorrhagic conversion s/p tPA and thrombectomy/stenting      Depression  Likely due to stroke  Patient uninterested at times in exam  May benefit from SSRI       AC Philippe  Comprehensive Stroke Center  Department of Vascular Neurology   [facility]

## 2017-04-04 NOTE — PLAN OF CARE
Problem: Patient Care Overview  Goal: Plan of Care Review  Outcome: Ongoing (interventions implemented as appropriate)  Pt AA unable to assess orientatiopn pt aphasic. Pt on tube feedings at 40 ml/hr tolerating well. Pt remains in restraints due to pulling at lines when released. No skin breakdown noted. Low air loss mattress ordered to be placed on bed. Pt has been afebrile and VSS.

## 2017-04-04 NOTE — PLAN OF CARE
Problem: Physical Therapy Goal  Goal: Physical Therapy Goal  Active Goals to be met by: 4/18/17    1. Supine to sit with CGA.  2. Sit to supine with Supervision. NOT MET  3. Sit to stand with QCN with CGA.  4. Pt to perform gait 5ft with appropriate AD with max assist.-met 4/1/17  Revised: Pt to perform gait 75ft with RW with CGA. NOT MET  5. Pt to transfer bed to/from bedside chair with max assist.- met 4/1/17  Revised: pt to transfer to bedside chair via stand pivot with min A with RW. NOT MET  6. Able to tolerate exercise for 15-20 reps with independence.  7. Dynamic sitting at edge of bed x 8 minutes with Stand-by Assistance.  8. Dynamic standing for 2 minutes with Contact Guard Assistance using Quad Cane.  9. Patient and family able to teachback stroke & positioning education independently.  10. Wheelchair propulsion x 100 feet with Minimal Assistance using bilateral lower extremities--Discontinued   Outcome: Ongoing (interventions implemented as appropriate)     Pt would benefit from IP Rehab upon discharge. Pt progressing towards goals.  Appropriate to transfer with: min A of 1 person (safety)  Idania Dumas PT, DPT  4/4/2017  Pager: [phone]

## 2017-04-04 NOTE — ASSESSMENT & PLAN NOTE
Due to stroke above  Weakness resolving   Aggressive PT/OT/ speech  Teams recommending rehab inpatient

## 2017-04-04 NOTE — CONSULTS
[facility]  General Surgery  Consult Note    Inpatient consult to General Surgery  Consult performed by: KEY OCONNOR  Consult ordered by: GUDELIA OVALLE  Reason for consult: PEG        Subjective:     Chief Complaint/Reason for Admission: Thrombotic stroke involving left middle cerebral artery    History of Present Illness:   Patient is a 62 yo male w pmhx of HTN, HLD and CAD who presented to OSH with signs and symptoms of stroke. Symptoms include right sided weakness, left gaze preference, aphasia, numbness on the right side and facial droop. He was given tPA and transferred [facility] [place] [facility]A multiphase and potential IR thrombectomy. He underwent thrombectomy on 3/18/17. He has been working with PT/OT/SLP. He failed an MBSS. SLP is recommending NPO, General surgery has been consulted for PEG placement.     No current facility-administered medications on file prior to encounter.      No current outpatient prescriptions on file prior to encounter.       Review of patient's allergies indicates:  No Known Allergies    History reviewed. No pertinent past medical history.  History reviewed. No pertinent surgical history.  Family History     None        Social History Main Topics    Smoking status: Unknown If Ever Smoked    Smokeless tobacco: Not on file    Alcohol use Not on file    Drug use: Not on file    Sexual activity: Not on file     Review of Systems   Reason unable to perform ROS: minimally verbal.     Objective:     Vital Signs (Most Recent):  Temp: 98.2 °F (36.8 °C) (04/04/17 1130)  Pulse: 72 (04/04/17 1130)  Resp: 20 (04/04/17 1130)  BP: (!) 141/65 (04/04/17 1130)  SpO2: 98 % (04/04/17 1130) Vital Signs (24h Range):  Temp:  [97.2 °F (36.2 °C)-98.2 °F (36.8 °C)] 98.2 °F (36.8 °C)  Pulse:  [68-90] 72  Resp:  [14-24] 20  SpO2:  [94 %-98 %] 98 %  BP: (115-162)/(59-70) 141/65     Weight: 74.2 kg (163 lb 9.3 oz)  Body mass index is 25.62 kg/(m^2).      Intake/Output Summary (Last 24  hours) at 04/04/17 1348  Last data filed at 04/04/17 0756   Gross per 24 hour   Intake             1065 ml   Output              803 ml   Net              262 ml       Physical Exam   Constitutional: He appears well-developed and well-nourished. No distress.   HENT:   Head: Normocephalic and atraumatic.   Eyes: EOM are normal.   Neck: Normal range of motion. Neck supple.   Cardiovascular: Normal rate and regular rhythm.    Pulmonary/Chest: Effort normal. No respiratory distress.   Abdominal:   Soft, NTND, no previous abdominal surgical scars noted, ecchymosis noted from lovenox shots   Musculoskeletal: He exhibits no edema or tenderness.   Neurological: He is alert.   Skin: Skin is warm and dry.       Significant Labs:  BMP:   Recent Labs  Lab 04/04/17  0355         K 4.2      CO2 23   BUN 18   CREATININE 0.8   CALCIUM 9.0   MG 2.6     CBC:   Recent Labs  Lab 04/04/17  0354   WBC 14.81*   RBC 4.15*   HGB 12.9*   HCT 38.6*   *   MCV 93   MCH 31.1*   MCHC 33.4     CMP:   Recent Labs  Lab 04/04/17  0355      CALCIUM 9.0   ALBUMIN 2.9*   PROT 7.6      K 4.2   CO2 23      BUN 18   CREATININE 0.8   ALKPHOS 98   ALT 70*   AST 47*   BILITOT 0.5     Coagulation:   Recent Labs  Lab 04/04/17  0355   INR 1.0     LFTs:   Recent Labs  Lab 04/04/17  0355   ALT 70*   AST 47*   ALKPHOS 98   BILITOT 0.5   PROT 7.6   ALBUMIN 2.9*     All pertinent labs from the last 24 hours have been reviewed.    Assessment/Plan:   62 yo male w L MCA stroke, dysphagia  -plan for PEG in OR tomorrow 4/5/17  -NPO/hold tube feeds p MN 4/5/17  -consents to be obtained  -discussed with staff    Thank you for your consult. I will follow-up with patient. Please contact us if you have any additional questions.    Génesis Renee PA-C  General Surgery  [facility]

## 2017-04-05 ENCOUNTER — ANESTHESIA (OUTPATIENT)
Dept: SURGERY | Facility: HOSPITAL | Age: 62
DRG: 023 | End: 2017-04-05
Payer: COMMERCIAL

## 2017-04-05 PROBLEM — I82.411 ACUTE DEEP VEIN THROMBOSIS (DVT) OF FEMORAL VEIN OF RIGHT LOWER EXTREMITY: Status: ACTIVE | Noted: 2017-04-05

## 2017-04-05 PROBLEM — R13.10 DYSPHAGIA: Status: ACTIVE | Noted: 2017-04-05

## 2017-04-05 LAB
ALBUMIN SERPL BCP-MCNC: 2.8 G/DL
ALP SERPL-CCNC: 100 U/L
ALT SERPL W/O P-5'-P-CCNC: 55 U/L
ANION GAP SERPL CALC-SCNC: 10 MMOL/L
AST SERPL-CCNC: 30 U/L
BASOPHILS # BLD AUTO: 0.06 K/UL
BASOPHILS NFR BLD: 0.4 %
BILIRUB SERPL-MCNC: 0.5 MG/DL
BUN SERPL-MCNC: 20 MG/DL
CALCIUM SERPL-MCNC: 9.2 MG/DL
CHLORIDE SERPL-SCNC: 105 MMOL/L
CO2 SERPL-SCNC: 23 MMOL/L
CREAT SERPL-MCNC: 0.7 MG/DL
DIFFERENTIAL METHOD: ABNORMAL
EOSINOPHIL # BLD AUTO: 0.3 K/UL
EOSINOPHIL NFR BLD: 2.1 %
ERYTHROCYTE [DISTWIDTH] IN BLOOD BY AUTOMATED COUNT: 13.1 %
EST. GFR  (AFRICAN AMERICAN): >60 ML/MIN/1.73 M^2
EST. GFR  (NON AFRICAN AMERICAN): >60 ML/MIN/1.73 M^2
GLUCOSE SERPL-MCNC: 92 MG/DL
HCT VFR BLD AUTO: 37.4 %
HGB BLD-MCNC: 12.8 G/DL
LYMPHOCYTES # BLD AUTO: 2.3 K/UL
LYMPHOCYTES NFR BLD: 16.7 %
MAGNESIUM SERPL-MCNC: 2.1 MG/DL
MCH RBC QN AUTO: 30.9 PG
MCHC RBC AUTO-ENTMCNC: 34.2 %
MCV RBC AUTO: 90 FL
MONOCYTES # BLD AUTO: 1.4 K/UL
MONOCYTES NFR BLD: 9.6 %
NEUTROPHILS # BLD AUTO: 9.9 K/UL
NEUTROPHILS NFR BLD: 70.9 %
PHOSPHATE SERPL-MCNC: 3.3 MG/DL
PLATELET # BLD AUTO: 433 K/UL
PMV BLD AUTO: 9.8 FL
POCT GLUCOSE: 101 MG/DL (ref 70–110)
POCT GLUCOSE: 115 MG/DL (ref 70–110)
POCT GLUCOSE: 117 MG/DL (ref 70–110)
POCT GLUCOSE: 66 MG/DL (ref 70–110)
POTASSIUM SERPL-SCNC: 4.2 MMOL/L
PROT SERPL-MCNC: 7.3 G/DL
RBC # BLD AUTO: 4.14 M/UL
SODIUM SERPL-SCNC: 138 MMOL/L
WBC # BLD AUTO: 14.01 K/UL

## 2017-04-05 PROCEDURE — 63600175 PHARM REV CODE 636 W HCPCS: Performed by: PHYSICIAN ASSISTANT

## 2017-04-05 PROCEDURE — 63600175 PHARM REV CODE 636 W HCPCS: Performed by: NURSE PRACTITIONER

## 2017-04-05 PROCEDURE — 25000003 PHARM REV CODE 250: Performed by: NURSE PRACTITIONER

## 2017-04-05 PROCEDURE — 97110 THERAPEUTIC EXERCISES: CPT

## 2017-04-05 PROCEDURE — 83735 ASSAY OF MAGNESIUM: CPT

## 2017-04-05 PROCEDURE — 99222 1ST HOSP IP/OBS MODERATE 55: CPT | Mod: 57,,, | Performed by: SURGERY

## 2017-04-05 PROCEDURE — 97535 SELF CARE MNGMENT TRAINING: CPT

## 2017-04-05 PROCEDURE — 36000704 HC OR TIME LEV I 1ST 15 MIN: Performed by: SURGERY

## 2017-04-05 PROCEDURE — D9220A PRA ANESTHESIA: Mod: ANES,,, | Performed by: ANESTHESIOLOGY

## 2017-04-05 PROCEDURE — 36415 COLL VENOUS BLD VENIPUNCTURE: CPT

## 2017-04-05 PROCEDURE — 80053 COMPREHEN METABOLIC PANEL: CPT

## 2017-04-05 PROCEDURE — 43246 EGD PLACE GASTROSTOMY TUBE: CPT | Mod: ,,, | Performed by: SURGERY

## 2017-04-05 PROCEDURE — 84100 ASSAY OF PHOSPHORUS: CPT

## 2017-04-05 PROCEDURE — 85025 COMPLETE CBC W/AUTO DIFF WBC: CPT

## 2017-04-05 PROCEDURE — 25000003 PHARM REV CODE 250: Performed by: NURSE ANESTHETIST, CERTIFIED REGISTERED

## 2017-04-05 PROCEDURE — 0DH63UZ INSERTION OF FEEDING DEVICE INTO STOMACH, PERCUTANEOUS APPROACH: ICD-10-PCS | Performed by: SURGERY

## 2017-04-05 PROCEDURE — 20600001 HC STEP DOWN PRIVATE ROOM

## 2017-04-05 PROCEDURE — 25000003 PHARM REV CODE 250: Performed by: SURGERY

## 2017-04-05 PROCEDURE — 92507 TX SP LANG VOICE COMM INDIV: CPT

## 2017-04-05 PROCEDURE — D9220A PRA ANESTHESIA: Mod: CRNA,,, | Performed by: NURSE ANESTHETIST, CERTIFIED REGISTERED

## 2017-04-05 PROCEDURE — 25000003 PHARM REV CODE 250: Performed by: STUDENT IN AN ORGANIZED HEALTH CARE EDUCATION/TRAINING PROGRAM

## 2017-04-05 PROCEDURE — 25000003 PHARM REV CODE 250: Performed by: PSYCHIATRY & NEUROLOGY

## 2017-04-05 PROCEDURE — 94640 AIRWAY INHALATION TREATMENT: CPT

## 2017-04-05 PROCEDURE — 63600175 PHARM REV CODE 636 W HCPCS: Performed by: NURSE ANESTHETIST, CERTIFIED REGISTERED

## 2017-04-05 PROCEDURE — 99233 SBSQ HOSP IP/OBS HIGH 50: CPT | Mod: ,,, | Performed by: PSYCHIATRY & NEUROLOGY

## 2017-04-05 PROCEDURE — 37000008 HC ANESTHESIA 1ST 15 MINUTES: Performed by: SURGERY

## 2017-04-05 PROCEDURE — 37000009 HC ANESTHESIA EA ADD 15 MINS: Performed by: SURGERY

## 2017-04-05 PROCEDURE — 36000705 HC OR TIME LEV I EA ADD 15 MIN: Performed by: SURGERY

## 2017-04-05 PROCEDURE — 27800903 OPTIME MED/SURG SUP & DEVICES OTHER IMPLANTS: Performed by: SURGERY

## 2017-04-05 PROCEDURE — 25000242 PHARM REV CODE 250 ALT 637 W/ HCPCS: Performed by: STUDENT IN AN ORGANIZED HEALTH CARE EDUCATION/TRAINING PROGRAM

## 2017-04-05 PROCEDURE — 71000044 HC DOSC ROUTINE RECOVERY FIRST HOUR: Performed by: SURGERY

## 2017-04-05 PROCEDURE — 71000015 HC POSTOP RECOV 1ST HR: Performed by: SURGERY

## 2017-04-05 RX ORDER — HYDRALAZINE HYDROCHLORIDE 20 MG/ML
5 INJECTION INTRAMUSCULAR; INTRAVENOUS ONCE
Status: COMPLETED | OUTPATIENT
Start: 2017-04-05 | End: 2017-04-05

## 2017-04-05 RX ORDER — CLOPIDOGREL BISULFATE 75 MG/1
75 TABLET ORAL DAILY
Status: DISCONTINUED | OUTPATIENT
Start: 2017-04-06 | End: 2017-04-06 | Stop reason: HOSPADM

## 2017-04-05 RX ORDER — SODIUM CHLORIDE 9 MG/ML
INJECTION, SOLUTION INTRAVENOUS CONTINUOUS PRN
Status: DISCONTINUED | OUTPATIENT
Start: 2017-04-05 | End: 2017-04-05

## 2017-04-05 RX ORDER — AMLODIPINE BESYLATE 10 MG/1
10 TABLET ORAL DAILY
Status: DISCONTINUED | OUTPATIENT
Start: 2017-04-06 | End: 2017-04-06 | Stop reason: HOSPADM

## 2017-04-05 RX ORDER — HEPARIN SODIUM 5000 [USP'U]/ML
5000 INJECTION, SOLUTION INTRAVENOUS; SUBCUTANEOUS EVERY 8 HOURS
Status: DISCONTINUED | OUTPATIENT
Start: 2017-04-06 | End: 2017-04-06

## 2017-04-05 RX ORDER — LIDOCAINE HCL/PF 100 MG/5ML
SYRINGE (ML) INTRAVENOUS
Status: DISCONTINUED | OUTPATIENT
Start: 2017-04-05 | End: 2017-04-05

## 2017-04-05 RX ORDER — HYDRALAZINE HYDROCHLORIDE 50 MG/1
50 TABLET, FILM COATED ORAL EVERY 8 HOURS
Status: DISCONTINUED | OUTPATIENT
Start: 2017-04-06 | End: 2017-04-06 | Stop reason: HOSPADM

## 2017-04-05 RX ORDER — ASPIRIN 81 MG/1
81 TABLET ORAL DAILY
Status: DISCONTINUED | OUTPATIENT
Start: 2017-04-06 | End: 2017-04-05

## 2017-04-05 RX ORDER — MIDAZOLAM HYDROCHLORIDE 1 MG/ML
INJECTION, SOLUTION INTRAMUSCULAR; INTRAVENOUS
Status: DISCONTINUED | OUTPATIENT
Start: 2017-04-05 | End: 2017-04-05

## 2017-04-05 RX ORDER — LOSARTAN POTASSIUM 50 MG/1
100 TABLET ORAL DAILY
Status: DISCONTINUED | OUTPATIENT
Start: 2017-04-06 | End: 2017-04-06 | Stop reason: HOSPADM

## 2017-04-05 RX ORDER — ASPIRIN 81 MG/1
81 TABLET ORAL DAILY
Status: DISCONTINUED | OUTPATIENT
Start: 2017-04-06 | End: 2017-04-06

## 2017-04-05 RX ORDER — PROPOFOL 10 MG/ML
VIAL (ML) INTRAVENOUS
Status: DISCONTINUED | OUTPATIENT
Start: 2017-04-05 | End: 2017-04-05

## 2017-04-05 RX ORDER — FLUOXETINE HYDROCHLORIDE 20 MG/1
20 CAPSULE ORAL DAILY
Status: DISCONTINUED | OUTPATIENT
Start: 2017-04-05 | End: 2017-04-05

## 2017-04-05 RX ORDER — LIDOCAINE HYDROCHLORIDE 10 MG/ML
INJECTION, SOLUTION EPIDURAL; INFILTRATION; INTRACAUDAL; PERINEURAL
Status: DISCONTINUED | OUTPATIENT
Start: 2017-04-05 | End: 2017-04-05 | Stop reason: HOSPADM

## 2017-04-05 RX ORDER — FENTANYL CITRATE 50 UG/ML
INJECTION, SOLUTION INTRAMUSCULAR; INTRAVENOUS
Status: DISCONTINUED | OUTPATIENT
Start: 2017-04-05 | End: 2017-04-05

## 2017-04-05 RX ORDER — ATORVASTATIN CALCIUM 20 MG/1
80 TABLET, FILM COATED ORAL DAILY
Status: DISCONTINUED | OUTPATIENT
Start: 2017-04-06 | End: 2017-04-06 | Stop reason: HOSPADM

## 2017-04-05 RX ORDER — FLUOXETINE HYDROCHLORIDE 20 MG/1
20 CAPSULE ORAL DAILY
Status: DISCONTINUED | OUTPATIENT
Start: 2017-04-06 | End: 2017-04-06 | Stop reason: HOSPADM

## 2017-04-05 RX ADMIN — Medication 3 ML: at 09:04

## 2017-04-05 RX ADMIN — FENTANYL CITRATE 50 MCG: 50 INJECTION, SOLUTION INTRAMUSCULAR; INTRAVENOUS at 04:04

## 2017-04-05 RX ADMIN — Medication 10 ML: at 12:04

## 2017-04-05 RX ADMIN — PROPOFOL 30 MG: 10 INJECTION, EMULSION INTRAVENOUS at 04:04

## 2017-04-05 RX ADMIN — IPRATROPIUM BROMIDE AND ALBUTEROL SULFATE 3 ML: .5; 3 SOLUTION RESPIRATORY (INHALATION) at 09:04

## 2017-04-05 RX ADMIN — HYDRALAZINE HYDROCHLORIDE 5 MG: 20 INJECTION INTRAMUSCULAR; INTRAVENOUS at 09:04

## 2017-04-05 RX ADMIN — LIDOCAINE HYDROCHLORIDE 100 MG: 20 INJECTION, SOLUTION INTRAVENOUS at 04:04

## 2017-04-05 RX ADMIN — HYDRALAZINE HYDROCHLORIDE 50 MG: 50 TABLET ORAL at 05:04

## 2017-04-05 RX ADMIN — PROPOFOL 20 MG: 10 INJECTION, EMULSION INTRAVENOUS at 04:04

## 2017-04-05 RX ADMIN — ALTEPLASE 2 MG: 2.2 INJECTION, POWDER, LYOPHILIZED, FOR SOLUTION INTRAVENOUS at 05:04

## 2017-04-05 RX ADMIN — SODIUM CHLORIDE: 0.9 INJECTION, SOLUTION INTRAVENOUS at 04:04

## 2017-04-05 RX ADMIN — MIDAZOLAM HYDROCHLORIDE 2 MG: 1 INJECTION, SOLUTION INTRAMUSCULAR; INTRAVENOUS at 04:04

## 2017-04-05 NOTE — PROGRESS NOTES
Visualized patient, nad noted, restraints intact, no signs or symptoms of trauma, pt appears asleep.

## 2017-04-05 NOTE — PLAN OF CARE
Problem: Occupational Therapy Goal  Goal: Occupational Therapy Goal  Goals with expiration date, 4/12:  Patient will increase functional independence with ADLs by performing:    Visual item location for 3/5 items on R. Met 4/3  Bed Mobility with min(a) and bed-rails as needed. MET 3/31, 4/3  -Revised: with SBA.  Supine to sit with SBA.  Stand pivot transfers with SBA.   Grooming while standing with stand by (a).  UB dressing with min(a) while seated EOB.   LB dressing with stand by (a) while seated EOB.  Patients family / caregiver will demonstrate independence and safety with assisting patient with self-care skills and functional mobility.   Patients family / caregiver will demonstrate independence with providing ROM and changes in bed positioning.   Patient and/or patients family will verbalize understanding of stroke prevention guidelines, personal risk factors and stroke warning signs via teachback method.   Outcome: Ongoing (interventions implemented as appropriate)  Goals remain appropriate

## 2017-04-05 NOTE — ASSESSMENT & PLAN NOTE
2/2 stroke    - SLP recommending rehab. PEG tube placement today for dysphagia; failed MBS on 4/5/17.

## 2017-04-05 NOTE — PLAN OF CARE
Problem: Patient Care Overview  Goal: Plan of Care Review  Outcome: Ongoing (interventions implemented as appropriate)  POC reviewed with pt. Pt remained aphasic; but able to nod appropriately for orientation questions. Tube feedings were cut off at midnight. No acute neuro changes noted. Pt remained in bilateral soft wrist restraints for pulling out NGT during the day.

## 2017-04-05 NOTE — PROGRESS NOTES
[facility]  Vascular Neurology  Comprehensive Stroke Center  Progress Note      Neurologic Chief Complaint: L MCA      Subjective:     Interval History: Patient is seen for follow-up neurological assessment and treatment recommendations:   More lethargic today, patient still following commands intermittently. Plan for PEG tube placement today    HPI, Past Medical, Family, and Social History remains the same as documented in the initial encounter.     Review of Systems   Constitutional: Negative for chills and fever.   Neurological: Positive for facial asymmetry, speech difficulty and weakness.     Scheduled Meds:   amlodipine  10 mg Per NG tube Daily    [START ON 4/6/2017] aspirin  81 mg Per G Tube Daily    atorvastatin  80 mg Per NG tube Daily    clopidogrel  75 mg Per NG tube Daily    fluoxetine  20 mg Per NG tube Daily    [START ON 4/6/2017] heparin (porcine)  5,000 Units Subcutaneous Q8H    hydrALAZINE  50 mg Per NG tube Q8H    losartan  100 mg Per NG tube Daily    sodium chloride 0.9%  10 mL Intravenous Q6H    sodium chloride 0.9%  10 mL Intravenous Q6H    sodium chloride 0.9%  3 mL Intravenous Q8H     Continuous Infusions:   PRN Meds:acetaminophen, albuterol-ipratropium 2.5mg-0.5mg/3mL, dextrose 50%, glucagon (human recombinant), insulin aspart, ondansetron, ramelteon, Flushing PICC Protocol **AND** sodium chloride 0.9% **AND** sodium chloride 0.9%, Flushing PICC Protocol **AND** sodium chloride 0.9% **AND** sodium chloride 0.9%    Objective:     Vital Signs (Most Recent):  Temp: 97.7 °F (36.5 °C) (04/05/17 1651)  Pulse: 63 (04/05/17 1651)  Resp: 17 (04/05/17 1651)  BP: (!) 158/69 (04/05/17 1651)  SpO2: 100 % (04/05/17 1651)  BP Location: Right arm    Vital Signs Range (Last 24H):  Temp:  [97.3 °F (36.3 °C)-98.7 °F (37.1 °C)]   Pulse:  [60-78]   Resp:  [17-20]   BP: (118-158)/(61-72)   SpO2:  [95 %-100 %]   BP Location: Right arm    Physical Exam   Constitutional: He appears  well-developed and well-nourished.   HENT:   Head: Normocephalic and atraumatic.   Cardiovascular: Normal rate.    Pulmonary/Chest: Effort normal.   Neurological: He is alert.   Skin: Skin is warm.       Neurological Exam:   LOC: drowsy and follows simple commands intermittently  Language: Expressive aphasia  Speech: Dysarthria  Orientation: Person  Facial Movement (CN VII): lower weakness right lower  Motor*: Arm Left:  Normal (5/5), Leg Left:   Normal (5/5), Arm Right:   Paretic:  4/5, Leg Right:   Paretic:  4/5    NIH Stroke Scale:    Level of Consciousness: 0 - alert  LOC Questions: 1 - answers one correctly  LOC Commands: 0 - performs both correctly  Best Gaze: 0 - normal  Visual: 0 - no visual loss  Facial Palsy: 1 - minor  Motor Left Arm: 0 - no drift  Motor Right Arm: 1 - drift  Motor Left Le - no drift  Motor Right Le - drift  Limb Ataxia: 0 - absent  Sensory: 0 - normal  Best Language: 1 - mild to moderate aphasia  Dysarthria: 1 - mild to moderate dysarthria  Extinction and Inattention: 0 - no neglect  NIH Stroke Scale Total: 6      Laboratory:  CMP:     Recent Labs  Lab 17  0443   CALCIUM 9.2   ALBUMIN 2.8*   PROT 7.3      K 4.2   CO2 23      BUN 20   CREATININE 0.7   ALKPHOS 100   ALT 55*   AST 30   BILITOT 0.5     CBC:     Recent Labs  Lab 17  0443   WBC 14.01*   RBC 4.14*   HGB 12.8*   HCT 37.4*   *   MCV 90   MCH 30.9   MCHC 34.2     Lipid Panel: No results for input(s): CHOL, LDLCALC, HDL, TRIG in the last 168 hours.  Coagulation:     Recent Labs  Lab 17  0355   INR 1.0     Platelet Aggregation Study: No results for input(s): PLTAGG, PLTAGINTERP, PLTAGREGLACO, ADPPLTAGGREG in the last 168 hours.  Hgb A1C: No results for input(s): HGBA1C in the last 168 hours.  TSH: No results for input(s): TSH in the last 168 hours.    Diagnostic Results:  I have personally reviewed:    CT head without contrast (3/26/17)    Evolving left MCA territory infarct with  associated left basal ganglia intraparenchymal hemorrhage and intraventricular extension. There is persistent mass effect upon the frontal horn of the left lateral ventricle without significant midline shift or hydrocephalus.    No evidence of new intracranial hemorrhage or large region of abnormal parenchymal hypoattenuation.      Assessment/Plan:     61 year old male with HTN, HLD, and CAD and L MCA stroke symptoms post tpa and transferred here for higher level of care and potential thrombectomy. CTA multiphase upon arrival.   Patient found to have severe L carotid artery stenosis.  Angioplasty with stent performed and thrombectomy of L MCA occlusion.  Patient found to have L MCA stroke with reperfusion injury.  Possible aortic vegetation seen on echo but on repeat echo showed sclerotic aorta.      03/22/17  Patient drowsy, follows simple commands, unable to move RUE      03/23/17  More awake today.  Temperature and leukocytosis resolving with vancomycin and cefepime.      3/24/17  NAEON. Still with fever overnight.     3/25/17 Patient much more alert and interactive with exam. Still with labored breathing    3/27/17  Patient has a R lower extremity DVT. Unable to have IVC filter placed d/t respiratory status- on intubation watch.     3/30/17  BP elevated to 180s-200s overnight requiring PRNs for BP. Patient will need PEG. Patients respiratory status now improved and on NC. Will have repeat doppler today on R leg to reevaluate if IVC filter to be placed.     03/31/17 Still has increased secretions and cough.  Neurologically stable      04/02/17  Off of oxygen.  Now will leukocytosis.  Neurologically unchanged.   Patient will need modified barium swallow    4/4/17 - failed MBSS, peg discussed with son, nephew and patient.     4/5/17: PEG tube placement today. workup for infection - UA, CXR, blood cx negative; patient afebrile. Waxing and waning mental status - will get an extended EEG.         * Thrombotic stroke  involving left middle cerebral artery  S/p tpa; L ICA angioplasty, stenting and thrombectomy  Antithrombotics for secondary stroke prevention: Aspirin 81mg, plavix 75mg qd - DUAP therapy while stent n place  Statins for secondary stroke prevention and hyperlipidemia, if present: Atorvastatin- 80 mg oral daily  Aggressive risk factor modification: Hypertension, High Cholesterol and Obesity  VTE Prophylaxis: SCDs, heparin 5k q8 hours  BP Parameters: SBP <150  Nursing Orders: Neuro checks- per tpa protocol, Antiembolic stockings, Swallowing evaluation by Nursing, Seizure precautions, Avoid Rucker catheter, Pneumatic compression device, Stroke Education, Blood glucose target 100-130, Up with assistance   Rehab teams recommending Rehab facility.      Right sided weakness  2/2 stroke  PT/OT recommending rehab    Aphasia  2/2 stroke    - SLP recommending rehab. PEG tube placement today for dysphagia; failed MBS on 4/5/17.     Carotid artery stenosis, symptomatic  S/p IR thrombectomy of distant MCA clot and angioplasty with stent on L ICA  Likely etiology of patient's L MCA stroke   Complication- With hemorrhagic conversion s/p tPA and thrombectomy/stenting  - continue dual anti-platelet therapy with asp and plavix    Acute deep vein thrombosis (DVT) of femoral vein of right lower extremity  - will treat with NOAC; Eliquis 10mg BID for 7 weeks; followed by Eliquis 5mg BID for 3 months.       Montserrat Hayward MD  Comprehensive Stroke Center  Department of Vascular Neurology   [facility]

## 2017-04-05 NOTE — PT/OT/SLP PROGRESS
Physical Therapy  Treatment    Nathanael Velez   MRN: [id number]   Admitting Diagnosis: Thrombotic stroke involving left middle cerebral artery    PT Received On: 17  PT Start Time: 824     PT Stop Time: 839    PT Total Time (min): 15 min       Billable Minutes:  NO BILLABLE UNITS    Treatment Type: Treatment  PT/PTA: PT        General Precautions: Standard, aphasia, aspiration, fall, NPO  Orthopedic Precautions: N/A   Braces: N/A    Subjective:  Communicated with ADINA Perez prior to session.  Pt found sleeping.    Pain Ratin/10 (no s/s of distress noted)  Pain Rating Post-Intervention: 0/10    Objective:   Patient found with: telemetry, NG tube (condom catheter)    Functional Mobility:  Pt found supine, sleeping. Provided tactile and verbal cues to arouse patient (trap squeeze), pt responding by moving away from therapist. Attempted to sit EOB to arouse pt; required total A due to lethargy. Pt returned to supine, restraints reapplied. Pt did not open eyes or arouse during session.     *no family present.  *pt planning for PEG placement today.  *condom catheter disconnected during mobility, RN aware.     AM-PAC 6 CLICK MOBILITY  How much help from another person does this patient currently need?   1 = Unable, Total/Dependent Assistance  2 = A lot, Maximum/Moderate Assistance  3 = A little, Minimum/Contact Guard/Supervision  4 = None, Modified [place]/Independent    Turning over in bed (including adjusting bedclothes, sheets and blankets)?: 3  Sitting down on and standing up from a chair with arms (e.g., wheelchair, bedside commode, etc.): 3  Moving from lying on back to sitting on the side of the bed?: 3  Moving to and from a bed to a chair (including a wheelchair)?: 3  Need to walk in hospital room?: 3  Climbing 3-5 steps with a railing?: 2  Total Score: 17    AM-PAC Raw Score CMS G-Code Modifier Level of Impairment Assistance   6 % Total / Unable   7 - 9 CM 80 - 100% Maximal Assist   10 - 14 CL  60 - 80% Moderate Assist   15 - 19 CK 40 - 60% Moderate Assist   20 - 22 CJ 20 - 40% Minimal Assist   23 CI 1-20% SBA / CGA   24 CH 0% Independent/ Mod I     Patient left supine with all lines intact, call button in reach, bed alarm on, restraints reapplied at end of session and RN notified.    Assessment:  Nathanael Velez is a 61 y.o. male with a medical diagnosis of Thrombotic stroke involving left middle cerebral artery. Session largely limited by fatigue. Pt continues to benefit from PT services to improve functional mobility. Will re-assess next available visit.     Rehab identified problem list/impairments: Rehab identified problem list/impairments: weakness, impaired self care skills, impaired functional mobilty, gait instability, impaired balance, decreased upper extremity function, decreased lower extremity function, decreased safety awareness    Rehab potential is good.    Activity tolerance: Good    Discharge recommendations: Discharge Facility/Level Of Care Needs: rehabilitation facility     Barriers to discharge: Barriers to Discharge: Inaccessible home environment, Decreased caregiver support    Equipment recommendations: Equipment Needed After Discharge: 3-in-1 commode, bath bench, walker, rolling, wheelchair     GOALS:   Physical Therapy Goals        Problem: Physical Therapy Goal    Goal Priority Disciplines Outcome Goal Variances Interventions   Physical Therapy Goal     PT/OT, PT Ongoing (interventions implemented as appropriate)     Description:  Active Goals to be met by: 4/18/17    1. Supine to sit with CGA.  2. Sit to supine with Supervision. NOT MET  3. Sit to stand with QCN with CGA.  4. Pt to perform gait 5ft with appropriate AD with max assist.-met 4/1/17  Revised: Pt to perform gait 75ft with RW with CGA. NOT MET  5. Pt to transfer bed to/from bedside chair with max assist.- met 4/1/17  Revised: pt to transfer to bedside chair via stand pivot with min A with RW. NOT MET  6. Able to tolerate  exercise for 15-20 reps with independence.  7. Dynamic sitting at edge of bed x 8 minutes with Stand-by Assistance.  8. Dynamic standing for 2 minutes with Contact Guard Assistance using Quad Cane.  9. Patient and family able to teachback stroke & positioning education independently.  10. Wheelchair propulsion x 100 feet with Minimal Assistance using bilateral lower extremities--Discontinued                PLAN:    Patient to be seen 6 x/week  to address the above listed problems via gait training, therapeutic activities, therapeutic exercises, neuromuscular re-education  Plan of Care expires: 04/18/17  Plan of Care reviewed with: patient     Idania TENZIN Dumas PT, DPT  4/5/2017  Pager: [phone]

## 2017-04-05 NOTE — SUBJECTIVE & OBJECTIVE
Neurologic Chief Complaint: L MCA      Subjective:     Interval History: Patient is seen for follow-up neurological assessment and treatment recommendations:   More lethargic today, patient still following commands intermittently. Plan for PEG tube placement today    HPI, Past Medical, Family, and Social History remains the same as documented in the initial encounter.     Review of Systems   Constitutional: Negative for chills and fever.   Neurological: Positive for facial asymmetry, speech difficulty and weakness.     Scheduled Meds:   amlodipine  10 mg Per NG tube Daily    [START ON 4/6/2017] aspirin  81 mg Per G Tube Daily    atorvastatin  80 mg Per NG tube Daily    clopidogrel  75 mg Per NG tube Daily    fluoxetine  20 mg Per NG tube Daily    [START ON 4/6/2017] heparin (porcine)  5,000 Units Subcutaneous Q8H    hydrALAZINE  50 mg Per NG tube Q8H    losartan  100 mg Per NG tube Daily    sodium chloride 0.9%  10 mL Intravenous Q6H    sodium chloride 0.9%  10 mL Intravenous Q6H    sodium chloride 0.9%  3 mL Intravenous Q8H     Continuous Infusions:   PRN Meds:acetaminophen, albuterol-ipratropium 2.5mg-0.5mg/3mL, dextrose 50%, glucagon (human recombinant), insulin aspart, ondansetron, ramelteon, Flushing PICC Protocol **AND** sodium chloride 0.9% **AND** sodium chloride 0.9%, Flushing PICC Protocol **AND** sodium chloride 0.9% **AND** sodium chloride 0.9%    Objective:     Vital Signs (Most Recent):  Temp: 97.7 °F (36.5 °C) (04/05/17 1651)  Pulse: 63 (04/05/17 1651)  Resp: 17 (04/05/17 1651)  BP: (!) 158/69 (04/05/17 1651)  SpO2: 100 % (04/05/17 1651)  BP Location: Right arm    Vital Signs Range (Last 24H):  Temp:  [97.3 °F (36.3 °C)-98.7 °F (37.1 °C)]   Pulse:  [60-78]   Resp:  [17-20]   BP: (118-158)/(61-72)   SpO2:  [95 %-100 %]   BP Location: Right arm    Physical Exam   Constitutional: He appears well-developed and well-nourished.   HENT:   Head: Normocephalic and atraumatic.   Cardiovascular:  Normal rate.    Pulmonary/Chest: Effort normal.   Neurological: He is alert.   Skin: Skin is warm.       Neurological Exam:   LOC: drowsy and follows simple commands intermittently  Language: Expressive aphasia  Speech: Dysarthria  Orientation: Person  Facial Movement (CN VII): lower weakness right lower  Motor*: Arm Left:  Normal (5/5), Leg Left:   Normal (5/5), Arm Right:   Paretic:  4/5, Leg Right:   Paretic:  4/5    NIH Stroke Scale:    Level of Consciousness: 0 - alert  LOC Questions: 1 - answers one correctly  LOC Commands: 0 - performs both correctly  Best Gaze: 0 - normal  Visual: 0 - no visual loss  Facial Palsy: 1 - minor  Motor Left Arm: 0 - no drift  Motor Right Arm: 1 - drift  Motor Left Le - no drift  Motor Right Le - drift  Limb Ataxia: 0 - absent  Sensory: 0 - normal  Best Language: 1 - mild to moderate aphasia  Dysarthria: 1 - mild to moderate dysarthria  Extinction and Inattention: 0 - no neglect  NIH Stroke Scale Total: 6      Laboratory:  CMP:     Recent Labs  Lab 17  0443   CALCIUM 9.2   ALBUMIN 2.8*   PROT 7.3      K 4.2   CO2 23      BUN 20   CREATININE 0.7   ALKPHOS 100   ALT 55*   AST 30   BILITOT 0.5     CBC:     Recent Labs  Lab 17  0443   WBC 14.01*   RBC 4.14*   HGB 12.8*   HCT 37.4*   *   MCV 90   MCH 30.9   MCHC 34.2     Lipid Panel: No results for input(s): CHOL, LDLCALC, HDL, TRIG in the last 168 hours.  Coagulation:     Recent Labs  Lab 17  0355   INR 1.0     Platelet Aggregation Study: No results for input(s): PLTAGG, PLTAGINTERP, PLTAGREGLACO, ADPPLTAGGREG in the last 168 hours.  Hgb A1C: No results for input(s): HGBA1C in the last 168 hours.  TSH: No results for input(s): TSH in the last 168 hours.    Diagnostic Results:  I have personally reviewed:    CT head without contrast (3/26/17)    Evolving left MCA territory infarct with associated left basal ganglia intraparenchymal hemorrhage and intraventricular extension. There is  persistent mass effect upon the frontal horn of the left lateral ventricle without significant midline shift or hydrocephalus.    No evidence of new intracranial hemorrhage or large region of abnormal parenchymal hypoattenuation.

## 2017-04-05 NOTE — TRANSFER OF CARE
"Anesthesia Transfer of Care Note    Patient: Nathanael Velez    Procedure(s) Performed: Procedure(s) (LRB):  PEG TUBE PLACEMENT/REPLACEMENT (N/A)    Patient location: [facility]    Anesthesia Type: general    Transport from OR: Transported from OR on room air with adequate spontaneous ventilation    Post pain: adequate analgesia    Post assessment: no apparent anesthetic complications and tolerated procedure well    Post vital signs: stable    Level of consciousness: lethargic    Nausea/Vomiting: no nausea/vomiting    Complications: none          Last vitals:   Visit Vitals    /61 (BP Location: Right arm, Patient Position: Lying, BP Method: Automatic)    Pulse 68    Temp 36.6 °C (97.9 °F) (Oral)    Resp 18    Ht 5' 7" (1.702 m)    Wt 74.2 kg (163 lb 9.3 oz)    SpO2 99%    BMI 25.62 kg/m2     "

## 2017-04-05 NOTE — PLAN OF CARE
Problem: Physical Therapy Goal  Goal: Physical Therapy Goal  Active Goals to be met by: 4/18/17    1. Supine to sit with CGA.  2. Sit to supine with Supervision. NOT MET  3. Sit to stand with QCN with CGA.  4. Pt to perform gait 5ft with appropriate AD with max assist.-met 4/1/17  Revised: Pt to perform gait 75ft with RW with CGA. NOT MET  5. Pt to transfer bed to/from bedside chair with max assist.- met 4/1/17  Revised: pt to transfer to bedside chair via stand pivot with min A with RW. NOT MET  6. Able to tolerate exercise for 15-20 reps with independence.  7. Dynamic sitting at edge of bed x 8 minutes with Stand-by Assistance.  8. Dynamic standing for 2 minutes with Contact Guard Assistance using Quad Cane.  9. Patient and family able to teachback stroke & positioning education independently.  10. Wheelchair propulsion x 100 feet with Minimal Assistance using bilateral lower extremities--Discontinued   Outcome: Ongoing (interventions implemented as appropriate)     Pt would benefit from IP Rehab upon discharge. Pt progressing towards goals.  Appropriate to transfer with: RN/PCT contact guard of 2 people  Idania Dumas PT, DPT  4/5/2017  Pager: [phone]

## 2017-04-05 NOTE — ANESTHESIA RELEASE NOTE
"Anesthesia Release from PACU Note    Patient: Nathanael Velez    Procedure(s) Performed: Procedure(s) (LRB):  PEG TUBE PLACEMENT/REPLACEMENT (N/A)    Anesthesia type: general    Post pain: Adequate analgesia    Post assessment: no apparent anesthetic complications    Last Vitals:   Visit Vitals    BP (!) 154/63 (BP Location: Right arm, Patient Position: Lying, BP Method: Automatic)    Pulse (!) 56    Temp 36.5 °C (97.7 °F) (Oral)    Resp 18    Ht 5' 7" (1.702 m)    Wt 74.2 kg (163 lb 9.3 oz)    SpO2 100%    BMI 25.62 kg/m2       Post vital signs: stable    Level of consciousness: awake    Nausea/Vomiting: no nausea/no vomiting    Complications: none    Airway Patency: patent    Respiratory: unassisted    Cardiovascular: stable    Hydration: euvolemic  "

## 2017-04-05 NOTE — PLAN OF CARE
Problem: Patient Care Overview  Goal: Plan of Care Review  Outcome: Ongoing (interventions implemented as appropriate)    04/05/17 6546   Coping/Psychosocial   Plan Of Care Reviewed With patient         Plan of care reviewed with patient on day of care. Vital signs stable. No neurologic changes during shift. Patient remains free of falls, trauma, and injury. Bed in lowest position, wheels locked, call light within reach, side rails x3 belongings within reach. Patient oriented to unit and plan of care board. Stroke education provided. Telemetry monitoring continued. Patient educated to safety measures. No acute distress noted. Restraint monitoring continued.         Down for peg tube placement. ERVINM.

## 2017-04-05 NOTE — PLAN OF CARE
Problem: SLP Goal  Goal: SLP Goal  SLP Plan of Care: Speech Pathology will follow pt 5x/week and address the following goals x1 week:  4/5  1. Pt will participate in ongoing swallow assessment to determine least restrictive diet. ONGOING  2. Pt will answer simple YN questions with 70% acc provided min cues to improve receptive language. ONGOING  3. Pt will follow 2-step directives with 60% acc indep provided mod cues to improve receptive language. MET/MODIFIED  4. Pt will complete automatic rote speech (counting) with 60% acc indep provided mod cues to improve expressive language. MET/MODIFIED  5. When deemed appropriate by MD/SLP pt will participate in MBS to further determine safe swallow function. ONGOING    SLP Plan of Care: Speech Pathology will follow pt 5x/week and address the following goals x1 week:  4/10  1. Pt will participate in ongoing swallow assessment to determine least restrictive diet.   2. Pt will answer simple YN questions with 70% acc provided min cues to improve receptive language.   3. Pt will follow 2-step directives with 80% acc indep provided mod cues to improve receptive language.  4. Pt will complete automatic rote speech/song with 90% acc indep provided mod cues to improve expressive language.   5. When deemed appropriate by MD/SLP pt will participate in MBS to further determine safe swallow function.           Continued progress towards goals.     KAYLEE Mckeon

## 2017-04-05 NOTE — PROGRESS NOTES
Visualized patient,  nad noted, restraints maintained, no signs or symptoms of trauma, denies pain, lying in bed watching tv.

## 2017-04-05 NOTE — ASSESSMENT & PLAN NOTE
S/p tpa; L ICA angioplasty, stenting and thrombectomy  Antithrombotics for secondary stroke prevention: Aspirin 81mg, plavix 75mg qd - DUAP therapy while stent n place  Statins for secondary stroke prevention and hyperlipidemia, if present: Atorvastatin- 80 mg oral daily  Aggressive risk factor modification: Hypertension, High Cholesterol and Obesity  VTE Prophylaxis: SCDs, heparin 5k q8 hours  BP Parameters: SBP <150  Nursing Orders: Neuro checks- per tpa protocol, Antiembolic stockings, Swallowing evaluation by Nursing, Seizure precautions, Avoid Rucker catheter, Pneumatic compression device, Stroke Education, Blood glucose target 100-130, Up with assistance   Rehab teams recommending Rehab facility.

## 2017-04-05 NOTE — PROGRESS NOTES
[facility]  General Surgery  Progress Note    Subjective:     Interval History:   Pt seen and examined, no acute events overnight, has been NPO p MN in anticipation of PEG placement today    Post-Op Info:  Procedure(s) (LRB):  VENOGRAM for IVC filter placement (N/A)          Medications:  Continuous Infusions:   Scheduled Meds:   amlodipine  10 mg Per NG tube Daily    atorvastatin  80 mg Per NG tube Daily    clopidogrel  75 mg Per NG tube Daily    fluoxetine  20 mg Oral Daily    hydrALAZINE  50 mg Per NG tube Q8H    losartan  100 mg Per NG tube Daily    sodium chloride 0.9%  10 mL Intravenous Q6H    sodium chloride 0.9%  10 mL Intravenous Q6H    sodium chloride 0.9%  3 mL Intravenous Q8H     PRN Meds:acetaminophen, albuterol-ipratropium 2.5mg-0.5mg/3mL, dextrose 50%, glucagon (human recombinant), insulin aspart, ondansetron, ramelteon, Flushing PICC Protocol **AND** sodium chloride 0.9% **AND** sodium chloride 0.9%, Flushing PICC Protocol **AND** sodium chloride 0.9% **AND** sodium chloride 0.9%     Objective:     Vital Signs (Most Recent):  Temp: 98.7 °F (37.1 °C) (04/05/17 0400)  Pulse: 65 (04/05/17 0400)  Resp: 20 (04/05/17 0400)  BP: 128/66 (04/05/17 0400)  SpO2: 98 % (04/05/17 0400) Vital Signs (24h Range):  Temp:  [97.3 °F (36.3 °C)-98.7 °F (37.1 °C)] 98.7 °F (37.1 °C)  Pulse:  [60-85] 65  Resp:  [18-20] 20  SpO2:  [95 %-98 %] 98 %  BP: (128-147)/(65-72) 128/66       Intake/Output Summary (Last 24 hours) at 04/05/17 0623  Last data filed at 04/05/17 0000   Gross per 24 hour   Intake             1300 ml   Output              700 ml   Net              600 ml       Physical Exam   Constitutional: He appears well-developed and well-nourished. No distress.   HENT:   Head: Normocephalic and atraumatic.   Cardiovascular: Normal rate and regular rhythm.    Pulmonary/Chest: Effort normal. No respiratory distress.   Abdominal:   Soft, NTND, no previous abdominal surgical scars noted        Significant Labs:  BMP:   Recent Labs  Lab 04/04/17  0355         K 4.2      CO2 23   BUN 18   CREATININE 0.8   CALCIUM 9.0   MG 2.6     CBC:   Recent Labs  Lab 04/05/17  0443   WBC 14.01*   RBC 4.14*   HGB 12.8*   HCT 37.4*   *   MCV 90   MCH 30.9   MCHC 34.2     CMP:   Recent Labs  Lab 04/04/17 0355      CALCIUM 9.0   ALBUMIN 2.9*   PROT 7.6      K 4.2   CO2 23      BUN 18   CREATININE 0.8   ALKPHOS 98   ALT 70*   AST 47*   BILITOT 0.5     Coagulation:   Recent Labs  Lab 04/04/17 0355   INR 1.0     LFTs:   Recent Labs  Lab 04/04/17 0355   ALT 70*   AST 47*   ALKPHOS 98   BILITOT 0.5   PROT 7.6   ALBUMIN 2.9*     All pertinent labs from the last 24 hours have been reviewed.    Assessment/Plan:   60 yo male w L MCA stroke, dysphagia  -plan for PEG placement in OR today  -has been NPO p MN  -consents obtained and in chart    Génesis Renee PA-C   [id number]  General Surgery  [facility]

## 2017-04-05 NOTE — NURSING TRANSFER
Nursing Transfer Note      4/5/2017     Transfer To: 702    Transfer via bed    Transfer with cardiac monitoring    Transported by pt escort     Chart send with patient: Yes    Patient reassessed at: 1900 4/5/2017    Upon arrival to floor: cardiac monitor applied, patient oriented to room, call bell in reach and bed in lowest position

## 2017-04-05 NOTE — OP NOTE
DATE OF PROCEDURE: 04/05/2017    PREOPERATIVE DIAGNOSES:   1. CVA  2. Dysphagia.    POSTOPERATIVE DIAGNOSES:   1. CVA  2. Dysphagia.     PROCEDURES PERFORMED:   1. Esophagogastroduodenoscopy  2. Percutaneous endoscopic gastrostomy.    ATTENDING SURGEON: Joshua Goldberg, M.D.     HOUSESTAFF SURGEON: Ortega Gutiérrez MD    : Ralf Roldan MD    ANESTHESIA: Local plus monitored anesthesia care    ESTIMATED BLOOD LOSS: 1 mL     FINDINGS: 20-Romansh percutaneous gastrostomy @ 4 cm placed without apparent complication.     SPECIMEN: None.     DRAINS: None.     COMPLICATIONS: None.     INDICATIONS: Nathanael Velez is a 61 y.o.male who was consulted by general surgery for feeding tube. The patient is expected to have prolonged dysphagia. We did obtain informed consent from the patient's son who expressed understanding of the risks and benefits and gave consent to proceed.     OPERATIVE PROCEDURE: The patient was identified in preoperative holding and brought back to the operating room. Anesthesia was induced. A timeout procedure was performed and all team members present agreed this was the correct procedure on the correct patient.    We then directed our attention to the left upper quadrant for gastrostomy tube placement. The patient's abdomen was prepped and draped. An upper endoscope was introduced into the oropharynx and guided down into the esophagus and stomach. The stomach was insufflated with air. We intubated the duodenum and examined the first and second portions; no abnormalities were noted. We withdrew the endoscope into the stomach and identified an appropriate position for gastrostomy tube placement 2 finger-breadths below the left subcostal margin. Palpation of the anterior abdominal wall at this point was visualized endoscopically and transillumination from the endoscope was visualized through the anterior abdominal wall. We made a 1.5 cm transverse skin incision. At this point, the stomach  was cannulated with a catheter loaded on a needle. This was grasped by a snare which had been passed through the endoscope. The needle was removed, and a guidewire was placed, and the snare was used to grasp the guidewire. The endoscope, snare, and guidewire were all withdrawn from the patient's mouth. A 20-Portuguese gastrostomy tube was loaded onto the guidewire and pulled through the anterior abdominal wall via Seldinger technique. Repeat endoscopy was performed with the gastrostomy tube at the 4 cm arnulfo at the skin. There was no blanching of the gastric mucosa, and when the tube was twisted, the button did not grab the mucosa. The insufflation in the stomach was evacuated, and the endoscope was removed. The gastrostomy tube was secured in place using the supplied devices and connected to a bag for gravity drainage.    The patient. I was present for and directed or performed the entire procedure. All sponge, instrument, and needle counts were correct at the termination of the PEG procedure.

## 2017-04-05 NOTE — PLAN OF CARE
Pt to pod _28__ asleep and easily aroused by voiced. Pt oriented to immediate surroundings and situation and verbalized understanding, acceptance and satisfaction with clinical encounter.Patient arrived to unit via stretcher from_OR______. Denies pain. Denies shortness of breath. Monitoring equipment placed on pt with noted VSS HRR, tele strip obtained.IVF patent per gravity and without any s/s of infiltration noted and saline locked upon arrival.. Oxygen in use @ 2l___ Bed in lowest position. Brakes locked Call light within reach. Side rails up x2.

## 2017-04-05 NOTE — ASSESSMENT & PLAN NOTE
S/p IR thrombectomy of distant MCA clot and angioplasty with stent on L ICA  Likely etiology of patient's L MCA stroke   Complication- With hemorrhagic conversion s/p tPA and thrombectomy/stenting  - continue dual anti-platelet therapy with asp and plavix

## 2017-04-05 NOTE — DISCHARGE INSTRUCTIONS
Discharge Instructions: Caring for Your Gastrostomy Tube (G-Tube)  You have been discharged with a gastrostomy tube, or G-tube. The G-tube was inserted through your belly (abdominal) wall and into your stomach. The tube will provide you with food, fluids, and medicine. Your G-tube may move in and out slightly. If the tube comes out all the way in the first few weeks after placement, dont put it back in. Call your healthcare provider right away. Dont wait until the next day. This is important because the G-tube tract through the skin may close very quickly, often within 24 hours. After the first few weeks, if the tube comes out, ask your provider how to get a spare tube. Ask your provider how to replace it at home.   General guidelines for use  · Wash your hands thoroughly with soap and warm water before starting your feeding.  · During the feeding and for 1 hour after, sit in a chair or sit up in bed.  · Before feeding begins, check to see that your stomach is empty. You will need a syringe for the following steps:   ¨ Insert the tip of an empty syringe into the end of the G-tube.  ¨ Pull back on the syringe to withdraw contents of the stomach.  ¨ Dont begin the feeding if more than 100 mL remains from the previous feeding.  · Clean the area around the tube with mild soap and water.  · Pat the area dry during bathing and as needed.  · Clean the area more often if it gets wet or is leaking some discharge (weeping).  · Keep the disk (flange) a few millimeters off the skin. This should leave just enough room for a gauze sponge. Pulling the flange too tightly can damage the skin. But leaving the flange too loose leads to leaking around the G-tube. Your healthcare team will go over these guidelines before you leave the hospital.     The name of my feeding supplement/formula is:  ___________________________________________     Amount per feeding:   ___________________________________________     Times per  day:  ___________________________________________     Amount of water used to flush tube:  ___________________________________________   Gravity feeding method  · Fill the feeding bag with the prescribed amount of formula. Run the fluid to the end of the tube to clear out any air. Clamp the tube.  · Connect the end of the feeding bag tubing to the G-tube.  · Hang the bag at least 18 inches above the level of your G-tube.  · Open the clamp and allow the formula to flow into the G-tube.  · Follow with the prescribed amount of water.  · After each feeding, rinse the bag and tubing. Every 24 hours, wash the bag and tubing with soapy water and rinse thoroughly.  Pump feeding method  · Fill the feeding bag with the prescribed amount of formula. Run the fluid to the end of the tube to clear out any air. Clamp the tube.  · Connect the end of the feeding bag tubing to the G-tube. Set the pump rate of flow to the prescribed rate per hour.  · Open the clamp on the tubing; press the start button on your pump.  · When feeding is complete, disconnect the feeding set.  · Connect the tip of an empty syringe to the feeding tube and slowly push in the prescribed amount of water.  · After each feeding, rinse the bag and tubing. Every 24 hours, wash the bag and tubing with soapy water and rinse thoroughly.  Syringe feeding method  · Remove the plunger from a syringe and connect the syringe to the G-tube.  · Hold the syringe upright and pour the formula into the syringe.  · Refill the syringe as the formula reaches the bottom of the syringe.  · Repeat the process until the prescribed amount of formula is given.  · Follow the feeding with the prescribed amount of water.  · After each feeding, rinse the bag and tubing. Every 24 hours, wash the bag and tubing with soapy water and rinse thoroughly.  When to call your provider  Call your healthcare provider right away if you have any of the following:  · The tube comes out   · The tube is  blocked  · Vomiting  · Fever above 100.4°F (38.0°C)  · Diarrhea that lasts more than 2 days  · Signs of infection (redness, swelling, or warmth at the tube site)  · Drainage from the tube site   Date Last Reviewed: 8/1/2016  © 1817-0562 [employer]. [address] [place] [address]. All rights reserved. This information is not intended as a substitute for professional medical care. Always follow your healthcare professional's instructions.

## 2017-04-05 NOTE — PROGRESS NOTES
Patient arrived to floor in 2 point restraints to bilateral wrists, capillary refill < 3 seconds in both hands, pt denies pain, urine incontinence noted, pt cleaned and absorbent pads replaced, picc line to upper left arm flushed and had blood return to both ports, ng tube to right nare noted to have loose tape to bridge of nose so was reinforced, nad noted, will continue to monitor.

## 2017-04-05 NOTE — PT/OT/SLP PROGRESS
Occupational Therapy  Treatment    Nathanael Velez   MRN: [id number]   Admitting Diagnosis: Thrombotic stroke involving left middle cerebral artery    OT Date of Treatment: 17   OT Start Time: 105  OT Stop Time: 1123  OT Total Time (min): 24 min    Billable Minutes:  Self Care/Home Management 12 and Therapeutic Exercise 12    General Precautions: Standard, aspiration, fall, NPO (cardiac)    Do you have any cultural, spiritual, Congregational conflicts, given your current situation?: Muslim    Subjective:  Communicated with RN prior to session.  Pt responses to questions however no spontaneous speech.    Pain Ratin/10  Pain Rating Post-Intervention: 0/10    Objective:  Patient found with: telemetry, restraints, NG tube     Functional Mobility:  Bed Mobility:  Scooting/Bridging: Minimum Assistance (scooting up HOB while supine)  Supine to Sit: Total Assistance  Sit to Supine: Moderate Assistance    Therapeutic Activities and Exercises:  Pt with eyes closed 100% of session.  Pt required SBA-Min (A) with postural control while seated EOB.  Pt answered 70% of questions accurately during session.  Pt appeared very lethargic during session (nursing notified).  Pt with /69, MN 69, MAP 95, O2 98%.  Pt performed AAROM with RUE & PROM with LUE In all planes x 10 reps each while supine.  Pt had no further questions & when asked whether there were any concerns pt reported none.      AM-PAC 6 CLICK ADL   How much help from another person does this patient currently need?   1 = Unable, Total/Dependent Assistance  2 = A lot, Maximum/Moderate Assistance  3 = A little, Minimum/Contact Guard/Supervision  4 = None, Modified [place]/Independent    Putting on and taking off regular lower body clothing? : 3  Bathing (including washing, rinsing, drying)?: 2  Toileting, which includes using toilet, bedpan, or urinal? : 2  Putting on and taking off regular upper body clothing?: 2  Taking care of personal grooming such as  brushing teeth?: 3  Eating meals?: 1  Total Score: 13     AM-PAC Raw Score CMS G-Code Modifier Level of Impairment Assistance   6 % Total / Unable   7 - 9 CM 80 - 100% Maximal Assist   10 - 14 CL 60 - 80% Moderate Assist   15 - 19 CK 40 - 60% Moderate Assist   20 - 22 CJ 20 - 40% Minimal Assist   23 CI 1-20% SBA / CGA   24 CH 0% Independent/ Mod I     Patient left supine with all lines intact, call button in reach, bed alarm on, restraints reapplied at end of session, RN notified and white board updated.    ASSESSMENT:  Nathanael Velez is a 61 y.o. male with a medical diagnosis of Thrombotic stroke involving left middle cerebral artery and presents with increased lethargy in comparison to prior sessions with this OT.  Pt did indicate that he did not sleep well last night.  Pt continues to require (A) with all activities.    Rehab identified problem list/impairments: Rehab identified problem list/impairments: weakness, impaired endurance, impaired sensation, impaired self care skills, visual deficits, impaired balance, impaired functional mobilty, impaired cognition, decreased safety awareness, decreased coordination, decreased upper extremity function, decreased lower extremity function    Rehab potential is fair.    Activity tolerance: Fair    Discharge recommendations: Discharge Facility/Level Of Care Needs: rehabilitation facility     GOALS:   Occupational Therapy Goals        Problem: Occupational Therapy Goal    Goal Priority Disciplines Outcome Interventions   Occupational Therapy Goal     OT, PT/OT Ongoing (interventions implemented as appropriate)    Description:  Goals with expiration date, 4/12:  Patient will increase functional independence with ADLs by performing:    Visual item location for 3/5 items on R. Met 4/3  Bed Mobility with min(a) and bed-rails as needed. MET 3/31, 4/3  -Revised: with SBA.  Supine to sit with SBA.  Stand pivot transfers with SBA.    Grooming while standing with stand by  (a).  UB dressing with min(a) while seated EOB.   LB dressing with stand by (a) while seated EOB.  Patient's family / caregiver will demonstrate independence and safety with assisting patient with self-care skills and functional mobility.      Patient's family / caregiver will demonstrate independence with providing ROM and changes in bed positioning.    Patient and/or patient's family will verbalize understanding of stroke prevention guidelines, personal risk factors and stroke warning signs via teachback method.                  Plan:  Patient to be seen 6 x/week to address the above listed problems via self-care/home management, neuromuscular re-education, cognitive retraining, sensory integration, therapeutic activities, therapeutic exercises  Plan of Care expires: 04/19/17  Plan of Care reviewed with: patient         AVIS Álvarez  04/05/2017

## 2017-04-05 NOTE — PROGRESS NOTES
[facility]  Adult Nutrition  Progress Note    SUMMARY     Recommendations    Recommendation/Intervention:   Continue TF at increased goal rate. Isosource 1.5 @ goal rate 55mL/hr. Hold for residuals >500ml. RD to monitor.     Goals: TF to meet >85% of EEN/EPN  Nutrition Goal Status: goal met  Communication of RD Recs: reviewed with RN    Continuum of Care Plan    Referral to Outpatient Services: other (see comments) (Nutr D/c Plan: unable to determine @ this time)    Reason for Assessment    Reason for Assessment: RD follow-up  Diagnosis: stroke/CVA  Relevent Medical History: HTN, HLD, CAD         General Information Comments: TF running @ goal rate ordered (40ml/hr), patient tolerating well. Will continue to monitor.     Nutrition Prescription Ordered    Current Diet Order: NPO  Nutrition Order Comments: TF held  Current Nutrition Support Formula Ordered: Isosource 1.5  Current Nutrition Support Rate Ordered: 40 (ml)  Current Nutrition Support Frequency Ordered: ml/hr        Evaluation of Received Nutrients/Fluid Intake    Enteral Calories (kcal): 1440  Enteral Protein (gm): 65  Enteral (Free Water) Fluid (mL): 733      Energy Calories Required: not meeting needs  % Kcal Needs: 76      Protein Required: not meeting needs  % Protein Needs: 87     IV Fluid (mL): 1200      Total Fluid Intake (mL/kg): 0  Fluid Required: meeting needs  Comments: +I/O, + BM, good UOP  Tolerance: tolerating     Nutrition Risk Screen     Nutrition Risk Screen: no indicators present    Nutrition/Diet History       Typical Food/Fluid Intake: NPO currently  Food Preferences: SAMINA        Factors Affecting Nutritional Intake: difficulty/impaired swallowing, NPO     Labs/Tests/Procedures/Meds       Pertinent Labs Reviewed: reviewed  Pertinent Labs Comments: Glu 106, BMP WDL  Pertinent Medications Reviewed: reviewed  Pertinent Medications Comments: statin, heparin    Physical Findings    Overall Physical Appearance: on oxygen  therapy, overweight  Tubes: nasogastric tube     Skin: intact    Anthropometrics       Height (inches): 67.01 in  Weight Method: Bed Scale  Weight (kg): 74.2 kg  Ideal Body Weight (IBW), Male: 148.06 lb     % Ideal Body Weight, Male (lb): 110.48 lb     BMI (kg/m2): 25.61  BMI Grade: 25 - 29.9 - overweight  Usual Body Weight (UBW), kg:  (unable to obtain)   Estimated/Assessed Needs    Weight Used For Calorie Calculations: 71.4 kg (157 lb 6.5 oz) (dosing weight)   Height (cm): 170.2 cm     Energy Need Method: [place]-St Jeor (1889 kcal (PAL x1.25))      RMR ([place]-St. Jeor Equation): 1514.81        Weight Used For Protein Calculations: 73.8 kg (162 lb 11.2 oz)  Protein Requirements: 74-89 g (1.0-1.2 g/kg)    Fluid Need Method: RDA Method (1ml/kcal or per MD)     Monitor and Evaluation    Food and Nutrient Intake: enteral nutrition intake, energy intake  Food and Nutrient Adminstration: enteral and parenteral nutrition administration, diet order        Anthropometric Measurements: weight change, weight  Biochemical Data, Medical Tests and Procedures: electrolyte and renal panel, glucose/endocrine profile  Nutrition-Focused Physical Findings: overall appearance    Nutrition Risk    Level of Risk: other (see comments) (2x weekly)    Nutrition Follow-Up    RD Follow-up?: Yes    Nutrition Diagnosis      Nutr Dx/problem: Inadequate energy intake  Related to/etiology: insufficient TF goal rate  As evidenced by: <85% of EEN being met  Status: continues    Assessment and Plan    No new Assessment & Plan notes have been filed under this hospital service since the last note was generated.  Service: Nutrition

## 2017-04-05 NOTE — PLAN OF CARE
Call received from [facility], admitting physician has medically accepted pt pending insurance approval, toleration of PEG feedings, WBC below 14.  Facility is initiating insurance auth request today.  Will cont to follow.    1315  Call from [facility] stating apparently [facility] did receive authorization from [facility] for inpatient rehab.  This CM attempted to contact Estrella in admissions at [facility] again with no success, message left.  This CM was not aware of auth request from [facility] but will attempt d/c for tomorrow morning pending medical stability.      Norma Gutierrez, RN  Case Management  [phone]

## 2017-04-05 NOTE — ANESTHESIA POSTPROCEDURE EVALUATION
"Anesthesia Post Evaluation    Patient: Nathanael Velez    Procedure(s) Performed: Procedure(s) (LRB):  PEG TUBE PLACEMENT/REPLACEMENT (N/A)    Final Anesthesia Type: general  Patient location during evaluation: PACU  Patient participation: Yes- Able to Participate  Level of consciousness: awake  Post-procedure vital signs: reviewed and stable  Pain management: adequate  Airway patency: patent  PONV status at discharge: No PONV  Anesthetic complications: no      Cardiovascular status: blood pressure returned to baseline  Respiratory status: unassisted  Hydration status: euvolemic  Follow-up not needed.        Visit Vitals    BP (!) 154/63 (BP Location: Right arm, Patient Position: Lying, BP Method: Automatic)    Pulse (!) 56    Temp 36.5 °C (97.7 °F) (Oral)    Resp 18    Ht 5' 7" (1.702 m)    Wt 74.2 kg (163 lb 9.3 oz)    SpO2 100%    BMI 25.62 kg/m2       Pain/Sergio Score: Pain Assessment Performed: Yes (4/5/2017  4:51 PM)  Presence of Pain: non-verbal indicators absent (4/5/2017  5:45 PM)      "

## 2017-04-05 NOTE — PROGRESS NOTES
Patient visualized, nad noted, 2 point restraints maintained, no sign or symptoms of injury, capillary refill continues to be < 3 seconds bilaterally and both hands are warm and dry.

## 2017-04-06 VITALS
SYSTOLIC BLOOD PRESSURE: 147 MMHG | TEMPERATURE: 98 F | WEIGHT: 163.56 LBS | OXYGEN SATURATION: 98 % | HEIGHT: 67 IN | DIASTOLIC BLOOD PRESSURE: 63 MMHG | HEART RATE: 74 BPM | BODY MASS INDEX: 25.67 KG/M2 | RESPIRATION RATE: 16 BRPM

## 2017-04-06 PROBLEM — D72.829 LEUKOCYTOSIS: Status: ACTIVE | Noted: 2017-04-06

## 2017-04-06 LAB
ALBUMIN SERPL BCP-MCNC: 2.9 G/DL
ALP SERPL-CCNC: 92 U/L
ALT SERPL W/O P-5'-P-CCNC: 51 U/L
ANION GAP SERPL CALC-SCNC: 12 MMOL/L
AST SERPL-CCNC: 26 U/L
BACTERIA UR CULT: NORMAL
BASOPHILS # BLD AUTO: 0.06 K/UL
BASOPHILS NFR BLD: 0.4 %
BILIRUB SERPL-MCNC: 0.7 MG/DL
BUN SERPL-MCNC: 22 MG/DL
CALCIUM SERPL-MCNC: 9.2 MG/DL
CHLORIDE SERPL-SCNC: 103 MMOL/L
CO2 SERPL-SCNC: 26 MMOL/L
CREAT SERPL-MCNC: 0.8 MG/DL
DIFFERENTIAL METHOD: ABNORMAL
EOSINOPHIL # BLD AUTO: 0.2 K/UL
EOSINOPHIL NFR BLD: 1.3 %
ERYTHROCYTE [DISTWIDTH] IN BLOOD BY AUTOMATED COUNT: 13.3 %
EST. GFR  (AFRICAN AMERICAN): >60 ML/MIN/1.73 M^2
EST. GFR  (NON AFRICAN AMERICAN): >60 ML/MIN/1.73 M^2
GLUCOSE SERPL-MCNC: 87 MG/DL
HCT VFR BLD AUTO: 38.7 %
HGB BLD-MCNC: 12.7 G/DL
LYMPHOCYTES # BLD AUTO: 2.1 K/UL
LYMPHOCYTES NFR BLD: 13.2 %
MAGNESIUM SERPL-MCNC: 1.9 MG/DL
MCH RBC QN AUTO: 31 PG
MCHC RBC AUTO-ENTMCNC: 32.8 %
MCV RBC AUTO: 94 FL
MONOCYTES # BLD AUTO: 1.3 K/UL
MONOCYTES NFR BLD: 8 %
NEUTROPHILS # BLD AUTO: 12.2 K/UL
NEUTROPHILS NFR BLD: 76.7 %
PHOSPHATE SERPL-MCNC: 3.4 MG/DL
PLATELET # BLD AUTO: 455 K/UL
PMV BLD AUTO: 10.3 FL
POCT GLUCOSE: 88 MG/DL (ref 70–110)
POCT GLUCOSE: 95 MG/DL (ref 70–110)
POTASSIUM SERPL-SCNC: 3.7 MMOL/L
PROT SERPL-MCNC: 7.5 G/DL
RBC # BLD AUTO: 4.1 M/UL
SODIUM SERPL-SCNC: 141 MMOL/L
WBC # BLD AUTO: 15.94 K/UL

## 2017-04-06 PROCEDURE — 25000003 PHARM REV CODE 250: Performed by: PHYSICIAN ASSISTANT

## 2017-04-06 PROCEDURE — 92526 ORAL FUNCTION THERAPY: CPT

## 2017-04-06 PROCEDURE — 25000003 PHARM REV CODE 250: Performed by: PSYCHIATRY & NEUROLOGY

## 2017-04-06 PROCEDURE — 80053 COMPREHEN METABOLIC PANEL: CPT

## 2017-04-06 PROCEDURE — 85025 COMPLETE CBC W/AUTO DIFF WBC: CPT

## 2017-04-06 PROCEDURE — 97532 *HC OT COG SKL DEV EA 15: CPT

## 2017-04-06 PROCEDURE — 83735 ASSAY OF MAGNESIUM: CPT

## 2017-04-06 PROCEDURE — 97530 THERAPEUTIC ACTIVITIES: CPT

## 2017-04-06 PROCEDURE — 97535 SELF CARE MNGMENT TRAINING: CPT

## 2017-04-06 PROCEDURE — 92507 TX SP LANG VOICE COMM INDIV: CPT

## 2017-04-06 PROCEDURE — 99233 SBSQ HOSP IP/OBS HIGH 50: CPT | Mod: ,,, | Performed by: PSYCHIATRY & NEUROLOGY

## 2017-04-06 PROCEDURE — 25000003 PHARM REV CODE 250: Performed by: NURSE PRACTITIONER

## 2017-04-06 PROCEDURE — 84100 ASSAY OF PHOSPHORUS: CPT

## 2017-04-06 RX ORDER — ATORVASTATIN CALCIUM 80 MG/1
80 TABLET, FILM COATED ORAL DAILY
Start: 2017-04-06 | End: 2018-04-06

## 2017-04-06 RX ORDER — DEXTROSE 50 % IN WATER (D50W) INTRAVENOUS SYRINGE
Status: DISPENSED
Start: 2017-04-06 | End: 2017-04-06

## 2017-04-06 RX ORDER — HYDRALAZINE HYDROCHLORIDE 50 MG/1
50 TABLET, FILM COATED ORAL EVERY 8 HOURS
Start: 2017-04-06 | End: 2018-04-06

## 2017-04-06 RX ORDER — RAMELTEON 8 MG/1
8 TABLET ORAL NIGHTLY PRN
Status: DISCONTINUED | OUTPATIENT
Start: 2017-04-06 | End: 2017-04-06 | Stop reason: HOSPADM

## 2017-04-06 RX ORDER — ACETAMINOPHEN 325 MG/1
650 TABLET ORAL EVERY 4 HOURS PRN
Status: DISCONTINUED | OUTPATIENT
Start: 2017-04-06 | End: 2017-04-06 | Stop reason: HOSPADM

## 2017-04-06 RX ORDER — NAPROXEN SODIUM 220 MG/1
81 TABLET, FILM COATED ORAL DAILY
Refills: 0 | COMMUNITY
Start: 2017-04-06 | End: 2018-04-06

## 2017-04-06 RX ORDER — CLOPIDOGREL BISULFATE 75 MG/1
75 TABLET ORAL DAILY
Start: 2017-04-06 | End: 2018-04-06

## 2017-04-06 RX ORDER — AMLODIPINE BESYLATE 10 MG/1
10 TABLET ORAL DAILY
Start: 2017-04-06 | End: 2018-04-06

## 2017-04-06 RX ORDER — NAPROXEN SODIUM 220 MG/1
81 TABLET, FILM COATED ORAL DAILY
Status: DISCONTINUED | OUTPATIENT
Start: 2017-04-06 | End: 2017-04-06 | Stop reason: HOSPADM

## 2017-04-06 RX ORDER — FLUOXETINE HYDROCHLORIDE 20 MG/1
20 CAPSULE ORAL DAILY
Start: 2017-04-06 | End: 2018-04-06

## 2017-04-06 RX ADMIN — LOSARTAN POTASSIUM 100 MG: 50 TABLET, FILM COATED ORAL at 09:04

## 2017-04-06 RX ADMIN — Medication 3 ML: at 04:04

## 2017-04-06 RX ADMIN — Medication 10 ML: at 06:04

## 2017-04-06 RX ADMIN — Medication 10 ML: at 12:04

## 2017-04-06 RX ADMIN — CLOPIDOGREL 75 MG: 75 TABLET, FILM COATED ORAL at 09:04

## 2017-04-06 RX ADMIN — HYDRALAZINE HYDROCHLORIDE 50 MG: 50 TABLET ORAL at 05:04

## 2017-04-06 RX ADMIN — DEXTROSE MONOHYDRATE 12.5 G: 500 INJECTION PARENTERAL at 12:04

## 2017-04-06 RX ADMIN — HYDRALAZINE HYDROCHLORIDE 50 MG: 50 TABLET ORAL at 04:04

## 2017-04-06 RX ADMIN — Medication 10 ML: at 04:04

## 2017-04-06 RX ADMIN — ACETAMINOPHEN 650 MG: 325 TABLET ORAL at 04:04

## 2017-04-06 RX ADMIN — ATORVASTATIN CALCIUM 80 MG: 20 TABLET, FILM COATED ORAL at 09:04

## 2017-04-06 RX ADMIN — Medication 3 ML: at 05:04

## 2017-04-06 RX ADMIN — APIXABAN 10 MG: 5 TABLET, FILM COATED ORAL at 09:04

## 2017-04-06 RX ADMIN — AMLODIPINE BESYLATE 10 MG: 10 TABLET ORAL at 09:04

## 2017-04-06 RX ADMIN — ASPIRIN 81 MG CHEWABLE TABLET 81 MG: 81 TABLET CHEWABLE at 09:04

## 2017-04-06 RX ADMIN — FLUOXETINE 20 MG: 20 CAPSULE ORAL at 09:04

## 2017-04-06 NOTE — ASSESSMENT & PLAN NOTE
- US LE (3/26): right common femoral thrombus  - US LE (3/27): right common femoral thrombus  - US LE (3/30): no thrombus seen in the right common femoral  - Decision made to treat given DVT was seen on multiple imaging.   - will treat with NOAC; Eliquis 10mg BID for 7 days; followed by Eliquis 5mg BID for total treatment course of 3 months.

## 2017-04-06 NOTE — DISCHARGE SUMMARY
[facility]  Vascular Neurology  Comprehensive Stroke Center  Discharge Summary     Summary:     Admit Date: 3/18/2017  3:41 PM    Discharge Date and Time: No discharge date for patient encounter.    Attending Physician: Ramses Benz MD     Discharge Provider: Montserrat Hayward MD    History of Present Illness: 61 year old male with pmh of HTN, HLD, and CAD who presents after receiving tPA at [facility] for CTA multiphase and potential IR thrombectomy. The patients tpa infusion ended at 1347 and was seen in the ED at 1528 upon arrival. The patient with symptoms of right sided weakness, left gaze preference, aphasia, numbness of the right side and facial droop - last known normal at 10 am. The patients son was contacted and advised of his arrival and verified the patient has no known drug allergies   The patients ems transport reported some spontaneous movement of the Rside while in transport. Remains aphasic.       Hospital Course (synopsis of major diagnoses, care, treatment, and services provided during the course of the hospital stay): 61 year old male with HTN, HLD, and CAD and L MCA stroke symptoms post tpa and transferred here for higher level of care and potential thrombectomy. CTA multiphase upon arrival.  Patient found to have severe L carotid artery stenosis.  Angioplasty with stent performed and thrombectomy of L MCA occlusion.  Patient found to have L MCA stroke with reperfusion injury post stent placement. ECHO on admit remarkable for possible aortic vegetation but on repeat echo showed sclerotic aortic valve.   Etiology of stroke is thrombotic stroke involving left middle cerebral artery and patient started on Aspirin 81mg, plavix 75mg qd post stent placement with plans for - DUAP therapy for 3 months post stent. After 3 months, recommend monotherapy with aspirin for secondary stroke prevention. Patient also started on Atorvastatin 80 mg qd for secondary stroke prevention.  SBP while inpatient maintained at <150 post stent. Patient seen and evaluated by PT/OT/SLP while inpatient - all therapy groups recommended rehab. Failed modified barium swallow study and PEG tube was placed on . Hospital course also remarkable for persistent leukocytosis - extensive workup with UA, CXR, blood cultures done. Most recent blood cx from  - one set positive for gram positive cocci in clusters; likely contaminant; blood cultures repeated; low suspicion for infection given patient is afebrile and not exhibiting signs of systemic infection. UA and CXR unremarkable. Hospital course also remarkable for depression and was started on fluoxetine. Acute deep vein thrombosis (DVT) of femoral vein of right lower extremity was also found on routine surveillance for DVT done in the ICU. US LE (3/26) showed right common femoral thrombus. US LE (3/27) showed right common femoral thrombus. US LE (3/30): no thrombus seen in the right common femoral, however decision made to treat given DVT was seen on multiple imaging.   Treatment was initiated with Eliquis 10mg BID for 7 days; with recommendation of decreasing dose to Eliquis 5mg BID for total treatment course of 3 months. Patient hemodynamically stable on the day of discharge.       NIH Stroke Scale:    Level of Consciousness: 0 - alert  LOC Questions: 1 - answers one correctly  LOC Commands: 0 - performs both correctly  Best Gaze: 0 - normal  Visual: 0 - no visual loss  Facial Palsy: 1 - minor  Motor Left Arm: 0 - no drift  Motor Right Arm: 1 - drift  Motor Left Le - no drift  Motor Right Le - drift  Limb Ataxia: 0 - absent  Sensory: 0 - normal  Best Language: 1 - mild to moderate aphasia  Dysarthria: 1 - mild to moderate dysarthria  Extinction and Inattention: 0 - no neglect  NIH Stroke Scale Total: 6  Modified [place] Scale:   Timeline: At discharge  Modified [place] Score: 3 - moderate disability          Assessment/Plan:     Interventions: IV t-PA,  Thrombectomy, Stenting    Complications: reperfusion injury    Research Candidate?:  No    Neurological deficit at discharge: Aphasia, dysarthria, dysphagia, mild right sided weakness     Disposition: Rehab Facility    Final Active Diagnoses:    Diagnosis Date Noted POA    PRINCIPAL PROBLEM:  Thrombotic stroke involving left middle cerebral artery [I63.312] 04/02/2017 Yes    Leukocytosis [D72.829] 04/06/2017 Yes    Dysphagia [R13.10] 04/05/2017 Yes    Acute deep vein thrombosis (DVT) of femoral vein of right lower extremity [I82.411] 04/05/2017 Yes    Depression [F32.9] 04/02/2017 No    Acute ischemic left MCA stroke [I63.512] 03/29/2017 Yes    Carotid artery stenosis, symptomatic [I65.29] 03/25/2017 Yes    Cytotoxic cerebral edema [G93.6] 03/22/2017 Yes    Right sided weakness [R53.1] 03/18/2017 Yes    Aphasia [R47.01] 03/18/2017 Yes    Essential hypertension [I10] 03/18/2017 Yes      Problems Resolved During this Admission:    Diagnosis Date Noted Date Resolved POA     No new Assessment & Plan notes have been filed under this hospital service since the last note was generated.  Service: Vascular Neurology      Recommendations:     Post-discharge complication risks: Falls, Pneumonia, Seizure, Skin breakdown, Urinary tract infections    Stroke Education given to: patient    Follow-up in Stroke Clinic in 30 days    Discharge Plan:  Antithrombotics: Aspirin 81mg, Clopidogrel 75mg for 3 months post stent. Monotherapy with aspirin 81mg qd indefinitely  Statin: Atorvastatin 80mg  Antocoagulant: Apixaban 10mg BID for 7 days, followed by Apixaban 5mg BID for 3 months  Aggresive risk factor modification:  Hypertension and Carotid Artery disease    Follow Up:  Follow-up Information     Follow up with Arlet Belcher PA-C In 4 weeks.    Specialty:  Neurology    Contact information:    [address] [place] [address] [id number] [phone]          Patient Instructions:     Ambulatory Referral to Cardiology    Referral Priority: Routine Referral Type: Consultation   Referral Reason: Specialty Services Required    Requested Specialty: Cardiology    Number of Visits Requested: 1      Activity as tolerated       Medications:  Reconciled Home Medications:   Current Discharge Medication List             START taking these medications     Details   amlodipine (NORVASC) 10 MG tablet 1 tablet (10 mg total) by Per G Tube route once daily.       apixaban 5 mg Tab 2 tablets (10 mg total) by Per G Tube route 2 (two) times daily for total of 7 days. Decrease dose to 1 tablet (5 mg total) two times daily for a total treatment course of 3 months. (Treatment dose for DVT).        aspirin 81 MG Chew 1 tablet (81 mg total) by Per G Tube route once daily indefinitely.   Refills: 0       clopidogrel (PLAVIX) 75 mg tablet 1 tablet (75 mg total) by Per G Tube route once daily. Dual antiplatelet therapy for internal carotid stent. Please discontinue after 3 months.        fluoxetine (PROZAC) 20 MG capsule 1 capsule (20 mg total) by Per G Tube route once daily.       hydrALAZINE (APRESOLINE) 50 MG tablet 1 tablet (50 mg total) by Per G Tube route every 8 (eight) hours.                 CONTINUE these medications which have CHANGED     Details   atorvastatin (LIPITOR) 80 MG tablet 1 tablet (80 mg total) by Per G Tube route once daily.                 CONTINUE these medications which have NOT CHANGED     Details   losartan (COZAAR) 100 MG tablet Take 100 mg by mouth once daily.                STOP taking these medications         clonazePAM (KLONOPIN) 1 MG tablet Comments:   Reason for Stopping:                Montserrat Hayward MD  Comprehensive Stroke Center  Department of Vascular Neurology   [facility]

## 2017-04-06 NOTE — SUBJECTIVE & OBJECTIVE
Neurologic Chief Complaint: L MCA      Subjective:     Interval History: Patient is seen for follow-up neurological assessment and treatment recommendations:   PEg tube placement yesterday. Patient doing well. Mental status improving. Tube feeds started.    HPI, Past Medical, Family, and Social History remains the same as documented in the initial encounter.     Review of Systems   Constitutional: Negative for chills and fever.   Neurological: Positive for facial asymmetry, speech difficulty and weakness.     Scheduled Meds:   amlodipine  10 mg Per G Tube Daily    apixaban  10 mg Per G Tube BID    aspirin  81 mg Per G Tube Daily    atorvastatin  80 mg Per G Tube Daily    clopidogrel  75 mg Per G Tube Daily    dextrose 50 % in water (D50W)        fluoxetine  20 mg Per G Tube Daily    hydrALAZINE  50 mg Per G Tube Q8H    losartan  100 mg Per G Tube Daily    sodium chloride 0.9%  10 mL Intravenous Q6H    sodium chloride 0.9%  10 mL Intravenous Q6H    sodium chloride 0.9%  3 mL Intravenous Q8H     Continuous Infusions:   PRN Meds:acetaminophen, albuterol-ipratropium 2.5mg-0.5mg/3mL, dextrose 50%, glucagon (human recombinant), insulin aspart, ondansetron, ramelteon, Flushing PICC Protocol **AND** sodium chloride 0.9% **AND** sodium chloride 0.9%, Flushing PICC Protocol **AND** sodium chloride 0.9% **AND** sodium chloride 0.9%    Objective:     Vital Signs (Most Recent):  Temp: 98.5 °F (36.9 °C) (04/06/17 0830)  Pulse: 69 (04/06/17 1100)  Resp: 19 (04/06/17 0830)  BP: (!) 141/69 (04/06/17 0830)  SpO2: 95 % (04/06/17 0830)  BP Location: Right arm    Vital Signs Range (Last 24H):  Temp:  [97.7 °F (36.5 °C)-99 °F (37.2 °C)]   Pulse:  []   Resp:  [14-19]   BP: (118-162)/(61-75)   SpO2:  [95 %-100 %]   BP Location: Right arm    Physical Exam   Constitutional: He appears well-developed and well-nourished.   HENT:   Head: Normocephalic and atraumatic.   Cardiovascular: Normal rate.    Pulmonary/Chest: Effort  normal.   Neurological: He is alert.   Skin: Skin is warm.       Neurological Exam:   LOC: drowsy and follows simple commands intermittently  Language: Expressive aphasia  Speech: Dysarthria  Orientation: Person  Facial Movement (CN VII): lower weakness right lower  Motor*: Arm Left:  Normal (5/5), Leg Left:   Normal (5/5), Arm Right:   Paretic:  4/5, Leg Right:   Paretic:  4/5    NIH Stroke Scale:    Level of Consciousness: 0 - alert  LOC Questions: 1 - answers one correctly  LOC Commands: 0 - performs both correctly  Best Gaze: 0 - normal  Visual: 0 - no visual loss  Facial Palsy: 1 - minor  Motor Left Arm: 0 - no drift  Motor Right Arm: 1 - drift  Motor Left Le - no drift  Motor Right Le - drift  Limb Ataxia: 0 - absent  Sensory: 0 - normal  Best Language: 1 - mild to moderate aphasia  Dysarthria: 1 - mild to moderate dysarthria  Extinction and Inattention: 0 - no neglect  NIH Stroke Scale Total: 6  Modified [place] Scale:   Timeline: At discharge  Modified [place] Score: 3 - moderate disability        Laboratory:  CMP:     Recent Labs  Lab 17  0525   CALCIUM 9.2   ALBUMIN 2.9*   PROT 7.5      K 3.7   CO2 26      BUN 22*   CREATININE 0.8   ALKPHOS 92   ALT 51*   AST 26   BILITOT 0.7     CBC:     Recent Labs  Lab 17  0525   WBC 15.94*   RBC 4.10*   HGB 12.7*   HCT 38.7*   *   MCV 94   MCH 31.0   MCHC 32.8     Lipid Panel: No results for input(s): CHOL, LDLCALC, HDL, TRIG in the last 168 hours.  Coagulation:     Recent Labs  Lab 17  0355   INR 1.0     Platelet Aggregation Study: No results for input(s): PLTAGG, PLTAGINTERP, PLTAGREGLACO, ADPPLTAGGREG in the last 168 hours.  Hgb A1C: No results for input(s): HGBA1C in the last 168 hours.  TSH: No results for input(s): TSH in the last 168 hours.    Diagnostic Results:  I have personally reviewed:    CT head without contrast (3/26/17)    Evolving left MCA territory infarct with associated left basal ganglia intraparenchymal  hemorrhage and intraventricular extension. There is persistent mass effect upon the frontal horn of the left lateral ventricle without significant midline shift or hydrocephalus.  No evidence of new intracranial hemorrhage or large region of abnormal parenchymal hypoattenuation.

## 2017-04-06 NOTE — ASSESSMENT & PLAN NOTE
- extensive workup with UA, CXR, blood cultures done. Most recent blood cx from 4/4 - one set positive for gram positive cocci in clusters; likely contaminant; blood cultures repeated; low suspicion for infection given patient is afebrile and not exhibiting signs of systemic infection. UA and CXR unremarkable. WBC 12-15 during admission 15 this AM; likely some component of post op leukocytosis this AM.

## 2017-04-06 NOTE — PT/OT/SLP DISCHARGE
Occupational Therapy Discharge Summary    Nathanael Velez  MRN: [id number]   Thrombotic stroke involving left middle cerebral artery   Patient Discharged from acute Occupational Therapy on 4/6.  Please refer to prior OT note dated on 4/6 for functional status.     Assessment:   Patient appropriate for care in another setting.  GOALS:   Occupational Therapy Goals        Problem: Occupational Therapy Goal    Goal Priority Disciplines Outcome Interventions   Occupational Therapy Goal     OT, PT/OT Ongoing (interventions implemented as appropriate)    Description:  Goals with expiration date, 4/12:  Patient will increase functional independence with ADLs by performing:    Visual item location for 3/5 items on R. Met 4/3  Bed Mobility with min(a) and bed-rails as needed. MET 3/31, 4/3  -Revised: with SBA.  Supine to sit with SBA.  Stand pivot transfers with SBA.    Grooming while standing with stand by (a).  UB dressing with min(a) while seated EOB.   LB dressing with stand by (a) while seated EOB.  Patient's family / caregiver will demonstrate independence and safety with assisting patient with self-care skills and functional mobility.      Patient's family / caregiver will demonstrate independence with providing ROM and changes in bed positioning.    Patient and/or patient's family will verbalize understanding of stroke prevention guidelines, personal risk factors and stroke warning signs via teachback method.                Reasons for Discontinuation of Therapy Services  Transfer to alternate level of care.      Plan:  Patient Discharged to: Inpatient Rehab   AVIS Staton  4/6/2017

## 2017-04-06 NOTE — SUBJECTIVE & OBJECTIVE
NIH Stroke Scale:    Level of Consciousness: 0 - alert  LOC Questions: 1 - answers one correctly  LOC Commands: 0 - performs both correctly  Best Gaze: 0 - normal  Visual: 0 - no visual loss  Facial Palsy: 1 - minor  Motor Left Arm: 0 - no drift  Motor Right Arm: 1 - drift  Motor Left Le - no drift  Motor Right Le - drift  Limb Ataxia: 0 - absent  Sensory: 0 - normal  Best Language: 1 - mild to moderate aphasia  Dysarthria: 1 - mild to moderate dysarthria  Extinction and Inattention: 0 - no neglect  NIH Stroke Scale Total: 6  Modified [place] Scale:   Timeline: At discharge  Modified [place] Score: 3 - moderate disability

## 2017-04-06 NOTE — NURSING
Patient is adequate for discharge. IV access has been discontinued, telemetry has been collected, and education has been provided to family at bedside. Patient will be transported via wheelchair. Will continue to monitor.

## 2017-04-06 NOTE — PROGRESS NOTES
[facility]  General Surgery  Progress Note    Subjective:     Interval History:   Pt seen and examined, no acute events overnight, s/p PEG placement POD 1; PEG put out 250mL overnight    Post-Op Info:  Procedure(s) (LRB):  PEG TUBE PLACEMENT/REPLACEMENT (N/A)   1 Day Post-Op      Medications:  Continuous Infusions:   Scheduled Meds:   amlodipine  10 mg Per G Tube Daily    aspirin  81 mg Per G Tube Daily    atorvastatin  80 mg Per G Tube Daily    clopidogrel  75 mg Per G Tube Daily    dextrose 50 % in water (D50W)        fluoxetine  20 mg Per G Tube Daily    heparin (porcine)  5,000 Units Subcutaneous Q8H    hydrALAZINE  50 mg Per G Tube Q8H    losartan  100 mg Per G Tube Daily    sodium chloride 0.9%  10 mL Intravenous Q6H    sodium chloride 0.9%  10 mL Intravenous Q6H    sodium chloride 0.9%  3 mL Intravenous Q8H     PRN Meds:acetaminophen, albuterol-ipratropium 2.5mg-0.5mg/3mL, dextrose 50%, glucagon (human recombinant), insulin aspart, ondansetron, ramelteon, Flushing PICC Protocol **AND** sodium chloride 0.9% **AND** sodium chloride 0.9%, Flushing PICC Protocol **AND** sodium chloride 0.9% **AND** sodium chloride 0.9%     Objective:     Vital Signs (Most Recent):  Temp: 98.3 °F (36.8 °C) (04/06/17 0400)  Pulse: 104 (04/06/17 0700)  Resp: 16 (04/06/17 0400)  BP: 136/69 (04/06/17 0400)  SpO2: 95 % (04/06/17 0400) Vital Signs (24h Range):  Temp:  [97.7 °F (36.5 °C)-99 °F (37.2 °C)] 98.3 °F (36.8 °C)  Pulse:  [] 104  Resp:  [14-19] 16  SpO2:  [95 %-100 %] 95 %  BP: (118-162)/(61-75) 136/69       Intake/Output Summary (Last 24 hours) at 04/06/17 0710  Last data filed at 04/06/17 0500   Gross per 24 hour   Intake              100 ml   Output              250 ml   Net             -150 ml       Physical Exam   Constitutional: He appears well-developed and well-nourished. No distress.   HENT:   Head: Normocephalic and atraumatic.   Abdominal:   Soft, NTND, PEG in place - clean, dry  and intact       Significant Labs:  BMP:   Recent Labs  Lab 04/05/17  0443   GLU 92      K 4.2      CO2 23   BUN 20   CREATININE 0.7   CALCIUM 9.2   MG 2.1     CBC:   Recent Labs  Lab 04/05/17  0443   WBC 14.01*   RBC 4.14*   HGB 12.8*   HCT 37.4*   *   MCV 90   MCH 30.9   MCHC 34.2     CMP:   Recent Labs  Lab 04/05/17  0443   GLU 92   CALCIUM 9.2   ALBUMIN 2.8*   PROT 7.3      K 4.2   CO2 23      BUN 20   CREATININE 0.7   ALKPHOS 100   ALT 55*   AST 30   BILITOT 0.5     LFTs:   Recent Labs  Lab 04/05/17  0443   ALT 55*   AST 30   ALKPHOS 100   BILITOT 0.5   PROT 7.3   ALBUMIN 2.8*     All pertinent labs from the last 24 hours have been reviewed.    Assessment/Plan:   62 yo male s/p PEG placement POD 1  -pt doing well after procedure  -ok to begin using PEG for tube feeds  -surgery will sign off at this time, please call for questions, thank you for allowing us to participate in the care of Mr. scott    Génesis Renee PA-C   [id number]  General Surgery  [facility]

## 2017-04-06 NOTE — ASSESSMENT & PLAN NOTE
S/p IR thrombectomy of distant MCA clot and angioplasty with stent on L ICA; Aspirin 81mg, plavix 75mg qd - DUAP therapy for 3 months for stent. After 3 months, recommend monotherapy with aspirin for secondary stroke prevention

## 2017-04-06 NOTE — PLAN OF CARE
Enoc w/pallavi loaiza arranged for transport due to SPD delay.  ok'd per LIZANDRO Amador supervisor.  Chart copy with radiology disc included.    Norma Gutierrez, ADINA  Case Management  [phone]

## 2017-04-06 NOTE — PLAN OF CARE
[facility]    FACILITY TRANSFER ORDERS      Patient Name: Nathanael Velez  YOB: 1955    PCP: Primary Doctor No   PCP Address: None  PCP Phone Number: None  PCP Fax: None    Encounter Date: 04/06/2017    Admit to: [facility] Inpatient rehab    Vital Signs:  Routine    Diagnoses:   Active Hospital Problems    Diagnosis  POA    *Thrombotic stroke involving left middle cerebral artery [I63.312]  Yes    Dysphagia [R13.10]  Yes    Acute deep vein thrombosis (DVT) of femoral vein of right lower extremity [I82.411]  Yes    Depression [F32.9]  No    Acute ischemic left MCA stroke [I63.512]  Yes    Carotid artery stenosis, symptomatic [I65.29]  Yes    Cytotoxic cerebral edema [G93.6]  Unknown    Right sided weakness [R53.1]  Unknown    Aphasia [R47.01]  Unknown    Essential hypertension [I10]  Unknown      Resolved Hospital Problems    Diagnosis Date Resolved POA   No resolved problems to display.       Allergies:Review of patient's allergies indicates:  No Known Allergies    Diet: tube feedings:  Isosource 1.5 @ goal rate 55mL/hr. Hold for residuals >500ml    Activities: Activity as tolerated    Nursing: turn q2h, seizure precautions.      Labs: Per facility protocol.       CONSULTS:    Physical Therapy to evaluate and treat. , Occupational Therapy to evaluate and treat. and Speech Therapy to evaluate and treat for Language, Swallowing and Cognition.    MISCELLANEOUS CARE:  PEG Care: Clean site every 24 hours. , Rucker Care: Empty Rucker bag every shift. Change Rucker every month. and Diabetes Care:   SN to perform and educate Diabetic management with blood glucose monitoring: and Report CBG < 60 or > 350 to physician.    WOUND CARE ORDERS  None    Medications: Review discharge medications with patient and family and provide education.      Current Discharge Medication List      START taking these medications    Details   amlodipine (NORVASC) 10 MG tablet 1 tablet (10 mg total) by Per G Tube  route once daily.      apixaban 5 mg Tab 2 tablets (10 mg total) by Per G Tube route 2 (two) times daily for total of 7 days. Decrease dose to 1 tablet (5 mg total) two times daily for a total treatment course of 3 months. (Treatment dose for DVT).       aspirin 81 MG Chew 1 tablet (81 mg total) by Per G Tube route once daily indefinitely.   Refills: 0      clopidogrel (PLAVIX) 75 mg tablet 1 tablet (75 mg total) by Per G Tube route once daily. Dual antiplatelet therapy for internal carotid stent. Please discontinue after 3 months.       fluoxetine (PROZAC) 20 MG capsule 1 capsule (20 mg total) by Per G Tube route once daily.      hydrALAZINE (APRESOLINE) 50 MG tablet 1 tablet (50 mg total) by Per G Tube route every 8 (eight) hours.         CONTINUE these medications which have CHANGED    Details   atorvastatin (LIPITOR) 80 MG tablet 1 tablet (80 mg total) by Per G Tube route once daily.         CONTINUE these medications which have NOT CHANGED    Details   losartan (COZAAR) 100 MG tablet Take 100 mg by mouth once daily.         STOP taking these medications       clonazePAM (KLONOPIN) 1 MG tablet Comments:   Reason for Stopping:                    _________________________________  Montserrat Hayward MD  04/06/2017

## 2017-04-06 NOTE — PROGRESS NOTES
[facility]  Vascular Neurology  Comprehensive Stroke Center  Progress Note      Neurologic Chief Complaint: L MCA      Subjective:     Interval History: Patient is seen for follow-up neurological assessment and treatment recommendations:   PEg tube placement yesterday. Patient doing well. Mental status improving. Tube feeds started.    HPI, Past Medical, Family, and Social History remains the same as documented in the initial encounter.     Review of Systems   Constitutional: Negative for chills and fever.   Neurological: Positive for facial asymmetry, speech difficulty and weakness.     Scheduled Meds:   amlodipine  10 mg Per G Tube Daily    apixaban  10 mg Per G Tube BID    aspirin  81 mg Per G Tube Daily    atorvastatin  80 mg Per G Tube Daily    clopidogrel  75 mg Per G Tube Daily    dextrose 50 % in water (D50W)        fluoxetine  20 mg Per G Tube Daily    hydrALAZINE  50 mg Per G Tube Q8H    losartan  100 mg Per G Tube Daily    sodium chloride 0.9%  10 mL Intravenous Q6H    sodium chloride 0.9%  10 mL Intravenous Q6H    sodium chloride 0.9%  3 mL Intravenous Q8H     Continuous Infusions:   PRN Meds:acetaminophen, albuterol-ipratropium 2.5mg-0.5mg/3mL, dextrose 50%, glucagon (human recombinant), insulin aspart, ondansetron, ramelteon, Flushing PICC Protocol **AND** sodium chloride 0.9% **AND** sodium chloride 0.9%, Flushing PICC Protocol **AND** sodium chloride 0.9% **AND** sodium chloride 0.9%    Objective:     Vital Signs (Most Recent):  Temp: 98.5 °F (36.9 °C) (04/06/17 0830)  Pulse: 69 (04/06/17 1100)  Resp: 19 (04/06/17 0830)  BP: (!) 141/69 (04/06/17 0830)  SpO2: 95 % (04/06/17 0830)  BP Location: Right arm    Vital Signs Range (Last 24H):  Temp:  [97.7 °F (36.5 °C)-99 °F (37.2 °C)]   Pulse:  []   Resp:  [14-19]   BP: (118-162)/(61-75)   SpO2:  [95 %-100 %]   BP Location: Right arm    Physical Exam   Constitutional: He appears well-developed and well-nourished.    HENT:   Head: Normocephalic and atraumatic.   Cardiovascular: Normal rate.    Pulmonary/Chest: Effort normal.   Neurological: He is alert.   Skin: Skin is warm.       Neurological Exam:   LOC: drowsy and follows simple commands intermittently  Language: Expressive aphasia  Speech: Dysarthria  Orientation: Person  Facial Movement (CN VII): lower weakness right lower  Motor*: Arm Left:  Normal (5/5), Leg Left:   Normal (5/5), Arm Right:   Paretic:  4/5, Leg Right:   Paretic:  4/5    NIH Stroke Scale:    Level of Consciousness: 0 - alert  LOC Questions: 1 - answers one correctly  LOC Commands: 0 - performs both correctly  Best Gaze: 0 - normal  Visual: 0 - no visual loss  Facial Palsy: 1 - minor  Motor Left Arm: 0 - no drift  Motor Right Arm: 1 - drift  Motor Left Le - no drift  Motor Right Le - drift  Limb Ataxia: 0 - absent  Sensory: 0 - normal  Best Language: 1 - mild to moderate aphasia  Dysarthria: 1 - mild to moderate dysarthria  Extinction and Inattention: 0 - no neglect  NIH Stroke Scale Total: 6  Modified [place] Scale:   Timeline: At discharge  Modified [place] Score: 3 - moderate disability        Laboratory:  CMP:     Recent Labs  Lab 17  0525   CALCIUM 9.2   ALBUMIN 2.9*   PROT 7.5      K 3.7   CO2 26      BUN 22*   CREATININE 0.8   ALKPHOS 92   ALT 51*   AST 26   BILITOT 0.7     CBC:     Recent Labs  Lab 17  0525   WBC 15.94*   RBC 4.10*   HGB 12.7*   HCT 38.7*   *   MCV 94   MCH 31.0   MCHC 32.8     Lipid Panel: No results for input(s): CHOL, LDLCALC, HDL, TRIG in the last 168 hours.  Coagulation:     Recent Labs  Lab 17  0355   INR 1.0     Platelet Aggregation Study: No results for input(s): PLTAGG, PLTAGINTERP, PLTAGREGLACO, ADPPLTAGGREG in the last 168 hours.  Hgb A1C: No results for input(s): HGBA1C in the last 168 hours.  TSH: No results for input(s): TSH in the last 168 hours.    Diagnostic Results:  I have personally reviewed:    CT head without  contrast (3/26/17)    Evolving left MCA territory infarct with associated left basal ganglia intraparenchymal hemorrhage and intraventricular extension. There is persistent mass effect upon the frontal horn of the left lateral ventricle without significant midline shift or hydrocephalus.  No evidence of new intracranial hemorrhage or large region of abnormal parenchymal hypoattenuation.      Assessment/Plan:     61 year old male with HTN, HLD, and CAD and L MCA stroke symptoms post tpa and transferred here for higher level of care and potential thrombectomy. CTA multiphase upon arrival.   Patient found to have severe L carotid artery stenosis.  Angioplasty with stent performed and thrombectomy of L MCA occlusion.  Patient found to have L MCA stroke with reperfusion injury.  Possible aortic vegetation seen on echo but on repeat echo showed sclerotic aorta.      03/22/17  Patient drowsy, follows simple commands, unable to move RUE      03/23/17  More awake today.  Temperature and leukocytosis resolving with vancomycin and cefepime.      3/24/17  NAEON. Still with fever overnight.     3/25/17 Patient much more alert and interactive with exam. Still with labored breathing    3/27/17  Patient has a R lower extremity DVT. Unable to have IVC filter placed d/t respiratory status- on intubation watch.     3/30/17  BP elevated to 180s-200s overnight requiring PRNs for BP. Patient will need PEG. Patients respiratory status now improved and on NC. Will have repeat doppler today on R leg to reevaluate if IVC filter to be placed.     03/31/17 Still has increased secretions and cough.  Neurologically stable      04/02/17  Off of oxygen.  Now will leukocytosis.  Neurologically unchanged.   Patient will need modified barium swallow    4/4/17 - failed MBSS, peg discussed with son, nephew and patient.     4/5/17: PEG tube placement today. workup for infection - UA, CXR, blood cx negative; patient afebrile.     4/6/17: tube feeds  started through PEG tube. One set of blood cultures positive for gram + cocci in clusters; likely contaminant; blood cx were repeated. WBC 15; likely post -op. Afebrile. Patient accepted at [facility]; to be discharged today.       * Thrombotic stroke involving left middle cerebral artery  S/p tpa; L ICA angioplasty, stenting of L ICA and thrombectomy  Antithrombotics for secondary stroke prevention: Aspirin 81mg, plavix 75mg qd - DUAP therapy for 3 months for stent. After 3 months, recommend monotherapy with aspirin for secondary stroke prevention  Statins for secondary stroke prevention and hyperlipidemia, if present: Atorvastatin- 80 mg oral daily  Aggressive risk factor modification: Hypertension, High Cholesterol and Obesity  VTE Prophylaxis: SCDs, heparin 5000 q8 hours  BP Parameters: SBP <150  Nursing Orders: Neuro checks- per tpa protocol, Antiembolic stockings, Swallowing evaluation by Nursing, Seizure precautions, Avoid Rucker catheter, Pneumatic compression device, Stroke Education, Blood glucose target 100-130, Up with assistance   Rehab teams recommending Rehab facility.      Right sided weakness  2/2 stroke  PT/OT recommending rehab    Aphasia  2/2 stroke  - SLP recommending rehab.    Carotid artery stenosis, symptomatic  S/p IR thrombectomy of distant MCA clot and angioplasty with stent on L ICA; Aspirin 81mg, plavix 75mg qd - DUAP therapy for 3 months for stent. After 3 months, recommend monotherapy with aspirin for secondary stroke prevention      Depression  - Likely due to stroke  - started on fluoxetine on 4/5    Dysphagia  - 2/2 stroke  - s/p PEG tube placement on 4/5.  - SLP to follow at rehab for dysphagia    Acute deep vein thrombosis (DVT) of femoral vein of right lower extremity  - US LE (3/26): right common femoral thrombus  - US LE (3/27): right common femoral thrombus  - US LE (3/30): no thrombus seen in the right common femoral  - Decision made to treat given DVT was seen on  multiple imaging.   - will treat with NOAC; Eliquis 10mg BID for 7 days; followed by Eliquis 5mg BID for total treatment course of 3 months.     Leukocytosis  - extensive workup with UA, CXR, blood cultures done. Most recent blood cx from 4/4 - one set positive for gram positive cocci in clusters; likely contaminant; blood cultures repeated; low suspicion for infection given patient is afebrile and not exhibiting signs of systemic infection. UA and CXR unremarkable. WBC 12-15 during admission 15 this AM; likely some component of post op leukocytosis this AM.       Dispo: discharge patient to [facility] today    Montserrat Hayward MD  Comprehensive Stroke Center  Department of Vascular Neurology   [facility]

## 2017-04-06 NOTE — PLAN OF CARE
STONE faxed updates to Estrella at  [facility] [phone].  Tube feedings to be initiated by floor nurse this morning.  Bilateral wrist restraints to be removed and abd binder to be placed.  Plan for possible d/c to facility on [facility] after 24hr tolerating tube feeding.    Norma Gutierrez RN  Case Management  [phone]

## 2017-04-06 NOTE — PLAN OF CARE
Problem: Patient Care Overview  Goal: Plan of Care Review  Outcome: Ongoing (interventions implemented as appropriate)  POC reviewed with pt. PEG had 250 ml clear brown drainage with clots. A breathing treatment was done as pt was struggling to cough up secretions. Oral suctioning was also done during the night. Pt remains in bilateral soft wrist restraints; no skin breakdown noted. Pt can have medications given through PEG.

## 2017-04-06 NOTE — ASSESSMENT & PLAN NOTE
S/p tpa; L ICA angioplasty, stenting of L ICA and thrombectomy  Antithrombotics for secondary stroke prevention: Aspirin 81mg, plavix 75mg qd - DUAP therapy for 3 months for stent. After 3 months, recommend monotherapy with aspirin for secondary stroke prevention  Statins for secondary stroke prevention and hyperlipidemia, if present: Atorvastatin- 80 mg oral daily  Aggressive risk factor modification: Hypertension, High Cholesterol and Obesity  VTE Prophylaxis: SCDs, heparin 5000 q8 hours  BP Parameters: SBP <150  Nursing Orders: Neuro checks- per tpa protocol, Antiembolic stockings, Swallowing evaluation by Nursing, Seizure precautions, Avoid Rucker catheter, Pneumatic compression device, Stroke Education, Blood glucose target 100-130, Up with assistance   Rehab teams recommending Rehab facility.

## 2017-04-06 NOTE — PLAN OF CARE
Problem: SLP Goal  Goal: SLP Goal  SLP Plan of Care: Speech Pathology will follow pt 5x/week and address the following goals x1 week:  4/5  1. Pt will participate in ongoing swallow assessment to determine least restrictive diet. ONGOING  2. Pt will answer simple YN questions with 70% acc provided min cues to improve receptive language. ONGOING  3. Pt will follow 2-step directives with 60% acc indep provided mod cues to improve receptive language. MET/MODIFIED  4. Pt will complete automatic rote speech (counting) with 60% acc indep provided mod cues to improve expressive language. MET/MODIFIED  5. When deemed appropriate by MD/SLP pt will participate in MBS to further determine safe swallow function. ONGOING    SLP Plan of Care: Speech Pathology will follow pt 5x/week and address the following goals x1 week:  4/10  1. Pt will participate in ongoing swallow assessment to determine least restrictive diet.   2. Pt will answer simple YN questions with 70% acc provided min cues to improve receptive language.   3. Pt will follow 2-step directives with 80% acc indep provided mod cues to improve receptive language.  4. Pt will complete automatic rote speech/song with 90% acc indep provided mod cues to improve expressive language.   5. When deemed appropriate by MD/SLP pt will participate in MBS to further determine safe swallow function.        Pt with continued progress towards goals.     Brenda Card M.A. CCC-SLP  Speech Language Pathologist  [phone]  4/6/2017

## 2017-04-06 NOTE — PT/OT/SLP PROGRESS
Speech Language Pathology  Treatment    Nathanael Velez   MRN: [id number]   Admitting Diagnosis: Thrombotic stroke involving left middle cerebral artery    Diet recommendations: Solid Diet Level: NPO  Liquid Diet Level: NPO   Medications crushed in puree okay  Small bites   Feed upright at 90 degrees; when pt is alert    SLP Treatment Date: 17  Speech Start Time: 948     Speech Stop Time: 1006     Speech Total (min): 18 min       TREATMENT BILLABLE MINUTES:  Speech Therapy Individual 9 and Treatment Swallowing Dysfunction 9    Has the patient been evaluated by SLP for swallowing? : Yes  Keep patient NPO?: Yes   General Precautions: Standard, fall, aspiration, NPO  Current Respiratory Status: nasal cannula       Subjective:  Pt awake; pleasant    Pain Ratin/10              Pain Rating Post-Intervention: 0/10    Objective:     Pt with improved alertness and participation than previous sessions. Pt tolerated 5 full spoonfuls puree without s/s of airway compromise; single swallows. Pt tolerated 5 1/2-full spoonfuls nectar thick liquid with immediate cough response after final trial only. Mild wet phonation post PO trials.  Oral phase cb mild decreased labial seal over spoon; functional with less oral stasis than present on MBS 4/. Skilled education provided on aspiration precautions and diet recommendations. Whiteboard updated with diet recommendations/aspiration precautions. No further questions. RN notified of progress with swallow.    Pt with improved participation in conversation; moderate communication breakdowns occurring during simple conversation. Pt answered simple YN questions x5 with 60% acc indep, improving to 80% acc provided visual cues; benefited from self corrections. Pt answered complex YN questions x5 with 60% acc indep, improving to 80% acc provided repetition. Pt completed rote language task of counting 1-10 indep x1. Pt completed simple convergent categorization task x3 with 33% acc indep;  cues attempted though not beneficial. Pt completed simple sentences x2 with 50% acc indep, improving to 100% acc provided phonological cues. MD entered room and assessed pt. Pt followed 1 step commands x3 with 100% acc provided tactile cues from MD.  Conversational language present with moderate perseverations, phonological errors, hesitations, and mild echolalia. Moderate dysarthria. No further questions.          FIM:  Social Interaction: 2 Maximal Direction--The patient interacts appropriately 25 to 49% of the time, but may beed restraint due to socially inappropriate behaviors.   Problem Solvin Total Assistance--The patient solves routine problems less than 25% of the time, or does not effectively solve problems, and may require constant one-to-one direction to complete simple daily activities.  The patient may need a restraint for safety.   Comprehension: 2 Maximal Prompting--The patient understands directions and conversation about basic daily needs 25 to 49% of the time.  Understand only simple, commonly used spoken expressions (e.g., hello, how are you) or gestures (e.g., waving good-bye, thank you).   Expression: 2 Maximal Prompting--The patient expresses basic daily needs and ideas 25 to 49% of the time.  The patient uses only single words or gestures, and (s)he needs prompting more than half the time.   Memory: 1 Total Assistance--The patient recognizes and remembers less than 25% of the time, or does not effectively recognize and remember.    Assessment:  Nathanael Velez is a 61 y.o. male with a medical diagnosis of Thrombotic stroke involving left middle cerebral artery and presents with apraxia, dysarthria, aphasia, cognitive impairment, oropharyngeal dysphagia. continued progress towards goals.  .    Discharge recommendations: Discharge Facility/Level Of Care Needs: rehabilitation facility (pending PT OT recs)     Goals:   SLP Goals        Problem: SLP Goal    Goal Priority Disciplines Outcome   SLP  Goal     SLP Ongoing (interventions implemented as appropriate)   Description:  SLP Plan of Care: Speech Pathology will follow pt 5x/week and address the following goals x1 week:  4/5  1. Pt will participate in ongoing swallow assessment to determine least restrictive diet. ONGOING  2. Pt will answer simple YN questions with 70% acc provided min cues to improve receptive language. ONGOING  3. Pt will follow 2-step directives with 60% acc indep provided mod cues to improve receptive language. MET/MODIFIED  4. Pt will complete automatic rote speech (counting) with 60% acc indep provided mod cues to improve expressive language. MET/MODIFIED  5. When deemed appropriate by MD/SLP pt will participate in MBS to further determine safe swallow function. ONGOING    SLP Plan of Care: Speech Pathology will follow pt 5x/week and address the following goals x1 week:  4/10  1. Pt will participate in ongoing swallow assessment to determine least restrictive diet.   2. Pt will answer simple YN questions with 70% acc provided min cues to improve receptive language.   3. Pt will follow 2-step directives with 80% acc indep provided mod cues to improve receptive language.  4. Pt will complete automatic rote speech/song with 90% acc indep provided mod cues to improve expressive language.   5. When deemed appropriate by MD/SLP pt will participate in MBS to further determine safe swallow function.                          Plan:   Patient to be seen Therapy Frequency: 5 x/week   Plan of Care expires: 04/17/17  Plan of Care reviewed with: patient  SLP Follow-up?: Yes  SLP - Next Visit Date: 04/07/17           Brenda Card M.A. CCC-SLP  Speech Language Pathologist  [phone]  4/6/2017

## 2017-04-06 NOTE — PLAN OF CARE
Call from Estrella at [facility], facility is ready to accept pt for admission on today.  Orders have been faxed, will await call for transport time today.  Nurse will call report when notified to [phone].  Pt's son Brian has been notified and in agreement with plan.    Norma Gutierrez RN  Case Management  [phone]

## 2017-04-06 NOTE — PLAN OF CARE
Problem: Occupational Therapy Goal  Goal: Occupational Therapy Goal  Goals with expiration date, 4/12:  Patient will increase functional independence with ADLs by performing:    Visual item location for 3/5 items on R. Met 4/3  Bed Mobility with min(a) and bed-rails as needed. MET 3/31, 4/3  -Revised: with SBA.  Supine to sit with SBA.  Stand pivot transfers with SBA.   Grooming while standing with stand by (a).  UB dressing with min(a) while seated EOB.   LB dressing with stand by (a) while seated EOB.  Patients family / caregiver will demonstrate independence and safety with assisting patient with self-care skills and functional mobility.   Patients family / caregiver will demonstrate independence with providing ROM and changes in bed positioning.   Patient and/or patients family will verbalize understanding of stroke prevention guidelines, personal risk factors and stroke warning signs via teachback method.   Goals remain appropriate.  AVIS Staton  4/6/2017

## 2017-04-06 NOTE — PT/OT/SLP PROGRESS
"Occupational Therapy  Treatment    Nathanael Velez   MRN: [id number]   Admitting Diagnosis: Thrombotic stroke involving left middle cerebral artery    OT Date of Treatment: 17   OT Start Time: 656  OT Stop Time: 738  OT Total Time (min): 42 min    Billable Minutes:  Self Care/Home Management 15, Therapeutic Activity 12 and Cognitive Retraining 15    General Precautions: Standard, aphasia, aspiration, NPO, fall  Orthopedic Precautions: N/A  Braces: N/A    Do you have any cultural, spiritual, Gnosticism conflicts, given your current situation?: Rastafari    Subjective:  Communicated with nurse prior to session.  Patient:  "I want something to eat.  Please give me food."  Pain Ratin/10   Pain Rating Post-Intervention: 0/10    Objective:  Patient found with: telemetry, restraints (PEG)  Family not present.    Functional Mobility:  Bed Mobility:  Rolling/Turning Right: Minimum assistance  Scooting/Bridging: Minimum Assistance  Supine to Sit: Minimum Assistance  Sit to Supine: Minimum Assistance    Transfers:   Sit <> Stand Assistance: Minimum Assistance  Sit <> Stand Assistive Device: No Assistive Device  Bed <> Chair Technique: Stand Pivot  Bed <> Chair Transfer Assistance: Minimum Assistance    Activities of Daily Living:  Feeding Level of Assistance:  (NPO)  UE Dressing Level of Assistance: Minimum assistance (assistance for fasteners)  LE Dressing Level of Assistance: Moderate assistance (assistance to initiate socks, manage clothing on right and for fasteners)  Grooming Position: Standing  Grooming Level of Assistance: Moderate assistance     Additional Treatment:   Patient education provided on role of OT and need for rehab upon discharge.   Patient unable to verbalize understanding 2* aphasia. Continued education, patient/ family training recommended.  SBA with postural control while seated EOB.  Patient alert and oriented x person.  Daily orientation provided.  Able to follow 3/4 one step commands.  Patient " attentive and interactive throughout the session.  Patient able to identify 2/3 body parts.  Able to name 1/3 objects.  Able to sequence 5/7 days of the week and 9/12 months of the year.  Perseveration noted throughout the session.  Poor eye contact.  Increased tone noted right UE.  Drooling noted right side of mouth.  Patient's functional status and disposition recommendation discussed with stroke team in daily rounds.  White board updated in patient's room.  OT asked if there were any other questions; patient/ family had no further questions.    AM-PAC 6 CLICK ADL   How much help from another person does this patient currently need?   1 = Unable, Total/Dependent Assistance  2 = A lot, Maximum/Moderate Assistance  3 = A little, Minimum/Contact Guard/Supervision  4 = None, Modified [place]/Independent    Putting on and taking off regular lower body clothing? : 2  Bathing (including washing, rinsing, drying)?: 2  Toileting, which includes using toilet, bedpan, or urinal? : 2  Putting on and taking off regular upper body clothing?: 2  Taking care of personal grooming such as brushing teeth?: 2  Eating meals?: 1  Total Score: 11     AM-PAC Raw Score CMS G-Code Modifier Level of Impairment Assistance   6 % Total / Unable   7 - 9 CM 80 - 100% Maximal Assist   10 - 14 CL 60 - 80% Moderate Assist   15 - 19 CK 40 - 60% Moderate Assist   20 - 22 CJ 20 - 40% Minimal Assist   23 CI 1-20% SBA / CGA   24 CH 0% Independent/ Mod I     Patient left supine with all lines intact, call button in reach and bed alarm on; restraints reapplied.    Assessment:  Nathanael Velez is a 61 y.o. male with a medical diagnosis of Thrombotic stroke involving left middle cerebral artery and presents with performance deficits of physical skills including impaired balance, mobility, strength, dexterity, fine motor coordination, gross motor coordination, sensation and endurance; demonstrating performance deficits of cognitive skills including  impaired attention, perception, understanding, problem solving, sequencing and memory all resulting in inability organizing occupational performance in a timely and safe manner; demonstrating performance deficits of psychosocial skills including impairments of interpersonal interactions and coping strategies which are skills necessary to successfully and appropriately participate in everyday tasks and social situations. These performance deficits have resulted in activity limitations including but not limited to: bed mobility, transfers, ascending/ descending stairs, walking short and long distances, walking around obstacles, transitional movement patterns (kneeling, bending); eating, upper body dressing, lower body dressing, brushing teeth, toileting, bathing, carrying objects, typing, writing, reading, balancing checkbook, shopping, meal preparation, school, community roles, and assisting others in self care. Patient's role as father, grandfather, , and independent caretaker for self has been affected. Patient will benefit from skilled OT services to maximize level of independence with self-care skills and functional mobility. Will benefit from rehab.    Rehab identified problem list/impairments: Rehab identified problem list/impairments: weakness, impaired self care skills, impaired balance, decreased coordination, decreased safety awareness, decreased ROM, impaired endurance, impaired functional mobilty, impaired sensation, gait instability, impaired cognition, decreased lower extremity function, decreased upper extremity function, impaired fine motor, impaired coordination    Rehab potential is good.    Activity tolerance: Good    Discharge recommendations: Discharge Facility/Level Of Care Needs: rehabilitation facility     Barriers to discharge: Barriers to Discharge: Inaccessible home environment, Decreased caregiver support    Equipment recommendations: 3-in-1 commode, bath bench, wheelchair      GOALS:   Occupational Therapy Goals        Problem: Occupational Therapy Goal    Goal Priority Disciplines Outcome Interventions   Occupational Therapy Goal     OT, PT/OT Ongoing (interventions implemented as appropriate)    Description:  Goals with expiration date, 4/12:  Patient will increase functional independence with ADLs by performing:    Visual item location for 3/5 items on R. Met 4/3  Bed Mobility with min(a) and bed-rails as needed. MET 3/31, 4/3  -Revised: with SBA.  Supine to sit with SBA.  Stand pivot transfers with SBA.    Grooming while standing with stand by (a).  UB dressing with min(a) while seated EOB.   LB dressing with stand by (a) while seated EOB.  Patient's family / caregiver will demonstrate independence and safety with assisting patient with self-care skills and functional mobility.      Patient's family / caregiver will demonstrate independence with providing ROM and changes in bed positioning.    Patient and/or patient's family will verbalize understanding of stroke prevention guidelines, personal risk factors and stroke warning signs via teachback method.                  Plan:  Patient to be seen 6 x/week to address the above listed problems via self-care/home management, neuromuscular re-education, therapeutic activities, therapeutic exercises, sensory integration, cognitive retraining  Plan of Care expires: 04/16/17  Plan of Care reviewed with: patient         AVIS Nice  04/06/2017

## 2017-04-07 NOTE — PT/OT/SLP PROGRESS
Physical Therapy  Treatment    Nathanael Velez   MRN: [id number]   Admitting Diagnosis: Thrombotic stroke involving left middle cerebral artery    PT Received On: 17  PT Start Time: 1533     PT Stop Time: 1554    PT Total Time (min): 21 min       Billable Minutes:  Therapeutic Activity 21    Treatment Type: Treatment  PT/PTA: PTA     PTA Visit Number: 1       General Precautions: Standard, aphasia, aspiration, fall, NPO  Orthopedic Precautions: N/A   Braces: N/A    Do you have any cultural, spiritual, Holiness conflicts, given your current situation?: Druze    Subjective:  Communicated with RN (Elen) prior to session.  Pt agreeable to PT session    Pain Ratin/10     Location - Orientation: generalized  Location: abdomen (in standing)  Pain Addressed: Reposition, Distraction, Cessation of Activity, Nurse notified  Pain Rating Post-Intervention:  (no rating provided)    Objective:   Patient found with: telemetry (PEG, abd binder.  pt found sup in bed with nephew present in the room)  2 attempts for tx session 2* feeding pump attached to bed IV pole and no portable IV pole available on unit.     Functional Mobility:  Bed Mobility:   Rolling/Turning to Left: Minimum assistance  Rolling/Turning Right: Minimum assistance  Supine to Sit: Minimum Assistance (from R side lying)  Sit to Supine: Minimum Assistance     Transfers:  Sit <> Stand Assistance: Minimum Assistance (from EOB with vc's)  Sit <> Stand Assistive Device: Rolling Walker, No Assistive Device (2 trials)    Gait:   Gait Distance: Attempted, however, pt did not perform 2* abdominal discomfort upon standing         Pt only able to take approx 3 lateral steps to the R with max A with no AD    Therapeutic Activities and Exercises:  Pt requires assistance for change of diaper and pericare 2* found wet with urine      SITTING       Pt tolerates sitting at the EOB with B UE support with SBA for balance       x10 min with vc's for postural control and weigh  shift    STANDING       Pt tolerates static standing x 2-3 min x1 trial with RW requiring CGA for balance       with vc's for postural control and weight shift    AM-PAC 6 CLICK MOBILITY  How much help from another person does this patient currently need?   1 = Unable, Total/Dependent Assistance  2 = A lot, Maximum/Moderate Assistance  3 = A little, Minimum/Contact Guard/Supervision  4 = None, Modified [place]/Independent    Turning over in bed (including adjusting bedclothes, sheets and blankets)?: 3  Sitting down on and standing up from a chair with arms (e.g., wheelchair, bedside commode, etc.): 3  Moving from lying on back to sitting on the side of the bed?: 3  Moving to and from a bed to a chair (including a wheelchair)?: 3  Need to walk in hospital room?: 3  Climbing 3-5 steps with a railing?: 2  Total Score: 17    AM-PAC Raw Score CMS G-Code Modifier Level of Impairment Assistance   6 % Total / Unable   7 - 9 CM 80 - 100% Maximal Assist   10 - 14 CL 60 - 80% Moderate Assist   15 - 19 CK 40 - 60% Moderate Assist   20 - 22 CJ 20 - 40% Minimal Assist   23 CI 1-20% SBA / CGA   24 CH 0% Independent/ Mod I     Patient left supine with all lines intact, call button in reach, bed alarm on, RN notified and nephew present.    Assessment:  Nathanael Velez is a 61 y.o. male with a medical diagnosis of Thrombotic stroke involving left middle cerebral artery and presents with poor initiation for bed mob and transfer requiring vc's and tc's to perform functional mobility.  Pt unable to proceed to gait this date 2* abdominal discomfort.  Pt will cont to benefit from skilled PT intervention to address deficits and improve functional mobility.     Rehab identified problem list/impairments: Rehab identified problem list/impairments: weakness, impaired endurance, impaired sensation, impaired self care skills, impaired functional mobilty, gait instability, impaired balance, visual deficits, impaired cognition, decreased  coordination, decreased upper extremity function, decreased lower extremity function, decreased safety awareness, pain, impaired coordination, impaired fine motor    Rehab potential is good.    Activity tolerance: Fair    Discharge recommendations: Discharge Facility/Level Of Care Needs: rehabilitation facility     Barriers to discharge: Barriers to Discharge: Inaccessible home environment, Decreased caregiver support    Equipment recommendations: Equipment Needed After Discharge: 3-in-1 commode, bath bench, walker, rolling, wheelchair     GOALS:   Physical Therapy Goals        Problem: Physical Therapy Goal    Goal Priority Disciplines Outcome Goal Variances Interventions   Physical Therapy Goal     PT/OT, PT Ongoing (interventions implemented as appropriate)     Description:  Active Goals to be met by: 4/18/17    1. Supine to sit with CGA.  2. Sit to supine with Supervision. NOT MET  3. Sit to stand with QCN with CGA.  4. Pt to perform gait 5ft with appropriate AD with max assist.-met 4/1/17  Revised: Pt to perform gait 75ft with RW with CGA. NOT MET  5. Pt to transfer bed to/from bedside chair with max assist.- met 4/1/17  Revised: pt to transfer to bedside chair via stand pivot with min A with RW. NOT MET  6. Able to tolerate exercise for 15-20 reps with independence.  7. Dynamic sitting at edge of bed x 8 minutes with Stand-by Assistance.  8. Dynamic standing for 2 minutes with Contact Guard Assistance using Quad Cane.  9. Patient and family able to teachback stroke & positioning education independently.  10. Wheelchair propulsion x 100 feet with Minimal Assistance using bilateral lower extremities--Discontinued                PLAN:    Patient to be seen 6 x/week  to address the above listed problems via gait training, therapeutic activities, therapeutic exercises, neuromuscular re-education  Plan of Care expires: 04/18/17  Plan of Care reviewed with: patient         Susan Post, PTA  04/06/2017

## 2017-04-07 NOTE — PLAN OF CARE
Problem: Physical Therapy Goal  Goal: Physical Therapy Goal  Active Goals to be met by: 4/18/17    1. Supine to sit with CGA.  2. Sit to supine with Supervision. NOT MET  3. Sit to stand with QCN with CGA.  4. Pt to perform gait 5ft with appropriate AD with max assist.-met 4/1/17  Revised: Pt to perform gait 75ft with RW with CGA. NOT MET  5. Pt to transfer bed to/from bedside chair with max assist.- met 4/1/17  Revised: pt to transfer to bedside chair via stand pivot with min A with RW. NOT MET  6. Able to tolerate exercise for 15-20 reps with independence.  7. Dynamic sitting at edge of bed x 8 minutes with Stand-by Assistance.  8. Dynamic standing for 2 minutes with Contact Guard Assistance using Quad Cane.  9. Patient and family able to teachback stroke & positioning education independently.  10. Wheelchair propulsion x 100 feet with Minimal Assistance using bilateral lower extremities--Discontinued            Goals remain appropriate.      Susan Post, PTA.  4/6/2017

## 2017-04-08 LAB
BACTERIA BLD CULT: NORMAL

## 2017-04-09 LAB — BACTERIA BLD CULT: NORMAL

## 2017-05-05 ENCOUNTER — TELEPHONE (OUTPATIENT)
Dept: NEUROLOGY | Facility: CLINIC | Age: 62
End: 2017-05-05

## 2017-05-05 NOTE — TELEPHONE ENCOUNTER
Called spoke with patient, Appt has been changed to 5/22 with Dr. Francisco. Pt states his understanding.

## 2017-07-05 ENCOUNTER — TELEPHONE (OUTPATIENT)
Dept: ADMINISTRATIVE | Facility: OTHER | Age: 62
End: 2017-07-05

## 2020-09-30 NOTE — SEDATION DOCUMENTATION
After obtaining consent, and per orders of Dr. Daisy Wise, injection of Flu given in Left deltoid by Daniel Neil. Patient instructed to remain in clinic for 20 minutes afterwards, and to report any adverse reaction to me immediately. TICI 3

## 2023-01-26 NOTE — ASSESSMENT & PLAN NOTE
Found on echo.   As per [facility]  Vegitation vs mass - repeat echo normal  Recommend clarification with cardiology on echo read    negative

## 2024-10-14 NOTE — PT/OT/SLP PROGRESS
Speech Language Pathology  Treatment    Nathanael Velez   MRN: [id number]   Admitting Diagnosis: Thrombotic stroke involving left middle cerebral artery    Diet recommendations: Solid Diet Level: NPO  Liquid Diet Level: NPO   Strict aspiration precautions.    SLP Treatment Date: 17  Speech Start Time: 913     Speech Stop Time: 924     Speech Total (min): 11 min       TREATMENT BILLABLE MINUTES:  Speech Therapy Individual 11    Has the patient been evaluated by SLP for swallowing? : Yes  Keep patient NPO?: Yes   General Precautions: Standard, fall, aspiration, NPO  Current Respiratory Status: nasal cannula       Subjective:  Pt was lethargic.    Pain Ratin/10  Pain Rating Post-Intervention: 0/10    Objective:   Patient found with: telemetry, NG tube    No PO trials attempted 2/2 to pt's PEG placement today. Max encouragement provided to pt to participate in session. Pt answered X5 simple yes/no questions with 20% acc indep; min cues not beneficial. Pt followed X1 2-step directives with 0% acc indep. Pt completed X1 rote speech task (singing) with 0% acc indep. Skilled education of SLP's occupation and reasoning for therapy. No further questions.     Assessment:  Nathanael Velez is a 61 y.o. male with a medical diagnosis of Thrombotic stroke involving left middle cerebral artery and presents with oropharyngeal dysphagia, aphasia, apraxia, cognitive impairment and dysarthria. Continue progress towards goals.     Discharge recommendations: Discharge Facility/Level Of Care Needs: rehabilitation facility (pending OT PT recs)     Goals:   SLP Goals        Problem: SLP Goal    Goal Priority Disciplines Outcome   SLP Goal     SLP Ongoing (interventions implemented as appropriate)   Description:  SLP Plan of Care: Speech Pathology will follow pt 5x/week and address the following goals x1 week:    1. Pt will participate in ongoing swallow assessment to determine least restrictive diet. ONGOING  2. Pt will answer simple YN  questions with 70% acc provided min cues to improve receptive language. ONGOING  3. Pt will follow 2-step directives with 60% acc indep provided mod cues to improve receptive language. MET/MODIFIED  4. Pt will complete automatic rote speech (counting) with 60% acc indep provided mod cues to improve expressive language. MET/MODIFIED  5. When deemed appropriate by MD/SLP pt will participate in MBS to further determine safe swallow function. ONGOING    SLP Plan of Care: Speech Pathology will follow pt 5x/week and address the following goals x1 week:  4/10  1. Pt will participate in ongoing swallow assessment to determine least restrictive diet.   2. Pt will answer simple YN questions with 70% acc provided min cues to improve receptive language.   3. Pt will follow 2-step directives with 80% acc indep provided mod cues to improve receptive language.  4. Pt will complete automatic rote speech/song with 90% acc indep provided mod cues to improve expressive language.   5. When deemed appropriate by MD/SLP pt will participate in MBS to further determine safe swallow function.                          Plan:   Patient to be seen Therapy Frequency: 5 x/week   Plan of Care expires: 04/17/17  Plan of Care reviewed with: patient  SLP Follow-up?: Yes  SLP - Next Visit Date: 04/06/17      KAYLEE Mckeon  04/05/2017     DISPLAY PLAN FREE TEXT DISPLAY PLAN FREE TEXT DISPLAY PLAN FREE TEXT DISPLAY PLAN FREE TEXT DISPLAY PLAN FREE TEXT

## 2024-12-19 NOTE — ASSESSMENT & PLAN NOTE
Given tpa   Due to stroke above  Aggressive PT/OT/ speech when appropriate   Pharmacy faxed in a request for prior authorization on:    Medication: Testosterone Cypionate 200mg/ml  Dosage: 200mg IM every 14 days  Quantity requested:  6ml with 4 refills  Pharmacy for prescription has been selected.    Initiation of prior authorization needed.

## (undated) DEVICE — KIT PEG PULL STANDARD 20F

## (undated) DEVICE — LUBRICANT SURGILUBE 2 OZ

## (undated) DEVICE — SEE MEDLINE ITEM 146420

## (undated) DEVICE — SPONGE DERMACEL 4PLY 2X2

## (undated) DEVICE — SEE L#95700

## (undated) DEVICE — SEE MEDLINE ITEM 152487

## (undated) DEVICE — SEE MEDLINE ITEM 146313

## (undated) DEVICE — GAUZE SPONGE 4X4 12PLY